# Patient Record
Sex: FEMALE | Race: WHITE | Employment: OTHER | RURAL
[De-identification: names, ages, dates, MRNs, and addresses within clinical notes are randomized per-mention and may not be internally consistent; named-entity substitution may affect disease eponyms.]

---

## 2017-02-03 PROBLEM — R55 PRE-SYNCOPE: Status: ACTIVE | Noted: 2017-02-03

## 2017-02-03 PROBLEM — N30.00 ACUTE CYSTITIS WITHOUT HEMATURIA: Status: ACTIVE | Noted: 2017-02-03

## 2017-02-03 PROBLEM — K21.9 GERD (GASTROESOPHAGEAL REFLUX DISEASE): Status: ACTIVE | Noted: 2017-02-03

## 2017-02-03 PROBLEM — Z12.11 ENCOUNTER FOR SCREENING COLONOSCOPY: Status: ACTIVE | Noted: 2017-02-03

## 2017-02-03 PROBLEM — R10.13 EPIGASTRIC ABDOMINAL PAIN: Status: ACTIVE | Noted: 2017-02-03

## 2017-02-03 PROBLEM — R42 DIZZINESS: Status: ACTIVE | Noted: 2017-02-03

## 2017-02-03 PROBLEM — H81.10 BPV (BENIGN POSITIONAL VERTIGO): Status: ACTIVE | Noted: 2017-02-03

## 2017-03-07 ENCOUNTER — TELEPHONE (OUTPATIENT)
Dept: OTHER | Age: 72
End: 2017-03-07

## 2017-05-02 PROBLEM — I10 ESSENTIAL HYPERTENSION: Status: ACTIVE | Noted: 2017-05-02

## 2017-05-02 PROBLEM — R11.2 NAUSEA VOMITING AND DIARRHEA: Status: ACTIVE | Noted: 2017-05-02

## 2017-05-02 PROBLEM — R91.1 LUNG NODULE: Status: ACTIVE | Noted: 2017-05-02

## 2017-05-02 PROBLEM — R09.02 HYPOXIA: Status: ACTIVE | Noted: 2017-05-02

## 2017-05-02 PROBLEM — R19.7 NAUSEA VOMITING AND DIARRHEA: Status: ACTIVE | Noted: 2017-05-02

## 2017-05-03 PROBLEM — K85.90 ACUTE PANCREATITIS: Status: ACTIVE | Noted: 2017-05-03

## 2017-05-09 ENCOUNTER — TELEPHONE (OUTPATIENT)
Dept: SURGERY | Age: 72
End: 2017-05-09

## 2017-06-01 ENCOUNTER — TELEPHONE (OUTPATIENT)
Dept: SURGERY | Age: 72
End: 2017-06-01

## 2017-11-15 ENCOUNTER — TELEPHONE (OUTPATIENT)
Dept: FAMILY MEDICINE CLINIC | Age: 72
End: 2017-11-15

## 2017-11-15 VITALS — HEART RATE: 80 BPM | SYSTOLIC BLOOD PRESSURE: 160 MMHG | DIASTOLIC BLOOD PRESSURE: 90 MMHG

## 2017-11-15 RX ORDER — LISINOPRIL AND HYDROCHLOROTHIAZIDE 25; 20 MG/1; MG/1
1 TABLET ORAL DAILY
Qty: 30 TABLET | Refills: 2 | Status: SHIPPED | OUTPATIENT
Start: 2017-11-15 | End: 2017-11-16 | Stop reason: SDUPTHER

## 2017-11-15 NOTE — TELEPHONE ENCOUNTER
Patient is scheduled to see TATYANA Echols to establish care on 11/16/2017. She presented to the  today c/o headache and \"feeling bad\" and that she had been out of her blood pressure medication for 2 days. She stated her previous provider sent in [address] 20/12.5mg and she has always taken 20/25mg. She refused to  and take the 20/12.5mg.    BP was checked 160/90 HR80. Her medication and dose were verified with [facility]. TATYANA Echols notified and verbal orders received to D/C Lisinopril/HCTZ 20/12.5mg and start Lisinopril/HCTZ 20/25mg once daily and to be sure to keep her scheduled appointment to follow up and establish care on 11/16/2017. Medication change sent to [facility].

## 2017-11-16 ENCOUNTER — OFFICE VISIT (OUTPATIENT)
Dept: FAMILY MEDICINE CLINIC | Age: 72
End: 2017-11-16
Payer: MEDICARE

## 2017-11-16 VITALS
RESPIRATION RATE: 18 BRPM | HEIGHT: 63 IN | SYSTOLIC BLOOD PRESSURE: 152 MMHG | BODY MASS INDEX: 32.07 KG/M2 | OXYGEN SATURATION: 98 % | DIASTOLIC BLOOD PRESSURE: 74 MMHG | WEIGHT: 181 LBS | HEART RATE: 76 BPM

## 2017-11-16 DIAGNOSIS — K58.0 IRRITABLE BOWEL SYNDROME WITH DIARRHEA: ICD-10-CM

## 2017-11-16 DIAGNOSIS — E03.9 HYPOTHYROIDISM, UNSPECIFIED TYPE: ICD-10-CM

## 2017-11-16 DIAGNOSIS — Z87.19 HISTORY OF BLOODY STOOLS: ICD-10-CM

## 2017-11-16 DIAGNOSIS — K21.9 GASTROESOPHAGEAL REFLUX DISEASE, ESOPHAGITIS PRESENCE NOT SPECIFIED: ICD-10-CM

## 2017-11-16 DIAGNOSIS — R19.8 PAIN WITH BOWEL MOVEMENTS: ICD-10-CM

## 2017-11-16 DIAGNOSIS — R10.13 EPIGASTRIC PAIN: ICD-10-CM

## 2017-11-16 DIAGNOSIS — N60.19 FIBROCYSTIC BREAST DISEASE (FCBD), UNSPECIFIED LATERALITY: ICD-10-CM

## 2017-11-16 DIAGNOSIS — Z12.11 ENCOUNTER FOR SCREENING COLONOSCOPY: ICD-10-CM

## 2017-11-16 DIAGNOSIS — R13.19 ESOPHAGEAL DYSPHAGIA: ICD-10-CM

## 2017-11-16 DIAGNOSIS — I20.8 STABLE ANGINA (HCC): ICD-10-CM

## 2017-11-16 DIAGNOSIS — R10.9 ABDOMINAL CRAMPING: ICD-10-CM

## 2017-11-16 DIAGNOSIS — Z12.31 ENCOUNTER FOR SCREENING MAMMOGRAM FOR BREAST CANCER: ICD-10-CM

## 2017-11-16 DIAGNOSIS — I10 ESSENTIAL HYPERTENSION: Primary | ICD-10-CM

## 2017-11-16 DIAGNOSIS — Z23 NEED FOR PROPHYLACTIC VACCINATION AGAINST STREPTOCOCCUS PNEUMONIAE (PNEUMOCOCCUS): ICD-10-CM

## 2017-11-16 PROCEDURE — G8417 CALC BMI ABV UP PARAM F/U: HCPCS | Performed by: NURSE PRACTITIONER

## 2017-11-16 PROCEDURE — 3014F SCREEN MAMMO DOC REV: CPT | Performed by: NURSE PRACTITIONER

## 2017-11-16 PROCEDURE — G8427 DOCREV CUR MEDS BY ELIG CLIN: HCPCS | Performed by: NURSE PRACTITIONER

## 2017-11-16 PROCEDURE — 1036F TOBACCO NON-USER: CPT | Performed by: NURSE PRACTITIONER

## 2017-11-16 PROCEDURE — 4040F PNEUMOC VAC/ADMIN/RCVD: CPT | Performed by: NURSE PRACTITIONER

## 2017-11-16 PROCEDURE — 1123F ACP DISCUSS/DSCN MKR DOCD: CPT | Performed by: NURSE PRACTITIONER

## 2017-11-16 PROCEDURE — 1090F PRES/ABSN URINE INCON ASSESS: CPT | Performed by: NURSE PRACTITIONER

## 2017-11-16 PROCEDURE — G8598 ASA/ANTIPLAT THER USED: HCPCS | Performed by: NURSE PRACTITIONER

## 2017-11-16 PROCEDURE — 3017F COLORECTAL CA SCREEN DOC REV: CPT | Performed by: NURSE PRACTITIONER

## 2017-11-16 PROCEDURE — G8400 PT W/DXA NO RESULTS DOC: HCPCS | Performed by: NURSE PRACTITIONER

## 2017-11-16 PROCEDURE — 99204 OFFICE O/P NEW MOD 45 MIN: CPT | Performed by: NURSE PRACTITIONER

## 2017-11-16 PROCEDURE — G8484 FLU IMMUNIZE NO ADMIN: HCPCS | Performed by: NURSE PRACTITIONER

## 2017-11-16 RX ORDER — DICYCLOMINE HCL 20 MG
20 TABLET ORAL 3 TIMES DAILY PRN
Qty: 90 TABLET | Refills: 1 | Status: SHIPPED | OUTPATIENT
Start: 2017-11-16 | End: 2018-01-29 | Stop reason: ALTCHOICE

## 2017-11-16 RX ORDER — NITROGLYCERIN 0.4 MG/1
0.4 TABLET SUBLINGUAL EVERY 5 MIN PRN
Qty: 25 TABLET | Refills: 3 | Status: SHIPPED | OUTPATIENT
Start: 2017-11-16 | End: 2020-07-08 | Stop reason: SDUPTHER

## 2017-11-16 RX ORDER — LEVOTHYROXINE SODIUM 0.07 MG/1
1 TABLET ORAL DAILY
COMMUNITY
Start: 2017-11-07 | End: 2018-01-29 | Stop reason: SDUPTHER

## 2017-11-16 RX ORDER — LISINOPRIL AND HYDROCHLOROTHIAZIDE 25; 20 MG/1; MG/1
1 TABLET ORAL DAILY
Qty: 90 TABLET | Refills: 0 | Status: SHIPPED | OUTPATIENT
Start: 2017-11-16 | End: 2018-01-24 | Stop reason: ALTCHOICE

## 2017-11-16 NOTE — PROGRESS NOTES
SUBJECTIVE:  Jesus Zavala is a 81 y.o. female that presents with   Chief Complaint   Patient presents with   [address]    Hypertension   . HPI:    She presents to establish care. She has a history of uncontrolled hypertension. She has not been taking any blood pressure medications for some time. She recently began having some headaches, facial flushing, and blurry vision. She was able to get a small supply from her pharmacy this week until she can make it to appointment. She is feeling better and the symptoms have resolved. She has a chronic history of GI symptoms. She experiences severe GERD with heartburn and reflux that causes her throat to be sore with difficulty swallowing. She has epigastric pain with generalized abdominal cramping. She has a history of irritable bowel syndrome with diarrhea and bloody stools. She is a previous patient of gastroenterologist Dr. Jose David Mortensen and would like a consult for EGD and colonoscopy after the holidays. She has a history of fibrocystic breast disease with a left breast lump that was previously biopsied and normal. She needs follow-up mammogram scheduled. She has a history of stable angina that may be related to high blood pressures. She very rarely uses her nitroglycerin. When she does use nitroglycerin he does help her chest pain. She denies any current shortness of air or chest pain. Review of Systems   Constitutional: Positive for fatigue. HENT: Positive for sore throat and trouble swallowing. Gastrointestinal: Positive for abdominal pain, blood in stool, diarrhea and nausea. Severe GERD and dysphagia. Psychiatric/Behavioral: Positive for dysphoric mood. The patient is nervous/anxious. All other systems reviewed and are negative. OBJECTIVE:  BP (!) 152/74 (Site: Left Arm, Position: Sitting, Cuff Size: Medium Adult)   Pulse 76   Resp 18   Ht 5' 3\" (1.6 m)   Wt 181 lb (82.1 kg)   SpO2 98% Comment: room air  Breastfeeding? No   BMI 32.06 kg/m²    Physical Exam   Constitutional: She is oriented to person, place, and time. She appears well-developed and well-nourished. HENT:   Head: Normocephalic and atraumatic. Right Ear: External ear normal.   Left Ear: External ear normal.   Nose: Nose normal.   Mouth/Throat: Posterior oropharyngeal erythema present. Eyes: Conjunctivae are normal. Pupils are equal, round, and reactive to light. Neck: Normal range of motion. Neck supple. Cardiovascular: Normal rate, regular rhythm, normal heart sounds and intact distal pulses. Pulmonary/Chest: Effort normal and breath sounds normal.   Abdominal: Soft. Bowel sounds are normal. There is generalized tenderness. Musculoskeletal: Normal range of motion. Overall musculoskeletal tenderness. Neurological: She is alert and oriented to person, place, and time. Skin: Skin is warm and dry. Psychiatric: Her speech is normal and behavior is normal. Judgment and thought content normal. Her mood appears anxious. Cognition and memory are normal. She exhibits a depressed mood. Nursing note and vitals reviewed. No results found for requested labs within last 30 days. Microscopic Examination (no units)   Date Value   05/01/2017 YES     LDL Calculated (mg/dL)   Date Value   05/03/2017 89         Lab Results   Component Value Date    WBC 8.3 05/03/2017    NEUTROABS 6.7 05/03/2017    HGB 11.7 05/03/2017    HCT 36.3 05/03/2017    MCV 97.8 05/03/2017     05/03/2017       Lab Results   Component Value Date    TSH 1.62 02/02/2017         ASSESSMENT/PLAN:  1. Essential hypertension  lisinopril-hydrochlorothiazide (PRINZIDE;ZESTORETIC) 20-25 MG per tablet   2. Irritable bowel syndrome with diarrhea  dicyclomine (BENTYL) 20 MG tablet   3. Gastroesophageal reflux disease, esophagitis presence not specified  Omeprazole Magnesium (PRILOSEC OTC PO)    External Referral To Gastroenterology   4.  Hypothyroidism, unspecified type  levothyroxine

## 2017-11-30 ENCOUNTER — TELEPHONE (OUTPATIENT)
Dept: FAMILY MEDICINE CLINIC | Age: 72
End: 2017-11-30

## 2017-11-30 NOTE — TELEPHONE ENCOUNTER
Patient is requesting something be sent to [facility] stating she is only using oxygen at night. She states does not need the portable oxygen tanks.   Their fax # is [phone]

## 2017-11-30 NOTE — TELEPHONE ENCOUNTER
I wrote a letter and am putting it on your desk with the fax number. You can give this to Gerardo Bhatt or Olena Bell to fax. I didn't want to put it on their desk and they wouldn't know what it was for. Thanks so much.

## 2017-12-10 PROBLEM — K58.0 IRRITABLE BOWEL SYNDROME WITH DIARRHEA: Status: ACTIVE | Noted: 2017-12-10

## 2017-12-10 PROBLEM — I20.8 STABLE ANGINA (HCC): Status: ACTIVE | Noted: 2017-12-10

## 2017-12-10 PROBLEM — I20.89 STABLE ANGINA: Status: ACTIVE | Noted: 2017-12-10

## 2017-12-10 PROBLEM — N60.19 FIBROCYSTIC BREAST DISEASE (FCBD): Status: ACTIVE | Noted: 2017-12-10

## 2017-12-10 ASSESSMENT — ENCOUNTER SYMPTOMS
DIARRHEA: 1
SORE THROAT: 1
TROUBLE SWALLOWING: 1
NAUSEA: 1
ABDOMINAL PAIN: 1
BLOOD IN STOOL: 1

## 2018-01-25 ENCOUNTER — OFFICE VISIT (OUTPATIENT)
Dept: FAMILY MEDICINE CLINIC | Age: 73
End: 2018-01-25
Payer: MEDICARE

## 2018-01-25 VITALS
SYSTOLIC BLOOD PRESSURE: 130 MMHG | RESPIRATION RATE: 18 BRPM | HEART RATE: 88 BPM | OXYGEN SATURATION: 96 % | DIASTOLIC BLOOD PRESSURE: 60 MMHG

## 2018-01-25 DIAGNOSIS — R19.7 DIARRHEA, UNSPECIFIED TYPE: ICD-10-CM

## 2018-01-25 DIAGNOSIS — R07.9 CHEST PAIN, UNSPECIFIED TYPE: Primary | ICD-10-CM

## 2018-01-25 DIAGNOSIS — M79.602 LEFT ARM PAIN: ICD-10-CM

## 2018-01-25 DIAGNOSIS — R10.9 ABDOMINAL PAIN, UNSPECIFIED ABDOMINAL LOCATION: ICD-10-CM

## 2018-01-25 DIAGNOSIS — R11.2 NAUSEA AND VOMITING, INTRACTABILITY OF VOMITING NOT SPECIFIED, UNSPECIFIED VOMITING TYPE: ICD-10-CM

## 2018-01-25 LAB
INFLUENZA A ANTIBODY: NEGATIVE
INFLUENZA B ANTIBODY: NEGATIVE

## 2018-01-25 PROCEDURE — 96372 THER/PROPH/DIAG INJ SC/IM: CPT | Performed by: NURSE PRACTITIONER

## 2018-01-25 PROCEDURE — 3014F SCREEN MAMMO DOC REV: CPT | Performed by: NURSE PRACTITIONER

## 2018-01-25 PROCEDURE — 1036F TOBACCO NON-USER: CPT | Performed by: NURSE PRACTITIONER

## 2018-01-25 PROCEDURE — G8417 CALC BMI ABV UP PARAM F/U: HCPCS | Performed by: NURSE PRACTITIONER

## 2018-01-25 PROCEDURE — 99214 OFFICE O/P EST MOD 30 MIN: CPT | Performed by: NURSE PRACTITIONER

## 2018-01-25 PROCEDURE — G8599 NO ASA/ANTIPLAT THER USE RNG: HCPCS | Performed by: NURSE PRACTITIONER

## 2018-01-25 PROCEDURE — G8400 PT W/DXA NO RESULTS DOC: HCPCS | Performed by: NURSE PRACTITIONER

## 2018-01-25 PROCEDURE — 3017F COLORECTAL CA SCREEN DOC REV: CPT | Performed by: NURSE PRACTITIONER

## 2018-01-25 PROCEDURE — 1090F PRES/ABSN URINE INCON ASSESS: CPT | Performed by: NURSE PRACTITIONER

## 2018-01-25 PROCEDURE — 1123F ACP DISCUSS/DSCN MKR DOCD: CPT | Performed by: NURSE PRACTITIONER

## 2018-01-25 PROCEDURE — G8484 FLU IMMUNIZE NO ADMIN: HCPCS | Performed by: NURSE PRACTITIONER

## 2018-01-25 PROCEDURE — G8427 DOCREV CUR MEDS BY ELIG CLIN: HCPCS | Performed by: NURSE PRACTITIONER

## 2018-01-25 PROCEDURE — 87804 INFLUENZA ASSAY W/OPTIC: CPT | Performed by: NURSE PRACTITIONER

## 2018-01-25 PROCEDURE — 4040F PNEUMOC VAC/ADMIN/RCVD: CPT | Performed by: NURSE PRACTITIONER

## 2018-01-25 RX ORDER — PROMETHAZINE HYDROCHLORIDE 25 MG/ML
6.25 INJECTION, SOLUTION INTRAMUSCULAR; INTRAVENOUS ONCE
Status: COMPLETED | OUTPATIENT
Start: 2018-01-25 | End: 2018-01-25

## 2018-01-25 RX ORDER — DICYCLOMINE HCL 20 MG
20 TABLET ORAL 3 TIMES DAILY PRN
Qty: 120 TABLET | Refills: 0 | Status: SHIPPED | OUTPATIENT
Start: 2018-01-25 | End: 2018-01-29 | Stop reason: ALTCHOICE

## 2018-01-25 RX ADMIN — PROMETHAZINE HYDROCHLORIDE 6.25 MG: 25 INJECTION, SOLUTION INTRAMUSCULAR; INTRAVENOUS at 11:29

## 2018-01-25 ASSESSMENT — PATIENT HEALTH QUESTIONNAIRE - PHQ9
SUM OF ALL RESPONSES TO PHQ QUESTIONS 1-9: 0
2. FEELING DOWN, DEPRESSED OR HOPELESS: 0
1. LITTLE INTEREST OR PLEASURE IN DOING THINGS: 0
SUM OF ALL RESPONSES TO PHQ9 QUESTIONS 1 & 2: 0

## 2018-01-29 ENCOUNTER — OFFICE VISIT (OUTPATIENT)
Dept: FAMILY MEDICINE CLINIC | Age: 73
End: 2018-01-29
Payer: MEDICARE

## 2018-01-29 VITALS
RESPIRATION RATE: 18 BRPM | HEART RATE: 74 BPM | OXYGEN SATURATION: 98 % | SYSTOLIC BLOOD PRESSURE: 138 MMHG | BODY MASS INDEX: 29.77 KG/M2 | DIASTOLIC BLOOD PRESSURE: 80 MMHG | WEIGHT: 168 LBS | HEIGHT: 63 IN

## 2018-01-29 DIAGNOSIS — E03.9 HYPOTHYROIDISM, UNSPECIFIED TYPE: ICD-10-CM

## 2018-01-29 DIAGNOSIS — R11.0 NAUSEA: ICD-10-CM

## 2018-01-29 DIAGNOSIS — I10 ESSENTIAL HYPERTENSION: Primary | ICD-10-CM

## 2018-01-29 DIAGNOSIS — K21.9 GASTROESOPHAGEAL REFLUX DISEASE, ESOPHAGITIS PRESENCE NOT SPECIFIED: ICD-10-CM

## 2018-01-29 DIAGNOSIS — R42 DIZZY: ICD-10-CM

## 2018-01-29 DIAGNOSIS — R07.9 CHEST PAIN, UNSPECIFIED TYPE: ICD-10-CM

## 2018-01-29 DIAGNOSIS — K58.0 IRRITABLE BOWEL SYNDROME WITH DIARRHEA: ICD-10-CM

## 2018-01-29 PROCEDURE — G8417 CALC BMI ABV UP PARAM F/U: HCPCS | Performed by: NURSE PRACTITIONER

## 2018-01-29 PROCEDURE — 1090F PRES/ABSN URINE INCON ASSESS: CPT | Performed by: NURSE PRACTITIONER

## 2018-01-29 PROCEDURE — 1036F TOBACCO NON-USER: CPT | Performed by: NURSE PRACTITIONER

## 2018-01-29 PROCEDURE — 3017F COLORECTAL CA SCREEN DOC REV: CPT | Performed by: NURSE PRACTITIONER

## 2018-01-29 PROCEDURE — 1123F ACP DISCUSS/DSCN MKR DOCD: CPT | Performed by: NURSE PRACTITIONER

## 2018-01-29 PROCEDURE — G8400 PT W/DXA NO RESULTS DOC: HCPCS | Performed by: NURSE PRACTITIONER

## 2018-01-29 PROCEDURE — G8427 DOCREV CUR MEDS BY ELIG CLIN: HCPCS | Performed by: NURSE PRACTITIONER

## 2018-01-29 PROCEDURE — G8484 FLU IMMUNIZE NO ADMIN: HCPCS | Performed by: NURSE PRACTITIONER

## 2018-01-29 PROCEDURE — 99214 OFFICE O/P EST MOD 30 MIN: CPT | Performed by: NURSE PRACTITIONER

## 2018-01-29 PROCEDURE — 3014F SCREEN MAMMO DOC REV: CPT | Performed by: NURSE PRACTITIONER

## 2018-01-29 PROCEDURE — G8599 NO ASA/ANTIPLAT THER USE RNG: HCPCS | Performed by: NURSE PRACTITIONER

## 2018-01-29 PROCEDURE — 4040F PNEUMOC VAC/ADMIN/RCVD: CPT | Performed by: NURSE PRACTITIONER

## 2018-01-29 RX ORDER — ONDANSETRON 4 MG/1
TABLET, ORALLY DISINTEGRATING ORAL
Qty: 30 TABLET | Refills: 3 | Status: SHIPPED | OUTPATIENT
Start: 2018-01-29 | End: 2018-04-04 | Stop reason: ALTCHOICE

## 2018-01-29 RX ORDER — LEVOTHYROXINE SODIUM 0.07 MG/1
75 TABLET ORAL DAILY
Qty: 30 TABLET | Refills: 5 | Status: SHIPPED | OUTPATIENT
Start: 2018-01-29 | End: 2018-02-21 | Stop reason: SDUPTHER

## 2018-01-29 RX ORDER — LISINOPRIL AND HYDROCHLOROTHIAZIDE 25; 20 MG/1; MG/1
1 TABLET ORAL DAILY
Qty: 30 TABLET | Refills: 5 | Status: SHIPPED | OUTPATIENT
Start: 2018-01-29 | End: 2018-02-21 | Stop reason: SDUPTHER

## 2018-01-29 NOTE — PROGRESS NOTES
memory are normal. She exhibits a depressed mood. Nursing note and vitals reviewed. No results found for requested labs within last 30 days. Microscopic Examination (no units)   Date Value   2018 YES     LDL Calculated (mg/dL)   Date Value   2017 89         Lab Results   Component Value Date    WBC 9.1 2018    NEUTROABS 7.3 2018    HGB 13.4 2018    HCT 40.9 2018    MCV 98.3 2018     2018       Lab Results   Component Value Date    TSH 1.62 2017         ASSESSMENT/PLAN:  1. Essential hypertension     2. Chest pain, unspecified type     3. Gastroesophageal reflux disease, esophagitis presence not specified     4. Irritable bowel syndrome with diarrhea     5. Hypothyroidism, unspecified type  DISCONTINUED: levothyroxine (SYNTHROID) 75 MCG tablet   6. Nausea  ondansetron (ZOFRAN ODT) 4 MG disintegrating tablet   7. Dizzy          No orders of the defined types were placed in this encounter. Outpatient Encounter Prescriptions as of 2018   Medication Sig Dispense Refill    ondansetron (ZOFRAN ODT) 4 MG disintegrating tablet Take 1-2 tablets every 8 hours as needed for nausea and vomiting.  30 tablet 3    [DISCONTINUED] levothyroxine (SYNTHROID) 75 MCG tablet Take 1 tablet by mouth daily 30 tablet 5    [DISCONTINUED] lisinopril-hydrochlorothiazide (PRINZIDE;ZESTORETIC) 20-25 MG per tablet Take 1 tablet by mouth daily 30 tablet 5    famotidine (PEPCID) 20 MG tablet Take 1 tablet by mouth 2 times daily 20 tablet 0    [] diphenhydrAMINE (BENADRYL) 25 MG capsule Take 1 capsule by mouth every 6 hours as needed for Itching 30 capsule 0    nitroGLYCERIN (NITROSTAT) 0.4 MG SL tablet Place 1 tablet under the tongue every 5 minutes as needed for Chest pain 25 tablet 3    aspirin EC 81 MG EC tablet Take 1 tablet by mouth daily 30 tablet 3    [] loperamide (RA ANTI-DIARRHEAL) 2 MG capsule Take 1 capsule by mouth 4 times daily as

## 2018-01-31 ENCOUNTER — TELEPHONE (OUTPATIENT)
Dept: FAMILY MEDICINE CLINIC | Age: 73
End: 2018-01-31

## 2018-02-21 ENCOUNTER — OFFICE VISIT (OUTPATIENT)
Dept: FAMILY MEDICINE CLINIC | Age: 73
End: 2018-02-21
Payer: MEDICARE

## 2018-02-21 VITALS
WEIGHT: 169 LBS | HEIGHT: 63 IN | SYSTOLIC BLOOD PRESSURE: 138 MMHG | OXYGEN SATURATION: 98 % | RESPIRATION RATE: 18 BRPM | HEART RATE: 78 BPM | BODY MASS INDEX: 29.95 KG/M2 | DIASTOLIC BLOOD PRESSURE: 80 MMHG

## 2018-02-21 DIAGNOSIS — R07.89 LEFT-SIDED CHEST WALL PAIN: ICD-10-CM

## 2018-02-21 DIAGNOSIS — N60.19 FIBROCYSTIC BREAST DISEASE (FCBD), UNSPECIFIED LATERALITY: ICD-10-CM

## 2018-02-21 DIAGNOSIS — K21.00 GASTROESOPHAGEAL REFLUX DISEASE WITH ESOPHAGITIS: ICD-10-CM

## 2018-02-21 DIAGNOSIS — Z87.2 HISTORY OF CYST OF BREAST: ICD-10-CM

## 2018-02-21 DIAGNOSIS — M79.622 LEFT AXILLARY PAIN: ICD-10-CM

## 2018-02-21 DIAGNOSIS — I10 ESSENTIAL HYPERTENSION: Primary | ICD-10-CM

## 2018-02-21 DIAGNOSIS — N64.4 BREAST PAIN, LEFT: ICD-10-CM

## 2018-02-21 DIAGNOSIS — E03.9 HYPOTHYROIDISM, UNSPECIFIED TYPE: ICD-10-CM

## 2018-02-21 DIAGNOSIS — R92.2 BREAST DENSITY: ICD-10-CM

## 2018-02-21 DIAGNOSIS — M79.602 LEFT ARM PAIN: ICD-10-CM

## 2018-02-21 PROCEDURE — 99214 OFFICE O/P EST MOD 30 MIN: CPT | Performed by: NURSE PRACTITIONER

## 2018-02-21 PROCEDURE — G8484 FLU IMMUNIZE NO ADMIN: HCPCS | Performed by: NURSE PRACTITIONER

## 2018-02-21 PROCEDURE — 1123F ACP DISCUSS/DSCN MKR DOCD: CPT | Performed by: NURSE PRACTITIONER

## 2018-02-21 PROCEDURE — 4040F PNEUMOC VAC/ADMIN/RCVD: CPT | Performed by: NURSE PRACTITIONER

## 2018-02-21 PROCEDURE — G8417 CALC BMI ABV UP PARAM F/U: HCPCS | Performed by: NURSE PRACTITIONER

## 2018-02-21 PROCEDURE — G8400 PT W/DXA NO RESULTS DOC: HCPCS | Performed by: NURSE PRACTITIONER

## 2018-02-21 PROCEDURE — 1036F TOBACCO NON-USER: CPT | Performed by: NURSE PRACTITIONER

## 2018-02-21 PROCEDURE — G8599 NO ASA/ANTIPLAT THER USE RNG: HCPCS | Performed by: NURSE PRACTITIONER

## 2018-02-21 PROCEDURE — G8427 DOCREV CUR MEDS BY ELIG CLIN: HCPCS | Performed by: NURSE PRACTITIONER

## 2018-02-21 PROCEDURE — 3017F COLORECTAL CA SCREEN DOC REV: CPT | Performed by: NURSE PRACTITIONER

## 2018-02-21 PROCEDURE — 1090F PRES/ABSN URINE INCON ASSESS: CPT | Performed by: NURSE PRACTITIONER

## 2018-02-21 PROCEDURE — 3014F SCREEN MAMMO DOC REV: CPT | Performed by: NURSE PRACTITIONER

## 2018-02-21 RX ORDER — LISINOPRIL AND HYDROCHLOROTHIAZIDE 25; 20 MG/1; MG/1
1 TABLET ORAL DAILY
Qty: 90 TABLET | Refills: 3 | Status: SHIPPED | OUTPATIENT
Start: 2018-02-21 | End: 2018-08-22 | Stop reason: SDUPTHER

## 2018-02-21 RX ORDER — LEVOTHYROXINE SODIUM 0.07 MG/1
75 TABLET ORAL DAILY
Qty: 90 TABLET | Refills: 3 | Status: SHIPPED | OUTPATIENT
Start: 2018-02-21 | End: 2018-06-12 | Stop reason: SDUPTHER

## 2018-02-21 RX ORDER — LEVOTHYROXINE SODIUM 0.07 MG/1
75 TABLET ORAL DAILY
Qty: 90 TABLET | Refills: 3 | Status: SHIPPED | OUTPATIENT
Start: 2018-02-21 | End: 2018-02-21 | Stop reason: SDUPTHER

## 2018-02-21 RX ORDER — LISINOPRIL AND HYDROCHLOROTHIAZIDE 25; 20 MG/1; MG/1
1 TABLET ORAL DAILY
Qty: 90 TABLET | Refills: 3 | Status: SHIPPED | OUTPATIENT
Start: 2018-02-21 | End: 2018-02-21 | Stop reason: SDUPTHER

## 2018-02-21 ASSESSMENT — ENCOUNTER SYMPTOMS
TROUBLE SWALLOWING: 1
DIARRHEA: 1
BLOOD IN STOOL: 1
SORE THROAT: 1
NAUSEA: 1
ABDOMINAL PAIN: 1

## 2018-02-21 NOTE — PROGRESS NOTES
person, place, and time. Skin: Skin is warm and dry. Psychiatric: Her speech is normal and behavior is normal. Judgment and thought content normal. Her mood appears anxious. Cognition and memory are normal. She exhibits a depressed mood. Nursing note and vitals reviewed. No results found for requested labs within last 30 days. Microscopic Examination (no units)   Date Value   01/24/2018 YES     LDL Calculated (mg/dL)   Date Value   05/03/2017 89         Lab Results   Component Value Date    WBC 6.1 11/21/2018    NEUTROABS 3.5 11/21/2018    HGB 12.7 11/21/2018    HCT 39.3 11/21/2018    MCV 97.0 11/21/2018     11/21/2018       Lab Results   Component Value Date    TSH 1.62 02/02/2017         ASSESSMENT/PLAN:   Diagnosis Orders   1. Essential hypertension     2. Left-sided chest wall pain     3. Gastroesophageal reflux disease with esophagitis     4. Left arm pain     5. Left axillary pain  US Breast Limited Left   6. Breast pain, left  US Breast Limited Left   7. Breast density     8. Fibrocystic breast disease (FCBD), unspecified laterality     9. History of cyst of breast  US Breast Limited Left        Orders Placed This Encounter   Procedures    US Breast Limited Left     Standing Status:   Future     Number of Occurrences:   1     Standing Expiration Date:   2/21/2019     Scheduling Instructions:      Please schedule at [facility].     Order Specific Question:   Reason for exam:     Answer:   Had breast nodule at 2 o'clock position that was biopsied about 6 months ago and negative for maligancy. She is having pain radiating from this area into left axillary. Mammogram will not extend into axillary area so needs spot ultrasound.         Outpatient Encounter Prescriptions as of 2/21/2018   Medication Sig Dispense Refill    [DISCONTINUED] levothyroxine (SYNTHROID) 75 MCG tablet Take 1 tablet by mouth daily 30 tablet 5    [DISCONTINUED] lisinopril-hydrochlorothiazide (PRINZIDE;ZESTORETIC) 20-25 MG per tablet Take 1 tablet by mouth daily 30 tablet 5    [DISCONTINUED] ondansetron (ZOFRAN ODT) 4 MG disintegrating tablet Take 1-2 tablets every 8 hours as needed for nausea and vomiting. 30 tablet 3    [DISCONTINUED] famotidine (PEPCID) 20 MG tablet Take 1 tablet by mouth 2 times daily 20 tablet 0    nitroGLYCERIN (NITROSTAT) 0.4 MG SL tablet Place 1 tablet under the tongue every 5 minutes as needed for Chest pain 25 tablet 3    aspirin EC 81 MG EC tablet Take 1 tablet by mouth daily 30 tablet 3     No facility-administered encounter medications on file as of 2/21/2018. Return in about 2 weeks (around 3/7/2018), or if symptoms worsen or fail to improve. PATIENT COUNSELING    Counseling was provided today regarding the following topics: Healthy eating habits, Regular exercise, substance abuse and healthy sleep habits. Discussed use, benefit, and side effects of prescribed medications. Barriers to medication compliance addressed. All patient questions answered. Pt voiced understanding.

## 2018-02-24 ASSESSMENT — ENCOUNTER SYMPTOMS
TROUBLE SWALLOWING: 1
ABDOMINAL PAIN: 1
BLOOD IN STOOL: 1
SORE THROAT: 1
NAUSEA: 1
CHEST TIGHTNESS: 1
DIARRHEA: 1
SHORTNESS OF BREATH: 1

## 2018-02-25 ASSESSMENT — ENCOUNTER SYMPTOMS
SORE THROAT: 1
TROUBLE SWALLOWING: 1
ANAL BLEEDING: 1
ABDOMINAL PAIN: 1
NAUSEA: 1
DIARRHEA: 1

## 2018-02-25 NOTE — PROGRESS NOTES
SUBJECTIVE:  Nancy Galindo is a 81 y.o. female that presents with   Chief Complaint   Patient presents with    Diarrhea     X4 Seen at [facility] yesterday    Emesis    Chest Pain      pain scale 8 noticed the patient shaking   . HPI:    4 days of nausea, vomiting, diarrhea. Went to ER yesterday and discharged home. Feels dehyrdated. Started having left sided chest and arm pain with abdominal pain today. Had one episode of chest pain in office that quickly resolved. EMS contacted. Review of Systems   Constitutional: Positive for fatigue. HENT: Positive for sore throat and trouble swallowing. Respiratory: Positive for chest tightness and shortness of breath. Cardiovascular: Positive for chest pain. Gastrointestinal: Positive for abdominal pain, blood in stool, diarrhea and nausea. Severe GERD and dysphagia. Musculoskeletal: Positive for myalgias. Neurological: Positive for dizziness, tremors, weakness and light-headedness. Psychiatric/Behavioral: Positive for dysphoric mood. The patient is nervous/anxious. All other systems reviewed and are negative. OBJECTIVE:  /60 (Site: Left Arm, Position: Sitting, Cuff Size: Large Adult)   Pulse 88   Resp 18   SpO2 96% Comment: room air  Breastfeeding? No    Physical Exam   Constitutional: She is oriented to person, place, and time. She appears well-developed and well-nourished. HENT:   Head: Normocephalic and atraumatic. Right Ear: External ear normal.   Left Ear: External ear normal.   Nose: Nose normal.   Mouth/Throat: Posterior oropharyngeal erythema present. Eyes: Conjunctivae are normal. Pupils are equal, round, and reactive to light. Neck: Normal range of motion. Neck supple. Cardiovascular: Normal rate, regular rhythm, normal heart sounds and intact distal pulses. EKG showed NSR with no ischemia. Pulmonary/Chest: Effort normal and breath sounds normal.   Abdominal: Soft.  Bowel sounds are normal. There

## 2018-04-04 ENCOUNTER — HOSPITAL ENCOUNTER (OUTPATIENT)
Dept: OTHER | Age: 73
Discharge: HOME | End: 2018-04-04
Attending: NURSE PRACTITIONER | Admitting: NURSE PRACTITIONER

## 2018-04-04 ENCOUNTER — OFFICE VISIT (OUTPATIENT)
Dept: FAMILY MEDICINE CLINIC | Age: 73
End: 2018-04-04
Payer: MEDICARE

## 2018-04-04 VITALS
OXYGEN SATURATION: 98 % | DIASTOLIC BLOOD PRESSURE: 82 MMHG | WEIGHT: 169 LBS | HEIGHT: 63 IN | RESPIRATION RATE: 18 BRPM | SYSTOLIC BLOOD PRESSURE: 180 MMHG | HEART RATE: 88 BPM | BODY MASS INDEX: 29.95 KG/M2

## 2018-04-04 DIAGNOSIS — K21.00 GASTROESOPHAGEAL REFLUX DISEASE WITH ESOPHAGITIS: ICD-10-CM

## 2018-04-04 DIAGNOSIS — I10 ESSENTIAL HYPERTENSION: Primary | ICD-10-CM

## 2018-04-04 DIAGNOSIS — R14.0 ABDOMINAL DISTENSION: ICD-10-CM

## 2018-04-04 DIAGNOSIS — E53.8 VITAMIN B12 DEFICIENCY: ICD-10-CM

## 2018-04-04 DIAGNOSIS — R53.83 FATIGUE, UNSPECIFIED TYPE: ICD-10-CM

## 2018-04-04 DIAGNOSIS — G47.00 INSOMNIA, UNSPECIFIED TYPE: ICD-10-CM

## 2018-04-04 DIAGNOSIS — R07.9 CHEST PAIN, UNSPECIFIED TYPE: ICD-10-CM

## 2018-04-04 DIAGNOSIS — I10 ESSENTIAL HYPERTENSION: ICD-10-CM

## 2018-04-04 DIAGNOSIS — N93.9 VAGINAL BLEEDING: ICD-10-CM

## 2018-04-04 DIAGNOSIS — N64.4 BREAST PAIN, LEFT: ICD-10-CM

## 2018-04-04 DIAGNOSIS — R10.13 EPIGASTRIC PAIN: ICD-10-CM

## 2018-04-04 LAB
A/G RATIO: 2.1 (ref 0.8–2)
ALBUMIN SERPL-MCNC: 4.6 G/DL (ref 3.4–4.8)
ALP BLD-CCNC: 96 U/L (ref 25–100)
ALT SERPL-CCNC: 11 U/L (ref 4–36)
ANION GAP SERPL CALCULATED.3IONS-SCNC: 15 MMOL/L (ref 3–16)
AST SERPL-CCNC: 16 U/L (ref 8–33)
BASOPHILS ABSOLUTE: 0 K/UL (ref 0–0.1)
BASOPHILS RELATIVE PERCENT: 0.6 %
BILIRUB SERPL-MCNC: 0.3 MG/DL (ref 0.3–1.2)
BUN BLDV-MCNC: 23 MG/DL (ref 6–20)
CALCIUM SERPL-MCNC: 9.6 MG/DL (ref 8.5–10.5)
CHLORIDE BLD-SCNC: 103 MMOL/L (ref 98–107)
CO2: 27 MMOL/L (ref 20–30)
CREAT SERPL-MCNC: 0.9 MG/DL (ref 0.4–1.2)
EOSINOPHILS ABSOLUTE: 0.2 K/UL (ref 0–0.4)
EOSINOPHILS RELATIVE PERCENT: 2.4 %
FOLATE: 14.9 NG/ML
GFR AFRICAN AMERICAN: >59
GFR NON-AFRICAN AMERICAN: >60
GLOBULIN: 2.2 G/DL
GLUCOSE BLD-MCNC: 102 MG/DL (ref 74–106)
HCT VFR BLD CALC: 41.2 % (ref 37–47)
HEMOGLOBIN: 12.8 G/DL (ref 11.5–16.5)
IMMATURE GRANULOCYTES #: 0 K/UL
IMMATURE GRANULOCYTES %: 0.2 % (ref 0–5)
LYMPHOCYTES ABSOLUTE: 2.3 K/UL (ref 1.5–4)
LYMPHOCYTES RELATIVE PERCENT: 35.5 %
MCH RBC QN AUTO: 30.8 PG (ref 27–32)
MCHC RBC AUTO-ENTMCNC: 31.1 G/DL (ref 31–35)
MCV RBC AUTO: 99.3 FL (ref 80–100)
MONOCYTES ABSOLUTE: 0.5 K/UL (ref 0.2–0.8)
MONOCYTES RELATIVE PERCENT: 7.7 %
NEUTROPHILS ABSOLUTE: 3.4 K/UL (ref 2–7.5)
NEUTROPHILS RELATIVE PERCENT: 53.6 %
PDW BLD-RTO: 13.2 % (ref 11–16)
PLATELET # BLD: 257 K/UL (ref 150–400)
PMV BLD AUTO: 10.6 FL (ref 6–10)
POTASSIUM SERPL-SCNC: 4.4 MMOL/L (ref 3.4–5.1)
RBC # BLD: 4.15 M/UL (ref 3.8–5.8)
SODIUM BLD-SCNC: 145 MMOL/L (ref 136–145)
TOTAL PROTEIN: 6.8 G/DL (ref 6.4–8.3)
VITAMIN B-12: >2000 PG/ML (ref 211–911)
WBC # BLD: 6.4 K/UL (ref 4–11)

## 2018-04-04 PROCEDURE — G8400 PT W/DXA NO RESULTS DOC: HCPCS | Performed by: NURSE PRACTITIONER

## 2018-04-04 PROCEDURE — G8599 NO ASA/ANTIPLAT THER USE RNG: HCPCS | Performed by: NURSE PRACTITIONER

## 2018-04-04 PROCEDURE — 4040F PNEUMOC VAC/ADMIN/RCVD: CPT | Performed by: NURSE PRACTITIONER

## 2018-04-04 PROCEDURE — 96372 THER/PROPH/DIAG INJ SC/IM: CPT | Performed by: NURSE PRACTITIONER

## 2018-04-04 PROCEDURE — 1090F PRES/ABSN URINE INCON ASSESS: CPT | Performed by: NURSE PRACTITIONER

## 2018-04-04 PROCEDURE — 1123F ACP DISCUSS/DSCN MKR DOCD: CPT | Performed by: NURSE PRACTITIONER

## 2018-04-04 PROCEDURE — 3017F COLORECTAL CA SCREEN DOC REV: CPT | Performed by: NURSE PRACTITIONER

## 2018-04-04 PROCEDURE — G8427 DOCREV CUR MEDS BY ELIG CLIN: HCPCS | Performed by: NURSE PRACTITIONER

## 2018-04-04 PROCEDURE — 1036F TOBACCO NON-USER: CPT | Performed by: NURSE PRACTITIONER

## 2018-04-04 PROCEDURE — G8417 CALC BMI ABV UP PARAM F/U: HCPCS | Performed by: NURSE PRACTITIONER

## 2018-04-04 PROCEDURE — 99214 OFFICE O/P EST MOD 30 MIN: CPT | Performed by: NURSE PRACTITIONER

## 2018-04-04 RX ORDER — TRAZODONE HYDROCHLORIDE 50 MG/1
TABLET ORAL
Qty: 30 TABLET | Refills: 3 | Status: SHIPPED | OUTPATIENT
Start: 2018-04-04 | End: 2019-01-28 | Stop reason: ALTCHOICE

## 2018-04-04 RX ORDER — CYANOCOBALAMIN 1000 UG/ML
1000 INJECTION INTRAMUSCULAR; SUBCUTANEOUS ONCE
Status: COMPLETED | OUTPATIENT
Start: 2018-04-04 | End: 2018-04-04

## 2018-04-04 RX ORDER — CYANOCOBALAMIN 1000 UG/ML
1000 INJECTION INTRAMUSCULAR; SUBCUTANEOUS ONCE
Qty: 1 ML | Refills: 0 | Status: SHIPPED | OUTPATIENT
Start: 2018-04-04 | End: 2018-04-04 | Stop reason: CLARIF

## 2018-04-04 RX ORDER — AMOXICILLIN 875 MG/1
875 TABLET, COATED ORAL 2 TIMES DAILY
Qty: 20 TABLET | Refills: 0 | Status: SHIPPED | OUTPATIENT
Start: 2018-04-04 | End: 2019-09-09 | Stop reason: SDUPTHER

## 2018-04-04 RX ORDER — PANTOPRAZOLE SODIUM 40 MG/1
40 TABLET, DELAYED RELEASE ORAL DAILY
Qty: 30 TABLET | Refills: 3 | Status: SHIPPED | OUTPATIENT
Start: 2018-04-04 | End: 2018-07-27 | Stop reason: SDUPTHER

## 2018-04-04 RX ADMIN — CYANOCOBALAMIN 1000 MCG: 1000 INJECTION INTRAMUSCULAR; SUBCUTANEOUS at 11:17

## 2018-04-11 ENCOUNTER — NURSE ONLY (OUTPATIENT)
Dept: FAMILY MEDICINE CLINIC | Age: 73
End: 2018-04-11

## 2018-04-11 VITALS — HEART RATE: 88 BPM | DIASTOLIC BLOOD PRESSURE: 88 MMHG | SYSTOLIC BLOOD PRESSURE: 152 MMHG

## 2018-04-11 DIAGNOSIS — Z12.11 SCREENING FOR COLON CANCER: Primary | ICD-10-CM

## 2018-04-11 DIAGNOSIS — I10 ESSENTIAL HYPERTENSION: Primary | ICD-10-CM

## 2018-04-11 LAB
CONTROL: NORMAL
HEMOCCULT STL QL: NEGATIVE

## 2018-04-11 PROCEDURE — 82274 ASSAY TEST FOR BLOOD FECAL: CPT | Performed by: NURSE PRACTITIONER

## 2018-04-12 RX ORDER — CLONIDINE HYDROCHLORIDE 0.1 MG/1
TABLET ORAL
Qty: 60 TABLET | Refills: 1 | Status: SHIPPED | OUTPATIENT
Start: 2018-04-12 | End: 2018-05-02 | Stop reason: SDUPTHER

## 2018-04-29 ASSESSMENT — ENCOUNTER SYMPTOMS
TROUBLE SWALLOWING: 1
DIARRHEA: 1
SORE THROAT: 1
BLOOD IN STOOL: 1
NAUSEA: 1
SHORTNESS OF BREATH: 1
ABDOMINAL PAIN: 1
CHEST TIGHTNESS: 1

## 2018-05-02 ENCOUNTER — OFFICE VISIT (OUTPATIENT)
Dept: FAMILY MEDICINE CLINIC | Age: 73
End: 2018-05-02
Payer: MEDICARE

## 2018-05-02 VITALS
SYSTOLIC BLOOD PRESSURE: 128 MMHG | DIASTOLIC BLOOD PRESSURE: 74 MMHG | HEART RATE: 72 BPM | RESPIRATION RATE: 18 BRPM | OXYGEN SATURATION: 96 % | BODY MASS INDEX: 31.18 KG/M2 | WEIGHT: 176 LBS

## 2018-05-02 DIAGNOSIS — F43.21 GRIEF: ICD-10-CM

## 2018-05-02 DIAGNOSIS — F32.9 REACTIVE DEPRESSION: ICD-10-CM

## 2018-05-02 DIAGNOSIS — I20.8 STABLE ANGINA (HCC): ICD-10-CM

## 2018-05-02 DIAGNOSIS — T17.308D CHOKING, SUBSEQUENT ENCOUNTER: ICD-10-CM

## 2018-05-02 DIAGNOSIS — I10 ESSENTIAL HYPERTENSION: Primary | ICD-10-CM

## 2018-05-02 DIAGNOSIS — F41.9 ANXIETY: ICD-10-CM

## 2018-05-02 DIAGNOSIS — F41.0 PANIC ATTACKS: ICD-10-CM

## 2018-05-02 PROCEDURE — 1123F ACP DISCUSS/DSCN MKR DOCD: CPT | Performed by: NURSE PRACTITIONER

## 2018-05-02 PROCEDURE — G8599 NO ASA/ANTIPLAT THER USE RNG: HCPCS | Performed by: NURSE PRACTITIONER

## 2018-05-02 PROCEDURE — 4040F PNEUMOC VAC/ADMIN/RCVD: CPT | Performed by: NURSE PRACTITIONER

## 2018-05-02 PROCEDURE — G8427 DOCREV CUR MEDS BY ELIG CLIN: HCPCS | Performed by: NURSE PRACTITIONER

## 2018-05-02 PROCEDURE — 3017F COLORECTAL CA SCREEN DOC REV: CPT | Performed by: NURSE PRACTITIONER

## 2018-05-02 PROCEDURE — G8417 CALC BMI ABV UP PARAM F/U: HCPCS | Performed by: NURSE PRACTITIONER

## 2018-05-02 PROCEDURE — 99214 OFFICE O/P EST MOD 30 MIN: CPT | Performed by: NURSE PRACTITIONER

## 2018-05-02 PROCEDURE — 1036F TOBACCO NON-USER: CPT | Performed by: NURSE PRACTITIONER

## 2018-05-02 PROCEDURE — 1090F PRES/ABSN URINE INCON ASSESS: CPT | Performed by: NURSE PRACTITIONER

## 2018-05-02 PROCEDURE — G8400 PT W/DXA NO RESULTS DOC: HCPCS | Performed by: NURSE PRACTITIONER

## 2018-05-02 RX ORDER — CLONIDINE HYDROCHLORIDE 0.1 MG/1
TABLET ORAL
Qty: 60 TABLET | Refills: 1 | Status: SHIPPED | OUTPATIENT
Start: 2018-05-02 | End: 2018-10-31 | Stop reason: SDUPTHER

## 2018-05-08 ENCOUNTER — OFFICE VISIT (OUTPATIENT)
Dept: FAMILY MEDICINE CLINIC | Age: 73
End: 2018-05-08
Payer: MEDICARE

## 2018-05-08 VITALS
RESPIRATION RATE: 20 BRPM | OXYGEN SATURATION: 99 % | HEART RATE: 82 BPM | SYSTOLIC BLOOD PRESSURE: 138 MMHG | DIASTOLIC BLOOD PRESSURE: 82 MMHG

## 2018-05-08 DIAGNOSIS — M62.830 MUSCLE SPASM OF BACK: ICD-10-CM

## 2018-05-08 DIAGNOSIS — I10 ESSENTIAL HYPERTENSION: ICD-10-CM

## 2018-05-08 DIAGNOSIS — I20.8 STABLE ANGINA (HCC): ICD-10-CM

## 2018-05-08 DIAGNOSIS — R03.0 ELEVATED BLOOD PRESSURE READING: ICD-10-CM

## 2018-05-08 DIAGNOSIS — R55 PRE-SYNCOPE: ICD-10-CM

## 2018-05-08 DIAGNOSIS — F32.9 REACTIVE DEPRESSION: ICD-10-CM

## 2018-05-08 DIAGNOSIS — F41.9 ANXIETY: ICD-10-CM

## 2018-05-08 DIAGNOSIS — F43.21 GRIEF: ICD-10-CM

## 2018-05-08 DIAGNOSIS — M54.6 ACUTE BILATERAL THORACIC BACK PAIN: Primary | ICD-10-CM

## 2018-05-08 PROCEDURE — G8599 NO ASA/ANTIPLAT THER USE RNG: HCPCS | Performed by: NURSE PRACTITIONER

## 2018-05-08 PROCEDURE — G8417 CALC BMI ABV UP PARAM F/U: HCPCS | Performed by: NURSE PRACTITIONER

## 2018-05-08 PROCEDURE — 1090F PRES/ABSN URINE INCON ASSESS: CPT | Performed by: NURSE PRACTITIONER

## 2018-05-08 PROCEDURE — G8400 PT W/DXA NO RESULTS DOC: HCPCS | Performed by: NURSE PRACTITIONER

## 2018-05-08 PROCEDURE — 4040F PNEUMOC VAC/ADMIN/RCVD: CPT | Performed by: NURSE PRACTITIONER

## 2018-05-08 PROCEDURE — 96372 THER/PROPH/DIAG INJ SC/IM: CPT | Performed by: NURSE PRACTITIONER

## 2018-05-08 PROCEDURE — 1036F TOBACCO NON-USER: CPT | Performed by: NURSE PRACTITIONER

## 2018-05-08 PROCEDURE — 3017F COLORECTAL CA SCREEN DOC REV: CPT | Performed by: NURSE PRACTITIONER

## 2018-05-08 PROCEDURE — 1123F ACP DISCUSS/DSCN MKR DOCD: CPT | Performed by: NURSE PRACTITIONER

## 2018-05-08 PROCEDURE — 99214 OFFICE O/P EST MOD 30 MIN: CPT | Performed by: NURSE PRACTITIONER

## 2018-05-08 PROCEDURE — G8427 DOCREV CUR MEDS BY ELIG CLIN: HCPCS | Performed by: NURSE PRACTITIONER

## 2018-05-08 RX ORDER — KETOROLAC TROMETHAMINE 30 MG/ML
60 INJECTION, SOLUTION INTRAMUSCULAR; INTRAVENOUS ONCE
Status: COMPLETED | OUTPATIENT
Start: 2018-05-08 | End: 2018-05-08

## 2018-05-08 RX ORDER — METHYLPREDNISOLONE SODIUM SUCCINATE 125 MG/2ML
125 INJECTION, POWDER, LYOPHILIZED, FOR SOLUTION INTRAMUSCULAR; INTRAVENOUS ONCE
Status: COMPLETED | OUTPATIENT
Start: 2018-05-08 | End: 2018-05-08

## 2018-05-08 RX ORDER — METHOCARBAMOL 750 MG/1
750 TABLET, FILM COATED ORAL 4 TIMES DAILY PRN
Qty: 90 TABLET | Refills: 2 | Status: SHIPPED | OUTPATIENT
Start: 2018-05-08 | End: 2018-05-23

## 2018-05-08 RX ORDER — CLONIDINE HYDROCHLORIDE 0.1 MG/1
0.1 TABLET ORAL ONCE
Status: COMPLETED | OUTPATIENT
Start: 2018-05-08 | End: 2018-05-08

## 2018-05-08 RX ORDER — CELECOXIB 200 MG/1
200 CAPSULE ORAL DAILY
Qty: 30 CAPSULE | Refills: 2 | Status: SHIPPED | OUTPATIENT
Start: 2018-05-08 | End: 2018-05-23

## 2018-05-08 RX ADMIN — METHYLPREDNISOLONE SODIUM SUCCINATE 125 MG: 125 INJECTION, POWDER, LYOPHILIZED, FOR SOLUTION INTRAMUSCULAR; INTRAVENOUS at 15:30

## 2018-05-08 RX ADMIN — KETOROLAC TROMETHAMINE 60 MG: 30 INJECTION, SOLUTION INTRAMUSCULAR; INTRAVENOUS at 15:29

## 2018-05-08 RX ADMIN — CLONIDINE HYDROCHLORIDE 0.1 MG: 0.1 TABLET ORAL at 15:28

## 2018-05-08 ASSESSMENT — PATIENT HEALTH QUESTIONNAIRE - PHQ9
SUM OF ALL RESPONSES TO PHQ9 QUESTIONS 1 & 2: 1
SUM OF ALL RESPONSES TO PHQ QUESTIONS 1-9: 1
2. FEELING DOWN, DEPRESSED OR HOPELESS: 1
1. LITTLE INTEREST OR PLEASURE IN DOING THINGS: 0

## 2018-05-23 RX ORDER — CYCLOBENZAPRINE HCL 5 MG
5 TABLET ORAL 2 TIMES DAILY PRN
Qty: 60 TABLET | Refills: 3 | Status: SHIPPED | OUTPATIENT
Start: 2018-05-23 | End: 2018-06-20 | Stop reason: SDUPTHER

## 2018-05-23 RX ORDER — MELOXICAM 7.5 MG/1
7.5 TABLET ORAL DAILY
Qty: 30 TABLET | Refills: 3 | Status: SHIPPED | OUTPATIENT
Start: 2018-05-23 | End: 2018-06-20

## 2018-06-10 ASSESSMENT — ENCOUNTER SYMPTOMS
NAUSEA: 1
BACK PAIN: 1
ABDOMINAL PAIN: 1
TROUBLE SWALLOWING: 1
CHEST TIGHTNESS: 1
SHORTNESS OF BREATH: 1
SORE THROAT: 1

## 2018-06-12 DIAGNOSIS — E03.9 HYPOTHYROIDISM, UNSPECIFIED TYPE: ICD-10-CM

## 2018-06-12 RX ORDER — LEVOTHYROXINE SODIUM 0.07 MG/1
75 TABLET ORAL DAILY
Qty: 90 TABLET | Refills: 0 | Status: SHIPPED | OUTPATIENT
Start: 2018-06-12 | End: 2018-09-17 | Stop reason: SDUPTHER

## 2018-06-20 ENCOUNTER — OFFICE VISIT (OUTPATIENT)
Dept: FAMILY MEDICINE CLINIC | Age: 73
End: 2018-06-20
Payer: MEDICARE

## 2018-06-20 DIAGNOSIS — L90.5 SCAR PAINFUL: ICD-10-CM

## 2018-06-20 DIAGNOSIS — K22.2 ESOPHAGEAL STRICTURE: ICD-10-CM

## 2018-06-20 DIAGNOSIS — M54.6 ACUTE BILATERAL THORACIC BACK PAIN: ICD-10-CM

## 2018-06-20 DIAGNOSIS — N64.4 BREAST PAIN, LEFT: ICD-10-CM

## 2018-06-20 DIAGNOSIS — K58.0 IRRITABLE BOWEL SYNDROME WITH DIARRHEA: ICD-10-CM

## 2018-06-20 DIAGNOSIS — R10.13 EPIGASTRIC PAIN: ICD-10-CM

## 2018-06-20 DIAGNOSIS — R52 SCAR PAINFUL: ICD-10-CM

## 2018-06-20 DIAGNOSIS — R11.2 NAUSEA AND VOMITING, INTRACTABILITY OF VOMITING NOT SPECIFIED, UNSPECIFIED VOMITING TYPE: ICD-10-CM

## 2018-06-20 DIAGNOSIS — K21.00 GASTROESOPHAGEAL REFLUX DISEASE WITH ESOPHAGITIS: Primary | ICD-10-CM

## 2018-06-20 DIAGNOSIS — R92.2 BREAST DENSITY: ICD-10-CM

## 2018-06-20 DIAGNOSIS — R13.19 ESOPHAGEAL DYSPHAGIA: ICD-10-CM

## 2018-06-20 DIAGNOSIS — M62.838 MUSCLE SPASM: ICD-10-CM

## 2018-06-20 DIAGNOSIS — M25.511 ACUTE PAIN OF RIGHT SHOULDER: ICD-10-CM

## 2018-06-20 PROCEDURE — G8417 CALC BMI ABV UP PARAM F/U: HCPCS | Performed by: NURSE PRACTITIONER

## 2018-06-20 PROCEDURE — 96372 THER/PROPH/DIAG INJ SC/IM: CPT | Performed by: NURSE PRACTITIONER

## 2018-06-20 PROCEDURE — 99214 OFFICE O/P EST MOD 30 MIN: CPT | Performed by: NURSE PRACTITIONER

## 2018-06-20 PROCEDURE — 1123F ACP DISCUSS/DSCN MKR DOCD: CPT | Performed by: NURSE PRACTITIONER

## 2018-06-20 PROCEDURE — G8400 PT W/DXA NO RESULTS DOC: HCPCS | Performed by: NURSE PRACTITIONER

## 2018-06-20 PROCEDURE — 4040F PNEUMOC VAC/ADMIN/RCVD: CPT | Performed by: NURSE PRACTITIONER

## 2018-06-20 PROCEDURE — G8427 DOCREV CUR MEDS BY ELIG CLIN: HCPCS | Performed by: NURSE PRACTITIONER

## 2018-06-20 PROCEDURE — 1090F PRES/ABSN URINE INCON ASSESS: CPT | Performed by: NURSE PRACTITIONER

## 2018-06-20 PROCEDURE — 1036F TOBACCO NON-USER: CPT | Performed by: NURSE PRACTITIONER

## 2018-06-20 PROCEDURE — 3017F COLORECTAL CA SCREEN DOC REV: CPT | Performed by: NURSE PRACTITIONER

## 2018-06-20 PROCEDURE — G8599 NO ASA/ANTIPLAT THER USE RNG: HCPCS | Performed by: NURSE PRACTITIONER

## 2018-06-20 RX ORDER — LOPERAMIDE HYDROCHLORIDE 2 MG/1
2 CAPSULE ORAL 4 TIMES DAILY PRN
Qty: 60 CAPSULE | Refills: 1 | Status: SHIPPED | OUTPATIENT
Start: 2018-06-20 | End: 2018-06-30

## 2018-06-20 RX ORDER — CYANOCOBALAMIN 1000 UG/ML
1000 INJECTION INTRAMUSCULAR; SUBCUTANEOUS ONCE
Status: COMPLETED | OUTPATIENT
Start: 2018-06-20 | End: 2018-06-20

## 2018-06-20 RX ORDER — CYCLOBENZAPRINE HCL 5 MG
5 TABLET ORAL 2 TIMES DAILY PRN
Qty: 60 TABLET | Refills: 1 | Status: SHIPPED | OUTPATIENT
Start: 2018-06-20 | End: 2018-06-20 | Stop reason: SDUPTHER

## 2018-06-20 RX ORDER — LOPERAMIDE HYDROCHLORIDE 2 MG/1
2 CAPSULE ORAL 4 TIMES DAILY PRN
Qty: 60 CAPSULE | Refills: 1 | Status: SHIPPED | OUTPATIENT
Start: 2018-06-20 | End: 2018-06-20 | Stop reason: SDUPTHER

## 2018-06-20 RX ORDER — METHYLPREDNISOLONE SODIUM SUCCINATE 125 MG/2ML
125 INJECTION, POWDER, LYOPHILIZED, FOR SOLUTION INTRAMUSCULAR; INTRAVENOUS ONCE
Status: COMPLETED | OUTPATIENT
Start: 2018-06-20 | End: 2018-06-20

## 2018-06-20 RX ORDER — CYCLOBENZAPRINE HCL 5 MG
5 TABLET ORAL 2 TIMES DAILY PRN
Qty: 60 TABLET | Refills: 1 | Status: SHIPPED | OUTPATIENT
Start: 2018-06-20 | End: 2018-07-20

## 2018-06-20 RX ORDER — KETOROLAC TROMETHAMINE 30 MG/ML
60 INJECTION, SOLUTION INTRAMUSCULAR; INTRAVENOUS ONCE
Status: COMPLETED | OUTPATIENT
Start: 2018-06-20 | End: 2018-06-20

## 2018-06-20 RX ADMIN — METHYLPREDNISOLONE SODIUM SUCCINATE 125 MG: 125 INJECTION, POWDER, LYOPHILIZED, FOR SOLUTION INTRAMUSCULAR; INTRAVENOUS at 14:33

## 2018-06-20 RX ADMIN — CYANOCOBALAMIN 1000 MCG: 1000 INJECTION INTRAMUSCULAR; SUBCUTANEOUS at 14:31

## 2018-06-20 RX ADMIN — KETOROLAC TROMETHAMINE 60 MG: 30 INJECTION, SOLUTION INTRAMUSCULAR; INTRAVENOUS at 14:32

## 2018-06-24 PROBLEM — F43.21 GRIEF: Status: ACTIVE | Noted: 2018-06-24

## 2018-06-24 ASSESSMENT — ENCOUNTER SYMPTOMS
BACK PAIN: 1
SHORTNESS OF BREATH: 1
TROUBLE SWALLOWING: 1
CHEST TIGHTNESS: 1
ABDOMINAL PAIN: 1
SORE THROAT: 1
NAUSEA: 1

## 2018-06-26 ENCOUNTER — TELEPHONE (OUTPATIENT)
Dept: FAMILY MEDICINE CLINIC | Age: 73
End: 2018-06-26

## 2018-06-27 VITALS
HEART RATE: 68 BPM | SYSTOLIC BLOOD PRESSURE: 136 MMHG | OXYGEN SATURATION: 98 % | DIASTOLIC BLOOD PRESSURE: 72 MMHG | RESPIRATION RATE: 18 BRPM

## 2018-06-27 PROBLEM — L90.5 SCAR PAINFUL: Status: ACTIVE | Noted: 2018-06-27

## 2018-06-27 PROBLEM — R52 SCAR PAINFUL: Status: ACTIVE | Noted: 2018-06-27

## 2018-06-27 PROBLEM — R92.30 BREAST DENSITY: Status: ACTIVE | Noted: 2018-06-27

## 2018-06-27 PROBLEM — R13.19 ESOPHAGEAL DYSPHAGIA: Status: ACTIVE | Noted: 2018-06-27

## 2018-06-27 PROBLEM — R92.2 BREAST DENSITY: Status: ACTIVE | Noted: 2018-06-27

## 2018-06-27 PROBLEM — M62.838 MUSCLE SPASM: Status: ACTIVE | Noted: 2018-06-27

## 2018-06-27 PROBLEM — K22.2 ESOPHAGEAL STRICTURE: Status: ACTIVE | Noted: 2018-06-27

## 2018-06-27 PROBLEM — N64.4 BREAST PAIN, LEFT: Status: ACTIVE | Noted: 2018-06-27

## 2018-06-27 ASSESSMENT — ENCOUNTER SYMPTOMS
CHEST TIGHTNESS: 1
SHORTNESS OF BREATH: 1
VOMITING: 1
BACK PAIN: 1
ABDOMINAL PAIN: 1
SORE THROAT: 1
NAUSEA: 1
TROUBLE SWALLOWING: 1

## 2018-07-09 ENCOUNTER — TELEPHONE (OUTPATIENT)
Dept: FAMILY MEDICINE CLINIC | Age: 73
End: 2018-07-09

## 2018-07-09 NOTE — TELEPHONE ENCOUNTER
Patient scheduled to see Dr. Arlette Acosta (in with Dr. Lázaro Ornelas) on 07/20/18 at 15. Patient's referral, along with all pertinent medical records faxed to the attention of scheduling on 07/05/18. Patient contacted advised of appointment date/time/location. Patient verbalized understanding of all instructions.  (Per Ron He)

## 2018-07-17 ENCOUNTER — TELEPHONE (OUTPATIENT)
Dept: FAMILY MEDICINE CLINIC | Age: 73
End: 2018-07-17

## 2018-07-27 RX ORDER — PANTOPRAZOLE SODIUM 40 MG/1
40 TABLET, DELAYED RELEASE ORAL DAILY
Qty: 30 TABLET | Refills: 3 | Status: SHIPPED | OUTPATIENT
Start: 2018-07-27 | End: 2018-10-31 | Stop reason: SDUPTHER

## 2018-08-03 DIAGNOSIS — N64.4 BREAST PAIN, LEFT: Primary | ICD-10-CM

## 2018-08-03 DIAGNOSIS — R92.8 ABNORMAL MAMMOGRAM: ICD-10-CM

## 2018-08-13 DIAGNOSIS — M79.622 LEFT AXILLARY PAIN: ICD-10-CM

## 2018-08-13 DIAGNOSIS — R92.8 ABNORMAL MAMMOGRAM: ICD-10-CM

## 2018-08-13 DIAGNOSIS — Z87.2 HISTORY OF CYST OF BREAST: ICD-10-CM

## 2018-08-13 DIAGNOSIS — N64.4 BREAST PAIN, LEFT: ICD-10-CM

## 2018-08-15 ENCOUNTER — OFFICE VISIT (OUTPATIENT)
Dept: FAMILY MEDICINE CLINIC | Age: 73
End: 2018-08-15
Payer: MEDICARE

## 2018-08-15 VITALS
SYSTOLIC BLOOD PRESSURE: 118 MMHG | HEIGHT: 64 IN | BODY MASS INDEX: 30.05 KG/M2 | WEIGHT: 176 LBS | RESPIRATION RATE: 18 BRPM | DIASTOLIC BLOOD PRESSURE: 76 MMHG | HEART RATE: 68 BPM | OXYGEN SATURATION: 98 %

## 2018-08-15 DIAGNOSIS — Z23 NEED FOR PROPHYLACTIC VACCINATION AGAINST STREPTOCOCCUS PNEUMONIAE (PNEUMOCOCCUS): ICD-10-CM

## 2018-08-15 DIAGNOSIS — N64.4 BREAST PAIN, LEFT: ICD-10-CM

## 2018-08-15 DIAGNOSIS — F41.9 ANXIETY: ICD-10-CM

## 2018-08-15 DIAGNOSIS — M54.42 CHRONIC MIDLINE LOW BACK PAIN WITH BILATERAL SCIATICA: ICD-10-CM

## 2018-08-15 DIAGNOSIS — R11.2 NAUSEA AND VOMITING IN ADULT: ICD-10-CM

## 2018-08-15 DIAGNOSIS — G89.29 CHRONIC MIDLINE LOW BACK PAIN WITH BILATERAL SCIATICA: ICD-10-CM

## 2018-08-15 DIAGNOSIS — M54.41 CHRONIC MIDLINE LOW BACK PAIN WITH BILATERAL SCIATICA: ICD-10-CM

## 2018-08-15 DIAGNOSIS — I10 ESSENTIAL HYPERTENSION: Primary | ICD-10-CM
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
## 2018-08-15 DIAGNOSIS — E53.8 B12 DEFICIENCY: ICD-10-CM

## 2018-08-15 DIAGNOSIS — L29.9 ITCHY SKIN: ICD-10-CM

## 2018-08-15 PROCEDURE — G8599 NO ASA/ANTIPLAT THER USE RNG: HCPCS | Performed by: NURSE PRACTITIONER

## 2018-08-15 PROCEDURE — 96372 THER/PROPH/DIAG INJ SC/IM: CPT | Performed by: NURSE PRACTITIONER

## 2018-08-15 PROCEDURE — G8427 DOCREV CUR MEDS BY ELIG CLIN: HCPCS | Performed by: NURSE PRACTITIONER

## 2018-08-15 PROCEDURE — 3017F COLORECTAL CA SCREEN DOC REV: CPT | Performed by: NURSE PRACTITIONER

## 2018-08-15 PROCEDURE — 4040F PNEUMOC VAC/ADMIN/RCVD: CPT | Performed by: NURSE PRACTITIONER

## 2018-08-15 PROCEDURE — 1090F PRES/ABSN URINE INCON ASSESS: CPT | Performed by: NURSE PRACTITIONER

## 2018-08-15 PROCEDURE — 90670 PCV13 VACCINE IM: CPT | Performed by: NURSE PRACTITIONER

## 2018-08-15 PROCEDURE — 1036F TOBACCO NON-USER: CPT | Performed by: NURSE PRACTITIONER

## 2018-08-15 PROCEDURE — G8400 PT W/DXA NO RESULTS DOC: HCPCS | Performed by: NURSE PRACTITIONER

## 2018-08-15 PROCEDURE — G8417 CALC BMI ABV UP PARAM F/U: HCPCS | Performed by: NURSE PRACTITIONER

## 2018-08-15 PROCEDURE — 1101F PT FALLS ASSESS-DOCD LE1/YR: CPT | Performed by: NURSE PRACTITIONER

## 2018-08-15 PROCEDURE — 99214 OFFICE O/P EST MOD 30 MIN: CPT | Performed by: NURSE PRACTITIONER

## 2018-08-15 PROCEDURE — G0009 ADMIN PNEUMOCOCCAL VACCINE: HCPCS | Performed by: NURSE PRACTITIONER

## 2018-08-15 PROCEDURE — 1123F ACP DISCUSS/DSCN MKR DOCD: CPT | Performed by: NURSE PRACTITIONER

## 2018-08-15 RX ORDER — HYDROXYZINE HYDROCHLORIDE 25 MG/1
TABLET, FILM COATED ORAL
Qty: 120 TABLET | Refills: 3 | Status: SHIPPED | OUTPATIENT
Start: 2018-08-15 | End: 2018-09-26 | Stop reason: SINTOL

## 2018-08-15 RX ORDER — CYANOCOBALAMIN 1000 UG/ML
1000 INJECTION INTRAMUSCULAR; SUBCUTANEOUS ONCE
Status: COMPLETED | OUTPATIENT
Start: 2018-08-15 | End: 2018-08-15

## 2018-08-15 RX ORDER — GABAPENTIN 100 MG/1
CAPSULE ORAL
Qty: 90 CAPSULE | Refills: 0 | Status: SHIPPED | OUTPATIENT
Start: 2018-08-15 | End: 2018-09-26 | Stop reason: SDUPTHER

## 2018-08-15 RX ADMIN — CYANOCOBALAMIN 1000 MCG: 1000 INJECTION INTRAMUSCULAR; SUBCUTANEOUS at 14:31

## 2018-08-15 ASSESSMENT — ENCOUNTER SYMPTOMS
ABDOMINAL PAIN: 1
TROUBLE SWALLOWING: 1
VOMITING: 1
NAUSEA: 1
BACK PAIN: 1
SHORTNESS OF BREATH: 1
SORE THROAT: 1
CHEST TIGHTNESS: 1

## 2018-08-15 NOTE — PROGRESS NOTES
SUBJECTIVE:  Briseyda Heredia is a 72 y.o. female that presents with   Chief Complaint   Patient presents with    ED Follow-up     [address] [place] [address]   . HPI:    HPI    Review of Systems   Constitutional: Positive for fatigue. HENT: Positive for sore throat and trouble swallowing. Respiratory: Positive for chest tightness and shortness of breath. Gastrointestinal: Positive for abdominal pain, nausea and vomiting. Severe GERD and dysphagia. Musculoskeletal: Positive for arthralgias, back pain, myalgias and neck stiffness. Neurological: Positive for dizziness, tremors, weakness and light-headedness. Psychiatric/Behavioral: Positive for decreased concentration, dysphoric mood and sleep disturbance. The patient is nervous/anxious. All other systems reviewed and are negative. OBJECTIVE:  Wt Readings from Last 2 Encounters:   08/15/18 176 lb (79.8 kg)   07/24/18 177 lb 4.8 oz (80.4 kg)     BP Readings from Last 2 Encounters:   08/15/18 118/76   07/24/18 (!) 170/84      Pulse Readings from Last 2 Encounters:   08/15/18 68   07/24/18 69     Body mass index is 30.21 kg/m². @WMPHCAA6(9)@  Resp Readings from Last 2 Encounters:   08/15/18 18   07/24/18 18       Physical Exam   Constitutional: She is oriented to person, place, and time. She appears well-developed and well-nourished. HENT:   Head: Normocephalic and atraumatic. Right Ear: External ear normal.   Left Ear: External ear normal.   Nose: Nose normal.   Mouth/Throat: Posterior oropharyngeal erythema present. Eyes: Pupils are equal, round, and reactive to light. Conjunctivae are normal.   Neck: Normal range of motion. Neck supple. Cardiovascular: Normal rate, regular rhythm, normal heart sounds and intact distal pulses. Pulmonary/Chest: Effort normal and breath sounds normal. Left breast exhibits tenderness. Abdominal: Soft. Bowel sounds are normal. There is generalized tenderness.    Musculoskeletal:

## 2018-08-15 NOTE — PROGRESS NOTES
SUBJECTIVE:  Lex Caldwell is a 72 y.o. female that presents with   Chief Complaint   Patient presents with    ED Follow-up     [address] [place] [address]   . HPI:    HPI    Review of Systems     OBJECTIVE:  Wt Readings from Last 2 Encounters:   08/22/18 176 lb (79.8 kg)   08/15/18 176 lb (79.8 kg)     BP Readings from Last 2 Encounters:   08/22/18 (!) 142/82   08/15/18 118/76      Pulse Readings from Last 2 Encounters:   08/22/18 77   08/15/18 68     Body mass index is 30.21 kg/m².    @EMTINXX3(4)@  Resp Readings from Last 2 Encounters:   08/22/18 18   08/15/18 18       Physical Exam    Results in Past 30 Days  Result Component Current Result Ref Range Previous Result Ref Range   Alb 4.3 (8/22/2018) 3.4 - 4.8 g/dL Not in Time Range    Albumin/Globulin Ratio 1.7 (8/22/2018) 0.8 - 2.0 Not in Time Range    Alkaline Phosphatase 86 (8/22/2018) 25 - 100 U/L Not in Time Range    ALT 13 (8/22/2018) 4 - 36 U/L Not in Time Range    AST 18 (8/22/2018) 8 - 33 U/L Not in Time Range    BUN 15 (8/22/2018) 6 - 20 mg/dL Not in Time Range    Calcium 9.3 (8/22/2018) 8.5 - 10.5 mg/dL Not in Time Range    Chloride 106 (8/22/2018) 98 - 107 mmol/L Not in Time Range    CO2 27 (8/22/2018) 20 - 30 mmol/L Not in Time Range    CREATININE 0.8 (8/22/2018) 0.4 - 1.2 mg/dL Not in Time Range    GFR  >59 (8/22/2018) >59 Not in Time Range    GFR Non- >60 (8/22/2018) >59 Not in Time Range    Globulin 2.5 (8/22/2018) g/dL Not in Time Range    Glucose 90 (8/22/2018) 74 - 106 mg/dL Not in Time Range    Potassium 4.5 (8/22/2018) 3.4 - 5.1 mmol/L Not in Time Range    Sodium 144 (8/22/2018) 136 - 145 mmol/L Not in Time Range    Total Bilirubin 0.3 (8/22/2018) 0.3 - 1.2 mg/dL Not in Time Range    Total Protein 6.8 (8/22/2018) 6.4 - 8.3 g/dL Not in Time Range      Microscopic Examination (no units)   Date Value   01/24/2018 YES     LDL Calculated (mg/dL)   Date Value   05/03/2017 89       Lab Results   Component

## 2018-08-22 ENCOUNTER — OFFICE VISIT (OUTPATIENT)
Dept: FAMILY MEDICINE CLINIC | Age: 73
End: 2018-08-22
Payer: MEDICARE

## 2018-08-22 ENCOUNTER — HOSPITAL ENCOUNTER (OUTPATIENT)
Facility: HOSPITAL | Age: 73
Discharge: HOME | End: 2018-08-22
Payer: MEDICARE

## 2018-08-22 DIAGNOSIS — N60.12 FIBROCYSTIC DISEASE OF LEFT BREAST: ICD-10-CM

## 2018-08-22 DIAGNOSIS — R92.8 ABNORMAL MAMMOGRAM OF LEFT BREAST: ICD-10-CM

## 2018-08-22 DIAGNOSIS — E87.5 HYPERKALEMIA: ICD-10-CM

## 2018-08-22 DIAGNOSIS — I10 ESSENTIAL HYPERTENSION: Primary | ICD-10-CM

## 2018-08-22 DIAGNOSIS — R05.9 COUGH: ICD-10-CM

## 2018-08-22 DIAGNOSIS — L98.9 SKIN LESION: ICD-10-CM

## 2018-08-22 DIAGNOSIS — R11.2 NAUSEA AND VOMITING, INTRACTABILITY OF VOMITING NOT SPECIFIED, UNSPECIFIED VOMITING TYPE: ICD-10-CM

## 2018-08-22 DIAGNOSIS — J01.90 ACUTE BACTERIAL SINUSITIS: ICD-10-CM

## 2018-08-22 DIAGNOSIS — E03.9 HYPOTHYROIDISM, UNSPECIFIED TYPE: ICD-10-CM

## 2018-08-22 DIAGNOSIS — I10 ESSENTIAL HYPERTENSION: ICD-10-CM

## 2018-08-22 DIAGNOSIS — B96.89 ACUTE BACTERIAL SINUSITIS: ICD-10-CM

## 2018-08-22 DIAGNOSIS — J40 BRONCHITIS: ICD-10-CM

## 2018-08-22 PROCEDURE — G8599 NO ASA/ANTIPLAT THER USE RNG: HCPCS | Performed by: NURSE PRACTITIONER

## 2018-08-22 PROCEDURE — G8427 DOCREV CUR MEDS BY ELIG CLIN: HCPCS | Performed by: NURSE PRACTITIONER

## 2018-08-22 PROCEDURE — 3017F COLORECTAL CA SCREEN DOC REV: CPT | Performed by: NURSE PRACTITIONER

## 2018-08-22 PROCEDURE — 1123F ACP DISCUSS/DSCN MKR DOCD: CPT | Performed by: NURSE PRACTITIONER

## 2018-08-22 PROCEDURE — 84443 ASSAY THYROID STIM HORMONE: CPT

## 2018-08-22 PROCEDURE — 4040F PNEUMOC VAC/ADMIN/RCVD: CPT | Performed by: NURSE PRACTITIONER

## 2018-08-22 PROCEDURE — 83735 ASSAY OF MAGNESIUM: CPT

## 2018-08-22 PROCEDURE — 84439 ASSAY OF FREE THYROXINE: CPT

## 2018-08-22 PROCEDURE — 99214 OFFICE O/P EST MOD 30 MIN: CPT | Performed by: NURSE PRACTITIONER

## 2018-08-22 PROCEDURE — 36415 COLL VENOUS BLD VENIPUNCTURE: CPT

## 2018-08-22 PROCEDURE — 1036F TOBACCO NON-USER: CPT | Performed by: NURSE PRACTITIONER

## 2018-08-22 PROCEDURE — G8400 PT W/DXA NO RESULTS DOC: HCPCS | Performed by: NURSE PRACTITIONER

## 2018-08-22 PROCEDURE — G8417 CALC BMI ABV UP PARAM F/U: HCPCS | Performed by: NURSE PRACTITIONER

## 2018-08-22 PROCEDURE — 96372 THER/PROPH/DIAG INJ SC/IM: CPT | Performed by: NURSE PRACTITIONER

## 2018-08-22 PROCEDURE — 80053 COMPREHEN METABOLIC PANEL: CPT

## 2018-08-22 PROCEDURE — 1101F PT FALLS ASSESS-DOCD LE1/YR: CPT | Performed by: NURSE PRACTITIONER

## 2018-08-22 PROCEDURE — 85025 COMPLETE CBC W/AUTO DIFF WBC: CPT

## 2018-08-22 PROCEDURE — 1090F PRES/ABSN URINE INCON ASSESS: CPT | Performed by: NURSE PRACTITIONER

## 2018-08-22 RX ORDER — METHYLPREDNISOLONE SODIUM SUCCINATE 125 MG/2ML
125 INJECTION, POWDER, LYOPHILIZED, FOR SOLUTION INTRAMUSCULAR; INTRAVENOUS ONCE
Status: COMPLETED | OUTPATIENT
Start: 2018-08-22 | End: 2018-08-22

## 2018-08-22 RX ORDER — PROMETHAZINE HYDROCHLORIDE 25 MG/1
25 TABLET ORAL EVERY 6 HOURS PRN
Qty: 30 TABLET | Refills: 1 | Status: SHIPPED | OUTPATIENT
Start: 2018-08-22 | End: 2018-08-29

## 2018-08-22 RX ORDER — CEFTRIAXONE 1 G/1
1 INJECTION, POWDER, FOR SOLUTION INTRAMUSCULAR; INTRAVENOUS ONCE
Status: COMPLETED | OUTPATIENT
Start: 2018-08-23 | End: 2018-08-23

## 2018-08-22 RX ORDER — CEFTRIAXONE 1 G/1
1 INJECTION, POWDER, FOR SOLUTION INTRAMUSCULAR; INTRAVENOUS ONCE
Status: COMPLETED | OUTPATIENT
Start: 2018-08-22 | End: 2018-08-22

## 2018-08-22 RX ORDER — LISINOPRIL AND HYDROCHLOROTHIAZIDE 25; 20 MG/1; MG/1
2 TABLET ORAL DAILY
Qty: 180 TABLET | Refills: 0
Start: 2018-08-22 | End: 2018-09-26 | Stop reason: SDUPTHER

## 2018-08-22 RX ORDER — AMOXICILLIN 875 MG/1
875 TABLET, COATED ORAL 2 TIMES DAILY
Qty: 20 TABLET | Refills: 0 | Status: SHIPPED | OUTPATIENT
Start: 2018-08-22 | End: 2018-09-01

## 2018-08-22 RX ADMIN — METHYLPREDNISOLONE SODIUM SUCCINATE 125 MG: 125 INJECTION, POWDER, LYOPHILIZED, FOR SOLUTION INTRAMUSCULAR; INTRAVENOUS at 14:47

## 2018-08-22 RX ADMIN — CEFTRIAXONE 1 G: 1 INJECTION, POWDER, FOR SOLUTION INTRAMUSCULAR; INTRAVENOUS at 14:48

## 2018-08-22 ASSESSMENT — ENCOUNTER SYMPTOMS
ABDOMINAL PAIN: 1
CHEST TIGHTNESS: 1
SORE THROAT: 1
VOMITING: 1
BACK PAIN: 1
NAUSEA: 1

## 2018-08-22 NOTE — PROGRESS NOTES
Administrations This Visit     cefTRIAXone (ROCEPHIN) injection 1 g     Admin Date  08/22/2018  14:48 Action  Given Dose  1 g Route  Intramuscular Site  Dorsogluteal Left Administered By  Jose Luis Michael    Ordering Provider:  TATYANA Wisdom    NDC:  93556-725-46    Lot#:  [id number]    :  Li Tucker    Patient Supplied?:  No    Comments:  01/2020          methylPREDNISolone sodium (SOLU-MEDROL) injection 125 mg     Admin Date  08/22/2018  14:47 Action  Given Dose  125 mg Route  Intramuscular Site  Dorsogluteal Right Administered By  Jose Luis Michael    Ordering Provider:  TATYANA Wisdom    NDC:  6797-4777-60    Lot#:  [id number]    :  [address].     Patient Supplied?:  No    Comments:  12/2020

## 2018-08-22 NOTE — PROGRESS NOTES
by general surgery. She denies any shortness of air or chest pain today. Review of Systems   Constitutional: Positive for activity change and fatigue. HENT: Positive for congestion, rhinorrhea, sinus pain, sinus pressure and sore throat. Respiratory: Positive for cough and chest tightness. Gastrointestinal: Positive for abdominal pain, nausea and vomiting. Severe GERD and dysphagia. Musculoskeletal: Positive for arthralgias, back pain, myalgias and neck stiffness. Allergic/Immunologic: Positive for environmental allergies. Neurological: Positive for dizziness, tremors, weakness and light-headedness. Psychiatric/Behavioral: Positive for decreased concentration, dysphoric mood and sleep disturbance. The patient is nervous/anxious. All other systems reviewed and are negative. OBJECTIVE:  Wt Readings from Last 2 Encounters:   08/22/18 176 lb (79.8 kg)   08/15/18 176 lb (79.8 kg)     BP Readings from Last 2 Encounters:   08/22/18 (!) 142/82   08/15/18 118/76      Pulse Readings from Last 2 Encounters:   08/22/18 77   08/15/18 68     Body mass index is 30.21 kg/m². @JREPUAB6(5)@  Resp Readings from Last 2 Encounters:   08/22/18 18   08/15/18 18       Physical Exam   Constitutional: She is oriented to person, place, and time. She appears well-developed and well-nourished. HENT:   Head: Normocephalic and atraumatic. Right Ear: External ear normal.   Left Ear: External ear normal.   Nose: Mucosal edema and rhinorrhea present. Right sinus exhibits maxillary sinus tenderness and frontal sinus tenderness. Left sinus exhibits maxillary sinus tenderness and frontal sinus tenderness. Mouth/Throat: Posterior oropharyngeal erythema present. Eyes: Pupils are equal, round, and reactive to light. Conjunctivae are normal.   Neck: Normal range of motion. Neck supple. Cardiovascular: Normal rate, regular rhythm, normal heart sounds and intact distal pulses.     Pulmonary/Chest: Effort normal. She has rhonchi in the left lower field. Left breast exhibits tenderness. Abdominal: Soft. Bowel sounds are normal. There is generalized tenderness. Musculoskeletal:        Right shoulder: She exhibits tenderness, pain and spasm. Cervical back: She exhibits tenderness and spasm. Thoracic back: She exhibits tenderness, pain and spasm. Overall musculoskeletal tenderness. Significant muscle tightness in shoulders bilaterally and thoracic area. Neurological: She is alert and oriented to person, place, and time. Skin: Skin is warm and dry. Scarring on shoulders bilaterally due to removal of lipomas. Right incision has severe tenderness to palpation. No signs of recurrence, redness, or swelling. Small red skin lesion on left forearm. Very tender to the touch. Psychiatric: Her speech is normal and behavior is normal. Judgment and thought content normal. Her mood appears anxious. Cognition and memory are normal. She exhibits a depressed mood. Nursing note and vitals reviewed.       Results in Past 30 Days  Result Component Current Result Ref Range Previous Result Ref Range   Alb 4.3 (8/22/2018) 3.4 - 4.8 g/dL Not in Time Range    Albumin/Globulin Ratio 1.7 (8/22/2018) 0.8 - 2.0 Not in Time Range    Alkaline Phosphatase 86 (8/22/2018) 25 - 100 U/L Not in Time Range    ALT 13 (8/22/2018) 4 - 36 U/L Not in Time Range    AST 18 (8/22/2018) 8 - 33 U/L Not in Time Range    BUN 15 (8/22/2018) 6 - 20 mg/dL Not in Time Range    Calcium 9.3 (8/22/2018) 8.5 - 10.5 mg/dL Not in Time Range    Chloride 106 (8/22/2018) 98 - 107 mmol/L Not in Time Range    CO2 27 (8/22/2018) 20 - 30 mmol/L Not in Time Range    CREATININE 0.8 (8/22/2018) 0.4 - 1.2 mg/dL Not in Time Range    GFR  >59 (8/22/2018) >59 Not in Time Range    GFR Non- >60 (8/22/2018) >59 Not in Time Range    Globulin 2.5 (8/22/2018) g/dL Not in Time Range    Glucose 90 (8/22/2018) 74 - 106 mg/dL Not in Time Range    Potassium 4.5 (8/22/2018) 3.4 - 5.1 mmol/L Not in Time Range    Sodium 144 (8/22/2018) 136 - 145 mmol/L Not in Time Range    Total Bilirubin 0.3 (8/22/2018) 0.3 - 1.2 mg/dL Not in Time Range    Total Protein 6.8 (8/22/2018) 6.4 - 8.3 g/dL Not in Time Range      Microscopic Examination (no units)   Date Value   01/24/2018 YES     LDL Calculated (mg/dL)   Date Value   05/03/2017 89       Lab Results   Component Value Date    WBC 6.2 08/22/2018    NEUTROABS 4.1 08/22/2018    HGB 12.3 08/22/2018    HCT 38.1 08/22/2018    MCV 96.7 08/22/2018     08/22/2018     Lab Results   Component Value Date    TSH 1.62 02/02/2017         ASSESSMENT/PLAN:  Rock Espinal was seen today for hypertension, nausea and headache. Diagnoses and all orders for this visit:    Essential hypertension  -     lisinopril-hydrochlorothiazide (PRINZIDE;ZESTORETIC) 20-25 MG per tablet; Take 2 tablets by mouth daily  -     CBC Auto Differential; Future  -     Comprehensive Metabolic Panel; Future    Bronchitis  -     amoxicillin (AMOXIL) 875 MG tablet; Take 1 tablet by mouth 2 times daily for 10 days  -     cefTRIAXone (ROCEPHIN) injection 1 g; Inject 1 g into the muscle once  -     methylPREDNISolone sodium (SOLU-MEDROL) injection 125 mg; Inject 2 mLs into the muscle once  -     cefTRIAXone (ROCEPHIN) injection 1 g; Inject 1 g into the muscle once    Acute bacterial sinusitis  -     amoxicillin (AMOXIL) 875 MG tablet;  Take 1 tablet by mouth 2 times daily for 10 days  -     cefTRIAXone (ROCEPHIN) injection 1 g; Inject 1 g into the muscle once  -     methylPREDNISolone sodium (SOLU-MEDROL) injection 125 mg; Inject 2 mLs into the muscle once  -     cefTRIAXone (ROCEPHIN) injection 1 g; Inject 1 g into the muscle once    Skin lesion  -     External Referral To General Surgery    Fibrocystic disease of left breast  -     External Referral To General Surgery    Nausea and vomiting, intractability of vomiting not specified, unspecified vomiting type  -     promethazine (PHENERGAN) 25 MG tablet; Take 1 tablet by mouth every 6 hours as needed for Nausea    Hyperkalemia  -     Comprehensive Metabolic Panel; Future  -     MAGNESIUM; Future    Hypothyroidism, unspecified type  -     TSH with Reflex; Future  -     T4, Free; Future    Cough  -     methylPREDNISolone sodium (SOLU-MEDROL) injection 125 mg; Inject 2 mLs into the muscle once    Abnormal mammogram of left breast  -     External Referral To General Surgery        Medications Discontinued During This Encounter   Medication Reason    lisinopril-hydrochlorothiazide (PRINZIDE;ZESTORETIC) 20-25 MG per tablet REORDER       Return in about 2 weeks (around 9/5/2018), or if symptoms worsen or fail to improve. PATIENT COUNSELING    Counseling was provided today regarding the following topics: Healthy eating habits, Regular exercise, substance abuse and healthy sleep habits. Discussed use, benefit, and side effects of prescribed medications. Barriers to medication compliance addressed. All patient questions answered. Pt voiced understanding.      Controlled Substances Monitoring:

## 2018-08-23 ENCOUNTER — NURSE ONLY (OUTPATIENT)
Dept: FAMILY MEDICINE CLINIC | Age: 73
End: 2018-08-23
Payer: MEDICARE

## 2018-08-23 ENCOUNTER — TELEPHONE (OUTPATIENT)
Dept: FAMILY MEDICINE CLINIC | Age: 73
End: 2018-08-23

## 2018-08-23 LAB
A/G RATIO: 1.7 (ref 0.8–2)
ALBUMIN SERPL-MCNC: 4.3 G/DL (ref 3.4–4.8)
ALP BLD-CCNC: 86 U/L (ref 25–100)
ALT SERPL-CCNC: 13 U/L (ref 4–36)
ANION GAP SERPL CALCULATED.3IONS-SCNC: 11 MMOL/L (ref 3–16)
AST SERPL-CCNC: 18 U/L (ref 8–33)
BASOPHILS ABSOLUTE: 0 K/UL (ref 0–0.1)
BASOPHILS RELATIVE PERCENT: 0.6 %
BILIRUB SERPL-MCNC: 0.3 MG/DL (ref 0.3–1.2)
BUN BLDV-MCNC: 15 MG/DL (ref 6–20)
CALCIUM SERPL-MCNC: 9.3 MG/DL (ref 8.5–10.5)
CHLORIDE BLD-SCNC: 106 MMOL/L (ref 98–107)
CO2: 27 MMOL/L (ref 20–30)
CREAT SERPL-MCNC: 0.8 MG/DL (ref 0.4–1.2)
EOSINOPHILS ABSOLUTE: 0.2 K/UL (ref 0–0.4)
EOSINOPHILS RELATIVE PERCENT: 2.4 %
GFR AFRICAN AMERICAN: >59
GFR NON-AFRICAN AMERICAN: >60
GLOBULIN: 2.5 G/DL
GLUCOSE BLD-MCNC: 90 MG/DL (ref 74–106)
HCT VFR BLD CALC: 38.1 % (ref 37–47)
HEMOGLOBIN: 12.3 G/DL (ref 11.5–16.5)
IMMATURE GRANULOCYTES #: 0 K/UL
IMMATURE GRANULOCYTES %: 0.2 % (ref 0–5)
LYMPHOCYTES ABSOLUTE: 1.6 K/UL (ref 1.5–4)
LYMPHOCYTES RELATIVE PERCENT: 25.7 %
MAGNESIUM: 2.1 MG/DL (ref 1.7–2.4)
MCH RBC QN AUTO: 31.2 PG (ref 27–32)
MCHC RBC AUTO-ENTMCNC: 32.3 G/DL (ref 31–35)
MCV RBC AUTO: 96.7 FL (ref 80–100)
MONOCYTES ABSOLUTE: 0.4 K/UL (ref 0.2–0.8)
MONOCYTES RELATIVE PERCENT: 5.6 %
NEUTROPHILS ABSOLUTE: 4.1 K/UL (ref 2–7.5)
NEUTROPHILS RELATIVE PERCENT: 65.5 %
PDW BLD-RTO: 12.5 % (ref 11–16)
PLATELET # BLD: 198 K/UL (ref 150–400)
PMV BLD AUTO: 10.6 FL (ref 6–10)
POTASSIUM SERPL-SCNC: 4.5 MMOL/L (ref 3.4–5.1)
RBC # BLD: 3.94 M/UL (ref 3.8–5.8)
SODIUM BLD-SCNC: 144 MMOL/L (ref 136–145)
T4 FREE: 1.24 NG/DL (ref 0.89–1.76)
TOTAL PROTEIN: 6.8 G/DL (ref 6.4–8.3)
TSH REFLEX: 1.01 UIU/ML (ref 0.35–5.5)
WBC # BLD: 6.2 K/UL (ref 4–11)

## 2018-08-23 PROCEDURE — 96372 THER/PROPH/DIAG INJ SC/IM: CPT | Performed by: NURSE PRACTITIONER

## 2018-08-23 RX ADMIN — CEFTRIAXONE 1 G: 1 INJECTION, POWDER, FOR SOLUTION INTRAMUSCULAR; INTRAVENOUS at 13:47

## 2018-08-23 NOTE — PROGRESS NOTES
Administrations This Visit     cefTRIAXone (ROCEPHIN) injection 1 g     Admin Date  08/23/2018  13:47 Action  Given Dose  1 g Route  Intramuscular Site  Dorsogluteal Right Administered By  Padma Downs    Ordering Provider:  TATYANA Tyler    NDC:  23364-544-37    Lot#:  [id number]    :  [employer]    Patient Supplied?:  No    Comments:  01/2020

## 2018-08-26 VITALS
HEIGHT: 64 IN | HEART RATE: 77 BPM | DIASTOLIC BLOOD PRESSURE: 82 MMHG | BODY MASS INDEX: 30.05 KG/M2 | SYSTOLIC BLOOD PRESSURE: 142 MMHG | OXYGEN SATURATION: 98 % | RESPIRATION RATE: 18 BRPM | WEIGHT: 176 LBS

## 2018-08-26 ASSESSMENT — ENCOUNTER SYMPTOMS
COUGH: 1
RHINORRHEA: 1
SINUS PRESSURE: 1
SINUS PAIN: 1

## 2018-09-17 DIAGNOSIS — E03.9 HYPOTHYROIDISM, UNSPECIFIED TYPE: ICD-10-CM

## 2018-09-17 RX ORDER — LEVOTHYROXINE SODIUM 0.07 MG/1
75 TABLET ORAL DAILY
Qty: 90 TABLET | Refills: 0 | Status: SHIPPED | OUTPATIENT
Start: 2018-09-17 | End: 2018-12-10 | Stop reason: SDUPTHER

## 2018-09-17 RX ORDER — LEVOTHYROXINE SODIUM 0.07 MG/1
75 TABLET ORAL DAILY
Qty: 90 TABLET | Refills: 0 | Status: SHIPPED | OUTPATIENT
Start: 2018-09-17 | End: 2018-09-17 | Stop reason: SDUPTHER

## 2018-09-26 ENCOUNTER — OFFICE VISIT (OUTPATIENT)
Dept: FAMILY MEDICINE CLINIC | Age: 73
End: 2018-09-26
Payer: MEDICARE

## 2018-09-26 VITALS
OXYGEN SATURATION: 97 % | HEART RATE: 67 BPM | TEMPERATURE: 98.1 F | BODY MASS INDEX: 29.71 KG/M2 | SYSTOLIC BLOOD PRESSURE: 138 MMHG | HEIGHT: 64 IN | RESPIRATION RATE: 18 BRPM | DIASTOLIC BLOOD PRESSURE: 76 MMHG | WEIGHT: 174 LBS

## 2018-09-26 DIAGNOSIS — K58.0 IRRITABLE BOWEL SYNDROME WITH DIARRHEA: ICD-10-CM

## 2018-09-26 DIAGNOSIS — F33.1 MODERATE EPISODE OF RECURRENT MAJOR DEPRESSIVE DISORDER (HCC): ICD-10-CM

## 2018-09-26 DIAGNOSIS — K21.9 GASTROESOPHAGEAL REFLUX DISEASE, ESOPHAGITIS PRESENCE NOT SPECIFIED: ICD-10-CM

## 2018-09-26 DIAGNOSIS — I10 ESSENTIAL HYPERTENSION: ICD-10-CM

## 2018-09-26 DIAGNOSIS — R93.5 ABNORMAL CT OF THE ABDOMEN: ICD-10-CM

## 2018-09-26 DIAGNOSIS — K86.89 FATTY PANCREAS: ICD-10-CM

## 2018-09-26 DIAGNOSIS — K76.9 LIVER LESION: ICD-10-CM

## 2018-09-26 DIAGNOSIS — B96.89 ACUTE BACTERIAL SINUSITIS: Primary | ICD-10-CM

## 2018-09-26 DIAGNOSIS — M54.42 CHRONIC MIDLINE LOW BACK PAIN WITH BILATERAL SCIATICA: ICD-10-CM

## 2018-09-26 DIAGNOSIS — G89.29 CHRONIC MIDLINE LOW BACK PAIN WITH BILATERAL SCIATICA: ICD-10-CM

## 2018-09-26 DIAGNOSIS — J01.90 ACUTE BACTERIAL SINUSITIS: Primary | ICD-10-CM

## 2018-09-26 DIAGNOSIS — F41.0 PANIC ATTACKS: ICD-10-CM

## 2018-09-26 DIAGNOSIS — E53.8 B12 DEFICIENCY: ICD-10-CM

## 2018-09-26 DIAGNOSIS — M54.41 CHRONIC MIDLINE LOW BACK PAIN WITH BILATERAL SCIATICA: ICD-10-CM

## 2018-09-26 DIAGNOSIS — F41.9 ANXIETY: ICD-10-CM

## 2018-09-26 PROCEDURE — G8417 CALC BMI ABV UP PARAM F/U: HCPCS | Performed by: NURSE PRACTITIONER

## 2018-09-26 PROCEDURE — G8400 PT W/DXA NO RESULTS DOC: HCPCS | Performed by: NURSE PRACTITIONER

## 2018-09-26 PROCEDURE — 99214 OFFICE O/P EST MOD 30 MIN: CPT | Performed by: NURSE PRACTITIONER

## 2018-09-26 PROCEDURE — 3017F COLORECTAL CA SCREEN DOC REV: CPT | Performed by: NURSE PRACTITIONER

## 2018-09-26 PROCEDURE — 4040F PNEUMOC VAC/ADMIN/RCVD: CPT | Performed by: NURSE PRACTITIONER

## 2018-09-26 PROCEDURE — 1090F PRES/ABSN URINE INCON ASSESS: CPT | Performed by: NURSE PRACTITIONER

## 2018-09-26 PROCEDURE — 1123F ACP DISCUSS/DSCN MKR DOCD: CPT | Performed by: NURSE PRACTITIONER

## 2018-09-26 PROCEDURE — G8427 DOCREV CUR MEDS BY ELIG CLIN: HCPCS | Performed by: NURSE PRACTITIONER

## 2018-09-26 PROCEDURE — 96372 THER/PROPH/DIAG INJ SC/IM: CPT | Performed by: NURSE PRACTITIONER

## 2018-09-26 PROCEDURE — 1036F TOBACCO NON-USER: CPT | Performed by: NURSE PRACTITIONER

## 2018-09-26 PROCEDURE — G8599 NO ASA/ANTIPLAT THER USE RNG: HCPCS | Performed by: NURSE PRACTITIONER

## 2018-09-26 PROCEDURE — 1101F PT FALLS ASSESS-DOCD LE1/YR: CPT | Performed by: NURSE PRACTITIONER

## 2018-09-26 RX ORDER — CYANOCOBALAMIN 1000 UG/ML
1000 INJECTION INTRAMUSCULAR; SUBCUTANEOUS
Status: SHIPPED | OUTPATIENT
Start: 2018-09-27 | End: 2019-03-26

## 2018-09-26 RX ORDER — GABAPENTIN 100 MG/1
CAPSULE ORAL
Qty: 90 CAPSULE | Refills: 1 | Status: SHIPPED | OUTPATIENT
Start: 2018-09-26 | End: 2018-10-31 | Stop reason: SDUPTHER

## 2018-09-26 RX ORDER — CEFTRIAXONE 1 G/1
1 INJECTION, POWDER, FOR SOLUTION INTRAMUSCULAR; INTRAVENOUS ONCE
Status: COMPLETED | OUTPATIENT
Start: 2018-09-26 | End: 2018-09-26

## 2018-09-26 RX ORDER — CEFDINIR 300 MG/1
300 CAPSULE ORAL 2 TIMES DAILY
Qty: 20 CAPSULE | Refills: 0 | Status: SHIPPED | OUTPATIENT
Start: 2018-09-26 | End: 2018-10-06

## 2018-09-26 RX ORDER — CITALOPRAM 20 MG/1
20 TABLET ORAL DAILY
Qty: 30 TABLET | Refills: 1 | Status: SHIPPED | OUTPATIENT
Start: 2018-09-26 | End: 2019-01-28 | Stop reason: SDUPTHER

## 2018-09-26 RX ORDER — LISINOPRIL AND HYDROCHLOROTHIAZIDE 25; 20 MG/1; MG/1
1 TABLET ORAL DAILY
Qty: 90 TABLET | Refills: 0
Start: 2018-09-26 | End: 2018-10-31 | Stop reason: SDUPTHER

## 2018-09-26 RX ADMIN — CEFTRIAXONE 1 G: 1 INJECTION, POWDER, FOR SOLUTION INTRAMUSCULAR; INTRAVENOUS at 14:53

## 2018-09-27 ENCOUNTER — NURSE ONLY (OUTPATIENT)
Dept: FAMILY MEDICINE CLINIC | Age: 73
End: 2018-09-27
Payer: MEDICARE

## 2018-09-27 PROBLEM — Z12.11 ENCOUNTER FOR SCREENING COLONOSCOPY: Status: RESOLVED | Noted: 2017-02-03 | Resolved: 2018-09-27

## 2018-09-27 PROCEDURE — 96372 THER/PROPH/DIAG INJ SC/IM: CPT | Performed by: NURSE PRACTITIONER

## 2018-09-27 RX ADMIN — CYANOCOBALAMIN 1000 MCG: 1000 INJECTION INTRAMUSCULAR; SUBCUTANEOUS at 15:01

## 2018-10-02 DIAGNOSIS — E03.9 HYPOTHYROIDISM, UNSPECIFIED TYPE: ICD-10-CM

## 2018-10-25 ENCOUNTER — TELEPHONE (OUTPATIENT)
Dept: FAMILY MEDICINE CLINIC | Age: 73
End: 2018-10-25

## 2018-10-25 NOTE — TELEPHONE ENCOUNTER
Patient wanted to let provider know that she had been to [facility] ED last pm due to not feeling well. She states passed out while there and hit head. She was admitted overnight. Patient states blood pressure is high. She states was told ED doctor would send toradol dissolvable pill to help with pain but it is not at pharmacy. She wants to know if provider will send it to [address] [place] [address] for her.

## 2018-10-25 NOTE — TELEPHONE ENCOUNTER
I've never heard of a dissolvable toradol. I looked up with the  and they don't make one. An NSAID/anti-inflammatory is not recommended for anyone who has unstable high blood pressures because it can raise it. It is also a blood thinner and really hard on the stomach. Is she having pain from hitting her head?

## 2018-10-27 PROBLEM — K86.89 FATTY PANCREAS: Status: ACTIVE | Noted: 2018-10-27

## 2018-10-27 PROBLEM — F41.0 PANIC ATTACKS: Status: ACTIVE | Noted: 2018-10-27

## 2018-10-27 PROBLEM — R93.5 ABNORMAL CT OF THE ABDOMEN: Status: ACTIVE | Noted: 2018-10-27

## 2018-10-27 PROBLEM — F33.1 MODERATE EPISODE OF RECURRENT MAJOR DEPRESSIVE DISORDER (HCC): Status: ACTIVE | Noted: 2018-10-27

## 2018-10-27 PROBLEM — K76.9 LIVER LESION: Status: ACTIVE | Noted: 2018-10-27

## 2018-10-27 PROBLEM — F41.9 ANXIETY: Status: ACTIVE | Noted: 2018-10-27

## 2018-10-27 ASSESSMENT — ENCOUNTER SYMPTOMS
COUGH: 1
ABDOMINAL PAIN: 1
SORE THROAT: 1
NAUSEA: 1
SINUS PRESSURE: 1
EYES NEGATIVE: 1
RHINORRHEA: 1
BACK PAIN: 1
DIARRHEA: 1
SINUS PAIN: 1

## 2018-10-31 ENCOUNTER — OFFICE VISIT (OUTPATIENT)
Dept: FAMILY MEDICINE CLINIC | Age: 73
End: 2018-10-31
Payer: MEDICARE

## 2018-10-31 VITALS
OXYGEN SATURATION: 98 % | HEART RATE: 79 BPM | BODY MASS INDEX: 30.39 KG/M2 | RESPIRATION RATE: 18 BRPM | WEIGHT: 178 LBS | DIASTOLIC BLOOD PRESSURE: 86 MMHG | HEIGHT: 64 IN | SYSTOLIC BLOOD PRESSURE: 204 MMHG

## 2018-10-31 DIAGNOSIS — M54.41 CHRONIC MIDLINE LOW BACK PAIN WITH BILATERAL SCIATICA: ICD-10-CM

## 2018-10-31 DIAGNOSIS — F41.0 PANIC ATTACKS: ICD-10-CM

## 2018-10-31 DIAGNOSIS — I10 ESSENTIAL HYPERTENSION: Primary | ICD-10-CM

## 2018-10-31 DIAGNOSIS — F41.9 ANXIETY: ICD-10-CM

## 2018-10-31 DIAGNOSIS — F33.1 MODERATE EPISODE OF RECURRENT MAJOR DEPRESSIVE DISORDER (HCC): ICD-10-CM

## 2018-10-31 DIAGNOSIS — R55 SYNCOPE, UNSPECIFIED SYNCOPE TYPE: ICD-10-CM

## 2018-10-31 DIAGNOSIS — E53.9 VITAMIN B DEFICIENCY: ICD-10-CM

## 2018-10-31 DIAGNOSIS — M54.42 CHRONIC MIDLINE LOW BACK PAIN WITH BILATERAL SCIATICA: ICD-10-CM

## 2018-10-31 DIAGNOSIS — Z09 HOSPITAL DISCHARGE FOLLOW-UP: ICD-10-CM

## 2018-10-31 DIAGNOSIS — G89.29 CHRONIC MIDLINE LOW BACK PAIN WITH BILATERAL SCIATICA: ICD-10-CM

## 2018-10-31 DIAGNOSIS — K21.9 GASTROESOPHAGEAL REFLUX DISEASE, ESOPHAGITIS PRESENCE NOT SPECIFIED: ICD-10-CM

## 2018-10-31 DIAGNOSIS — E03.9 HYPOTHYROIDISM, UNSPECIFIED TYPE: ICD-10-CM

## 2018-10-31 DIAGNOSIS — G47.00 INSOMNIA, UNSPECIFIED TYPE: ICD-10-CM

## 2018-10-31 PROCEDURE — 1090F PRES/ABSN URINE INCON ASSESS: CPT | Performed by: NURSE PRACTITIONER

## 2018-10-31 PROCEDURE — G8427 DOCREV CUR MEDS BY ELIG CLIN: HCPCS | Performed by: NURSE PRACTITIONER

## 2018-10-31 PROCEDURE — 3017F COLORECTAL CA SCREEN DOC REV: CPT | Performed by: NURSE PRACTITIONER

## 2018-10-31 PROCEDURE — 1036F TOBACCO NON-USER: CPT | Performed by: NURSE PRACTITIONER

## 2018-10-31 PROCEDURE — G8400 PT W/DXA NO RESULTS DOC: HCPCS | Performed by: NURSE PRACTITIONER

## 2018-10-31 PROCEDURE — G8599 NO ASA/ANTIPLAT THER USE RNG: HCPCS | Performed by: NURSE PRACTITIONER

## 2018-10-31 PROCEDURE — 1101F PT FALLS ASSESS-DOCD LE1/YR: CPT | Performed by: NURSE PRACTITIONER

## 2018-10-31 PROCEDURE — 1123F ACP DISCUSS/DSCN MKR DOCD: CPT | Performed by: NURSE PRACTITIONER

## 2018-10-31 PROCEDURE — 4040F PNEUMOC VAC/ADMIN/RCVD: CPT | Performed by: NURSE PRACTITIONER

## 2018-10-31 PROCEDURE — G8417 CALC BMI ABV UP PARAM F/U: HCPCS | Performed by: NURSE PRACTITIONER

## 2018-10-31 PROCEDURE — 99214 OFFICE O/P EST MOD 30 MIN: CPT | Performed by: NURSE PRACTITIONER

## 2018-10-31 PROCEDURE — G8484 FLU IMMUNIZE NO ADMIN: HCPCS | Performed by: NURSE PRACTITIONER

## 2018-10-31 RX ORDER — CITALOPRAM 20 MG/1
20 TABLET ORAL DAILY
Qty: 30 TABLET | Refills: 1 | Status: CANCELLED | OUTPATIENT
Start: 2018-10-31

## 2018-10-31 RX ORDER — GABAPENTIN 100 MG/1
CAPSULE ORAL
Qty: 90 CAPSULE | Refills: 1 | Status: CANCELLED | OUTPATIENT
Start: 2018-10-31 | End: 2018-12-12

## 2018-10-31 RX ORDER — TRAZODONE HYDROCHLORIDE 50 MG/1
TABLET ORAL
Qty: 30 TABLET | Refills: 3 | Status: CANCELLED | OUTPATIENT
Start: 2018-10-31

## 2018-10-31 RX ORDER — LISINOPRIL AND HYDROCHLOROTHIAZIDE 25; 20 MG/1; MG/1
1 TABLET ORAL 2 TIMES DAILY
Qty: 60 TABLET | Refills: 3
Start: 2018-10-31 | End: 2019-06-24 | Stop reason: SDUPTHER

## 2018-10-31 RX ORDER — GABAPENTIN 100 MG/1
100 CAPSULE ORAL NIGHTLY PRN
Qty: 30 CAPSULE | Refills: 0
Start: 2018-10-31 | End: 2018-11-21 | Stop reason: SDUPTHER

## 2018-10-31 RX ORDER — CYANOCOBALAMIN 1000 UG/ML
1000 INJECTION INTRAMUSCULAR; SUBCUTANEOUS ONCE
Status: DISCONTINUED | OUTPATIENT
Start: 2018-11-01 | End: 2021-02-06

## 2018-10-31 RX ORDER — METHYLPREDNISOLONE SODIUM SUCCINATE 125 MG/2ML
125 INJECTION, POWDER, LYOPHILIZED, FOR SOLUTION INTRAMUSCULAR; INTRAVENOUS ONCE
Status: DISCONTINUED | OUTPATIENT
Start: 2018-11-01 | End: 2018-11-29

## 2018-10-31 RX ORDER — KETOROLAC TROMETHAMINE 30 MG/ML
60 INJECTION, SOLUTION INTRAMUSCULAR; INTRAVENOUS ONCE
Status: SHIPPED | OUTPATIENT
Start: 2018-11-01 | End: 2018-11-05

## 2018-10-31 RX ORDER — LEVOTHYROXINE SODIUM 0.07 MG/1
75 TABLET ORAL DAILY
Qty: 90 TABLET | Refills: 0 | Status: CANCELLED | OUTPATIENT
Start: 2018-10-31

## 2018-10-31 RX ORDER — CLONIDINE HYDROCHLORIDE 0.1 MG/1
TABLET ORAL
Qty: 60 TABLET | Refills: 1 | Status: SHIPPED | OUTPATIENT
Start: 2018-10-31 | End: 2019-10-01

## 2018-10-31 RX ORDER — PANTOPRAZOLE SODIUM 40 MG/1
40 TABLET, DELAYED RELEASE ORAL DAILY
Qty: 30 TABLET | Refills: 3 | Status: SHIPPED | OUTPATIENT
Start: 2018-10-31 | End: 2019-01-28 | Stop reason: SDUPTHER

## 2018-10-31 NOTE — PROGRESS NOTES
Have you seen any other physician or provider since your last visit yes - [facility]    Have you had any other diagnostic tests since your last visit? Chest X-Ray, CT scan of head    Have you changed or stopped any medications since your last visit? no       Pt went to Urgent care and passed out in the lobby, she was admitted to [facility] for hypertension.

## 2018-11-14 ENCOUNTER — TELEPHONE (OUTPATIENT)
Dept: FAMILY MEDICINE CLINIC | Age: 73
End: 2018-11-14

## 2018-11-14 RX ORDER — AMOXICILLIN 875 MG/1
875 TABLET, COATED ORAL 2 TIMES DAILY
Qty: 20 TABLET | Refills: 0 | Status: SHIPPED | OUTPATIENT
Start: 2018-11-14 | End: 2018-11-24

## 2018-11-21 ENCOUNTER — OFFICE VISIT (OUTPATIENT)
Dept: FAMILY MEDICINE CLINIC | Age: 73
End: 2018-11-21
Payer: MEDICARE

## 2018-11-21 ENCOUNTER — HOSPITAL ENCOUNTER (OUTPATIENT)
Facility: HOSPITAL | Age: 73
Discharge: HOME | End: 2018-11-21
Payer: MEDICARE

## 2018-11-21 VITALS
OXYGEN SATURATION: 98 % | HEIGHT: 64 IN | WEIGHT: 181 LBS | DIASTOLIC BLOOD PRESSURE: 84 MMHG | HEART RATE: 74 BPM | SYSTOLIC BLOOD PRESSURE: 136 MMHG | RESPIRATION RATE: 18 BRPM | BODY MASS INDEX: 30.9 KG/M2

## 2018-11-21 DIAGNOSIS — K76.9 LIVER LESION: ICD-10-CM

## 2018-11-21 DIAGNOSIS — R10.84 GENERALIZED ABDOMINAL PAIN: ICD-10-CM

## 2018-11-21 DIAGNOSIS — M54.42 CHRONIC MIDLINE LOW BACK PAIN WITH BILATERAL SCIATICA: ICD-10-CM

## 2018-11-21 DIAGNOSIS — G89.29 CHRONIC MIDLINE LOW BACK PAIN WITH BILATERAL SCIATICA: ICD-10-CM

## 2018-11-21 DIAGNOSIS — J01.90 ACUTE BACTERIAL SINUSITIS: ICD-10-CM

## 2018-11-21 DIAGNOSIS — E53.8 VITAMIN B12 DEFICIENCY: ICD-10-CM

## 2018-11-21 DIAGNOSIS — R05.3 CHRONIC COUGH: ICD-10-CM

## 2018-11-21 DIAGNOSIS — B96.89 ACUTE BACTERIAL SINUSITIS: ICD-10-CM

## 2018-11-21 DIAGNOSIS — R06.02 SHORTNESS OF BREATH: ICD-10-CM

## 2018-11-21 DIAGNOSIS — F41.9 ANXIETY: ICD-10-CM

## 2018-11-21 DIAGNOSIS — R04.0 NASAL BLEEDING: ICD-10-CM

## 2018-11-21 DIAGNOSIS — K92.0 HEMATEMESIS WITH NAUSEA: ICD-10-CM

## 2018-11-21 DIAGNOSIS — F33.1 MODERATE EPISODE OF RECURRENT MAJOR DEPRESSIVE DISORDER (HCC): ICD-10-CM

## 2018-11-21 DIAGNOSIS — M54.41 CHRONIC MIDLINE LOW BACK PAIN WITH BILATERAL SCIATICA: ICD-10-CM

## 2018-11-21 DIAGNOSIS — K22.2 ESOPHAGEAL STRICTURE: ICD-10-CM

## 2018-11-21 DIAGNOSIS — J42 CHRONIC BRONCHITIS, UNSPECIFIED CHRONIC BRONCHITIS TYPE (HCC): Primary | ICD-10-CM

## 2018-11-21 DIAGNOSIS — R13.19 ESOPHAGEAL DYSPHAGIA: ICD-10-CM

## 2018-11-21 DIAGNOSIS — K58.0 IRRITABLE BOWEL SYNDROME WITH DIARRHEA: ICD-10-CM

## 2018-11-21 DIAGNOSIS — K86.89 FATTY PANCREAS: ICD-10-CM

## 2018-11-21 DIAGNOSIS — F41.0 PANIC ATTACKS: ICD-10-CM

## 2018-11-21 DIAGNOSIS — R93.5 ABNORMAL CT OF THE ABDOMEN: ICD-10-CM

## 2018-11-21 DIAGNOSIS — D69.9 BLEEDS EASILY (HCC): ICD-10-CM

## 2018-11-21 LAB
A/G RATIO: 1.8 (ref 0.8–2)
ALBUMIN SERPL-MCNC: 4.2 G/DL (ref 3.4–4.8)
ALP BLD-CCNC: 102 U/L (ref 25–100)
ALT SERPL-CCNC: 10 U/L (ref 4–36)
AMYLASE: 56 U/L (ref 20–104)
ANION GAP SERPL CALCULATED.3IONS-SCNC: 11 MMOL/L (ref 3–16)
APTT: 26.5 SEC (ref 23–28.6)
AST SERPL-CCNC: 15 U/L (ref 8–33)
BASOPHILS ABSOLUTE: 0.1 K/UL (ref 0–0.1)
BASOPHILS RELATIVE PERCENT: 0.8 %
BILIRUB SERPL-MCNC: 0.3 MG/DL (ref 0.3–1.2)
BUN BLDV-MCNC: 14 MG/DL (ref 6–20)
CALCIUM SERPL-MCNC: 9.6 MG/DL (ref 8.5–10.5)
CHLORIDE BLD-SCNC: 108 MMOL/L (ref 98–107)
CO2: 26 MMOL/L (ref 20–30)
CREAT SERPL-MCNC: 0.7 MG/DL (ref 0.4–1.2)
EOSINOPHILS ABSOLUTE: 0.2 K/UL (ref 0–0.4)
EOSINOPHILS RELATIVE PERCENT: 2.8 %
GFR AFRICAN AMERICAN: >59
GFR NON-AFRICAN AMERICAN: >60
GLOBULIN: 2.4 G/DL
GLUCOSE BLD-MCNC: 127 MG/DL (ref 74–106)
HCT VFR BLD CALC: 39.3 % (ref 37–47)
HEMOGLOBIN: 12.7 G/DL (ref 11.5–16.5)
IMMATURE GRANULOCYTES #: 0 K/UL
IMMATURE GRANULOCYTES %: 0.3 % (ref 0–5)
INR BLD: 1 (ref 0.86–1.14)
LIPASE: 29 U/L (ref 5.6–51.3)
LYMPHOCYTES ABSOLUTE: 1.9 K/UL (ref 1.5–4)
LYMPHOCYTES RELATIVE PERCENT: 31.5 %
MCH RBC QN AUTO: 31.4 PG (ref 27–32)
MCHC RBC AUTO-ENTMCNC: 32.3 G/DL (ref 31–35)
MCV RBC AUTO: 97 FL (ref 80–100)
MONOCYTES ABSOLUTE: 0.4 K/UL (ref 0.2–0.8)
MONOCYTES RELATIVE PERCENT: 7.1 %
NEUTROPHILS ABSOLUTE: 3.5 K/UL (ref 2–7.5)
NEUTROPHILS RELATIVE PERCENT: 57.5 %
PDW BLD-RTO: 12.6 % (ref 11–16)
PLATELET # BLD: 246 K/UL (ref 150–400)
PMV BLD AUTO: 10.3 FL (ref 6–10)
POTASSIUM SERPL-SCNC: 4.2 MMOL/L (ref 3.4–5.1)
PROTHROMBIN TIME: 9.8 SEC (ref 9.5–10.9)
RBC # BLD: 4.05 M/UL (ref 3.8–5.8)
SODIUM BLD-SCNC: 145 MMOL/L (ref 136–145)
TOTAL PROTEIN: 6.6 G/DL (ref 6.4–8.3)
WBC # BLD: 6.1 K/UL (ref 4–11)

## 2018-11-21 PROCEDURE — G8417 CALC BMI ABV UP PARAM F/U: HCPCS | Performed by: NURSE PRACTITIONER

## 2018-11-21 PROCEDURE — G8400 PT W/DXA NO RESULTS DOC: HCPCS | Performed by: NURSE PRACTITIONER

## 2018-11-21 PROCEDURE — 99214 OFFICE O/P EST MOD 30 MIN: CPT | Performed by: NURSE PRACTITIONER

## 2018-11-21 PROCEDURE — G8599 NO ASA/ANTIPLAT THER USE RNG: HCPCS | Performed by: NURSE PRACTITIONER

## 2018-11-21 PROCEDURE — 1036F TOBACCO NON-USER: CPT | Performed by: NURSE PRACTITIONER

## 2018-11-21 PROCEDURE — 82150 ASSAY OF AMYLASE: CPT

## 2018-11-21 PROCEDURE — 4040F PNEUMOC VAC/ADMIN/RCVD: CPT | Performed by: NURSE PRACTITIONER

## 2018-11-21 PROCEDURE — 85730 THROMBOPLASTIN TIME PARTIAL: CPT

## 2018-11-21 PROCEDURE — G8484 FLU IMMUNIZE NO ADMIN: HCPCS | Performed by: NURSE PRACTITIONER

## 2018-11-21 PROCEDURE — 85025 COMPLETE CBC W/AUTO DIFF WBC: CPT

## 2018-11-21 PROCEDURE — 3023F SPIROM DOC REV: CPT | Performed by: NURSE PRACTITIONER

## 2018-11-21 PROCEDURE — 85610 PROTHROMBIN TIME: CPT

## 2018-11-21 PROCEDURE — 1090F PRES/ABSN URINE INCON ASSESS: CPT | Performed by: NURSE PRACTITIONER

## 2018-11-21 PROCEDURE — 96372 THER/PROPH/DIAG INJ SC/IM: CPT | Performed by: NURSE PRACTITIONER

## 2018-11-21 PROCEDURE — 1101F PT FALLS ASSESS-DOCD LE1/YR: CPT | Performed by: NURSE PRACTITIONER

## 2018-11-21 PROCEDURE — G8427 DOCREV CUR MEDS BY ELIG CLIN: HCPCS | Performed by: NURSE PRACTITIONER

## 2018-11-21 PROCEDURE — 1123F ACP DISCUSS/DSCN MKR DOCD: CPT | Performed by: NURSE PRACTITIONER

## 2018-11-21 PROCEDURE — 83690 ASSAY OF LIPASE: CPT

## 2018-11-21 PROCEDURE — 80053 COMPREHEN METABOLIC PANEL: CPT

## 2018-11-21 PROCEDURE — 3017F COLORECTAL CA SCREEN DOC REV: CPT | Performed by: NURSE PRACTITIONER

## 2018-11-21 PROCEDURE — G8926 SPIRO NO PERF OR DOC: HCPCS | Performed by: NURSE PRACTITIONER

## 2018-11-21 PROCEDURE — 36415 COLL VENOUS BLD VENIPUNCTURE: CPT

## 2018-11-21 RX ORDER — CYANOCOBALAMIN 1000 UG/ML
1000 INJECTION INTRAMUSCULAR; SUBCUTANEOUS ONCE
Status: COMPLETED | OUTPATIENT
Start: 2018-11-21 | End: 2018-11-21

## 2018-11-21 RX ORDER — METHYLPREDNISOLONE SODIUM SUCCINATE 125 MG/2ML
125 INJECTION, POWDER, LYOPHILIZED, FOR SOLUTION INTRAMUSCULAR; INTRAVENOUS ONCE
Status: COMPLETED | OUTPATIENT
Start: 2018-11-21 | End: 2018-11-21

## 2018-11-21 RX ORDER — KETOROLAC TROMETHAMINE 30 MG/ML
60 INJECTION, SOLUTION INTRAMUSCULAR; INTRAVENOUS ONCE
Status: SHIPPED | OUTPATIENT
Start: 2018-11-26 | End: 2018-11-30

## 2018-11-21 RX ORDER — CITALOPRAM 20 MG/1
20 TABLET ORAL DAILY
Qty: 30 TABLET | Refills: 1 | Status: CANCELLED | OUTPATIENT
Start: 2018-11-21

## 2018-11-21 RX ORDER — CEFTRIAXONE 1 G/1
1 INJECTION, POWDER, FOR SOLUTION INTRAMUSCULAR; INTRAVENOUS ONCE
Status: COMPLETED | OUTPATIENT
Start: 2018-11-21 | End: 2018-11-21

## 2018-11-21 RX ORDER — GABAPENTIN 100 MG/1
100 CAPSULE ORAL NIGHTLY PRN
Qty: 30 CAPSULE | Refills: 1 | Status: SHIPPED | OUTPATIENT
Start: 2018-11-21 | End: 2019-06-11 | Stop reason: SDUPTHER

## 2018-11-21 RX ADMIN — CEFTRIAXONE 1 G: 1 INJECTION, POWDER, FOR SOLUTION INTRAMUSCULAR; INTRAVENOUS at 13:23

## 2018-11-21 RX ADMIN — METHYLPREDNISOLONE SODIUM SUCCINATE 125 MG: 125 INJECTION, POWDER, LYOPHILIZED, FOR SOLUTION INTRAMUSCULAR; INTRAVENOUS at 13:24

## 2018-11-21 RX ADMIN — CYANOCOBALAMIN 1000 MCG: 1000 INJECTION INTRAMUSCULAR; SUBCUTANEOUS at 13:26

## 2018-11-21 NOTE — PROGRESS NOTES
exercise, substance abuse and healthy sleep habits. Discussed use, benefit, and side effects of prescribed medications. Barriers to medication compliance addressed. All patient questions answered. Ptvoiced understanding.      Controlled Substances Monitoring:

## 2018-11-21 NOTE — LETTER
MEDICATION AGREEMENT    Patient Name:  Sohan Acuna  Patient :          1945    Physician:  TATYANA Galvez      VZKIUR Date:  2018    To assist patients with certain conditions multiple classes of medications may be used to help manage your treatment better and to help improve your social and work activities. Because of the choice of medications you are taking, you are at a higher risk for developing addiction or dependency. The following prescribed need monitored more frequently and will require you to partner and assist in your healthcare. This alternative type of treatment does have risks and these will be discussed with you. Medication Dose Instructions Quantity Per Month   gabapentin (NEURONTIN) 100 MG capsule      100 mg Take one tablet daily  30                          Benefits and goals of Controlled Substance Medications: There are two potential goals for your treatment: (1) lower pain (2) improved daily life functions. There are many possible treatments for your chronic condition and we will try alternatives to medication as well. These may include: physical therapy, yoga, massage, home daily exercise, meditation, relaxation techniques, injections, chiropractic manipulations, surgery, cognitive therapy, hypnosis and many medications that are not addicting. Management of chronic pain specifically via medications can help but, it will not remove all of your pain and may not restore all function. Risks of Controlled Substance Medications: These medications can lead to problems such as addiction, sedation, falls, constipation, nausea, vomiting or overdose. They may cause sleepiness, lightheadedness, slow thinking and may impair you from driving or using equipment. They may cause problems with breathing, sleep apnea, reduced coughing, these are especially dangerous for patients with lung disease. ? I will protect my prescriptions and medications. I understand that lost or misplaced prescriptions will not be replaced. ? I will keep medications only for my own use and will not share them with others. I will keep all medications away from children    ? I agree to participate in any medical, psychological or psychiatric assessments recommended by my provider. ? I will actively participate in any program designed to improve function, including social, physical, psychological and daily or work activities. 2. I will not use illegal or street drugs or another persons prescription. If I have an addiction problem with drugs or alcohol and my provider asks me to enter a program to address this issue, I agree to follow through. Such programs may include:     ? 12-Step Program and securing a sponsor    ? Individual counseling    ? Inpatient or outpatient treatment    ? Other___________________________    If in treatment, I will request that a copy of the programs initial evaluation and treatment recommendations be sent to this provider and will not expect refills until that is received. I will also request written monthly updates be sent to this provider to verify my continuing treatment. 3. I will consent to random drug screening to assure I am only taking the prescribed drugs, described in this MEDICATION AGREEMENT. I understand that a drug screen is a laboratory test in which a sample of my urine or blood is checked to see what drugs I have been taking. 4. I will keep all my scheduled appointments. If I need to cancel my appointment I will do so, a minimum of 24 hours before it is scheduled. 5. I will consent to random medication (pill) counts that are deemed necessary by my provider as a result of suspicious/unpredictable behavior or inappropriate drug screen results.  I understand if contacted for a random count it is my responsibility to bring all medications listed on this MEDICATION AGREEMENT to the providers office at time of count. 6. I understand that this provider may stop prescribing the medications listed if:    ? I do not show any improvement in pain or my activity has not improved    ? I develop rapid tolerance or loss of improvement as described in my treatment plan    ? I develop significant side effects from the medication    ? My behavior is inconsistent with the responsibilities outlined above, which may also result in being prevented from receiving further care from this office. AGREEMENT: I have read the above and have had all my questions answered. For chronic pain management, I know that chronic pain can be managed with many types of treatments. A chronic opioid trial may be part of my treatment but I must be an active participant in my care. Opioid medication is only one part of my pain management. There is limited scientific data to suggest that using opioids over 4 months will lower my pain and or improve my daily function. There is some scientific information that suggests using chronic opioids can increase my pain, make me feel less well, and increase my risk of unintentional death directly related to the opioid medication. I know that if my provider feels my risk from controlled medications and or opioid therapy is greater than my benefit, I will have my controlled substance medications and or opioid medication compassionately lowered or removed altogether. I agree to a controlled substance medication or chronic opioid trial.     I further agree to allow this office to contact family or friends if there are concerns about my safety and use of the controlled medications. I Vianca Perkins have agreed to use the medications listed on this MEDICATION AGREEMENT as instructed by my physician and as stated in this Medication Agreement.        Patient Signature___________________________________________Date_________ Patient Printed Name_______________________________________      Provider Signature_________________________________________Date_________

## 2018-11-25 ASSESSMENT — ENCOUNTER SYMPTOMS
NAUSEA: 1
CHEST TIGHTNESS: 1
ABDOMINAL PAIN: 1
BACK PAIN: 1

## 2018-11-27 ENCOUNTER — OFFICE VISIT (OUTPATIENT)
Dept: FAMILY MEDICINE CLINIC | Age: 73
End: 2018-11-27
Payer: MEDICARE

## 2018-11-27 VITALS
RESPIRATION RATE: 18 BRPM | DIASTOLIC BLOOD PRESSURE: 80 MMHG | SYSTOLIC BLOOD PRESSURE: 158 MMHG | HEART RATE: 89 BPM | OXYGEN SATURATION: 96 % | BODY MASS INDEX: 30.9 KG/M2 | HEIGHT: 64 IN | WEIGHT: 181 LBS

## 2018-11-27 DIAGNOSIS — R10.13 EPIGASTRIC ABDOMINAL PAIN: ICD-10-CM

## 2018-11-27 DIAGNOSIS — R05.9 COUGH: ICD-10-CM

## 2018-11-27 DIAGNOSIS — K31.84 GASTROPARESIS: ICD-10-CM

## 2018-11-27 DIAGNOSIS — K21.00 GASTROESOPHAGEAL REFLUX DISEASE WITH ESOPHAGITIS: ICD-10-CM

## 2018-11-27 DIAGNOSIS — I10 ESSENTIAL HYPERTENSION: ICD-10-CM

## 2018-11-27 DIAGNOSIS — J01.91 ACUTE RECURRENT SINUSITIS, UNSPECIFIED LOCATION: ICD-10-CM

## 2018-11-27 DIAGNOSIS — J42 CHRONIC BRONCHITIS, UNSPECIFIED CHRONIC BRONCHITIS TYPE (HCC): Primary | ICD-10-CM

## 2018-11-27 DIAGNOSIS — R11.2 NAUSEA VOMITING AND DIARRHEA: ICD-10-CM

## 2018-11-27 DIAGNOSIS — K58.0 IRRITABLE BOWEL SYNDROME WITH DIARRHEA: ICD-10-CM

## 2018-11-27 DIAGNOSIS — R19.7 NAUSEA VOMITING AND DIARRHEA: ICD-10-CM

## 2018-11-27 DIAGNOSIS — J02.9 ACUTE PHARYNGITIS, UNSPECIFIED ETIOLOGY: ICD-10-CM

## 2018-11-27 PROCEDURE — 99214 OFFICE O/P EST MOD 30 MIN: CPT | Performed by: NURSE PRACTITIONER

## 2018-11-27 PROCEDURE — 3023F SPIROM DOC REV: CPT | Performed by: NURSE PRACTITIONER

## 2018-11-27 PROCEDURE — 3017F COLORECTAL CA SCREEN DOC REV: CPT | Performed by: NURSE PRACTITIONER

## 2018-11-27 PROCEDURE — 1123F ACP DISCUSS/DSCN MKR DOCD: CPT | Performed by: NURSE PRACTITIONER

## 2018-11-27 PROCEDURE — G8599 NO ASA/ANTIPLAT THER USE RNG: HCPCS | Performed by: NURSE PRACTITIONER

## 2018-11-27 PROCEDURE — G8400 PT W/DXA NO RESULTS DOC: HCPCS | Performed by: NURSE PRACTITIONER

## 2018-11-27 PROCEDURE — 1090F PRES/ABSN URINE INCON ASSESS: CPT | Performed by: NURSE PRACTITIONER

## 2018-11-27 PROCEDURE — G8484 FLU IMMUNIZE NO ADMIN: HCPCS | Performed by: NURSE PRACTITIONER

## 2018-11-27 PROCEDURE — 1101F PT FALLS ASSESS-DOCD LE1/YR: CPT | Performed by: NURSE PRACTITIONER

## 2018-11-27 PROCEDURE — G8926 SPIRO NO PERF OR DOC: HCPCS | Performed by: NURSE PRACTITIONER

## 2018-11-27 PROCEDURE — 4040F PNEUMOC VAC/ADMIN/RCVD: CPT | Performed by: NURSE PRACTITIONER

## 2018-11-27 PROCEDURE — 96372 THER/PROPH/DIAG INJ SC/IM: CPT | Performed by: NURSE PRACTITIONER

## 2018-11-27 PROCEDURE — 1036F TOBACCO NON-USER: CPT | Performed by: NURSE PRACTITIONER

## 2018-11-27 PROCEDURE — G8428 CUR MEDS NOT DOCUMENT: HCPCS | Performed by: NURSE PRACTITIONER

## 2018-11-27 PROCEDURE — G8417 CALC BMI ABV UP PARAM F/U: HCPCS | Performed by: NURSE PRACTITIONER

## 2018-11-27 RX ORDER — METOCLOPRAMIDE 10 MG/1
TABLET ORAL
COMMUNITY
Start: 2018-11-24 | End: 2019-07-23

## 2018-11-27 RX ORDER — LEVOFLOXACIN 500 MG/1
500 TABLET, FILM COATED ORAL DAILY
Qty: 10 TABLET | Refills: 0 | Status: SHIPPED | OUTPATIENT
Start: 2018-11-27 | End: 2018-11-29 | Stop reason: SDUPTHER

## 2018-11-27 RX ORDER — CEFTRIAXONE 1 G/1
1 INJECTION, POWDER, FOR SOLUTION INTRAMUSCULAR; INTRAVENOUS ONCE
Status: COMPLETED | OUTPATIENT
Start: 2018-11-27 | End: 2018-11-27

## 2018-11-27 RX ORDER — CEFTRIAXONE 1 G/1
1 INJECTION, POWDER, FOR SOLUTION INTRAMUSCULAR; INTRAVENOUS ONCE
Status: COMPLETED | OUTPATIENT
Start: 2018-11-28 | End: 2018-11-28

## 2018-11-27 RX ADMIN — CEFTRIAXONE 1 G: 1 INJECTION, POWDER, FOR SOLUTION INTRAMUSCULAR; INTRAVENOUS at 14:30

## 2018-11-27 NOTE — PROGRESS NOTES
diarrhea    Irritable bowel syndrome with diarrhea    Cough    Other orders  -     Discontinue: levofloxacin (LEVAQUIN) 500 MG tablet; Take 1 tablet by mouth daily for 10 days        There are no discontinued medications. Return if symptoms worsen or fail to improve. PATIENT COUNSELING    Counseling was provided today regarding the following topics: Healthy eating habits, Regular exercise, substance abuse and healthy sleep habits. Discussed use, benefit, and side effects of prescribed medications. Barriers to medication compliance addressed. All patient questions answered. Ptvoiced understanding.      Controlled Substances Monitoring:

## 2018-11-28 ENCOUNTER — NURSE ONLY (OUTPATIENT)
Dept: FAMILY MEDICINE CLINIC | Age: 73
End: 2018-11-28
Payer: MEDICARE

## 2018-11-28 DIAGNOSIS — J04.0 LARYNGITIS: Primary | ICD-10-CM

## 2018-11-28 PROCEDURE — 96372 THER/PROPH/DIAG INJ SC/IM: CPT | Performed by: NURSE PRACTITIONER

## 2018-11-28 RX ADMIN — CEFTRIAXONE 1 G: 1 INJECTION, POWDER, FOR SOLUTION INTRAMUSCULAR; INTRAVENOUS at 15:10

## 2018-11-29 ENCOUNTER — OFFICE VISIT (OUTPATIENT)
Dept: FAMILY MEDICINE CLINIC | Age: 73
End: 2018-11-29
Payer: MEDICARE

## 2018-11-29 VITALS
RESPIRATION RATE: 18 BRPM | SYSTOLIC BLOOD PRESSURE: 130 MMHG | DIASTOLIC BLOOD PRESSURE: 74 MMHG | OXYGEN SATURATION: 97 % | HEART RATE: 96 BPM | BODY MASS INDEX: 30.11 KG/M2 | TEMPERATURE: 96.6 F | WEIGHT: 176.4 LBS | HEIGHT: 64 IN

## 2018-11-29 DIAGNOSIS — J01.00 ACUTE NON-RECURRENT MAXILLARY SINUSITIS: Primary | ICD-10-CM

## 2018-11-29 DIAGNOSIS — H61.23 BILATERAL IMPACTED CERUMEN: ICD-10-CM

## 2018-11-29 PROCEDURE — 1090F PRES/ABSN URINE INCON ASSESS: CPT | Performed by: NURSE PRACTITIONER

## 2018-11-29 PROCEDURE — 69210 REMOVE IMPACTED EAR WAX UNI: CPT | Performed by: NURSE PRACTITIONER

## 2018-11-29 PROCEDURE — 99213 OFFICE O/P EST LOW 20 MIN: CPT | Performed by: NURSE PRACTITIONER

## 2018-11-29 PROCEDURE — 1101F PT FALLS ASSESS-DOCD LE1/YR: CPT | Performed by: NURSE PRACTITIONER

## 2018-11-29 PROCEDURE — G8427 DOCREV CUR MEDS BY ELIG CLIN: HCPCS | Performed by: NURSE PRACTITIONER

## 2018-11-29 PROCEDURE — G8599 NO ASA/ANTIPLAT THER USE RNG: HCPCS | Performed by: NURSE PRACTITIONER

## 2018-11-29 PROCEDURE — G8417 CALC BMI ABV UP PARAM F/U: HCPCS | Performed by: NURSE PRACTITIONER

## 2018-11-29 PROCEDURE — 96372 THER/PROPH/DIAG INJ SC/IM: CPT | Performed by: NURSE PRACTITIONER

## 2018-11-29 PROCEDURE — 1036F TOBACCO NON-USER: CPT | Performed by: NURSE PRACTITIONER

## 2018-11-29 PROCEDURE — 4040F PNEUMOC VAC/ADMIN/RCVD: CPT | Performed by: NURSE PRACTITIONER

## 2018-11-29 PROCEDURE — 1123F ACP DISCUSS/DSCN MKR DOCD: CPT | Performed by: NURSE PRACTITIONER

## 2018-11-29 PROCEDURE — 3017F COLORECTAL CA SCREEN DOC REV: CPT | Performed by: NURSE PRACTITIONER

## 2018-11-29 PROCEDURE — G8484 FLU IMMUNIZE NO ADMIN: HCPCS | Performed by: NURSE PRACTITIONER

## 2018-11-29 PROCEDURE — G8400 PT W/DXA NO RESULTS DOC: HCPCS | Performed by: NURSE PRACTITIONER

## 2018-11-29 RX ORDER — DOXYCYCLINE HYCLATE 100 MG/1
100 CAPSULE ORAL 2 TIMES DAILY
Qty: 20 CAPSULE | Refills: 0 | Status: SHIPPED | OUTPATIENT
Start: 2018-11-29 | End: 2018-11-29 | Stop reason: ALTCHOICE

## 2018-11-29 RX ORDER — PREDNISONE 10 MG/1
10 TABLET ORAL 2 TIMES DAILY
Qty: 10 TABLET | Refills: 0 | Status: SHIPPED | OUTPATIENT
Start: 2018-11-29 | End: 2018-12-04

## 2018-11-29 RX ORDER — METHYLPREDNISOLONE SODIUM SUCCINATE 125 MG/2ML
125 INJECTION, POWDER, LYOPHILIZED, FOR SOLUTION INTRAMUSCULAR; INTRAVENOUS ONCE
Status: COMPLETED | OUTPATIENT
Start: 2018-11-29 | End: 2018-11-29

## 2018-11-29 RX ORDER — LEVOFLOXACIN 500 MG/1
500 TABLET, FILM COATED ORAL DAILY
Qty: 10 TABLET | Refills: 0 | Status: SHIPPED | OUTPATIENT
Start: 2018-11-29 | End: 2018-12-05

## 2018-11-29 RX ADMIN — METHYLPREDNISOLONE SODIUM SUCCINATE 125 MG: 125 INJECTION, POWDER, LYOPHILIZED, FOR SOLUTION INTRAMUSCULAR; INTRAVENOUS at 14:00

## 2018-11-29 ASSESSMENT — ENCOUNTER SYMPTOMS: COUGH: 1

## 2018-11-29 NOTE — PROGRESS NOTES
round, and reactive to light. Conjunctivae and EOM are normal.   Neck: Normal range of motion. Neck supple. Cardiovascular: Normal rate, regular rhythm, normal heart sounds and intact distal pulses. Exam reveals no friction rub. No murmur heard. Pulmonary/Chest: Effort normal. She has wheezes. Abdominal: Soft. Bowel sounds are normal. She exhibits no distension and no mass. There is no tenderness. There is no rebound and no guarding. No hernia. Musculoskeletal: Normal range of motion. Neurological: She is alert and oriented to person, place, and time. No cranial nerve deficit or sensory deficit. Coordination and gait normal. GCS verbal subscore is 5. GCS motor subscore is 6. Skin: Skin is warm and dry. Capillary refill takes less than 2 seconds. Psychiatric: She has a normal mood and affect. Her behavior is normal. Thought content normal.   Nursing note and vitals reviewed.        Results in Past 30 Days  Result Component Current Result Ref Range Previous Result Ref Range   Alb 4.2 (11/21/2018) 3.4 - 4.8 g/dL Not in Time Range    Albumin/Globulin Ratio 1.8 (11/21/2018) 0.8 - 2.0 Not in Time Range    Alkaline Phosphatase 102 (H) (11/21/2018) 25 - 100 U/L Not in Time Range    ALT 10 (11/21/2018) 4 - 36 U/L Not in Time Range    AST 15 (11/21/2018) 8 - 33 U/L Not in Time Range    BUN 14 (11/21/2018) 6 - 20 mg/dL Not in Time Range    Calcium 9.6 (11/21/2018) 8.5 - 10.5 mg/dL Not in Time Range    Chloride 108 (H) (11/21/2018) 98 - 107 mmol/L Not in Time Range    CO2 26 (11/21/2018) 20 - 30 mmol/L Not in Time Range    CREATININE 0.7 (11/21/2018) 0.4 - 1.2 mg/dL Not in Time Range    GFR  >59 (11/21/2018) >59 Not in Time Range    GFR Non- >60 (11/21/2018) >59 Not in Time Range    Globulin 2.4 (11/21/2018) g/dL Not in Time Range    Glucose 127 (H) (11/21/2018) 74 - 106 mg/dL Not in Time Range    Potassium 4.2 (11/21/2018) 3.4 - 5.1 mmol/L Not in Time Range    Sodium 145

## 2018-11-29 NOTE — PATIENT INSTRUCTIONS
dispose of used patches by folding them in half so that the sticky sides meet, and then flushing them down a toilet. They should not be placed in the household trash where children or pets can find them. · If you have any questions, ask your provider or pharmacist for more information. · Be sure to keep all appointments for provider visits or tests. We are committed to providing you with the best care possible. In order to help us achieve these goals please remember to bring all medications, herbal products, and over the counter supplements with you to each visit. If your provider has ordered testing for you, please be sure to follow up with our office if you have not received results within 7 days after the testing took place. *If you receive a survey after visiting one of our offices, please take time to share your experience concerning your physician office visit. These surveys are confidential and no health information about you is shared. We are eager to improve for you and we are counting on your feedback to help make that happen. Patient Education        Sinusitis: Care Instructions  Your Care Instructions    Sinusitis is an infection of the lining of the sinus cavities in your head. Sinusitis often follows a cold. It causes pain and pressure in your head and face. In most cases, sinusitis gets better on its own in 1 to 2 weeks. But some mild symptoms may last for several weeks. Sometimes antibiotics are needed. Follow-up care is a key part of your treatment and safety. Be sure to make and go to all appointments, and call your doctor if you are having problems. It's also a good idea to know your test results and keep a list of the medicines you take. How can you care for yourself at home? · Take an over-the-counter pain medicine, such as acetaminophen (Tylenol), ibuprofen (Advil, Motrin), or naproxen (Aleve). Read and follow all instructions on the label.   · If the doctor prescribed

## 2018-12-02 ASSESSMENT — ENCOUNTER SYMPTOMS: SORE THROAT: 1

## 2018-12-05 ENCOUNTER — OFFICE VISIT (OUTPATIENT)
Dept: FAMILY MEDICINE CLINIC | Age: 73
End: 2018-12-05
Payer: MEDICARE

## 2018-12-05 ENCOUNTER — HOSPITAL ENCOUNTER (OUTPATIENT)
Facility: HOSPITAL | Age: 73
Discharge: HOME | End: 2018-12-05
Payer: MEDICARE

## 2018-12-05 VITALS
HEART RATE: 72 BPM | RESPIRATION RATE: 18 BRPM | DIASTOLIC BLOOD PRESSURE: 80 MMHG | OXYGEN SATURATION: 98 % | BODY MASS INDEX: 30.05 KG/M2 | WEIGHT: 176 LBS | SYSTOLIC BLOOD PRESSURE: 162 MMHG | HEIGHT: 64 IN

## 2018-12-05 DIAGNOSIS — R06.02 SHORTNESS OF BREATH: ICD-10-CM

## 2018-12-05 DIAGNOSIS — J42 CHRONIC BRONCHITIS, UNSPECIFIED CHRONIC BRONCHITIS TYPE (HCC): ICD-10-CM

## 2018-12-05 DIAGNOSIS — Z09 HOSPITAL DISCHARGE FOLLOW-UP: ICD-10-CM

## 2018-12-05 DIAGNOSIS — J42 CHRONIC BRONCHITIS, UNSPECIFIED CHRONIC BRONCHITIS TYPE (HCC): Primary | ICD-10-CM

## 2018-12-05 DIAGNOSIS — R05.3 CHRONIC COUGH: ICD-10-CM

## 2018-12-05 DIAGNOSIS — R05.8 SPUTUM PRODUCTION: ICD-10-CM

## 2018-12-05 DIAGNOSIS — R42 DIZZINESS: ICD-10-CM

## 2018-12-05 PROCEDURE — G8484 FLU IMMUNIZE NO ADMIN: HCPCS | Performed by: NURSE PRACTITIONER

## 2018-12-05 PROCEDURE — 1101F PT FALLS ASSESS-DOCD LE1/YR: CPT | Performed by: NURSE PRACTITIONER

## 2018-12-05 PROCEDURE — 3023F SPIROM DOC REV: CPT | Performed by: NURSE PRACTITIONER

## 2018-12-05 PROCEDURE — 1123F ACP DISCUSS/DSCN MKR DOCD: CPT | Performed by: NURSE PRACTITIONER

## 2018-12-05 PROCEDURE — G8400 PT W/DXA NO RESULTS DOC: HCPCS | Performed by: NURSE PRACTITIONER

## 2018-12-05 PROCEDURE — 4040F PNEUMOC VAC/ADMIN/RCVD: CPT | Performed by: NURSE PRACTITIONER

## 2018-12-05 PROCEDURE — G8427 DOCREV CUR MEDS BY ELIG CLIN: HCPCS | Performed by: NURSE PRACTITIONER

## 2018-12-05 PROCEDURE — G8417 CALC BMI ABV UP PARAM F/U: HCPCS | Performed by: NURSE PRACTITIONER

## 2018-12-05 PROCEDURE — 87070 CULTURE OTHR SPECIMN AEROBIC: CPT

## 2018-12-05 PROCEDURE — 1036F TOBACCO NON-USER: CPT | Performed by: NURSE PRACTITIONER

## 2018-12-05 PROCEDURE — G8599 NO ASA/ANTIPLAT THER USE RNG: HCPCS | Performed by: NURSE PRACTITIONER

## 2018-12-05 PROCEDURE — 87205 SMEAR GRAM STAIN: CPT

## 2018-12-05 PROCEDURE — G8926 SPIRO NO PERF OR DOC: HCPCS | Performed by: NURSE PRACTITIONER

## 2018-12-05 PROCEDURE — 99213 OFFICE O/P EST LOW 20 MIN: CPT | Performed by: NURSE PRACTITIONER

## 2018-12-05 PROCEDURE — 1090F PRES/ABSN URINE INCON ASSESS: CPT | Performed by: NURSE PRACTITIONER

## 2018-12-05 PROCEDURE — 3017F COLORECTAL CA SCREEN DOC REV: CPT | Performed by: NURSE PRACTITIONER

## 2018-12-05 RX ORDER — AMOXICILLIN 875 MG/1
875 TABLET, COATED ORAL 2 TIMES DAILY
Qty: 20 TABLET | Refills: 0 | Status: SHIPPED | OUTPATIENT
Start: 2018-12-05 | End: 2019-05-31 | Stop reason: SDUPTHER

## 2018-12-05 RX ORDER — CEFDINIR 300 MG/1
1 CAPSULE ORAL 2 TIMES DAILY
COMMUNITY
Start: 2018-12-02 | End: 2018-12-31 | Stop reason: SINTOL

## 2018-12-05 NOTE — PROGRESS NOTES
and are negative. OBJECTIVE:  Wt Readings from Last 2 Encounters:   12/05/18 176 lb (79.8 kg)   11/29/18 176 lb 6.4 oz (80 kg)     BP Readings from Last 2 Encounters:   12/05/18 (!) 162/80   11/29/18 130/74      Pulse Readings from Last 2 Encounters:   12/05/18 72   11/29/18 96     Body mass index is 30.21 kg/m². @HDEMSIL4(3)@  Resp Readings from Last 2 Encounters:   12/05/18 18   11/29/18 18       Physical Exam   Constitutional: She is oriented to person, place, and time. She appears well-developed and well-nourished. HENT:   Head: Normocephalic and atraumatic. Right Ear: External ear normal.   Left Ear: External ear normal.   Nose: Mucosal edema and rhinorrhea present. Right sinus exhibits maxillary sinus tenderness and frontal sinus tenderness. Left sinus exhibits maxillary sinus tenderness and frontal sinus tenderness. Mouth/Throat: Uvula is midline, oropharynx is clear and moist and mucous membranes are normal.   Eyes: Pupils are equal, round, and reactive to light. Conjunctivae are normal.   Neck: Normal range of motion. Neck supple. Cardiovascular: Normal rate, regular rhythm, normal heart sounds and intact distal pulses. Pulmonary/Chest: Effort normal. She has rhonchi. Left breast exhibits tenderness. Rhonchi throughout lower lobes bilaterally with cough. Abdominal: Soft. Bowel sounds are normal. There is generalized tenderness. Musculoskeletal:        Right shoulder: She exhibits tenderness, pain and spasm. Cervical back: She exhibits tenderness and spasm. Thoracic back: She exhibits tenderness, pain and spasm. Lumbar back: She exhibits decreased range of motion, tenderness, pain and spasm. Overall musculoskeletal tenderness. Significant muscle tightness in shoulders bilaterally and thoracic area. Neurological: She is alert and oriented to person, place, and time. Skin: Skin is warm and dry.         Scarring on shoulders bilaterally due to removal of

## 2018-12-07 LAB
CULTURE, RESPIRATORY: NORMAL
GRAM STAIN RESULT: NORMAL

## 2018-12-10 DIAGNOSIS — E03.9 HYPOTHYROIDISM, UNSPECIFIED TYPE: ICD-10-CM

## 2018-12-10 RX ORDER — LEVOTHYROXINE SODIUM 0.07 MG/1
75 TABLET ORAL DAILY
Qty: 90 TABLET | Refills: 0 | Status: SHIPPED | OUTPATIENT
Start: 2018-12-10 | End: 2018-12-17 | Stop reason: SDUPTHER

## 2018-12-31 PROBLEM — D69.9 BLEEDS EASILY (HCC): Status: ACTIVE | Noted: 2018-12-31

## 2018-12-31 PROBLEM — N30.00 ACUTE CYSTITIS WITHOUT HEMATURIA: Status: RESOLVED | Noted: 2017-02-03 | Resolved: 2018-12-31

## 2018-12-31 PROBLEM — J42 CHRONIC BRONCHITIS (HCC): Status: ACTIVE | Noted: 2018-12-31

## 2018-12-31 PROBLEM — K31.84 GASTROPARESIS: Status: ACTIVE | Noted: 2018-12-31

## 2019-01-02 ENCOUNTER — NURSE ONLY (OUTPATIENT)
Dept: FAMILY MEDICINE CLINIC | Age: 74
End: 2019-01-02
Payer: MEDICARE

## 2019-01-02 DIAGNOSIS — E53.8 B12 DEFICIENCY: Primary | ICD-10-CM

## 2019-01-02 PROCEDURE — 96372 THER/PROPH/DIAG INJ SC/IM: CPT | Performed by: NURSE PRACTITIONER

## 2019-01-02 RX ORDER — CYANOCOBALAMIN 1000 UG/ML
1000 INJECTION INTRAMUSCULAR; SUBCUTANEOUS ONCE
Status: COMPLETED | OUTPATIENT
Start: 2019-01-02 | End: 2019-01-02

## 2019-01-02 RX ADMIN — CYANOCOBALAMIN 1000 MCG: 1000 INJECTION INTRAMUSCULAR; SUBCUTANEOUS at 15:18

## 2019-01-06 ASSESSMENT — ENCOUNTER SYMPTOMS
NAUSEA: 1
VOMITING: 1
SINUS PRESSURE: 1
NAUSEA: 1
DIARRHEA: 1
DIARRHEA: 1
BACK PAIN: 1
SORE THROAT: 1
SHORTNESS OF BREATH: 1
VOMITING: 1
ABDOMINAL PAIN: 1
SORE THROAT: 1
RHINORRHEA: 1
RHINORRHEA: 1
SINUS PAIN: 1
SINUS PAIN: 1
BACK PAIN: 1
CHEST TIGHTNESS: 1
COUGH: 1
ABDOMINAL PAIN: 1
SINUS PRESSURE: 1
SHORTNESS OF BREATH: 1
CHEST TIGHTNESS: 1
COUGH: 1

## 2019-01-08 ASSESSMENT — ENCOUNTER SYMPTOMS
ABDOMINAL PAIN: 1
SINUS PAIN: 1
VOMITING: 1
CHEST TIGHTNESS: 1
SINUS PRESSURE: 1
COUGH: 1
DIARRHEA: 1
SORE THROAT: 1
EYES NEGATIVE: 1
SHORTNESS OF BREATH: 1
RHINORRHEA: 1
BACK PAIN: 1
NAUSEA: 1

## 2019-01-09 ENCOUNTER — TELEPHONE (OUTPATIENT)
Dept: FAMILY MEDICINE CLINIC | Age: 74
End: 2019-01-09

## 2019-01-21 ENCOUNTER — TELEPHONE (OUTPATIENT)
Dept: FAMILY MEDICINE CLINIC | Age: 74
End: 2019-01-21

## 2019-01-22 ENCOUNTER — CARE COORDINATION (OUTPATIENT)
Dept: CARE COORDINATION | Age: 74
End: 2019-01-22

## 2019-01-28 ENCOUNTER — OFFICE VISIT (OUTPATIENT)
Dept: FAMILY MEDICINE CLINIC | Age: 74
End: 2019-01-28
Payer: MEDICARE

## 2019-01-28 VITALS
HEART RATE: 72 BPM | RESPIRATION RATE: 18 BRPM | SYSTOLIC BLOOD PRESSURE: 110 MMHG | OXYGEN SATURATION: 98 % | WEIGHT: 179 LBS | BODY MASS INDEX: 30.56 KG/M2 | DIASTOLIC BLOOD PRESSURE: 64 MMHG | HEIGHT: 64 IN

## 2019-01-28 DIAGNOSIS — F33.1 MODERATE EPISODE OF RECURRENT MAJOR DEPRESSIVE DISORDER (HCC): ICD-10-CM

## 2019-01-28 DIAGNOSIS — I10 ESSENTIAL HYPERTENSION: Primary | ICD-10-CM

## 2019-01-28 DIAGNOSIS — I20.8 STABLE ANGINA (HCC): ICD-10-CM

## 2019-01-28 DIAGNOSIS — G89.29 CHRONIC MIDLINE LOW BACK PAIN WITH BILATERAL SCIATICA: ICD-10-CM

## 2019-01-28 DIAGNOSIS — G47.00 INSOMNIA, UNSPECIFIED TYPE: ICD-10-CM

## 2019-01-28 DIAGNOSIS — R11.0 NAUSEA: ICD-10-CM

## 2019-01-28 DIAGNOSIS — M54.41 CHRONIC MIDLINE LOW BACK PAIN WITH BILATERAL SCIATICA: ICD-10-CM

## 2019-01-28 DIAGNOSIS — M54.42 CHRONIC MIDLINE LOW BACK PAIN WITH BILATERAL SCIATICA: ICD-10-CM

## 2019-01-28 DIAGNOSIS — E53.8 B12 DEFICIENCY: ICD-10-CM

## 2019-01-28 DIAGNOSIS — K21.9 GASTROESOPHAGEAL REFLUX DISEASE, ESOPHAGITIS PRESENCE NOT SPECIFIED: ICD-10-CM

## 2019-01-28 DIAGNOSIS — F41.9 ANXIETY: ICD-10-CM

## 2019-01-28 DIAGNOSIS — F41.0 PANIC ATTACKS: ICD-10-CM

## 2019-01-28 DIAGNOSIS — M54.6 CHRONIC LEFT-SIDED THORACIC BACK PAIN: ICD-10-CM

## 2019-01-28 DIAGNOSIS — G89.29 CHRONIC LEFT-SIDED THORACIC BACK PAIN: ICD-10-CM

## 2019-01-28 PROCEDURE — 96372 THER/PROPH/DIAG INJ SC/IM: CPT | Performed by: NURSE PRACTITIONER

## 2019-01-28 PROCEDURE — 99496 TRANSJ CARE MGMT HIGH F2F 7D: CPT | Performed by: NURSE PRACTITIONER

## 2019-01-28 RX ORDER — PANTOPRAZOLE SODIUM 40 MG/1
40 TABLET, DELAYED RELEASE ORAL DAILY
Qty: 30 TABLET | Refills: 3 | Status: SHIPPED | OUTPATIENT
Start: 2019-01-28 | End: 2019-05-21 | Stop reason: SDUPTHER

## 2019-01-28 RX ORDER — CYCLOBENZAPRINE HCL 10 MG
1 TABLET ORAL 3 TIMES DAILY
COMMUNITY
Start: 2019-01-22 | End: 2019-07-23

## 2019-01-28 RX ORDER — TRAZODONE HYDROCHLORIDE 50 MG/1
TABLET ORAL
Qty: 30 TABLET | Refills: 3 | Status: CANCELLED | OUTPATIENT
Start: 2019-01-28

## 2019-01-28 RX ORDER — METHOCARBAMOL 750 MG/1
1 TABLET, FILM COATED ORAL 3 TIMES DAILY
COMMUNITY
Start: 2019-01-09 | End: 2019-07-13

## 2019-01-28 RX ORDER — CYANOCOBALAMIN 1000 UG/ML
1000 INJECTION INTRAMUSCULAR; SUBCUTANEOUS ONCE
Status: COMPLETED | OUTPATIENT
Start: 2019-01-28 | End: 2019-01-28

## 2019-01-28 RX ORDER — LISINOPRIL AND HYDROCHLOROTHIAZIDE 25; 20 MG/1; MG/1
1 TABLET ORAL 2 TIMES DAILY
Qty: 60 TABLET | Refills: 3 | Status: CANCELLED | OUTPATIENT
Start: 2019-01-28

## 2019-01-28 RX ORDER — VALACYCLOVIR HYDROCHLORIDE 1 G/1
1 TABLET, FILM COATED ORAL DAILY
COMMUNITY
Start: 2019-01-22 | End: 2019-02-03 | Stop reason: CLARIF

## 2019-01-28 RX ORDER — CITALOPRAM 20 MG/1
20 TABLET ORAL DAILY
Qty: 30 TABLET | Refills: 3 | Status: SHIPPED | OUTPATIENT
Start: 2019-01-28 | End: 2019-06-11

## 2019-01-28 RX ORDER — PROMETHAZINE HYDROCHLORIDE 25 MG/1
1 TABLET ORAL PRN
COMMUNITY
Start: 2019-01-22 | End: 2019-07-23

## 2019-01-28 RX ORDER — GABAPENTIN 100 MG/1
100 CAPSULE ORAL NIGHTLY PRN
Qty: 30 CAPSULE | Refills: 1 | Status: CANCELLED | OUTPATIENT
Start: 2019-01-28 | End: 2019-02-27

## 2019-01-28 RX ORDER — CLONIDINE HYDROCHLORIDE 0.1 MG/1
TABLET ORAL
Qty: 60 TABLET | Refills: 1 | Status: CANCELLED | OUTPATIENT
Start: 2019-01-28

## 2019-01-28 RX ADMIN — CYANOCOBALAMIN 1000 MCG: 1000 INJECTION INTRAMUSCULAR; SUBCUTANEOUS at 10:48

## 2019-02-03 ASSESSMENT — ENCOUNTER SYMPTOMS
COUGH: 1
CHEST TIGHTNESS: 1
DIARRHEA: 1
VOMITING: 1
SORE THROAT: 1
SHORTNESS OF BREATH: 1
ABDOMINAL PAIN: 1
BACK PAIN: 1
NAUSEA: 1
EYES NEGATIVE: 1

## 2019-02-26 PROBLEM — R93.2 ABNORMAL FINDINGS ON DIAGNOSTIC IMAGING OF LIVER: Status: ACTIVE | Noted: 2019-02-26

## 2019-02-26 PROBLEM — I95.1 SYNCOPE DUE TO ORTHOSTATIC HYPOTENSION: Status: ACTIVE | Noted: 2019-02-26

## 2019-02-26 PROBLEM — M54.6 THORACIC BACK PAIN: Status: ACTIVE | Noted: 2019-02-26

## 2019-02-26 PROBLEM — D18.00 HEMANGIOMA: Status: ACTIVE | Noted: 2019-02-26

## 2019-02-26 PROBLEM — R53.1 WEAKNESS PRESENT: Status: ACTIVE | Noted: 2019-02-26

## 2019-03-12 ENCOUNTER — OFFICE VISIT (OUTPATIENT)
Dept: FAMILY MEDICINE CLINIC | Age: 74
End: 2019-03-12
Payer: MEDICARE

## 2019-03-12 VITALS
BODY MASS INDEX: 30.56 KG/M2 | OXYGEN SATURATION: 98 % | HEART RATE: 76 BPM | DIASTOLIC BLOOD PRESSURE: 76 MMHG | SYSTOLIC BLOOD PRESSURE: 132 MMHG | WEIGHT: 179 LBS | HEIGHT: 64 IN | RESPIRATION RATE: 18 BRPM

## 2019-03-12 DIAGNOSIS — E53.8 B12 DEFICIENCY: ICD-10-CM

## 2019-03-12 DIAGNOSIS — E03.9 HYPOTHYROIDISM, UNSPECIFIED TYPE: ICD-10-CM

## 2019-03-12 DIAGNOSIS — I10 ESSENTIAL HYPERTENSION: Primary | ICD-10-CM

## 2019-03-12 PROCEDURE — 4040F PNEUMOC VAC/ADMIN/RCVD: CPT | Performed by: NURSE PRACTITIONER

## 2019-03-12 PROCEDURE — 96372 THER/PROPH/DIAG INJ SC/IM: CPT | Performed by: NURSE PRACTITIONER

## 2019-03-12 PROCEDURE — 1036F TOBACCO NON-USER: CPT | Performed by: NURSE PRACTITIONER

## 2019-03-12 PROCEDURE — 1101F PT FALLS ASSESS-DOCD LE1/YR: CPT | Performed by: NURSE PRACTITIONER

## 2019-03-12 PROCEDURE — 1090F PRES/ABSN URINE INCON ASSESS: CPT | Performed by: NURSE PRACTITIONER

## 2019-03-12 PROCEDURE — G8417 CALC BMI ABV UP PARAM F/U: HCPCS | Performed by: NURSE PRACTITIONER

## 2019-03-12 PROCEDURE — 1123F ACP DISCUSS/DSCN MKR DOCD: CPT | Performed by: NURSE PRACTITIONER

## 2019-03-12 PROCEDURE — 3017F COLORECTAL CA SCREEN DOC REV: CPT | Performed by: NURSE PRACTITIONER

## 2019-03-12 PROCEDURE — G8484 FLU IMMUNIZE NO ADMIN: HCPCS | Performed by: NURSE PRACTITIONER

## 2019-03-12 PROCEDURE — 99214 OFFICE O/P EST MOD 30 MIN: CPT | Performed by: NURSE PRACTITIONER

## 2019-03-12 PROCEDURE — G8427 DOCREV CUR MEDS BY ELIG CLIN: HCPCS | Performed by: NURSE PRACTITIONER

## 2019-03-12 PROCEDURE — G8599 NO ASA/ANTIPLAT THER USE RNG: HCPCS | Performed by: NURSE PRACTITIONER

## 2019-03-12 PROCEDURE — G8400 PT W/DXA NO RESULTS DOC: HCPCS | Performed by: NURSE PRACTITIONER

## 2019-03-12 RX ORDER — CYANOCOBALAMIN 1000 UG/ML
1000 INJECTION INTRAMUSCULAR; SUBCUTANEOUS ONCE
Status: COMPLETED | OUTPATIENT
Start: 2019-03-12 | End: 2019-03-12

## 2019-03-12 RX ORDER — LEVOTHYROXINE SODIUM 0.07 MG/1
75 TABLET ORAL DAILY
Qty: 90 TABLET | Refills: 3 | Status: SHIPPED | OUTPATIENT
Start: 2019-03-12 | End: 2019-08-26 | Stop reason: SDUPTHER

## 2019-03-12 RX ORDER — LISINOPRIL AND HYDROCHLOROTHIAZIDE 25; 20 MG/1; MG/1
1 TABLET ORAL 2 TIMES DAILY
Qty: 60 TABLET | Refills: 3 | Status: CANCELLED | OUTPATIENT
Start: 2019-03-12

## 2019-03-12 RX ADMIN — CYANOCOBALAMIN 1000 MCG: 1000 INJECTION INTRAMUSCULAR; SUBCUTANEOUS at 13:26

## 2019-03-12 ASSESSMENT — ENCOUNTER SYMPTOMS
BLURRED VISION: 0
SHORTNESS OF BREATH: 0
COLOR CHANGE: 0
ORTHOPNEA: 0
EYE REDNESS: 0
COUGH: 0
BACK PAIN: 0
EYE PAIN: 0
NAUSEA: 0
TROUBLE SWALLOWING: 0
SORE THROAT: 0
VOMITING: 0
DIARRHEA: 0
CHEST TIGHTNESS: 0
ABDOMINAL PAIN: 0

## 2019-04-25 ENCOUNTER — NURSE ONLY (OUTPATIENT)
Dept: FAMILY MEDICINE CLINIC | Age: 74
End: 2019-04-25
Payer: MEDICARE

## 2019-04-25 DIAGNOSIS — E53.8 B12 DEFICIENCY: Primary | ICD-10-CM

## 2019-04-25 PROCEDURE — 96372 THER/PROPH/DIAG INJ SC/IM: CPT | Performed by: NURSE PRACTITIONER

## 2019-04-25 RX ORDER — CYANOCOBALAMIN 1000 UG/ML
1000 INJECTION INTRAMUSCULAR; SUBCUTANEOUS ONCE
Status: COMPLETED | OUTPATIENT
Start: 2019-04-25 | End: 2019-04-25

## 2019-04-25 RX ADMIN — CYANOCOBALAMIN 1000 MCG: 1000 INJECTION INTRAMUSCULAR; SUBCUTANEOUS at 14:17

## 2019-05-21 ENCOUNTER — OFFICE VISIT (OUTPATIENT)
Dept: FAMILY MEDICINE CLINIC | Age: 74
End: 2019-05-21
Payer: MEDICARE

## 2019-05-21 VITALS
DIASTOLIC BLOOD PRESSURE: 72 MMHG | RESPIRATION RATE: 18 BRPM | WEIGHT: 177 LBS | SYSTOLIC BLOOD PRESSURE: 128 MMHG | OXYGEN SATURATION: 98 % | HEIGHT: 64 IN | BODY MASS INDEX: 30.22 KG/M2 | HEART RATE: 70 BPM

## 2019-05-21 DIAGNOSIS — R11.0 NAUSEA: ICD-10-CM

## 2019-05-21 DIAGNOSIS — Z13.1 SCREENING FOR DIABETES MELLITUS (DM): ICD-10-CM

## 2019-05-21 DIAGNOSIS — Z13.220 SCREENING FOR HYPERLIPIDEMIA: ICD-10-CM

## 2019-05-21 DIAGNOSIS — E53.8 B12 DEFICIENCY: ICD-10-CM

## 2019-05-21 DIAGNOSIS — K57.92 DIVERTICULITIS: Primary | ICD-10-CM

## 2019-05-21 DIAGNOSIS — R05.9 COUGH: ICD-10-CM

## 2019-05-21 DIAGNOSIS — E55.9 VITAMIN D DEFICIENCY: ICD-10-CM

## 2019-05-21 DIAGNOSIS — E03.9 HYPOTHYROIDISM, UNSPECIFIED TYPE: ICD-10-CM

## 2019-05-21 DIAGNOSIS — R79.9 ABNORMAL FINDING OF BLOOD CHEMISTRY: ICD-10-CM

## 2019-05-21 DIAGNOSIS — M79.662 PAIN OF LEFT CALF: ICD-10-CM

## 2019-05-21 DIAGNOSIS — K21.9 GASTROESOPHAGEAL REFLUX DISEASE, ESOPHAGITIS PRESENCE NOT SPECIFIED: ICD-10-CM

## 2019-05-21 DIAGNOSIS — Z87.891 PERSONAL HISTORY OF NICOTINE DEPENDENCE: ICD-10-CM

## 2019-05-21 PROCEDURE — 1090F PRES/ABSN URINE INCON ASSESS: CPT | Performed by: NURSE PRACTITIONER

## 2019-05-21 PROCEDURE — G8400 PT W/DXA NO RESULTS DOC: HCPCS | Performed by: NURSE PRACTITIONER

## 2019-05-21 PROCEDURE — 4040F PNEUMOC VAC/ADMIN/RCVD: CPT | Performed by: NURSE PRACTITIONER

## 2019-05-21 PROCEDURE — 3017F COLORECTAL CA SCREEN DOC REV: CPT | Performed by: NURSE PRACTITIONER

## 2019-05-21 PROCEDURE — G8599 NO ASA/ANTIPLAT THER USE RNG: HCPCS | Performed by: NURSE PRACTITIONER

## 2019-05-21 PROCEDURE — G8417 CALC BMI ABV UP PARAM F/U: HCPCS | Performed by: NURSE PRACTITIONER

## 2019-05-21 PROCEDURE — 99214 OFFICE O/P EST MOD 30 MIN: CPT | Performed by: NURSE PRACTITIONER

## 2019-05-21 PROCEDURE — 1123F ACP DISCUSS/DSCN MKR DOCD: CPT | Performed by: NURSE PRACTITIONER

## 2019-05-21 PROCEDURE — G8427 DOCREV CUR MEDS BY ELIG CLIN: HCPCS | Performed by: NURSE PRACTITIONER

## 2019-05-21 PROCEDURE — 1036F TOBACCO NON-USER: CPT | Performed by: NURSE PRACTITIONER

## 2019-05-21 PROCEDURE — 96372 THER/PROPH/DIAG INJ SC/IM: CPT | Performed by: NURSE PRACTITIONER

## 2019-05-21 RX ORDER — ONDANSETRON 4 MG/1
4 TABLET, ORALLY DISINTEGRATING ORAL EVERY 8 HOURS PRN
Qty: 20 TABLET | Refills: 0 | Status: SHIPPED | OUTPATIENT
Start: 2019-05-21 | End: 2019-07-23

## 2019-05-21 RX ORDER — LEVOFLOXACIN 750 MG/1
750 TABLET ORAL DAILY
Qty: 5 TABLET | Refills: 0 | Status: SHIPPED | OUTPATIENT
Start: 2019-05-21 | End: 2019-05-26

## 2019-05-21 RX ORDER — METHYLPREDNISOLONE ACETATE 80 MG/ML
80 INJECTION, SUSPENSION INTRA-ARTICULAR; INTRALESIONAL; INTRAMUSCULAR; SOFT TISSUE ONCE
Status: COMPLETED | OUTPATIENT
Start: 2019-05-21 | End: 2019-05-21

## 2019-05-21 RX ORDER — PANTOPRAZOLE SODIUM 40 MG/1
40 TABLET, DELAYED RELEASE ORAL DAILY
Qty: 30 TABLET | Refills: 3 | Status: SHIPPED | OUTPATIENT
Start: 2019-05-21 | End: 2019-07-25 | Stop reason: ALTCHOICE

## 2019-05-21 RX ORDER — CYCLOBENZAPRINE HCL 5 MG
1 TABLET ORAL 2 TIMES DAILY
COMMUNITY
Start: 2019-03-13 | End: 2019-10-01

## 2019-05-21 RX ADMIN — METHYLPREDNISOLONE ACETATE 80 MG: 80 INJECTION, SUSPENSION INTRA-ARTICULAR; INTRALESIONAL; INTRAMUSCULAR; SOFT TISSUE at 15:04

## 2019-05-21 ASSESSMENT — ENCOUNTER SYMPTOMS
COUGH: 0
SHORTNESS OF BREATH: 0
DIARRHEA: 0
TROUBLE SWALLOWING: 0
ORTHOPNEA: 0
VOMITING: 0
COLOR CHANGE: 0
CHEST TIGHTNESS: 0
ABDOMINAL PAIN: 1
BLURRED VISION: 0
BACK PAIN: 0
EYE PAIN: 0
SORE THROAT: 0
NAUSEA: 1
EYE REDNESS: 0

## 2019-05-21 NOTE — PROGRESS NOTES
Have you seen any other physician or provider since your last visit yes - [facility]    Have you had any other diagnostic tests since your last visit? Yes at [address]    Have you changed or stopped any medications since your last visit?  yes

## 2019-05-21 NOTE — PROGRESS NOTES
Administrations This Visit     methylPREDNISolone acetate (DEPO-MEDROL) injection 80 mg     Admin Date  05/21/2019  15:04 Action  Given Dose  80 mg Route  Intramuscular Site  Dorsogluteal Right Administered By  Clari Watson    Ordering Provider:  TATYANA Simpson CNP    NDC:  1577-4947-18    Lot#:  [id number]    :  [employer].     Patient Supplied?:  No

## 2019-05-21 NOTE — PROGRESS NOTES
SUBJECTIVE:    Patient ID: Elaine Barton is a 72 y.o. female. Chief Complaint   Patient presents with    Hypertension     follow up    Leg Pain     left leg pain x several days, thinks could be blood clot       Rafy Jalloh presents to the clinic today for a follow-up after recent hospitalization from [facility] [place] where she went to the ER with c/o abd pain, nausea, vomiting, and diarrhea. She was admitted there with Colitis and diverticulitis. She was discharged home with prescriptions of Flagyl, Levaquin, Lisinopril 30 mg, and phenergan. She reported today she never got these medications filled. She is stable today. She does reports some nausea and colicky abdominal pain. She reports he hasn't been eating a healthy diet due to business in her life schedule. She reports while hospitalized Dr. Shelli Becerra her cardiologist also evaluated her and increased her lisinopril due to HTN. Patient reports having a follow-up with Cardiology the 1st week of June. Patient explained that she wanted to see me today before getting the above prescriptions filled. She explains to me that Phenergan makes her extremely sleepy, and that she was allergic to Flagyl. The patient is going to get the Levaquin filled today for diverticulosis and is requesting something else for nausea and GERD. Patient is requesting routine lab work to be checked, she cant have this performed today but can come into the clinic one morning before work. Patient is requesting a CT scan of her lungs due to family history of lung cancer and a lingering cough that doesn't seem to go away. Patient appear in no acute distress, her vital signs are stable today. Hypertension   This is a chronic problem. The current episode started more than 1 year ago. The problem has been gradually improving since onset. The problem is controlled.  Pertinent negatives include no anxiety, blurred vision, chest pain, headaches, malaise/fatigue, neck pain, orthopnea, tablet Take 1 tablet by mouth daily 30 tablet 3    metoclopramide (REGLAN) 10 MG tablet       gabapentin (NEURONTIN) 100 MG capsule Take 1 capsule by mouth nightly as needed (back pain) for up to 30 days. . 30 capsule 1    lisinopril-hydrochlorothiazide (PRINZIDE;ZESTORETIC) 20-25 MG per tablet Take 1 tablet by mouth 2 times daily 60 tablet 3    cloNIDine (CATAPRES) 0.1 MG tablet Take one tablet up to twice daily as needed for blood pressures greater than 160/90. 60 tablet 1    nitroGLYCERIN (NITROSTAT) 0.4 MG SL tablet Place 1 tablet under the tongue every 5 minutes as needed for Chest pain 25 tablet 3    aspirin EC 81 MG EC tablet Take 1 tablet by mouth daily 30 tablet 3     Current Facility-Administered Medications on File Prior to Visit   Medication Dose Route Frequency Provider Last Rate Last Dose    cyanocobalamin injection 1,000 mcg  1,000 mcg Intramuscular Once [employer] Depaul, APRN           Review of Systems   Constitutional: Negative for activity change, appetite change, chills, fever and malaise/fatigue. HENT: Negative for congestion, ear pain, nosebleeds, sore throat and trouble swallowing. Eyes: Negative for blurred vision, pain and redness. Respiratory: Negative for cough, chest tightness and shortness of breath. Cardiovascular: Positive for leg swelling. Negative for chest pain, palpitations, orthopnea and PND. Left calf pain and tenderness, she has a history of blood clots. This has been recurrent for 2-3 days. This pain is also affecting her ADl's and walking for long distances. Gastrointestinal: Positive for abdominal pain (and cramping. Recently DC'd from hospital with dx of diverticulitis.) and nausea. Negative for diarrhea and vomiting. Heart burn that comes and goes. Genitourinary: Negative for difficulty urinating, dysuria and flank pain. Musculoskeletal: Negative for arthralgias, back pain, gait problem and neck pain.    Skin: Negative for color

## 2019-05-22 ENCOUNTER — TELEPHONE (OUTPATIENT)
Dept: FAMILY MEDICINE CLINIC | Age: 74
End: 2019-05-22

## 2019-05-22 DIAGNOSIS — R06.02 SHORTNESS OF BREATH: Primary | ICD-10-CM

## 2019-05-22 DIAGNOSIS — R05.9 COUGH: ICD-10-CM

## 2019-05-22 NOTE — TELEPHONE ENCOUNTER
The documentation states patient was never a smoker. Therefore she will need a CT Chest instead of CT Lung Screening otherwise insurance will not pay. If you would like for her to still have this, please place a new order.

## 2019-05-23 ENCOUNTER — TELEPHONE (OUTPATIENT)
Dept: FAMILY MEDICINE CLINIC | Age: 74
End: 2019-05-23

## 2019-05-23 NOTE — TELEPHONE ENCOUNTER
Patient scheduled for CT Chest WO & US at [facility] on 05/30/19 at 12 and 12:30. Patient needs to arrive at 11:45. Patient's order is in epic. Left patient a detailed v/m with appointment date/time/location.

## 2019-05-23 NOTE — TELEPHONE ENCOUNTER
I dc'd the lung cancer screen and placed a new order for CT chest thank you. The patient requested having this done. Nothing major going on with her.

## 2019-05-30 ENCOUNTER — HOSPITAL ENCOUNTER (OUTPATIENT)
Dept: CT IMAGING | Facility: HOSPITAL | Age: 74
Discharge: HOME | End: 2019-05-30
Payer: MEDICARE

## 2019-05-30 ENCOUNTER — TELEPHONE (OUTPATIENT)
Dept: FAMILY MEDICINE CLINIC | Age: 74
End: 2019-05-30

## 2019-05-30 ENCOUNTER — HOSPITAL ENCOUNTER (OUTPATIENT)
Dept: ULTRASOUND IMAGING | Facility: HOSPITAL | Age: 74
Discharge: HOME | End: 2019-05-30
Payer: MEDICARE

## 2019-05-30 DIAGNOSIS — R06.02 SHORTNESS OF BREATH: ICD-10-CM

## 2019-05-30 DIAGNOSIS — M79.662 PAIN OF LEFT CALF: ICD-10-CM

## 2019-05-30 DIAGNOSIS — I82.812 EMBOLISM FROM LEFT GREATER SAPHENOUS VEIN: Primary | ICD-10-CM

## 2019-05-30 DIAGNOSIS — R05.9 COUGH: ICD-10-CM

## 2019-05-30 PROCEDURE — 71250 CT THORAX DX C-: CPT

## 2019-05-30 PROCEDURE — 93970 EXTREMITY STUDY: CPT

## 2019-05-30 NOTE — TELEPHONE ENCOUNTER
Contacted Dr. Molly Toussaint office per Harley Morales MD request to inquire if he could consult patient for veinous thrombus - spoke with Akila Erwin - she advised that per Mich Son, PA orders start patient on Eliquis 10mg BID x7 days initially then Eliquis 5mg BID thereafter. Patient is to f/u with Mich Son on 6/3/2019 at 11:30pm.     TATYANA Gallego ordering provider for US notified of orders - read back and verbalized understanding - he will contact patient.

## 2019-05-30 NOTE — TELEPHONE ENCOUNTER
Spoke with patient, she states she was speaking to Edith Neighbours and got disconnected. Edith Neighbours notified that patient has called him back. Edith Neighbours will call patient to discuss blood clot.

## 2019-05-31 DIAGNOSIS — J42 CHRONIC BRONCHITIS, UNSPECIFIED CHRONIC BRONCHITIS TYPE (HCC): ICD-10-CM

## 2019-05-31 RX ORDER — AMOXICILLIN 875 MG/1
875 TABLET, COATED ORAL 2 TIMES DAILY
Qty: 20 TABLET | Refills: 0 | Status: SHIPPED | OUTPATIENT
Start: 2019-05-31 | End: 2019-07-09 | Stop reason: SDUPTHER

## 2019-05-31 NOTE — TELEPHONE ENCOUNTER
Spoke with [facility] has high copay - the insurance did not reject it, there is just a high deductible the patient has to meet at this time. A 30 day Free Trial offer did go through for the 7 day supply to take 10mg BID. Left message for Alejandra Buckner at Dr. Lauren Ulloa office advising her - patient will see them on 6/3/2019 and JUSTINE Farah can decide on treatment from there. Attempted to contact patient, no answer. Left voicemail requesting she return call.      Spoke with patient, advised her to  the Eliquis at [facility] today and take it as directed 10mg BID for 7 days and to follow up with JUSTINE Farah on 6/3/2019 - patient verbalized understanding of all instructions and stated she has been in contact with the cardiology office today to confirm her visit on 6/3/2019

## 2019-05-31 NOTE — TELEPHONE ENCOUNTER
Pt called cant afford the meds that Vamsi put her on.   Pt didn't know the name of it  Please call patient back

## 2019-05-31 NOTE — LETTER
[address] [id number]  Phone: [phone]  Fax: [phone]    TATYANA Jeff CNP        May 31, 2019     Patient: Alicia Peguero   YOB: 1945   Date of Visit: 5/31/2019       To Whom it May Concern:    Adal Hay was seen in my clinic on 5/31/2019. She is to be on light duty at work due to her current medical condition. She should not be standing for any long period of time. Patient to remain on light duty up through 6/4/2019    If you have any questions or concerns, please don't hesitate to call.     Sincerely,         TATYANA Jeff CNP

## 2019-06-03 ENCOUNTER — TELEPHONE (OUTPATIENT)
Dept: FAMILY MEDICINE CLINIC | Age: 74
End: 2019-06-03

## 2019-06-03 DIAGNOSIS — I82.812 EMBOLISM FROM LEFT GREATER SAPHENOUS VEIN: ICD-10-CM

## 2019-06-03 RX ORDER — APIXABAN 5 MG/1
TABLET, FILM COATED ORAL
Qty: 28 TABLET | Refills: 0 | Status: SHIPPED | OUTPATIENT
Start: 2019-06-03 | End: 2020-07-08 | Stop reason: ALTCHOICE

## 2019-06-03 NOTE — TELEPHONE ENCOUNTER
Patient called, she was late for her cardiology appointment today, they rescheduled her to be seen on Wednesday 6/12/2019 - she feels like she needs to be seen sooner and asked that TATYANA Gallego call their office. I called patient back to schedule her to be seen by Gila Antoine today, no answer. Left voicemail requesting she return call.

## 2019-06-03 NOTE — TELEPHONE ENCOUNTER
Medication:   Requested Prescriptions     Pending Prescriptions Disp Refills    ELIQUIS 5 MG TABS tablet [Pharmacy Med Name: ELIQUIS 5 MG TABLET] 28 tablet 0     Sig: TAKE TWO TABLETS BY MOUTH TWICE A DAY FOR 7 DAYS       Patient Phone Number: [phone] (home) [phone] (work)    Last appt: Visit date not found   Next appt: Visit date not found    Last Labs DM: No results found for: LABA1C  Last Lipid:   Lab Results   Component Value Date    CHOL 152 05/03/2017    TRIG 142 05/03/2017    HDL 35 05/03/2017    LDLCALC 89 05/03/2017     Last PSA: No results found for: PSA  Last Thyroid:   Lab Results   Component Value Date    TSH 1.62 02/02/2017    T4FREE 1.24 08/22/2018       Last OARRS:   RX Monitoring 11/21/2018   Attestation The Prescription Monitoring Report for this patient was reviewed today. Chronic Pain Routine Monitoring Possible medication side effects, risk of tolerance/dependence & alternative treatments discussed. Chronic Pain > 80 MEDD Functional status reviewed - continues with improved or maintaining ADL's.;Reviewed the patient's functional status and documentation.

## 2019-06-06 ENCOUNTER — HOSPITAL ENCOUNTER (OUTPATIENT)
Facility: HOSPITAL | Age: 74
Discharge: HOME | End: 2019-06-06
Payer: MEDICARE

## 2019-06-06 ENCOUNTER — NURSE ONLY (OUTPATIENT)
Dept: FAMILY MEDICINE CLINIC | Age: 74
End: 2019-06-06

## 2019-06-06 VITALS — DIASTOLIC BLOOD PRESSURE: 80 MMHG | SYSTOLIC BLOOD PRESSURE: 158 MMHG

## 2019-06-06 DIAGNOSIS — E55.9 VITAMIN D DEFICIENCY: ICD-10-CM

## 2019-06-06 DIAGNOSIS — R79.9 ABNORMAL FINDING OF BLOOD CHEMISTRY: ICD-10-CM

## 2019-06-06 DIAGNOSIS — E03.9 HYPOTHYROIDISM, UNSPECIFIED TYPE: ICD-10-CM

## 2019-06-06 DIAGNOSIS — E53.8 B12 DEFICIENCY: ICD-10-CM

## 2019-06-06 DIAGNOSIS — Z13.1 SCREENING FOR DIABETES MELLITUS (DM): ICD-10-CM

## 2019-06-06 DIAGNOSIS — K57.92 DIVERTICULITIS: ICD-10-CM

## 2019-06-06 DIAGNOSIS — Z13.220 SCREENING FOR HYPERLIPIDEMIA: ICD-10-CM

## 2019-06-06 LAB
A/G RATIO: 1.4 (ref 0.8–2)
ALBUMIN SERPL-MCNC: 4.3 G/DL (ref 3.4–4.8)
ALP BLD-CCNC: 116 U/L (ref 25–100)
ALT SERPL-CCNC: 12 U/L (ref 4–36)
ANION GAP SERPL CALCULATED.3IONS-SCNC: 11 MMOL/L (ref 3–16)
AST SERPL-CCNC: 16 U/L (ref 8–33)
BASOPHILS ABSOLUTE: 0.1 K/UL (ref 0–0.1)
BASOPHILS RELATIVE PERCENT: 0.7 %
BILIRUB SERPL-MCNC: 0.3 MG/DL (ref 0.3–1.2)
BUN BLDV-MCNC: 14 MG/DL (ref 6–20)
CALCIUM SERPL-MCNC: 9.4 MG/DL (ref 8.5–10.5)
CHLORIDE BLD-SCNC: 102 MMOL/L (ref 98–107)
CHOLESTEROL, TOTAL: 181 MG/DL (ref 0–200)
CO2: 28 MMOL/L (ref 20–30)
CREAT SERPL-MCNC: 0.7 MG/DL (ref 0.4–1.2)
EOSINOPHILS ABSOLUTE: 0.2 K/UL (ref 0–0.4)
EOSINOPHILS RELATIVE PERCENT: 2.3 %
FOLATE: 11.33 NG/ML
GFR AFRICAN AMERICAN: >59
GFR NON-AFRICAN AMERICAN: >60
GLOBULIN: 3 G/DL
GLUCOSE BLD-MCNC: 116 MG/DL (ref 74–106)
HBA1C MFR BLD: 5.6 %
HCT VFR BLD CALC: 41.7 % (ref 37–47)
HDLC SERPL-MCNC: 49 MG/DL (ref 40–60)
HEMOGLOBIN: 13.3 G/DL (ref 11.5–16.5)
IMMATURE GRANULOCYTES #: 0 K/UL
IMMATURE GRANULOCYTES %: 0.3 % (ref 0–5)
LDL CHOLESTEROL CALCULATED: 104 MG/DL
LYMPHOCYTES ABSOLUTE: 2.4 K/UL (ref 1.5–4)
LYMPHOCYTES RELATIVE PERCENT: 33.2 %
MCH RBC QN AUTO: 31.2 PG (ref 27–32)
MCHC RBC AUTO-ENTMCNC: 31.9 G/DL (ref 31–35)
MCV RBC AUTO: 97.9 FL (ref 80–100)
MONOCYTES ABSOLUTE: 0.5 K/UL (ref 0.2–0.8)
MONOCYTES RELATIVE PERCENT: 6.9 %
NEUTROPHILS ABSOLUTE: 4.1 K/UL (ref 2–7.5)
NEUTROPHILS RELATIVE PERCENT: 56.6 %
PDW BLD-RTO: 12.8 % (ref 11–16)
PLATELET # BLD: 256 K/UL (ref 150–400)
PMV BLD AUTO: 10.2 FL (ref 6–10)
POTASSIUM SERPL-SCNC: 4.4 MMOL/L (ref 3.4–5.1)
RBC # BLD: 4.26 M/UL (ref 3.8–5.8)
SODIUM BLD-SCNC: 141 MMOL/L (ref 136–145)
TOTAL PROTEIN: 7.3 G/DL (ref 6.4–8.3)
TRIGL SERPL-MCNC: 140 MG/DL (ref 0–249)
TSH REFLEX: 1.84 UIU/ML (ref 0.35–5.5)
VITAMIN B-12: 394 PG/ML (ref 211–911)
VITAMIN D 25-HYDROXY: 23.1 (ref 32–100)
VLDLC SERPL CALC-MCNC: 28 MG/DL
WBC # BLD: 7.3 K/UL (ref 4–11)

## 2019-06-06 PROCEDURE — 83036 HEMOGLOBIN GLYCOSYLATED A1C: CPT

## 2019-06-06 PROCEDURE — 85025 COMPLETE CBC W/AUTO DIFF WBC: CPT

## 2019-06-06 PROCEDURE — 80053 COMPREHEN METABOLIC PANEL: CPT

## 2019-06-06 PROCEDURE — 80061 LIPID PANEL: CPT

## 2019-06-06 PROCEDURE — 82306 VITAMIN D 25 HYDROXY: CPT

## 2019-06-06 PROCEDURE — 82607 VITAMIN B-12: CPT

## 2019-06-06 PROCEDURE — 82746 ASSAY OF FOLIC ACID SERUM: CPT

## 2019-06-06 PROCEDURE — 84443 ASSAY THYROID STIM HORMONE: CPT

## 2019-06-11 ENCOUNTER — OFFICE VISIT (OUTPATIENT)
Dept: FAMILY MEDICINE CLINIC | Age: 74
End: 2019-06-11
Payer: MEDICARE

## 2019-06-11 VITALS
SYSTOLIC BLOOD PRESSURE: 128 MMHG | HEART RATE: 75 BPM | RESPIRATION RATE: 18 BRPM | HEIGHT: 64 IN | BODY MASS INDEX: 30.73 KG/M2 | DIASTOLIC BLOOD PRESSURE: 74 MMHG | WEIGHT: 180 LBS | OXYGEN SATURATION: 98 %

## 2019-06-11 DIAGNOSIS — I82.812 EMBOLISM FROM LEFT GREATER SAPHENOUS VEIN: Primary | ICD-10-CM

## 2019-06-11 DIAGNOSIS — D69.9 BLEEDS EASILY (HCC): ICD-10-CM

## 2019-06-11 DIAGNOSIS — G89.29 CHRONIC MIDLINE LOW BACK PAIN WITH BILATERAL SCIATICA: ICD-10-CM

## 2019-06-11 DIAGNOSIS — M54.41 CHRONIC MIDLINE LOW BACK PAIN WITH BILATERAL SCIATICA: ICD-10-CM

## 2019-06-11 DIAGNOSIS — J42 CHRONIC BRONCHITIS, UNSPECIFIED CHRONIC BRONCHITIS TYPE (HCC): ICD-10-CM

## 2019-06-11 DIAGNOSIS — M54.42 CHRONIC MIDLINE LOW BACK PAIN WITH BILATERAL SCIATICA: ICD-10-CM

## 2019-06-11 PROCEDURE — 3023F SPIROM DOC REV: CPT | Performed by: NURSE PRACTITIONER

## 2019-06-11 PROCEDURE — G8417 CALC BMI ABV UP PARAM F/U: HCPCS | Performed by: NURSE PRACTITIONER

## 2019-06-11 PROCEDURE — G8400 PT W/DXA NO RESULTS DOC: HCPCS | Performed by: NURSE PRACTITIONER

## 2019-06-11 PROCEDURE — 4040F PNEUMOC VAC/ADMIN/RCVD: CPT | Performed by: NURSE PRACTITIONER

## 2019-06-11 PROCEDURE — G8598 ASA/ANTIPLAT THER USED: HCPCS | Performed by: NURSE PRACTITIONER

## 2019-06-11 PROCEDURE — G8926 SPIRO NO PERF OR DOC: HCPCS | Performed by: NURSE PRACTITIONER

## 2019-06-11 PROCEDURE — 99214 OFFICE O/P EST MOD 30 MIN: CPT | Performed by: NURSE PRACTITIONER

## 2019-06-11 PROCEDURE — G8427 DOCREV CUR MEDS BY ELIG CLIN: HCPCS | Performed by: NURSE PRACTITIONER

## 2019-06-11 PROCEDURE — 1036F TOBACCO NON-USER: CPT | Performed by: NURSE PRACTITIONER

## 2019-06-11 PROCEDURE — 3017F COLORECTAL CA SCREEN DOC REV: CPT | Performed by: NURSE PRACTITIONER

## 2019-06-11 PROCEDURE — 1090F PRES/ABSN URINE INCON ASSESS: CPT | Performed by: NURSE PRACTITIONER

## 2019-06-11 PROCEDURE — 1123F ACP DISCUSS/DSCN MKR DOCD: CPT | Performed by: NURSE PRACTITIONER

## 2019-06-11 RX ORDER — GABAPENTIN 100 MG/1
100 CAPSULE ORAL NIGHTLY PRN
Qty: 30 CAPSULE | Refills: 1 | Status: SHIPPED | OUTPATIENT
Start: 2019-06-11 | End: 2019-10-31 | Stop reason: SDUPTHER

## 2019-06-11 ASSESSMENT — ENCOUNTER SYMPTOMS
SORE THROAT: 0
ABDOMINAL PAIN: 0
COLOR CHANGE: 0
CHEST TIGHTNESS: 0
SHORTNESS OF BREATH: 0
EYE PAIN: 0
COUGH: 0
VOMITING: 0
NAUSEA: 0
DIARRHEA: 0
EYE REDNESS: 0
TROUBLE SWALLOWING: 0

## 2019-06-11 NOTE — LETTER
[address] [address]  Phone: [phone]  Fax: [phone]    TATYANA Jeff CNP        June 11, 2019     Patient: Alicia Peguero   YOB: 1945   Date of Visit: 6/11/2019       To Whom it May Concern:    Adal Hay was seen in my clinic on 6/11/2019. She will on light duty at work due to a left leg embolism. She should not be standing for any long perod of time. Patient to remain on light duty up through 07/03/2019. If you have any questions or concerns, please don't hesitate to call.     Sincerely,         TATYANA Jeff CNP

## 2019-06-11 NOTE — PROGRESS NOTES
SUBJECTIVE:    Patient ID: Gloria Nichols is a 69 y.o. female. Chief Complaint   Patient presents with    Hypertension     f/u       Bryce Partida presents to the clinic today to follow-up on hypertension, chronic bronchitis, chronic middle low back pain, and recent diagnosis of left greater saphenous vein embolism. Patient has no acute complaints today. She appears well. Continues to work and is retired. Patient has been taking all medications as prescribed. She continues to take the Eliquis 5 mg PO BID for embolism. Patient missed her last cardiology appointment and had to reschedule for next week. Patient has been resting her lower extremities frequently and elevating them at work. She denies any worsening lower extremity pin, denies SOB or chest pain. She is aware of the seriousness of her diagnosis of embolism in her leg and at risk for bleeding. Patient also understands the importance of following up with her cardiologist next week. He is aware of embolism to her left leg and wants to follow this. Patient HTN is stable, she is requesting to see PT for her chronic low back pain, she reports seeing PT before and it really improved her back pain. All other chronic diagnoses are stable at this time. Patient denies any adverse side effects to any of her current medications. Hypertension   This is a chronic problem. The current episode started more than 1 year ago. The problem has been gradually improving since onset. The problem is controlled. Associated symptoms include anxiety and peripheral edema. Pertinent negatives include no blurred vision, chest pain, headaches, malaise/fatigue, neck pain, orthopnea, palpitations, PND, shortness of breath or sweats. Agents associated with hypertension include thyroid hormones. Risk factors for coronary artery disease include stress, post-menopausal state, obesity, dyslipidemia and family history.  Past treatments include alpha 1 blockers, ACE inhibitors, diuretics and lifestyle changes. The current treatment provides significant improvement. Compliance problems include diet and psychosocial issues. Identifiable causes of hypertension include a thyroid problem. Back Pain   This is a chronic problem. The current episode started more than 1 year ago. The problem occurs intermittently. The problem has been waxing and waning since onset. The pain is present in the lumbar spine. The quality of the pain is described as aching. The pain radiates to the left thigh. The pain is at a severity of 4/10. The pain is mild. The pain is worse during the day. The symptoms are aggravated by bending and position. Stiffness is present in the morning. Associated symptoms include leg pain. Pertinent negatives include no abdominal pain, bladder incontinence, bowel incontinence, chest pain, dysuria, fever, headaches, numbness, paresis, paresthesias, pelvic pain, perianal numbness, tingling, weakness or weight loss. Risk factors include history of osteoporosis, obesity, menopause, lack of exercise and sedentary lifestyle. She has tried muscle relaxant, ice, walking, analgesics, bed rest and heat for the symptoms. The treatment provided moderate relief.         Active Ambulatory Problems     Diagnosis Date Noted    HTN (hypertension) 05/20/2015    Hypothyroidism 05/20/2015    Chest pain 05/20/2015    Elevated serum creatinine 05/21/2015    Headache 05/21/2015    Nausea and vomiting in adult 05/21/2015    Hypertensive urgency 05/22/2015    Pre-syncope 02/03/2017    BPV (benign positional vertigo) 02/03/2017    GERD (gastroesophageal reflux disease) 02/03/2017    Epigastric abdominal pain 02/03/2017    Hypoxia 05/02/2017    Nausea vomiting and diarrhea 05/02/2017    Essential hypertension 05/02/2017    Lung nodule 05/02/2017    Acute pancreatitis 05/03/2017    Hematochezia     Irritable bowel syndrome with diarrhea 12/10/2017    Stable angina ([facility] [address] 12/10/2017    Fibrocystic breast  ELIQUIS 5 MG TABS tablet TAKE TWO TABLETS BY MOUTH TWICE A DAY FOR 7 DAYS 28 tablet 0    apixaban (ELIQUIS) 5 MG TABS tablet Take 1 tablet by mouth 2 times daily 60 tablet 0    cyclobenzaprine (FLEXERIL) 5 MG tablet Take 1 tablet by mouth 2 times daily      pantoprazole (PROTONIX) 40 MG tablet Take 1 tablet by mouth daily 30 tablet 3    ondansetron (ZOFRAN-ODT) 4 MG disintegrating tablet Take 1 tablet by mouth every 8 hours as needed for Nausea or Vomiting 20 tablet 0    levothyroxine (SYNTHROID) 75 MCG tablet Take 1 tablet by mouth Daily 90 tablet 3    cyclobenzaprine (FLEXERIL) 10 MG tablet Take 1 tablet by mouth 3 times daily      methocarbamol (ROBAXIN) 750 MG tablet Take 1 tablet by mouth 3 times daily      promethazine (PHENERGAN) 25 MG tablet Take 1 tablet by mouth as needed      metoclopramide (REGLAN) 10 MG tablet       lisinopril-hydrochlorothiazide (PRINZIDE;ZESTORETIC) 20-25 MG per tablet Take 1 tablet by mouth 2 times daily 60 tablet 3    cloNIDine (CATAPRES) 0.1 MG tablet Take one tablet up to twice daily as needed for blood pressures greater than 160/90. 60 tablet 1    nitroGLYCERIN (NITROSTAT) 0.4 MG SL tablet Place 1 tablet under the tongue every 5 minutes as needed for Chest pain 25 tablet 3     Current Facility-Administered Medications on File Prior to Visit   Medication Dose Route Frequency Provider Last Rate Last Dose    cyanocobalamin injection 1,000 mcg  1,000 mcg Intramuscular Once [employer] Depannabelle, APRN           Review of Systems   Constitutional: Negative for activity change, appetite change, chills, fever, malaise/fatigue and weight loss. HENT: Negative for congestion, ear pain, nosebleeds, sore throat and trouble swallowing. Eyes: Negative for blurred vision, pain and redness. Respiratory: Negative for cough, chest tightness and shortness of breath. Cardiovascular: Negative for chest pain, palpitations, orthopnea, leg swelling and PND. motion, full passive range of motion without pain and phonation normal. Neck supple. Normal carotid pulses, no hepatojugular reflux and no JVD present. No tracheal tenderness present. Carotid bruit is not present. No tracheal deviation present. No thyroid mass and no thyromegaly present. Cardiovascular: Normal rate, regular rhythm, S1 normal, S2 normal, normal heart sounds, intact distal pulses and normal pulses. Exam reveals no gallop, no distant heart sounds and no friction rub. No murmur heard. Pulmonary/Chest: Effort normal and breath sounds normal. No accessory muscle usage or stridor. No apnea, no tachypnea and no bradypnea. She has no decreased breath sounds. She has no wheezes. She has no rhonchi. She has no rales. She exhibits no tenderness. Abdominal: Soft. Normal appearance and bowel sounds are normal. She exhibits no distension and no mass. There is no hepatosplenomegaly, splenomegaly or hepatomegaly. There is no tenderness. There is no rebound and no guarding. No hernia. Musculoskeletal: She exhibits no edema or deformity. Lumbar back: She exhibits decreased range of motion, tenderness, bony tenderness, pain and spasm. Left upper leg: She exhibits swelling. Left lower leg: She exhibits swelling. Left foot: There is swelling. Left lower extremities has trace edema with + pulses bilaterally. This is stable and at baseline. No changes since diagnoses with blood clot in her left lower extremities. Temperate normal, skin color normal.    Lymphadenopathy:     She has no cervical adenopathy. She has no axillary adenopathy. Neurological: She is alert and oriented to person, place, and time. She has normal strength. She is not disoriented. She displays normal reflexes. No cranial nerve deficit or sensory deficit. She exhibits normal muscle tone. Coordination normal. GCS eye subscore is 4. GCS verbal subscore is 5. GCS motor subscore is 6.    Skin: Skin is warm, dry and intact. Capillary refill takes less than 2 seconds. No bruising and no rash noted. She is not diaphoretic. No erythema. No pallor. Psychiatric: She has a normal mood and affect. Her speech is normal and behavior is normal. Judgment and thought content normal. Cognition and memory are normal.   Nursing note and vitals reviewed.     Results in Past 30 Days  Result Component Current Result Ref Range Previous Result Ref Range   Alb 4.3 (6/6/2019) 3.4 - 4.8 g/dL Not in Time Range    Albumin/Globulin Ratio 1.4 (6/6/2019) 0.8 - 2.0 Not in Time Range    Alkaline Phosphatase 116 (H) (6/6/2019) 25 - 100 U/L Not in Time Range    ALT 12 (6/6/2019) 4 - 36 U/L Not in Time Range    AST 16 (6/6/2019) 8 - 33 U/L Not in Time Range    BUN 14 (6/6/2019) 6 - 20 mg/dL Not in Time Range    Calcium 9.4 (6/6/2019) 8.5 - 10.5 mg/dL Not in Time Range    Chloride 102 (6/6/2019) 98 - 107 mmol/L Not in Time Range    CO2 28 (6/6/2019) 20 - 30 mmol/L Not in Time Range    CREATININE 0.7 (6/6/2019) 0.4 - 1.2 mg/dL Not in Time Range    GFR  >59 (6/6/2019) >59 Not in Time Range    GFR Non- >60 (6/6/2019) >59 Not in Time Range    Globulin 3.0 (6/6/2019) g/dL Not in Time Range    Glucose 116 (H) (6/6/2019) 74 - 106 mg/dL Not in Time Range    Potassium 4.4 (6/6/2019) 3.4 - 5.1 mmol/L Not in Time Range    Sodium 141 (6/6/2019) 136 - 145 mmol/L Not in Time Range    Total Bilirubin 0.3 (6/6/2019) 0.3 - 1.2 mg/dL Not in Time Range    Total Protein 7.3 (6/6/2019) 6.4 - 8.3 g/dL Not in Time Range      Hemoglobin A1C (%)   Date Value   06/06/2019 5.6     Microscopic Examination (no units)   Date Value   01/24/2018 YES     LDL Calculated (mg/dL)   Date Value   06/06/2019 104       Lab Results   Component Value Date    WBC 7.3 06/06/2019    NEUTROABS 4.1 06/06/2019    HGB 13.3 06/06/2019    HCT 41.7 06/06/2019    MCV 97.9 06/06/2019     06/06/2019     Lab Results   Component Value Date    TSH 1.62 02/02/2017 ASSESSMENT/PLAN:     Татьяна Monique was seen today for hypertension. Diagnoses and all orders for this visit:    Embolism from left greater saphenous vein        - Continue to take Eliquis 5 mg PO BID until noted otherwise by cardiologist.        - Keep and make sure to follow-up with cardiology as scheduled next week. Chronic midline low back pain with bilateral sciatica  -     External Referral To Physical Therapy  -     gabapentin (NEURONTIN) 100 MG capsule; Take 1 capsule by mouth nightly as needed (back pain) for up to 30 days. Chronic bronchitis, unspecified chronic bronchitis type ([address]        - Stable        - Discussed recent ling CT results    EXAM: CT CHEST WITHOUT CONTRAST       INDICATION: Worsening cough. Dyspnea on exertion.       COMPARISON: None       TECHNIQUE: CT of the chest was performed without contrast. Axial images, multiplanar reformatted images and axial maximum intensity projection images provided for review.   Individualized dose optimization technique was used in order to meet ALARA    standards for radiation dose reduction.  In addition to vendor specific dose reduction algorithms, the dose reduction techniques vary based on the specific scanner utilized but frequently include automated exposure control, adjustment of the mA and/or kV    according to patient size, and use of iterative reconstruction technique.       CONTRAST: Chest CT dated May 1, 2017.       FINDINGS:       LUNGS AND AIRWAYS: The lung parenchyma is somewhat degraded by respiratory motion artifact. There is some stable scarring identified within the lingula and medially within the right middle lobe. There is some stable scarring identified anteriorly within    the right upper lobe. There is some subtle mosaic groundglass attenuation identified with the upper lung zones.  This can be seen with some air trapping or mild bronchiolitis.       There is a stable 6 mm noncalcified pulmonary nodule identified within the right middle lobe (series 2 image 1:30). No new pulmonary nodules are identified.       No consolidation is identified. No honeycombing is identified.       PLEURA: No pleural effusions or pleural thickening.       HEART AND GREAT VESSELS: Thoracic aorta is without evidence of any aneurysm. Heart size is normal. No pericardial effusion is identified.       ADENOPATHY/MEDIASTINUM: No adenopathy.       CHEST WALL / LOWER NECK: No significant abnormality.       UPPER ABDOMEN: There are multiple cysts seen scattered throughout the liver. Patient status post a cholecystectomy.       BONES: No significant abnormality.           Impression       1. There is some subtle mosaic groundglass attenuation identified with the upper lung zones. This can be seen with some air trapping or mild bronchiolitis.       2. Stable 6 mm noncalcified pulmonary nodule is identified within the right middle lobe. This has been stable for greater than 2 years therefore is considered benign.         3. Stable scarring is identified within the lingula as well as at the medial aspect the right middle lobe. Bleeds easily ([address]      -Stable      -Patient aware of increased bleeding risk while taking Eliquis.      -Patient verbalizes understanding of increased risk for falls. Quality & Risk Score Accuracy    Visit Dx:  Y39 - Chronic bronchitis, unspecified chronic bronchitis type ([address]  Assessment and plan:  Stable based upon symptoms and exam. Continue current treatment plan and follow up at least yearly. Visit Dx:  D69.9 - Bleeds easily ([address]  Assessment and plan:  Stable based upon symptoms and exam. Continue current treatment plan and follow up at least yearly. Last edited 06/17/19 10:13 EDT by TATYANA Mason CNP            Controlled Substances Monitoring:     RX Monitoring 11/21/2018   Attestation The Prescription Monitoring Report for this patient was reviewed today.    Periodic Controlled Substance Monitoring Possible medication side effects, risk of tolerance/dependence & alternative treatments discussed. Chronic Pain > 80 MEDD Functional status reviewed - continues with improved or maintaining ADL's.;Reviewed the patient's functional status and documentation. PATIENT COUNSELING     Counseling was provided today regarding the following topics: Healthy eating habits, Regular exercise, substance abuse and healthy sleep habits. Discussed use, benefit, and side effects of prescribed medications. Barriers to medication compliance addressed. All patient questions answered. Patient voiced understanding. Medications Discontinued During This Encounter   Medication Reason    citalopram (CELEXA) 20 MG tablet LIST CLEANUP    gabapentin (NEURONTIN) 100 MG capsule REORDER       Return in about 3 weeks (around 7/2/2019). Lazarus Herald, APRN - CNP     Education was provided for discussed topics. Call the office with worsening complaints or any side effects to any medications. If an emergency please call 911.

## 2019-06-13 ENCOUNTER — NURSE ONLY (OUTPATIENT)
Dept: FAMILY MEDICINE CLINIC | Age: 74
End: 2019-06-13

## 2019-06-13 VITALS — SYSTOLIC BLOOD PRESSURE: 138 MMHG | DIASTOLIC BLOOD PRESSURE: 84 MMHG

## 2019-06-13 DIAGNOSIS — I10 HYPERTENSION, UNSPECIFIED TYPE: Primary | ICD-10-CM

## 2019-06-17 PROBLEM — D69.9 BLEEDS EASILY (HCC): Status: RESOLVED | Noted: 2018-12-31 | Resolved: 2019-06-17

## 2019-06-17 ASSESSMENT — ENCOUNTER SYMPTOMS
ORTHOPNEA: 0
BOWEL INCONTINENCE: 0
BACK PAIN: 1
BLURRED VISION: 0

## 2019-06-24 ENCOUNTER — TELEPHONE (OUTPATIENT)
Dept: FAMILY MEDICINE CLINIC | Age: 74
End: 2019-06-24

## 2019-06-24 DIAGNOSIS — I10 ESSENTIAL HYPERTENSION: ICD-10-CM

## 2019-06-24 RX ORDER — LISINOPRIL AND HYDROCHLOROTHIAZIDE 25; 20 MG/1; MG/1
1 TABLET ORAL 2 TIMES DAILY
Qty: 10 TABLET | Refills: 0 | Status: SHIPPED | OUTPATIENT
Start: 2019-06-24 | End: 2019-10-01

## 2019-06-24 NOTE — TELEPHONE ENCOUNTER
Patient called, she is out of town in [place] visiting family for a wedding. She forgot her blood pressure medication Lisinopril/HCT and requested 5 tablets be sent to [facility] in [place], [place]    Per TATYANA Barraza verbal order.  Medication sent to requested pharmacy

## 2019-07-09 ENCOUNTER — OFFICE VISIT (OUTPATIENT)
Dept: FAMILY MEDICINE CLINIC | Age: 74
End: 2019-07-09
Payer: MEDICARE

## 2019-07-09 VITALS
WEIGHT: 183 LBS | HEIGHT: 64 IN | SYSTOLIC BLOOD PRESSURE: 158 MMHG | DIASTOLIC BLOOD PRESSURE: 74 MMHG | OXYGEN SATURATION: 98 % | RESPIRATION RATE: 18 BRPM | BODY MASS INDEX: 31.24 KG/M2 | HEART RATE: 80 BPM

## 2019-07-09 DIAGNOSIS — J42 CHRONIC BRONCHITIS, UNSPECIFIED CHRONIC BRONCHITIS TYPE (HCC): Primary | ICD-10-CM

## 2019-07-09 DIAGNOSIS — I82.4Z2 DEEP VEIN THROMBOSIS (DVT) OF DISTAL VEIN OF LEFT LOWER EXTREMITY, UNSPECIFIED CHRONICITY (HCC): ICD-10-CM

## 2019-07-09 PROCEDURE — G8926 SPIRO NO PERF OR DOC: HCPCS | Performed by: NURSE PRACTITIONER

## 2019-07-09 PROCEDURE — 3023F SPIROM DOC REV: CPT | Performed by: NURSE PRACTITIONER

## 2019-07-09 PROCEDURE — G8598 ASA/ANTIPLAT THER USED: HCPCS | Performed by: NURSE PRACTITIONER

## 2019-07-09 PROCEDURE — G8427 DOCREV CUR MEDS BY ELIG CLIN: HCPCS | Performed by: NURSE PRACTITIONER

## 2019-07-09 PROCEDURE — 1036F TOBACCO NON-USER: CPT | Performed by: NURSE PRACTITIONER

## 2019-07-09 PROCEDURE — 1090F PRES/ABSN URINE INCON ASSESS: CPT | Performed by: NURSE PRACTITIONER

## 2019-07-09 PROCEDURE — 3017F COLORECTAL CA SCREEN DOC REV: CPT | Performed by: NURSE PRACTITIONER

## 2019-07-09 PROCEDURE — G8417 CALC BMI ABV UP PARAM F/U: HCPCS | Performed by: NURSE PRACTITIONER

## 2019-07-09 PROCEDURE — 96372 THER/PROPH/DIAG INJ SC/IM: CPT | Performed by: NURSE PRACTITIONER

## 2019-07-09 PROCEDURE — 4040F PNEUMOC VAC/ADMIN/RCVD: CPT | Performed by: NURSE PRACTITIONER

## 2019-07-09 PROCEDURE — G8400 PT W/DXA NO RESULTS DOC: HCPCS | Performed by: NURSE PRACTITIONER

## 2019-07-09 PROCEDURE — 99214 OFFICE O/P EST MOD 30 MIN: CPT | Performed by: NURSE PRACTITIONER

## 2019-07-09 PROCEDURE — 1123F ACP DISCUSS/DSCN MKR DOCD: CPT | Performed by: NURSE PRACTITIONER

## 2019-07-09 RX ORDER — DEXAMETHASONE SODIUM PHOSPHATE 10 MG/ML
8 INJECTION INTRAMUSCULAR; INTRAVENOUS ONCE
Status: DISCONTINUED | OUTPATIENT
Start: 2019-07-09 | End: 2019-10-21

## 2019-07-09 RX ORDER — AMOXICILLIN 875 MG/1
875 TABLET, COATED ORAL 2 TIMES DAILY
Qty: 20 TABLET | Refills: 0 | Status: SHIPPED | OUTPATIENT
Start: 2019-07-09 | End: 2019-07-19

## 2019-07-09 RX ORDER — DEXAMETHASONE SODIUM PHOSPHATE 4 MG/ML
8 INJECTION, SOLUTION INTRA-ARTICULAR; INTRALESIONAL; INTRAMUSCULAR; INTRAVENOUS; SOFT TISSUE ONCE
Status: COMPLETED | OUTPATIENT
Start: 2019-07-09 | End: 2019-07-09

## 2019-07-09 RX ORDER — CYANOCOBALAMIN 1000 UG/ML
1000 INJECTION INTRAMUSCULAR; SUBCUTANEOUS ONCE
Status: CANCELLED | OUTPATIENT
Start: 2019-07-09 | End: 2019-07-09

## 2019-07-09 RX ORDER — DEXAMETHASONE SODIUM PHOSPHATE 10 MG/ML
4.5 INJECTION INTRAMUSCULAR; INTRAVENOUS ONCE
Status: DISCONTINUED | OUTPATIENT
Start: 2019-07-09 | End: 2019-07-09

## 2019-07-09 RX ADMIN — DEXAMETHASONE SODIUM PHOSPHATE 8 MG: 4 INJECTION, SOLUTION INTRA-ARTICULAR; INTRALESIONAL; INTRAMUSCULAR; INTRAVENOUS; SOFT TISSUE at 14:41

## 2019-07-09 NOTE — PROGRESS NOTES
Administrations This Visit     dexamethasone (DECADRON) injection 8 mg     Admin Date  07/09/2019  14:41 Action  Given Dose  8 mg Route  Intramuscular Site  Ventrogluteal Right Administered By  Gautam Urrutia MA    Ordering Provider:  TATYANA Capone CNP    NDC:  68916-022-32    Lot#:  [id number]    :  [id number]    Patient Supplied?:  No

## 2019-07-09 NOTE — PROGRESS NOTES
SUBJECTIVE:    Patient ID: Anabell Wood is a 69 y.o. female. Chief Complaint   Patient presents with    Head Congestion    Chest Congestion    Leg Pain     jose Beltrán presents to the clinic today with new onset head congestion, chest congestion and leg pain. Patient appears in no acute distress at this time. She continues to overuse lower extremities at work and not elevating them like previously recommended for DVT. Patient continues to take Anticoagulation therapy and routinely follows up with her Cardiologist for chronic complaints and recently dx superficial DVT. Patient continues to be noncompliant with some of her medications and elects to only take some daily prescribed medications when she can remember to take them. However patient does reports taking her blood thinner as directed. Cough   This is a new problem. The current episode started in the past 7 days. The problem has been waxing and waning. The problem occurs every few minutes. The cough is non-productive. Associated symptoms include ear congestion, headaches, nasal congestion, postnasal drip, rhinorrhea, shortness of breath and wheezing. Pertinent negatives include no chest pain, chills, ear pain, eye redness, fever, heartburn, hemoptysis, myalgias, rash, sore throat, sweats or weight loss. The symptoms are aggravated by dust, exercise, fumes and pollens. Risk factors for lung disease include occupational exposure. She has tried nothing for the symptoms. The treatment provided no relief. Her past medical history is significant for bronchitis and environmental allergies. There is no history of asthma, bronchiectasis, COPD, emphysema or pneumonia. Leg Pain    The incident occurred more than 1 week ago. There was no injury mechanism (superficial lower extremities thrombosis, pt cont anticoagulant and following with cardiologist.). The pain is present in the left leg.  The quality of the pain is described as aching, burning and cramping. The pain is at a severity of 4/10. The pain is mild. The pain has been fluctuating since onset. Associated symptoms include an inability to bear weight and tingling. Pertinent negatives include no loss of motion, loss of sensation, muscle weakness or numbness. She reports no foreign bodies present. The symptoms are aggravated by movement, palpation and weight bearing. She has tried elevation, ice, rest and non-weight bearing (blood thinner, rest) for the symptoms. The treatment provided moderate relief.         Active Ambulatory Problems     Diagnosis Date Noted    HTN (hypertension) 05/20/2015    Hypothyroidism 05/20/2015    Chest pain 05/20/2015    Elevated serum creatinine 05/21/2015    Headache 05/21/2015    Nausea and vomiting in adult 05/21/2015    Hypertensive urgency 05/22/2015    Pre-syncope 02/03/2017    BPV (benign positional vertigo) 02/03/2017    GERD (gastroesophageal reflux disease) 02/03/2017    Epigastric abdominal pain 02/03/2017    Hypoxia 05/02/2017    Nausea vomiting and diarrhea 05/02/2017    Essential hypertension 05/02/2017    Lung nodule 05/02/2017    Acute pancreatitis 05/03/2017    Hematochezia     Irritable bowel syndrome with diarrhea 12/10/2017    Stable angina ([address] 12/10/2017    Fibrocystic breast disease ([facility]) 12/10/2017    Grief 06/24/2018    Esophageal dysphagia 06/27/2018    Breast pain, left 06/27/2018    Muscle spasm 06/27/2018    Esophageal stricture 06/27/2018    Breast density 06/27/2018    Scar painful 06/27/2018    Anxiety 10/27/2018    Moderate episode of recurrent major depressive disorder ([address] 10/27/2018    Panic attacks 10/27/2018    Liver lesion 10/27/2018    Fatty pancreas 10/27/2018    Abnormal CT of the abdomen 10/27/2018    Bleeds easily ([address] 12/31/2018    Chronic bronchitis ([address] 12/31/2018    Gastroparesis 12/31/2018    Hemangioma 02/26/2019    Abnormal findings on diagnostic imaging of liver 02/26/2019    tablet by mouth Daily 90 tablet 3    cyclobenzaprine (FLEXERIL) 10 MG tablet Take 1 tablet by mouth 3 times daily      promethazine (PHENERGAN) 25 MG tablet Take 1 tablet by mouth as needed      metoclopramide (REGLAN) 10 MG tablet       cloNIDine (CATAPRES) 0.1 MG tablet Take one tablet up to twice daily as needed for blood pressures greater than 160/90. 60 tablet 1    nitroGLYCERIN (NITROSTAT) 0.4 MG SL tablet Place 1 tablet under the tongue every 5 minutes as needed for Chest pain 25 tablet 3     Current Facility-Administered Medications on File Prior to Visit   Medication Dose Route Frequency Provider Last Rate Last Dose    cyanocobalamin injection 1,000 mcg  1,000 mcg Intramuscular Once [employer] Dep[employer], APRN           Review of Systems   Constitutional: Negative for activity change, appetite change, chills, fever and weight loss. HENT: Positive for congestion, postnasal drip and rhinorrhea. Negative for ear pain, nosebleeds, sore throat and trouble swallowing. Eyes: Negative for pain and redness. Respiratory: Positive for cough, shortness of breath and wheezing. Negative for hemoptysis and chest tightness. Cardiovascular: Negative for chest pain, palpitations and leg swelling. Gastrointestinal: Negative for abdominal pain, diarrhea, heartburn, nausea and vomiting. Genitourinary: Negative for difficulty urinating, dysuria and flank pain. Musculoskeletal: Positive for arthralgias. Negative for back pain, gait problem and myalgias. Left lower leg pain. History of the same and previous workup and US of lower extremity performed. Skin: Negative for color change, pallor, rash and wound. Allergic/Immunologic: Positive for environmental allergies. Negative for food allergies. Neurological: Positive for tingling and headaches. Negative for dizziness and numbness. Hematological: Negative for adenopathy. Does not bruise/bleed easily.    Psychiatric/Behavioral: Negative for agitation and sleep disturbance. The patient is not nervous/anxious. OBJECTIVE:  BP (!) 158/74   Pulse 80   Resp 18   Ht 5' 4\" (1.626 m)   Wt 183 lb (83 kg)   SpO2 98% Comment: room air  Breastfeeding? No   BMI 31.41 kg/m²       Physical Exam   Constitutional: She is oriented to person, place, and time. Vital signs are normal. She appears well-developed and well-nourished. She is active. Non-toxic appearance. She does not have a sickly appearance. She does not appear ill. No distress. HENT:   Head: Normocephalic and atraumatic. Right Ear: Tympanic membrane, external ear and ear canal normal. There is tenderness. Decreased hearing is noted. Left Ear: Tympanic membrane, external ear and ear canal normal. There is tenderness. Decreased hearing is noted. Nose: Mucosal edema, rhinorrhea and sinus tenderness present. No nose lacerations. Right sinus exhibits frontal sinus tenderness. Right sinus exhibits no maxillary sinus tenderness. Left sinus exhibits no maxillary sinus tenderness and no frontal sinus tenderness. Mouth/Throat: Uvula is midline and mucous membranes are normal. Normal dentition. No dental caries. Posterior oropharyngeal erythema present. No tonsillar exudate. Eyes: Pupils are equal, round, and reactive to light. Conjunctivae, EOM and lids are normal. Right eye exhibits no discharge. Left eye exhibits no discharge. No scleral icterus. Neck: Trachea normal, normal range of motion, full passive range of motion without pain and phonation normal. Neck supple. Normal carotid pulses, no hepatojugular reflux and no JVD present. No tracheal tenderness present. Carotid bruit is not present. No tracheal deviation present. No thyroid mass and no thyromegaly present. Cardiovascular: Normal rate, regular rhythm, S1 normal, S2 normal, normal heart sounds, intact distal pulses and normal pulses. Exam reveals no gallop, no distant heart sounds and no friction rub.    No murmur

## 2019-07-13 ENCOUNTER — HOSPITAL ENCOUNTER (EMERGENCY)
Facility: HOSPITAL | Age: 74
Discharge: HOME | End: 2019-07-13
Attending: EMERGENCY MEDICINE
Payer: MEDICARE

## 2019-07-13 ENCOUNTER — APPOINTMENT (OUTPATIENT)
Dept: CT IMAGING | Facility: HOSPITAL | Age: 74
End: 2019-07-13
Payer: MEDICARE

## 2019-07-13 VITALS
BODY MASS INDEX: 31.54 KG/M2 | TEMPERATURE: 97 F | WEIGHT: 178 LBS | HEIGHT: 63 IN | RESPIRATION RATE: 24 BRPM | OXYGEN SATURATION: 95 % | SYSTOLIC BLOOD PRESSURE: 178 MMHG | HEART RATE: 72 BPM | DIASTOLIC BLOOD PRESSURE: 87 MMHG

## 2019-07-13 DIAGNOSIS — S39.012A BACK STRAIN, INITIAL ENCOUNTER: Primary | ICD-10-CM

## 2019-07-13 DIAGNOSIS — G44.209 TENSION HEADACHE: ICD-10-CM

## 2019-07-13 PROCEDURE — 6360000002 HC RX W HCPCS: Performed by: EMERGENCY MEDICINE

## 2019-07-13 PROCEDURE — 93005 ELECTROCARDIOGRAM TRACING: CPT

## 2019-07-13 PROCEDURE — 71250 CT THORAX DX C-: CPT

## 2019-07-13 PROCEDURE — 99283 EMERGENCY DEPT VISIT LOW MDM: CPT

## 2019-07-13 PROCEDURE — 96372 THER/PROPH/DIAG INJ SC/IM: CPT

## 2019-07-13 RX ORDER — KETOROLAC TROMETHAMINE 30 MG/ML
60 INJECTION, SOLUTION INTRAMUSCULAR; INTRAVENOUS ONCE
Status: COMPLETED | OUTPATIENT
Start: 2019-07-13 | End: 2019-07-13

## 2019-07-13 RX ORDER — HYDROCODONE BITARTRATE AND ACETAMINOPHEN 5; 325 MG/1; MG/1
1 TABLET ORAL EVERY 6 HOURS PRN
Qty: 12 TABLET | Refills: 0 | Status: SHIPPED | OUTPATIENT
Start: 2019-07-13 | End: 2019-07-16

## 2019-07-13 RX ORDER — DEXAMETHASONE SODIUM PHOSPHATE 10 MG/ML
10 INJECTION INTRAMUSCULAR; INTRAVENOUS ONCE
Status: COMPLETED | OUTPATIENT
Start: 2019-07-13 | End: 2019-07-13

## 2019-07-13 RX ADMIN — KETOROLAC TROMETHAMINE 60 MG: 30 INJECTION, SOLUTION INTRAMUSCULAR; INTRAVENOUS at 09:11

## 2019-07-13 RX ADMIN — DEXAMETHASONE SODIUM PHOSPHATE 10 MG: 10 INJECTION INTRAMUSCULAR; INTRAVENOUS at 09:11

## 2019-07-13 ASSESSMENT — ENCOUNTER SYMPTOMS
ABDOMINAL PAIN: 0
NAUSEA: 0
EYE DISCHARGE: 0
VOMITING: 0
CHEST TIGHTNESS: 0
SHORTNESS OF BREATH: 0
CONSTIPATION: 0
WHEEZING: 0
EYE PAIN: 0
COUGH: 0
EYE REDNESS: 0
DIARRHEA: 0
SINUS PRESSURE: 0
TROUBLE SWALLOWING: 0
SORE THROAT: 0
BACK PAIN: 1
RHINORRHEA: 0

## 2019-07-13 ASSESSMENT — PAIN DESCRIPTION - DESCRIPTORS: DESCRIPTORS: PRESSURE

## 2019-07-13 ASSESSMENT — PAIN DESCRIPTION - ORIENTATION: ORIENTATION: LOWER

## 2019-07-13 ASSESSMENT — PAIN SCALES - GENERAL
PAINLEVEL_OUTOF10: 10
PAINLEVEL_OUTOF10: 8
PAINLEVEL_OUTOF10: 10

## 2019-07-13 ASSESSMENT — PAIN DESCRIPTION - FREQUENCY: FREQUENCY: CONTINUOUS

## 2019-07-13 ASSESSMENT — PAIN DESCRIPTION - ONSET: ONSET: GRADUAL

## 2019-07-13 ASSESSMENT — PAIN DESCRIPTION - LOCATION
LOCATION: BACK
LOCATION: BACK;HEAD

## 2019-07-13 ASSESSMENT — PAIN - FUNCTIONAL ASSESSMENT: PAIN_FUNCTIONAL_ASSESSMENT: 0-10

## 2019-07-13 NOTE — ED TRIAGE NOTES
Pt c/o severe head, left upper back pain and cp. Pt was seen a few hours ago at another hospital and given cortisone shot. Pt c/o fingers going numb . Pt c/o nausea and freezing and shivering.  Pt alert, p/w/d at this time

## 2019-07-13 NOTE — LETTER
[facility] Emergency Department  [place] [address]  Phone: [phone]               July 13, 2019    Patient: Dirk Leroy   YOB: 1945   Date of Visit: 7/13/2019       To Whom It May Concern:    Ander Saeed was seen and treated in our emergency department on 7/13/2019. She may return to work on 7/15/2019.       Sincerely,       Ronald Barry RN         Signature:__________________________________

## 2019-07-13 NOTE — ED PROVIDER NOTES
[address]  eMERGENCY dEPARTMENT eNCOUnter      Pt Name: Anabell Wood  MRN: [id number]  [place] 1945  Date of evaluation: 7/13/2019  Provider: Nadia Heimlich, MD    [address] [place] [address]       Chief Complaint   Patient presents with    Back Pain    Headache         HISTORY OF PRESENT ILLNESS   (Location/Symptom, Timing/Onset, Context/Setting, Quality, Duration, Modifying Factors, Severity)  Note limiting factors. Anabell Wood is a 69 y.o. female who presents to the emergency department because of left upper back pain with headache upon waking this am. Says no injury, no cough/cold symptoms. Denies chest pain. No SOB. Nursing Notes were reviewed. REVIEW OF SYSTEMS    (2-9 systems for level 4, 10 or more forlevel 5)     Review of Systems   Constitutional: Negative for chills and fever. HENT: Negative for congestion, ear pain, postnasal drip, rhinorrhea, sinus pressure, sneezing, sore throat and trouble swallowing. Eyes: Negative for pain, discharge and redness. Respiratory: Negative for cough, chest tightness, shortness of breath and wheezing. Cardiovascular: Negative for chest pain, palpitations and leg swelling. Gastrointestinal: Negative for abdominal pain, constipation, diarrhea, nausea and vomiting. Genitourinary: Negative for dysuria, flank pain, frequency, hematuria and urgency. Musculoskeletal: Positive for back pain. Negative for joint swelling and neck pain. Skin: Negative for pallor and rash. Neurological: Positive for headaches. Negative for dizziness, syncope, weakness and numbness. Psychiatric/Behavioral: Negative for confusion and hallucinations. The patient is not nervous/anxious.             PAST MEDICAL HISTORY     Past Medical History:   Diagnosis Date    Bleeds easily ([address] 12/31/2018    Headache(784.0)     Hypertension     Hypothyroid 5/20/2015    MI, old     Moderate episode of recurrent major depressive disorder ([address] EKG's are interpreted by the Emergency Department Physician who either signs or Co-signs this chart in the absence of a cardiologist.    EKG showed NSR at 75 bpm.    RADIOLOGY:   Non-plain film images such as CT, Ultrasound and MRI are read by the radiologist. Plain radiographic images are visualized andpreliminarily interpreted by the emergency physician with the below findings:    CT chest negative    Interpretationper the Radiologist below, if available at the time of this note:    CT CHEST WO CONTRAST   Final Result      1. No focal airspace consolidation. 2. Stable mild mosaic attenuation in bilateral upper lobes, likely relate to air trapping from small airways disease. 3. Stable 6 mm nodule right middle lobe, likely benign. 4. Stable mild scarring in the lingular and middle lobes. 5. Diffuse idiopathic skeletal hyperostosis in the mid and lower thoracic spine. 6. No significant interval change since 5/30/2019. ED BEDSIDE ULTRASOUND:   Performed by ED Physician - none    LABS:  Labs Reviewed - No data to display    All other labs were within normal range or not returned as of this dictation. EMERGENCY DEPARTMENT COURSE and DIFFERENTIAL DIAGNOSIS/MDM:   Vitals:    Vitals:    07/13/19 0657   BP: (!) 199/98   Pulse: 80   Resp: 24   Temp: 97 °F (36.1 °C)   TempSrc: Oral   SpO2: 97%   Weight: 178 lb (80.7 kg)   Height: 5' 3\" (1.6 m)           CRITICAL CARE TIME   Total Critical Care time was  minutes, excluding separatelyreportable procedures. There was a high probability ofclinically significant/life threatening deterioration in the patient's condition which required my urgent intervention. CONSULTS:  None    PROCEDURES:  None    PROGRESS NOTES:    CT chest negative. Will give toradol and decadron. Norco prn. FINAL IMPRESSION      1. Back strain, initial encounter    2.  Tension headache          Final diagnoses:   Back strain, initial encounter   Tension headache

## 2019-07-21 PROBLEM — I82.409 DEEP VEIN THROMBOSIS OF LOWER EXTREMITY (HCC): Status: ACTIVE | Noted: 2019-07-21

## 2019-07-21 ASSESSMENT — ENCOUNTER SYMPTOMS
COUGH: 1
VOMITING: 0
EYE REDNESS: 0
ABDOMINAL PAIN: 0
SORE THROAT: 0
DIARRHEA: 0
TROUBLE SWALLOWING: 0
NAUSEA: 0
HEMOPTYSIS: 0
RHINORRHEA: 1
CHEST TIGHTNESS: 0
SHORTNESS OF BREATH: 1
BACK PAIN: 0
WHEEZING: 1
EYE PAIN: 0
HEARTBURN: 0
COLOR CHANGE: 0

## 2019-07-23 ENCOUNTER — OFFICE VISIT (OUTPATIENT)
Dept: FAMILY MEDICINE CLINIC | Age: 74
End: 2019-07-23
Payer: MEDICARE

## 2019-07-23 ENCOUNTER — HOSPITAL ENCOUNTER (OUTPATIENT)
Facility: HOSPITAL | Age: 74
Discharge: HOME | End: 2019-07-23
Payer: MEDICARE

## 2019-07-23 VITALS
OXYGEN SATURATION: 97 % | HEIGHT: 65 IN | RESPIRATION RATE: 16 BRPM | HEART RATE: 70 BPM | SYSTOLIC BLOOD PRESSURE: 120 MMHG | WEIGHT: 179 LBS | BODY MASS INDEX: 29.82 KG/M2 | DIASTOLIC BLOOD PRESSURE: 62 MMHG

## 2019-07-23 DIAGNOSIS — R73.09 ELEVATED GLUCOSE LEVEL: ICD-10-CM

## 2019-07-23 DIAGNOSIS — K22.2 ESOPHAGEAL STRICTURE: Primary | ICD-10-CM

## 2019-07-23 DIAGNOSIS — Z09 HOSPITAL DISCHARGE FOLLOW-UP: ICD-10-CM

## 2019-07-23 DIAGNOSIS — K21.9 GASTROESOPHAGEAL REFLUX DISEASE WITHOUT ESOPHAGITIS: ICD-10-CM

## 2019-07-23 DIAGNOSIS — E86.0 DEHYDRATION: ICD-10-CM

## 2019-07-23 DIAGNOSIS — D72.829 LEUKOCYTOSIS, UNSPECIFIED TYPE: ICD-10-CM

## 2019-07-23 DIAGNOSIS — R31.9 HEMATURIA, UNSPECIFIED TYPE: ICD-10-CM

## 2019-07-23 LAB
BILIRUBIN, POC: NEGATIVE
BLOOD URINE, POC: NORMAL
CLARITY, POC: NORMAL
COLOR, POC: YELLOW
GLUCOSE URINE, POC: NEGATIVE
KETONES, POC: NEGATIVE
LEUKOCYTE EST, POC: NORMAL
NITRITE, POC: NEGATIVE
PH, POC: 5.5
PROTEIN, POC: NEGATIVE
SPECIFIC GRAVITY, POC: 1.03
UROBILINOGEN, POC: 0.2

## 2019-07-23 PROCEDURE — 87086 URINE CULTURE/COLONY COUNT: CPT

## 2019-07-23 PROCEDURE — 1111F DSCHRG MED/CURRENT MED MERGE: CPT | Performed by: NURSE PRACTITIONER

## 2019-07-23 PROCEDURE — 99214 OFFICE O/P EST MOD 30 MIN: CPT | Performed by: NURSE PRACTITIONER

## 2019-07-23 PROCEDURE — 81002 URINALYSIS NONAUTO W/O SCOPE: CPT | Performed by: NURSE PRACTITIONER

## 2019-07-23 PROCEDURE — 96360 HYDRATION IV INFUSION INIT: CPT | Performed by: NURSE PRACTITIONER

## 2019-07-23 RX ORDER — SUCRALFATE 1 G/1
1 TABLET ORAL 4 TIMES DAILY
Qty: 120 TABLET | Refills: 3 | Status: SHIPPED | OUTPATIENT
Start: 2019-07-23 | End: 2019-07-25 | Stop reason: ALTCHOICE

## 2019-07-23 RX ORDER — SUCRALFATE 1 G/10ML
SUSPENSION ORAL
COMMUNITY
Start: 2019-07-17 | End: 2019-07-23

## 2019-07-23 RX ORDER — SODIUM CHLORIDE 9 MG/ML
INJECTION, SOLUTION INTRAVENOUS ONCE
Status: DISCONTINUED | OUTPATIENT
Start: 2019-07-23 | End: 2021-02-06

## 2019-07-23 RX ORDER — METFORMIN HYDROCHLORIDE 500 MG/1
500 TABLET, EXTENDED RELEASE ORAL
Qty: 90 TABLET | Refills: 0 | Status: SHIPPED | OUTPATIENT
Start: 2019-07-23 | End: 2019-09-28

## 2019-07-23 RX ORDER — CARVEDILOL 3.12 MG/1
1 TABLET ORAL 2 TIMES DAILY
COMMUNITY
Start: 2019-07-17 | End: 2019-10-01

## 2019-07-23 RX ORDER — SODIUM CHLORIDE 9 MG/ML
INJECTION, SOLUTION INTRAVENOUS CONTINUOUS
Status: DISCONTINUED | OUTPATIENT
Start: 2019-07-23 | End: 2019-07-23

## 2019-07-23 ASSESSMENT — ENCOUNTER SYMPTOMS
TROUBLE SWALLOWING: 0
EYE REDNESS: 0
NAUSEA: 1
BACK PAIN: 0
SHORTNESS OF BREATH: 0
COLOR CHANGE: 0
CHEST TIGHTNESS: 0
COUGH: 0
EYE PAIN: 0
SORE THROAT: 0

## 2019-07-23 NOTE — PROGRESS NOTES
Post-Discharge Transitional Care Management Services or Hospital Follow Up      Jenelle Galeas   YOB: 1945    Date of Office Visit:  7/23/2019  Date of Hospital Admission: 7/13/19  Date of Hospital Discharge: 7/13/19  Readmission Risk Score(high >=14%.  Medium >=10%):No data recorded    Care management risk score Rising risk (score 2-5) and Complex Care (Scores >=6): 8     Non face to face  following discharge, date last encounter closed (first attempt may have been earlier): *No documented post hospital discharge outreach found in the last 14 days *No documented post hospital discharge outreach found in the last 14 days    Call initiated 2 business days of discharge: *No response recorded in the last 14 days     Patient Active Problem List   Diagnosis    HTN (hypertension)    Hypothyroidism    Chest pain    Elevated serum creatinine    Headache    Nausea and vomiting in adult    Hypertensive urgency    Pre-syncope    BPV (benign positional vertigo)    GERD (gastroesophageal reflux disease)    Epigastric abdominal pain    Hypoxia    Nausea vomiting and diarrhea    Essential hypertension    Lung nodule    Acute pancreatitis    Hematochezia    Irritable bowel syndrome with diarrhea    Stable angina (HCC)    Fibrocystic breast disease (FCBD)    Grief    Esophageal dysphagia    Breast pain, left    Muscle spasm    Esophageal stricture    Breast density    Scar painful    Anxiety    Moderate episode of recurrent major depressive disorder (HCC)    Panic attacks    Liver lesion    Fatty pancreas    Abnormal CT of the abdomen    Bleeds easily (HCC)    Chronic bronchitis (HCC)    Gastroparesis    Hemangioma    Abnormal findings on diagnostic imaging of liver    Syncope due to orthostatic hypotension    Thoracic back pain    Weakness present    B12 deficiency    Deep vein thrombosis of lower extremity (HCC)       Allergies   Allergen Reactions    Iv Dye [Iodides] and time. Vital signs are normal. She appears well-developed and well-nourished. She is active. Non-toxic appearance. She does not have a sickly appearance. She does not appear ill. No distress. HENT:   Head: Normocephalic and atraumatic. Right Ear: Hearing, tympanic membrane, external ear and ear canal normal.   Left Ear: Hearing, tympanic membrane, external ear and ear canal normal.   Nose: Nose normal. No mucosal edema, rhinorrhea or nose lacerations. Right sinus exhibits no maxillary sinus tenderness and no frontal sinus tenderness. Left sinus exhibits no maxillary sinus tenderness and no frontal sinus tenderness. Mouth/Throat: Uvula is midline and oropharynx is clear and moist. Mucous membranes are dry. Normal dentition. No dental caries. No oropharyngeal exudate. No tonsillar exudate. Eyes: Pupils are equal, round, and reactive to light. Conjunctivae, EOM and lids are normal. Right eye exhibits no discharge. Left eye exhibits no discharge. No scleral icterus. Neck: Trachea normal, normal range of motion, full passive range of motion without pain and phonation normal. Neck supple. Normal carotid pulses, no hepatojugular reflux and no JVD present. No tracheal tenderness present. Carotid bruit is not present. No tracheal deviation present. No thyroid mass and no thyromegaly present. Cardiovascular: Normal rate, regular rhythm, S1 normal, S2 normal, normal heart sounds, intact distal pulses and normal pulses. Exam reveals no gallop, no distant heart sounds and no friction rub. No murmur heard. Pulmonary/Chest: Effort normal and breath sounds normal. No accessory muscle usage or stridor. No apnea, no tachypnea and no bradypnea. No respiratory distress. She has no decreased breath sounds. She has no wheezes. She has no rhonchi. She has no rales. She exhibits no tenderness. Abdominal: Soft. Normal appearance and bowel sounds are normal. She exhibits no distension and no mass.  There is no SOLUTION INFUS  - 0.9 % sodium chloride infusion 500ml/60min once  -Patient tolerated fluids without any distress.  -Continue to increase PO intake.  -Make sure to follow-up with all specialist appointments per DC summary. Patient aware.         Medical Decision Making: high complexity

## 2019-07-24 ENCOUNTER — HOSPITAL ENCOUNTER (EMERGENCY)
Facility: HOSPITAL | Age: 74
Discharge: HOME | End: 2019-07-25
Attending: EMERGENCY MEDICINE
Payer: MEDICARE

## 2019-07-24 DIAGNOSIS — K59.00 CONSTIPATION, UNSPECIFIED CONSTIPATION TYPE: Primary | ICD-10-CM

## 2019-07-24 PROCEDURE — 99284 EMERGENCY DEPT VISIT MOD MDM: CPT

## 2019-07-24 ASSESSMENT — ENCOUNTER SYMPTOMS
DIARRHEA: 0
VOMITING: 0
ABDOMINAL PAIN: 0

## 2019-07-25 ENCOUNTER — APPOINTMENT (OUTPATIENT)
Dept: GENERAL RADIOLOGY | Facility: HOSPITAL | Age: 74
End: 2019-07-25
Payer: MEDICARE

## 2019-07-25 VITALS
SYSTOLIC BLOOD PRESSURE: 144 MMHG | HEART RATE: 79 BPM | OXYGEN SATURATION: 95 % | TEMPERATURE: 98.2 F | WEIGHT: 178 LBS | DIASTOLIC BLOOD PRESSURE: 65 MMHG | RESPIRATION RATE: 16 BRPM | BODY MASS INDEX: 31.54 KG/M2 | HEIGHT: 63 IN

## 2019-07-25 PROCEDURE — 74018 RADEX ABDOMEN 1 VIEW: CPT

## 2019-07-25 PROCEDURE — 6370000000 HC RX 637 (ALT 250 FOR IP): Performed by: EMERGENCY MEDICINE

## 2019-07-25 RX ORDER — SENNA AND DOCUSATE SODIUM 50; 8.6 MG/1; MG/1
1 TABLET, FILM COATED ORAL DAILY
Qty: 30 TABLET | Refills: 1 | Status: SHIPPED | OUTPATIENT
Start: 2019-07-25 | End: 2019-08-24

## 2019-07-25 RX ORDER — OMEPRAZOLE 20 MG/1
20 CAPSULE, DELAYED RELEASE ORAL DAILY
COMMUNITY
End: 2019-10-01

## 2019-07-25 RX ADMIN — MAGNESIUM CITRATE 296 ML: 1.75 LIQUID ORAL at 01:08

## 2019-07-25 ASSESSMENT — ENCOUNTER SYMPTOMS
EYE PAIN: 0
EYE REDNESS: 0
NAUSEA: 0
RHINORRHEA: 0
CHEST TIGHTNESS: 0
COUGH: 0
EYE DISCHARGE: 0
DIARRHEA: 0
VOMITING: 0
SINUS PRESSURE: 0
TROUBLE SWALLOWING: 0
SHORTNESS OF BREATH: 0
SORE THROAT: 0
BACK PAIN: 0
CONSTIPATION: 0
WHEEZING: 0
ABDOMINAL PAIN: 1

## 2019-07-25 ASSESSMENT — PAIN DESCRIPTION - ONSET: ONSET: GRADUAL

## 2019-07-25 ASSESSMENT — PAIN SCALES - GENERAL: PAINLEVEL_OUTOF10: 8

## 2019-07-25 ASSESSMENT — PAIN DESCRIPTION - LOCATION: LOCATION: ABDOMEN

## 2019-07-25 ASSESSMENT — PAIN DESCRIPTION - ORIENTATION: ORIENTATION: LOWER

## 2019-07-25 ASSESSMENT — PAIN DESCRIPTION - PROGRESSION: CLINICAL_PROGRESSION: NOT CHANGED

## 2019-07-25 ASSESSMENT — PAIN DESCRIPTION - PAIN TYPE: TYPE: ACUTE PAIN

## 2019-07-25 ASSESSMENT — PAIN DESCRIPTION - FREQUENCY: FREQUENCY: CONTINUOUS

## 2019-07-25 ASSESSMENT — PAIN DESCRIPTION - DESCRIPTORS: DESCRIPTORS: OTHER (COMMENT)

## 2019-07-25 NOTE — ED PROVIDER NOTES
[address]  eMERGENCY dEPARTMENT eNCOUnter      Pt Name: Edwin Barbosa  MRN: [id number]  [place] 1945  Date of evaluation: 7/24/2019  Provider: Gadiel Parry MD    [address] [place] [address]       Chief Complaint   Patient presents with    Abdominal Pain         HISTORY OF PRESENT ILLNESS   (Location/Symptom, Timing/Onset, Context/Setting, Quality, Duration, Modifying Factors, Severity)  Note limiting factors. Edwin Barbosa is a 69 y.o. female who presents to the emergency department because of lower abdominal pain secondary to constipation for the past few days. Patient seen at [facility] ED and after a complete abdominal work up, she was diagnosed with constipation and told to take OTC laxatives which she did with no relief. Nursing Notes were reviewed. REVIEW OF SYSTEMS    (2-9 systems for level 4, 10 or more forlevel 5)     Review of Systems   Constitutional: Negative for chills and fever. HENT: Negative for congestion, ear pain, postnasal drip, rhinorrhea, sinus pressure, sneezing, sore throat and trouble swallowing. Eyes: Negative for pain, discharge and redness. Respiratory: Negative for cough, chest tightness, shortness of breath and wheezing. Cardiovascular: Negative for chest pain, palpitations and leg swelling. Gastrointestinal: Positive for abdominal pain. Negative for constipation, diarrhea, nausea and vomiting. Genitourinary: Negative for dysuria, flank pain, frequency, hematuria and urgency. Musculoskeletal: Negative for back pain, joint swelling and neck pain. Skin: Negative for pallor and rash. Neurological: Negative for dizziness, syncope, weakness, numbness and headaches. Psychiatric/Behavioral: Negative for confusion and hallucinations. The patient is not nervous/anxious.             PAST MEDICAL HISTORY     Past Medical History:   Diagnosis Date    Bleeds easily ([address] 12/31/2018    Headache(784.0)     Hypertension    

## 2019-07-26 LAB — URINE CULTURE, ROUTINE: NORMAL

## 2019-07-30 ENCOUNTER — OFFICE VISIT (OUTPATIENT)
Dept: FAMILY MEDICINE CLINIC | Age: 74
End: 2019-07-30
Payer: MEDICARE

## 2019-07-30 VITALS
BODY MASS INDEX: 29.99 KG/M2 | SYSTOLIC BLOOD PRESSURE: 132 MMHG | HEIGHT: 65 IN | DIASTOLIC BLOOD PRESSURE: 78 MMHG | OXYGEN SATURATION: 98 % | RESPIRATION RATE: 18 BRPM | WEIGHT: 180 LBS | HEART RATE: 68 BPM

## 2019-07-30 DIAGNOSIS — S39.012A STRAIN OF LUMBAR REGION, INITIAL ENCOUNTER: Primary | ICD-10-CM

## 2019-07-30 PROCEDURE — G8417 CALC BMI ABV UP PARAM F/U: HCPCS | Performed by: NURSE PRACTITIONER

## 2019-07-30 PROCEDURE — 99213 OFFICE O/P EST LOW 20 MIN: CPT | Performed by: NURSE PRACTITIONER

## 2019-07-30 PROCEDURE — G8598 ASA/ANTIPLAT THER USED: HCPCS | Performed by: NURSE PRACTITIONER

## 2019-07-30 PROCEDURE — 96372 THER/PROPH/DIAG INJ SC/IM: CPT | Performed by: NURSE PRACTITIONER

## 2019-07-30 PROCEDURE — 4040F PNEUMOC VAC/ADMIN/RCVD: CPT | Performed by: NURSE PRACTITIONER

## 2019-07-30 PROCEDURE — 1036F TOBACCO NON-USER: CPT | Performed by: NURSE PRACTITIONER

## 2019-07-30 PROCEDURE — 1090F PRES/ABSN URINE INCON ASSESS: CPT | Performed by: NURSE PRACTITIONER

## 2019-07-30 PROCEDURE — G8400 PT W/DXA NO RESULTS DOC: HCPCS | Performed by: NURSE PRACTITIONER

## 2019-07-30 PROCEDURE — 3017F COLORECTAL CA SCREEN DOC REV: CPT | Performed by: NURSE PRACTITIONER

## 2019-07-30 PROCEDURE — G8427 DOCREV CUR MEDS BY ELIG CLIN: HCPCS | Performed by: NURSE PRACTITIONER

## 2019-07-30 PROCEDURE — 1123F ACP DISCUSS/DSCN MKR DOCD: CPT | Performed by: NURSE PRACTITIONER

## 2019-07-30 RX ORDER — METHYLPREDNISOLONE SODIUM SUCCINATE 125 MG/2ML
80 INJECTION, POWDER, LYOPHILIZED, FOR SOLUTION INTRAMUSCULAR; INTRAVENOUS ONCE
Status: COMPLETED | OUTPATIENT
Start: 2019-07-30 | End: 2019-07-30

## 2019-07-30 RX ORDER — KETOROLAC TROMETHAMINE 30 MG/ML
15 INJECTION, SOLUTION INTRAMUSCULAR; INTRAVENOUS DAILY
Status: SHIPPED | OUTPATIENT
Start: 2019-07-30 | End: 2019-08-01

## 2019-07-30 RX ADMIN — METHYLPREDNISOLONE SODIUM SUCCINATE 80 MG: 125 INJECTION, POWDER, LYOPHILIZED, FOR SOLUTION INTRAMUSCULAR; INTRAVENOUS at 15:46

## 2019-07-30 RX ADMIN — KETOROLAC TROMETHAMINE 15 MG: 30 INJECTION, SOLUTION INTRAMUSCULAR; INTRAVENOUS at 15:46

## 2019-07-30 ASSESSMENT — ENCOUNTER SYMPTOMS
SHORTNESS OF BREATH: 0
CHEST TIGHTNESS: 0
COLOR CHANGE: 0
EYE REDNESS: 0
DIARRHEA: 0
SORE THROAT: 0
ABDOMINAL PAIN: 0
NAUSEA: 0
EYE PAIN: 0
TROUBLE SWALLOWING: 0
COUGH: 0
VOMITING: 0

## 2019-07-30 NOTE — PROGRESS NOTES
SUBJECTIVE:    Patient ID: Terrance Galvan is a 69 y.o. female. Chief Complaint   Patient presents with    Constipation     f/u [address] was seen today for ER follow-up due to constipation. Patient is also c/o a strain in her low back this has occurred in the past when she has overdone it at work or with ADL's patient requesting a toradol and steroid injection today. She reports this medications combo has helped relieve her pain in the past. Patient is otherwise stable today, no reported worsening constipation. Patients vital signs are stable today. Constipation   This is a recurrent problem. The current episode started 1 to 4 weeks ago. The problem has been resolved since onset. Her stool frequency is 1 time per day. The stool is described as firm and formed. The patient is not on a high fiber diet. She does not exercise regularly. There has not been adequate water intake. Associated symptoms include back pain, bloating and flatus. Pertinent negatives include no abdominal pain, anorexia, diarrhea, difficulty urinating, fecal incontinence, fever, hematochezia, hemorrhoids, melena, nausea, rectal pain, vomiting or weight loss. Risk factors include obesity, recent illness, immobility and dietary change. She has tried diet changes, enemas, fiber, laxatives and stool softeners for the symptoms. The treatment provided significant relief. There is no history of abdominal surgery, endocrine disease, inflammatory bowel disease, irritable bowel syndrome, metabolic disease, neurologic disease, neuromuscular disease, psychiatric history or radiation treatment. Back Pain   This is a chronic problem. The current episode started more than 1 year ago. The problem occurs intermittently. The problem has been waxing and waning since onset. The pain is present in the gluteal and lumbar spine. The quality of the pain is described as aching, burning, cramping and shooting. The pain radiates to the left foot.  The pain is at a severity of 5/10. The pain is moderate. The pain is worse during the day. The symptoms are aggravated by bending. Stiffness is present in the morning. Associated symptoms include leg pain and tingling. Pertinent negatives include no abdominal pain, bladder incontinence, bowel incontinence, chest pain, dysuria, fever, headaches, numbness, paresis, paresthesias, pelvic pain, perianal numbness, weakness or weight loss. Risk factors include lack of exercise, menopause, obesity, history of osteoporosis, poor posture and sedentary lifestyle (recent illness). She has tried bed rest, analgesics, ice, NSAIDs, walking and muscle relaxant for the symptoms. The treatment provided significant relief. Brandon Cyr was seen today for constipation.     Diagnoses and all orders for this visit:    Strain of lumbar region, initial encounter  -     ketorolac (TORADOL) injection 15 mg  -     methylPREDNISolone sodium (SOLU-MEDROL) injection 80 mg         Active Ambulatory Problems     Diagnosis Date Noted    HTN (hypertension) 05/20/2015    Hypothyroidism 05/20/2015    Chest pain 05/20/2015    Elevated serum creatinine 05/21/2015    Headache 05/21/2015    Nausea and vomiting in adult 05/21/2015    Hypertensive urgency 05/22/2015    Pre-syncope 02/03/2017    BPV (benign positional vertigo) 02/03/2017    GERD (gastroesophageal reflux disease) 02/03/2017    Epigastric abdominal pain 02/03/2017    Hypoxia 05/02/2017    Nausea vomiting and diarrhea 05/02/2017    Essential hypertension 05/02/2017    Lung nodule 05/02/2017    Acute pancreatitis 05/03/2017    Hematochezia     Irritable bowel syndrome with diarrhea 12/10/2017    Stable angina ([address] 12/10/2017    Fibrocystic breast disease (FCBD) 12/10/2017    Grief 06/24/2018    Esophageal dysphagia 06/27/2018    Breast pain, left 06/27/2018    Muscle spasm 06/27/2018    Esophageal stricture 06/27/2018    Breast density 06/27/2018    Scar painful 06/27/2018  Anxiety 10/27/2018    Moderate episode of recurrent major depressive disorder ([facility] [address] [address] 10/27/2018    Panic attacks 10/27/2018    Liver lesion 10/27/2018    Fatty pancreas 10/27/2018    Abnormal CT of the abdomen 10/27/2018    Bleeds easily ([facility] [address] 12/31/2018    Chronic bronchitis ([facility] 12/31/2018    Gastroparesis 12/31/2018    Hemangioma 02/26/2019    Abnormal findings on diagnostic imaging of liver 02/26/2019    Syncope due to orthostatic hypotension 02/26/2019    Thoracic back pain 02/26/2019    Weakness present 02/26/2019    B12 deficiency 03/12/2019    Deep vein thrombosis of lower extremity ([facility] 07/21/2019     Resolved Ambulatory Problems     Diagnosis Date Noted    Acute cystitis without hematuria 02/03/2017    Encounter for screening colonoscopy 02/03/2017     Past Medical History:   Diagnosis Date    Headache(784.0)     Hypertension     Hypothyroid 5/20/2015    MI, old     Pneumonia     Stomach ulcer     Thyroid disease       Allergies   Allergen Reactions    Iv Dye [Iodides] Hives, Shortness Of Breath and Other (See Comments)     Pt also passed out      Azithromycin Rash    Codeine Hives    Influenza Vaccines      Patient couldn't remember the reaction att.  Morphine     Mucinex [Guaifenesin Er]      Severe nausea    Reglan [Metoclopramide] Itching     welps      Past Surgical History:   Procedure Laterality Date    APPENDECTOMY      CHOLECYSTECTOMY      LIVER SURGERY      UPPER GASTROINTESTINAL ENDOSCOPY        History reviewed. No pertinent family history. Patient's medications, allergies, past medical, surgical, social and family histories were reviewed and updated as appropriate.     Current Outpatient Medications on File Prior to Visit   Medication Sig Dispense Refill    omeprazole (PRILOSEC) 20 MG delayed release capsule Take 20 mg by mouth daily      sennosides-docusate sodium (SENOKOT-S) 8.6-50 MG tablet Take 1 tablet by mouth daily 30 tablet 1    carvedilol Nursing note and vitals reviewed. No results found for requested labs within last 30 days. Hemoglobin A1C (%)   Date Value   06/06/2019 5.6     Microscopic Examination (no units)   Date Value   01/24/2018 YES     LDL Calculated (mg/dL)   Date Value   06/06/2019 104       Lab Results   Component Value Date    WBC 7.3 06/06/2019    NEUTROABS 4.1 06/06/2019    HGB 13.3 06/06/2019    HCT 41.7 06/06/2019    MCV 97.9 06/06/2019     06/06/2019     Lab Results   Component Value Date    TSH 1.62 02/02/2017          ASSESSMENT/PLAN:     Josy Mueller was seen today for constipation. Diagnoses and all orders for this visit:    Strain of lumbar region, initial encounter  -     ketorolac (TORADOL) injection 15 mg  -     methylPREDNISolone sodium (SOLU-MEDROL) injection 80 mg  - Continue to go to PT on Thursday. Administrations This Visit     ketorolac (TORADOL) injection 15 mg     Admin Date  07/30/2019 Action  Given Dose  15 mg Route  Intramuscular Administered By  Taina Lute          methylPREDNISolone sodium (SOLU-MEDROL) injection 80 mg     Admin Date  07/30/2019 Action  Given Dose  80 mg Route  Intramuscular Administered By  Taina Lute                Controlled Substances Monitoring:     RX Monitoring 11/21/2018   Attestation The Prescription Monitoring Report for this patient was reviewed today. Periodic Controlled Substance Monitoring Possible medication side effects, risk of tolerance/dependence & alternative treatments discussed. Chronic Pain > 80 MEDD Functional status reviewed - continues with improved or maintaining ADL's.;Reviewed the patient's functional status and documentation. PATIENT COUNSELING     Counseling was provided today regarding the following topics: Healthy eating habits, Regular exercise, substance abuse and healthy sleep habits. Discussed use, benefit, and side effects of prescribed medications. Barriers to medication compliance addressed.   All

## 2019-08-05 ASSESSMENT — ENCOUNTER SYMPTOMS
FLATUS: 1
CONSTIPATION: 1
BACK PAIN: 1
RECTAL PAIN: 0
BOWEL INCONTINENCE: 0
HEMATOCHEZIA: 0
BLOATING: 1

## 2019-08-26 ENCOUNTER — OFFICE VISIT (OUTPATIENT)
Dept: FAMILY MEDICINE CLINIC | Age: 74
End: 2019-08-26
Payer: MEDICARE

## 2019-08-26 VITALS
SYSTOLIC BLOOD PRESSURE: 130 MMHG | BODY MASS INDEX: 29.66 KG/M2 | HEART RATE: 78 BPM | RESPIRATION RATE: 20 BRPM | DIASTOLIC BLOOD PRESSURE: 70 MMHG | WEIGHT: 178 LBS | HEIGHT: 65 IN | OXYGEN SATURATION: 98 %

## 2019-08-26 DIAGNOSIS — J01.90 ACUTE BACTERIAL SINUSITIS: ICD-10-CM

## 2019-08-26 DIAGNOSIS — R11.0 NAUSEA: ICD-10-CM

## 2019-08-26 DIAGNOSIS — B96.89 ACUTE BACTERIAL SINUSITIS: ICD-10-CM

## 2019-08-26 DIAGNOSIS — I10 ESSENTIAL HYPERTENSION: Primary | ICD-10-CM

## 2019-08-26 DIAGNOSIS — R42 DIZZINESS: ICD-10-CM

## 2019-08-26 DIAGNOSIS — E03.9 HYPOTHYROIDISM, UNSPECIFIED TYPE: ICD-10-CM

## 2019-08-26 DIAGNOSIS — Z12.31 ENCOUNTER FOR SCREENING MAMMOGRAM FOR MALIGNANT NEOPLASM OF BREAST: ICD-10-CM

## 2019-08-26 DIAGNOSIS — R07.9 CHEST PAIN, UNSPECIFIED TYPE: ICD-10-CM

## 2019-08-26 DIAGNOSIS — R92.8 MAMMOGRAM ABNORMAL: ICD-10-CM

## 2019-08-26 PROCEDURE — 1090F PRES/ABSN URINE INCON ASSESS: CPT | Performed by: NURSE PRACTITIONER

## 2019-08-26 PROCEDURE — 4040F PNEUMOC VAC/ADMIN/RCVD: CPT | Performed by: NURSE PRACTITIONER

## 2019-08-26 PROCEDURE — 1036F TOBACCO NON-USER: CPT | Performed by: NURSE PRACTITIONER

## 2019-08-26 PROCEDURE — 3017F COLORECTAL CA SCREEN DOC REV: CPT | Performed by: NURSE PRACTITIONER

## 2019-08-26 PROCEDURE — G8427 DOCREV CUR MEDS BY ELIG CLIN: HCPCS | Performed by: NURSE PRACTITIONER

## 2019-08-26 PROCEDURE — 1123F ACP DISCUSS/DSCN MKR DOCD: CPT | Performed by: NURSE PRACTITIONER

## 2019-08-26 PROCEDURE — G8400 PT W/DXA NO RESULTS DOC: HCPCS | Performed by: NURSE PRACTITIONER

## 2019-08-26 PROCEDURE — 99214 OFFICE O/P EST MOD 30 MIN: CPT | Performed by: NURSE PRACTITIONER

## 2019-08-26 PROCEDURE — 96372 THER/PROPH/DIAG INJ SC/IM: CPT | Performed by: NURSE PRACTITIONER

## 2019-08-26 PROCEDURE — G8417 CALC BMI ABV UP PARAM F/U: HCPCS | Performed by: NURSE PRACTITIONER

## 2019-08-26 PROCEDURE — G8598 ASA/ANTIPLAT THER USED: HCPCS | Performed by: NURSE PRACTITIONER

## 2019-08-26 RX ORDER — METHYLPREDNISOLONE SODIUM SUCCINATE 125 MG/2ML
125 INJECTION, POWDER, LYOPHILIZED, FOR SOLUTION INTRAMUSCULAR; INTRAVENOUS ONCE
Status: COMPLETED | OUTPATIENT
Start: 2019-08-26 | End: 2019-08-26

## 2019-08-26 RX ORDER — CEFTRIAXONE 1 G/1
1 INJECTION, POWDER, FOR SOLUTION INTRAMUSCULAR; INTRAVENOUS ONCE
Status: COMPLETED | OUTPATIENT
Start: 2019-08-26 | End: 2019-08-26

## 2019-08-26 RX ORDER — ONDANSETRON 4 MG/1
4 TABLET, FILM COATED ORAL EVERY 8 HOURS PRN
Qty: 30 TABLET | Refills: 5 | Status: SHIPPED | OUTPATIENT
Start: 2019-08-26 | End: 2020-09-21

## 2019-08-26 RX ORDER — LEVOTHYROXINE SODIUM 0.07 MG/1
75 TABLET ORAL DAILY
Qty: 90 TABLET | Refills: 3 | Status: SHIPPED | OUTPATIENT
Start: 2019-08-26 | End: 2020-03-11 | Stop reason: SDUPTHER

## 2019-08-26 RX ADMIN — CEFTRIAXONE 1 G: 1 INJECTION, POWDER, FOR SOLUTION INTRAMUSCULAR; INTRAVENOUS at 11:54

## 2019-08-26 RX ADMIN — METHYLPREDNISOLONE SODIUM SUCCINATE 125 MG: 125 INJECTION, POWDER, LYOPHILIZED, FOR SOLUTION INTRAMUSCULAR; INTRAVENOUS at 11:55

## 2019-08-26 NOTE — PROGRESS NOTES
eye exhibits no discharge. Left eye exhibits no discharge. No scleral icterus. Neck: Trachea normal, normal range of motion, full passive range of motion without pain and phonation normal. Neck supple. Normal carotid pulses, no hepatojugular reflux and no JVD present. No tracheal tenderness present. Carotid bruit is not present. No tracheal deviation present. No thyroid mass and no thyromegaly present. Cardiovascular: Normal rate, regular rhythm, S1 normal, S2 normal, normal heart sounds, intact distal pulses and normal pulses. Exam reveals no gallop, no distant heart sounds and no friction rub. No murmur heard. Pulmonary/Chest: Effort normal and breath sounds normal. No accessory muscle usage or stridor. No apnea, no tachypnea and no bradypnea. She has no decreased breath sounds. She has no wheezes. She has no rhonchi. She has no rales. She exhibits no tenderness. Abdominal: Soft. Normal appearance and bowel sounds are normal. She exhibits no distension and no mass. There is no hepatosplenomegaly, splenomegaly or hepatomegaly. There is no tenderness. There is no rebound and no guarding. No hernia. Musculoskeletal: Normal range of motion. She exhibits no edema, tenderness or deformity. Lymphadenopathy:     She has no cervical adenopathy. She has no axillary adenopathy. Neurological: She is alert and oriented to person, place, and time. She has normal strength. She is not disoriented. She displays normal reflexes. No cranial nerve deficit or sensory deficit. She exhibits normal muscle tone. Coordination normal. GCS eye subscore is 4. GCS verbal subscore is 5. GCS motor subscore is 6. Skin: Skin is warm, dry and intact. Capillary refill takes less than 2 seconds. No bruising and no rash noted. She is not diaphoretic. No erythema. No pallor. Psychiatric: She has a normal mood and affect.  Her speech is normal and behavior is normal. Judgment and thought content normal. Cognition and memory are

## 2019-08-28 ASSESSMENT — ENCOUNTER SYMPTOMS
BACK PAIN: 0
COUGH: 1
TROUBLE SWALLOWING: 0
SORE THROAT: 0
DIARRHEA: 0
SINUS PAIN: 1
VOMITING: 0
COLOR CHANGE: 0
SINUS PRESSURE: 1
ABDOMINAL PAIN: 0
SHORTNESS OF BREATH: 0
EYE REDNESS: 0
NAUSEA: 0
RHINORRHEA: 1
CHEST TIGHTNESS: 0
SINUS COMPLAINT: 1
EYE PAIN: 0

## 2019-08-29 ENCOUNTER — TELEPHONE (OUTPATIENT)
Dept: FAMILY MEDICINE CLINIC | Age: 74
End: 2019-08-29

## 2019-08-29 ENCOUNTER — NURSE ONLY (OUTPATIENT)
Dept: FAMILY MEDICINE CLINIC | Age: 74
End: 2019-08-29
Payer: MEDICARE

## 2019-08-29 DIAGNOSIS — E53.8 B12 DEFICIENCY: ICD-10-CM

## 2019-08-29 DIAGNOSIS — M79.662 PAIN OF LEFT CALF: Primary | ICD-10-CM

## 2019-08-29 DIAGNOSIS — I10 ESSENTIAL HYPERTENSION: ICD-10-CM

## 2019-08-29 PROCEDURE — 96372 THER/PROPH/DIAG INJ SC/IM: CPT | Performed by: NURSE PRACTITIONER

## 2019-08-29 RX ORDER — CLONIDINE HYDROCHLORIDE 0.1 MG/1
0.1 TABLET ORAL ONCE
Status: COMPLETED | OUTPATIENT
Start: 2019-08-29 | End: 2019-08-29

## 2019-08-29 RX ORDER — CYANOCOBALAMIN 1000 UG/ML
1000 INJECTION INTRAMUSCULAR; SUBCUTANEOUS ONCE
Status: COMPLETED | OUTPATIENT
Start: 2019-08-29 | End: 2019-08-29

## 2019-08-29 RX ADMIN — CYANOCOBALAMIN 1000 MCG: 1000 INJECTION INTRAMUSCULAR; SUBCUTANEOUS at 14:20

## 2019-08-29 RX ADMIN — CLONIDINE HYDROCHLORIDE 0.1 MG: 0.1 TABLET ORAL at 14:21

## 2019-09-04 DIAGNOSIS — R92.8 MAMMOGRAM ABNORMAL: Primary | ICD-10-CM

## 2019-09-09 ENCOUNTER — OFFICE VISIT (OUTPATIENT)
Dept: FAMILY MEDICINE CLINIC | Age: 74
End: 2019-09-09
Payer: MEDICARE

## 2019-09-09 VITALS
RESPIRATION RATE: 20 BRPM | HEIGHT: 65 IN | HEART RATE: 87 BPM | DIASTOLIC BLOOD PRESSURE: 80 MMHG | OXYGEN SATURATION: 98 % | SYSTOLIC BLOOD PRESSURE: 148 MMHG | BODY MASS INDEX: 29.66 KG/M2 | WEIGHT: 178 LBS

## 2019-09-09 DIAGNOSIS — B96.89 ACUTE BACTERIAL SINUSITIS: Primary | ICD-10-CM

## 2019-09-09 DIAGNOSIS — G47.00 INSOMNIA, UNSPECIFIED TYPE: ICD-10-CM

## 2019-09-09 DIAGNOSIS — J01.90 ACUTE BACTERIAL SINUSITIS: Primary | ICD-10-CM

## 2019-09-09 DIAGNOSIS — F41.0 PANIC ATTACK: ICD-10-CM

## 2019-09-09 PROCEDURE — G8427 DOCREV CUR MEDS BY ELIG CLIN: HCPCS | Performed by: NURSE PRACTITIONER

## 2019-09-09 PROCEDURE — 1090F PRES/ABSN URINE INCON ASSESS: CPT | Performed by: NURSE PRACTITIONER

## 2019-09-09 PROCEDURE — G8400 PT W/DXA NO RESULTS DOC: HCPCS | Performed by: NURSE PRACTITIONER

## 2019-09-09 PROCEDURE — 1123F ACP DISCUSS/DSCN MKR DOCD: CPT | Performed by: NURSE PRACTITIONER

## 2019-09-09 PROCEDURE — 96372 THER/PROPH/DIAG INJ SC/IM: CPT | Performed by: NURSE PRACTITIONER

## 2019-09-09 PROCEDURE — G8417 CALC BMI ABV UP PARAM F/U: HCPCS | Performed by: NURSE PRACTITIONER

## 2019-09-09 PROCEDURE — 4040F PNEUMOC VAC/ADMIN/RCVD: CPT | Performed by: NURSE PRACTITIONER

## 2019-09-09 PROCEDURE — 1036F TOBACCO NON-USER: CPT | Performed by: NURSE PRACTITIONER

## 2019-09-09 PROCEDURE — 3017F COLORECTAL CA SCREEN DOC REV: CPT | Performed by: NURSE PRACTITIONER

## 2019-09-09 PROCEDURE — G8598 ASA/ANTIPLAT THER USED: HCPCS | Performed by: NURSE PRACTITIONER

## 2019-09-09 PROCEDURE — 99214 OFFICE O/P EST MOD 30 MIN: CPT | Performed by: NURSE PRACTITIONER

## 2019-09-09 RX ORDER — METHYLPREDNISOLONE 4 MG/1
TABLET ORAL
Qty: 21 TABLET | Refills: 0 | Status: SHIPPED | OUTPATIENT
Start: 2019-09-09 | End: 2019-09-28

## 2019-09-09 RX ORDER — METHYLPREDNISOLONE SODIUM SUCCINATE 125 MG/2ML
125 INJECTION, POWDER, LYOPHILIZED, FOR SOLUTION INTRAMUSCULAR; INTRAVENOUS ONCE
Status: COMPLETED | OUTPATIENT
Start: 2019-09-09 | End: 2019-09-09

## 2019-09-09 RX ORDER — KETOROLAC TROMETHAMINE 30 MG/ML
60 INJECTION, SOLUTION INTRAMUSCULAR; INTRAVENOUS ONCE
Status: COMPLETED | OUTPATIENT
Start: 2019-09-09 | End: 2019-09-09

## 2019-09-09 RX ORDER — AMOXICILLIN 875 MG/1
875 TABLET, COATED ORAL 2 TIMES DAILY
Qty: 20 TABLET | Refills: 0 | Status: SHIPPED | OUTPATIENT
Start: 2019-09-09 | End: 2019-10-21 | Stop reason: SDUPTHER

## 2019-09-09 RX ORDER — CLONAZEPAM 1 MG/1
1 TABLET ORAL NIGHTLY PRN
Qty: 30 TABLET | Refills: 0 | Status: SHIPPED | OUTPATIENT
Start: 2019-09-09 | End: 2020-04-30 | Stop reason: SDUPTHER

## 2019-09-09 RX ORDER — CEFTRIAXONE 1 G/1
1 INJECTION, POWDER, FOR SOLUTION INTRAMUSCULAR; INTRAVENOUS ONCE
Status: COMPLETED | OUTPATIENT
Start: 2019-09-09 | End: 2019-09-09

## 2019-09-09 RX ORDER — METHYLPREDNISOLONE SODIUM SUCCINATE 125 MG/2ML
125 INJECTION, POWDER, LYOPHILIZED, FOR SOLUTION INTRAMUSCULAR; INTRAVENOUS ONCE
Status: DISCONTINUED | OUTPATIENT
Start: 2019-09-09 | End: 2019-10-21

## 2019-09-09 RX ORDER — KETOROLAC TROMETHAMINE 30 MG/ML
30 INJECTION, SOLUTION INTRAMUSCULAR; INTRAVENOUS ONCE
Status: SHIPPED | OUTPATIENT
Start: 2019-09-09 | End: 2019-09-14

## 2019-09-09 RX ADMIN — CEFTRIAXONE 1 G: 1 INJECTION, POWDER, FOR SOLUTION INTRAMUSCULAR; INTRAVENOUS at 14:22

## 2019-09-09 RX ADMIN — METHYLPREDNISOLONE SODIUM SUCCINATE 125 MG: 125 INJECTION, POWDER, LYOPHILIZED, FOR SOLUTION INTRAMUSCULAR; INTRAVENOUS at 14:25

## 2019-09-09 RX ADMIN — KETOROLAC TROMETHAMINE 60 MG: 30 INJECTION, SOLUTION INTRAMUSCULAR; INTRAVENOUS at 14:24

## 2019-09-10 DIAGNOSIS — R92.8 MAMMOGRAM ABNORMAL: Primary | ICD-10-CM

## 2019-09-12 DIAGNOSIS — Z12.39 BREAST CANCER SCREENING: Primary | ICD-10-CM

## 2019-09-13 ASSESSMENT — ENCOUNTER SYMPTOMS
BACK PAIN: 1
COLOR CHANGE: 0
SORE THROAT: 0
SHORTNESS OF BREATH: 0
VOMITING: 0
DIARRHEA: 0
SINUS PAIN: 1
SINUS PRESSURE: 1
NAUSEA: 0
TROUBLE SWALLOWING: 0
COUGH: 1
CHEST TIGHTNESS: 0
ABDOMINAL PAIN: 0
EYE REDNESS: 0
RHINORRHEA: 1
EYE PAIN: 0

## 2019-09-13 NOTE — PROGRESS NOTES
SUBJECTIVE:    Patient ID: Brina Nieves is a 69 y.o. female. Chief Complaint   Patient presents with    Chest Pain     went to ER     Back Pain       Chest Pain    This is a chronic problem. The current episode started more than 1 year ago. The onset quality is undetermined. The problem occurs rarely. The problem has been waxing and waning. The pain is present in the epigastric region. The pain is at a severity of 0/10. The patient is experiencing no pain. Associated symptoms include back pain, a cough and headaches. Pertinent negatives include no abdominal pain, dizziness, fever, nausea, numbness, palpitations, shortness of breath or vomiting. Associated symptoms comments: Anxiety with panic attacks that are situational. 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 She realized that becoming anxious made her have the chest pain. It is unknown what precipitates the cough. The cough is non-productive. There is no color change associated with the cough. Nothing relieves the cough. Nothing worsens the cough. The pain is aggravated by nothing. She has tried antacids and rest for the symptoms. The treatment provided moderate relief. Risk factors include lack of exercise, obesity and stress. Her past medical history is significant for hypertension. Back Pain   This is a chronic problem. The current episode started more than 1 year ago. The problem occurs intermittently. The problem is unchanged. The pain is present in the gluteal and lumbar spine. The quality of the pain is described as aching. The pain does not radiate. The pain is at a severity of 1/10. The pain is mild. The symptoms are aggravated by bending and position. Stiffness is present in the morning. Associated symptoms include chest pain and headaches. Pertinent negatives include no abdominal pain, dysuria, fever or numbness.  Risk factors include history of osteoporosis, poor posture and sedentary imaging of liver 02/26/2019    Syncope due to orthostatic hypotension 02/26/2019    Thoracic back pain 02/26/2019    Weakness present 02/26/2019    B12 deficiency 03/12/2019    Deep vein thrombosis of lower extremity ([facility] [address] [address] 07/21/2019     Resolved Ambulatory Problems     Diagnosis Date Noted    Acute cystitis without hematuria 02/03/2017    Encounter for screening colonoscopy 02/03/2017     Past Medical History:   Diagnosis Date    Headache(784.0)     Hypertension     Hypothyroid 5/20/2015    MI, old     Pneumonia     Stomach ulcer     Thyroid disease       Allergies   Allergen Reactions    Iv Dye [Iodides] Hives, Shortness Of Breath and Other (See Comments)     Pt also passed out      Azithromycin Rash    Codeine Hives    Influenza Vaccines      Patient couldn't remember the reaction att.  Morphine     Mucinex [Guaifenesin Er]      Severe nausea    Reglan [Metoclopramide] Itching     welps      Past Surgical History:   Procedure Laterality Date    APPENDECTOMY      CHOLECYSTECTOMY      LIVER SURGERY      UPPER GASTROINTESTINAL ENDOSCOPY        No family history on file. Patient's medications, allergies, past medical, surgical, social and family histories were reviewed and updated as appropriate.     Current Outpatient Medications on File Prior to Visit   Medication Sig Dispense Refill    levothyroxine (SYNTHROID) 75 MCG tablet Take 1 tablet by mouth Daily 90 tablet 3    ondansetron (ZOFRAN) 4 MG tablet Take 1 tablet by mouth every 8 hours as needed for Nausea or Vomiting 30 tablet 5    omeprazole (PRILOSEC) 20 MG delayed release capsule Take 20 mg by mouth daily      carvedilol (COREG) 3.125 MG tablet Take 1 tablet by mouth 2 times daily      metFORMIN (GLUCOPHAGE-XR) 500 MG extended release tablet Take 1 tablet by mouth daily (with breakfast) 90 tablet 0    lisinopril-hydrochlorothiazide (PRINZIDE;ZESTORETIC) 20-25 MG per tablet Take 1 tablet by mouth 2 times daily 10 tablet 0    01/24/2018 YES     LDL Calculated (mg/dL)   Date Value   06/06/2019 104         Lab Results   Component Value Date    WBC 7.3 06/06/2019    NEUTROABS 4.1 06/06/2019    HGB 13.3 06/06/2019    HCT 41.7 06/06/2019    MCV 97.9 06/06/2019     06/06/2019       Lab Results   Component Value Date    TSH 1.62 02/02/2017          ASSESSMENT/PLAN:     Mary Mejia was seen today for chest pain and back pain. Diagnoses and all orders for this visit:    Acute bacterial sinusitis  -     methylPREDNISolone (MEDROL DOSEPACK) 4 MG tablet; Take by mouth [id number] stop  -     methylPREDNISolone sodium (SOLU-MEDROL) injection 125 mg  -     cefTRIAXone (ROCEPHIN) injection 1 g  -     ketorolac (TORADOL) injection 30 mg  -     amoxicillin (AMOXIL) 875 MG tablet; Take 1 tablet by mouth 2 times daily for 10 days  -     ketorolac (TORADOL) injection 60 mg  -     methylPREDNISolone sodium (SOLU-MEDROL) injection 125 mg    Insomnia, unspecified type  -     clonazePAM (KLONOPIN) 1 MG tablet; Take 1 tablet by mouth nightly as needed (INSOMNIA) for up to 30 days. Panic attack  -     clonazePAM (KLONOPIN) 1 MG tablet; Take 1 tablet by mouth nightly as needed (INSOMNIA) for up to 30 days. Administrations This Visit     cefTRIAXone (ROCEPHIN) injection 1 g     Admin Date  09/09/2019 Action  Given Dose  1 g Route  Intramuscular Administered By  Fransiscaata Berenice          ketorolac (TORADOL) injection 60 mg     Admin Date  09/09/2019 Action  Given Dose  60 mg Route  Intramuscular Administered By  Fransiscaata Berenice          methylPREDNISolone sodium (SOLU-MEDROL) injection 125 mg     Admin Date  09/09/2019 Action  Given Dose  125 mg Route  Intramuscular Administered By  Leata Berenice                Controlled Substances Monitoring:     RX Monitoring 9/9/2019   Attestation The Prescription Monitoring Report for this patient was reviewed today.    Periodic Controlled Substance Monitoring Possible medication side effects, risk of

## 2019-09-28 ENCOUNTER — HOSPITAL ENCOUNTER (EMERGENCY)
Facility: HOSPITAL | Age: 74
Discharge: HOME | End: 2019-09-28
Attending: EMERGENCY MEDICINE
Payer: MEDICARE

## 2019-09-28 ENCOUNTER — APPOINTMENT (OUTPATIENT)
Dept: CT IMAGING | Facility: HOSPITAL | Age: 74
End: 2019-09-28
Payer: MEDICARE

## 2019-09-28 ENCOUNTER — APPOINTMENT (OUTPATIENT)
Dept: GENERAL RADIOLOGY | Facility: HOSPITAL | Age: 74
End: 2019-09-28
Payer: MEDICARE

## 2019-09-28 VITALS
WEIGHT: 178 LBS | OXYGEN SATURATION: 94 % | DIASTOLIC BLOOD PRESSURE: 69 MMHG | SYSTOLIC BLOOD PRESSURE: 137 MMHG | HEIGHT: 63 IN | BODY MASS INDEX: 31.54 KG/M2 | TEMPERATURE: 97.1 F | RESPIRATION RATE: 16 BRPM | HEART RATE: 80 BPM

## 2019-09-28 DIAGNOSIS — S29.019A THORACIC MYOFASCIAL STRAIN, INITIAL ENCOUNTER: Primary | ICD-10-CM

## 2019-09-28 DIAGNOSIS — R07.9 CHEST PAIN, UNSPECIFIED TYPE: ICD-10-CM

## 2019-09-28 DIAGNOSIS — M54.6 ACUTE MIDLINE THORACIC BACK PAIN: ICD-10-CM

## 2019-09-28 LAB
A/G RATIO: 1.6 (ref 0.8–2)
ALBUMIN SERPL-MCNC: 4.3 G/DL (ref 3.4–4.8)
ALP BLD-CCNC: 100 U/L (ref 25–100)
ALT SERPL-CCNC: 18 U/L (ref 4–36)
ANION GAP SERPL CALCULATED.3IONS-SCNC: 15 MMOL/L (ref 3–16)
AST SERPL-CCNC: 16 U/L (ref 8–33)
BASOPHILS ABSOLUTE: 0.1 K/UL (ref 0–0.1)
BASOPHILS RELATIVE PERCENT: 0.7 %
BILIRUB SERPL-MCNC: <0.2 MG/DL (ref 0.3–1.2)
BUN BLDV-MCNC: 21 MG/DL (ref 6–20)
CALCIUM SERPL-MCNC: 10 MG/DL (ref 8.5–10.5)
CHLORIDE BLD-SCNC: 103 MMOL/L (ref 98–107)
CO2: 25 MMOL/L (ref 20–30)
CREAT SERPL-MCNC: 1 MG/DL (ref 0.4–1.2)
EOSINOPHILS ABSOLUTE: 0.2 K/UL (ref 0–0.4)
EOSINOPHILS RELATIVE PERCENT: 2 %
GFR AFRICAN AMERICAN: >59
GFR NON-AFRICAN AMERICAN: 54
GLOBULIN: 2.7 G/DL
GLUCOSE BLD-MCNC: 134 MG/DL (ref 74–106)
HCT VFR BLD CALC: 39.6 % (ref 37–47)
HEMOGLOBIN: 13.3 G/DL (ref 11.5–16.5)
IMMATURE GRANULOCYTES #: 0.1 K/UL
IMMATURE GRANULOCYTES %: 0.5 % (ref 0–5)
LYMPHOCYTES ABSOLUTE: 2.4 K/UL (ref 1.5–4)
LYMPHOCYTES RELATIVE PERCENT: 24.8 %
MCH RBC QN AUTO: 32 PG (ref 27–32)
MCHC RBC AUTO-ENTMCNC: 33.6 G/DL (ref 31–35)
MCV RBC AUTO: 95.2 FL (ref 80–100)
MONOCYTES ABSOLUTE: 0.7 K/UL (ref 0.2–0.8)
MONOCYTES RELATIVE PERCENT: 7.6 %
NEUTROPHILS ABSOLUTE: 6.1 K/UL (ref 2–7.5)
NEUTROPHILS RELATIVE PERCENT: 64.4 %
PDW BLD-RTO: 13.2 % (ref 11–16)
PLATELET # BLD: 263 K/UL (ref 150–400)
PMV BLD AUTO: 9.9 FL (ref 6–10)
POTASSIUM SERPL-SCNC: 3.8 MMOL/L (ref 3.4–5.1)
RBC # BLD: 4.16 M/UL (ref 3.8–5.8)
SODIUM BLD-SCNC: 143 MMOL/L (ref 136–145)
TOTAL PROTEIN: 7 G/DL (ref 6.4–8.3)
TROPONIN: <0.3 NG/ML
WBC # BLD: 9.5 K/UL (ref 4–11)

## 2019-09-28 PROCEDURE — 96375 TX/PRO/DX INJ NEW DRUG ADDON: CPT

## 2019-09-28 PROCEDURE — 36415 COLL VENOUS BLD VENIPUNCTURE: CPT

## 2019-09-28 PROCEDURE — 6360000002 HC RX W HCPCS: Performed by: EMERGENCY MEDICINE

## 2019-09-28 PROCEDURE — 96374 THER/PROPH/DIAG INJ IV PUSH: CPT

## 2019-09-28 PROCEDURE — 70450 CT HEAD/BRAIN W/O DYE: CPT

## 2019-09-28 PROCEDURE — 80053 COMPREHEN METABOLIC PANEL: CPT

## 2019-09-28 PROCEDURE — 6370000000 HC RX 637 (ALT 250 FOR IP): Performed by: EMERGENCY MEDICINE

## 2019-09-28 PROCEDURE — 84484 ASSAY OF TROPONIN QUANT: CPT

## 2019-09-28 PROCEDURE — 85025 COMPLETE CBC W/AUTO DIFF WBC: CPT

## 2019-09-28 PROCEDURE — 99284 EMERGENCY DEPT VISIT MOD MDM: CPT

## 2019-09-28 PROCEDURE — 71045 X-RAY EXAM CHEST 1 VIEW: CPT

## 2019-09-28 RX ORDER — MEPERIDINE HYDROCHLORIDE 25 MG/ML
25 INJECTION INTRAMUSCULAR; INTRAVENOUS; SUBCUTANEOUS ONCE
Status: COMPLETED | OUTPATIENT
Start: 2019-09-28 | End: 2019-09-28

## 2019-09-28 RX ORDER — HYDROCODONE BITARTRATE AND ACETAMINOPHEN 5; 325 MG/1; MG/1
1 TABLET ORAL EVERY 6 HOURS PRN
Qty: 12 TABLET | Refills: 0 | Status: SHIPPED | OUTPATIENT
Start: 2019-09-28 | End: 2019-10-01

## 2019-09-28 RX ORDER — PROMETHAZINE HYDROCHLORIDE 25 MG/ML
25 INJECTION, SOLUTION INTRAMUSCULAR; INTRAVENOUS ONCE
Status: COMPLETED | OUTPATIENT
Start: 2019-09-28 | End: 2019-09-28

## 2019-09-28 RX ORDER — KETOROLAC TROMETHAMINE 30 MG/ML
30 INJECTION, SOLUTION INTRAMUSCULAR; INTRAVENOUS ONCE
Status: COMPLETED | OUTPATIENT
Start: 2019-09-28 | End: 2019-09-28

## 2019-09-28 RX ORDER — DIPHENHYDRAMINE HCL 25 MG
25 CAPSULE ORAL
Status: COMPLETED | OUTPATIENT
Start: 2019-09-28 | End: 2019-09-28

## 2019-09-28 RX ADMIN — KETOROLAC TROMETHAMINE 30 MG: 30 INJECTION, SOLUTION INTRAMUSCULAR; INTRAVENOUS at 20:55

## 2019-09-28 RX ADMIN — DIPHENHYDRAMINE HYDROCHLORIDE 25 MG: 25 CAPSULE ORAL at 20:08

## 2019-09-28 RX ADMIN — PROMETHAZINE HYDROCHLORIDE 25 MG: 25 INJECTION INTRAMUSCULAR; INTRAVENOUS at 19:45

## 2019-09-28 RX ADMIN — MEPERIDINE HYDROCHLORIDE 25 MG: 25 INJECTION INTRAMUSCULAR; INTRAVENOUS; SUBCUTANEOUS at 19:48

## 2019-09-28 ASSESSMENT — ENCOUNTER SYMPTOMS
EYE REDNESS: 0
EYE PAIN: 0
SINUS PRESSURE: 0
CONSTIPATION: 0
COUGH: 0
BACK PAIN: 1
RHINORRHEA: 0
CHEST TIGHTNESS: 0
WHEEZING: 0
SORE THROAT: 0
TROUBLE SWALLOWING: 0
NAUSEA: 0
ABDOMINAL PAIN: 0
EYE DISCHARGE: 0
SHORTNESS OF BREATH: 0
DIARRHEA: 0
VOMITING: 0

## 2019-09-28 ASSESSMENT — PAIN SCALES - GENERAL
PAINLEVEL_OUTOF10: 10
PAINLEVEL_OUTOF10: 10
PAINLEVEL_OUTOF10: 8

## 2019-10-01 ENCOUNTER — OFFICE VISIT (OUTPATIENT)
Dept: FAMILY MEDICINE CLINIC | Age: 74
End: 2019-10-01
Payer: MEDICARE

## 2019-10-01 VITALS
SYSTOLIC BLOOD PRESSURE: 146 MMHG | DIASTOLIC BLOOD PRESSURE: 76 MMHG | OXYGEN SATURATION: 98 % | RESPIRATION RATE: 18 BRPM | HEART RATE: 78 BPM

## 2019-10-01 DIAGNOSIS — M54.41 CHRONIC MIDLINE LOW BACK PAIN WITH BILATERAL SCIATICA: Primary | ICD-10-CM

## 2019-10-01 DIAGNOSIS — G89.29 CHRONIC MIDLINE LOW BACK PAIN WITH BILATERAL SCIATICA: Primary | ICD-10-CM

## 2019-10-01 DIAGNOSIS — I10 ESSENTIAL HYPERTENSION: ICD-10-CM

## 2019-10-01 DIAGNOSIS — M54.42 CHRONIC MIDLINE LOW BACK PAIN WITH BILATERAL SCIATICA: Primary | ICD-10-CM

## 2019-10-01 PROCEDURE — 4040F PNEUMOC VAC/ADMIN/RCVD: CPT | Performed by: NURSE PRACTITIONER

## 2019-10-01 PROCEDURE — 3017F COLORECTAL CA SCREEN DOC REV: CPT | Performed by: NURSE PRACTITIONER

## 2019-10-01 PROCEDURE — 1123F ACP DISCUSS/DSCN MKR DOCD: CPT | Performed by: NURSE PRACTITIONER

## 2019-10-01 PROCEDURE — 1036F TOBACCO NON-USER: CPT | Performed by: NURSE PRACTITIONER

## 2019-10-01 PROCEDURE — G8417 CALC BMI ABV UP PARAM F/U: HCPCS | Performed by: NURSE PRACTITIONER

## 2019-10-01 PROCEDURE — 1090F PRES/ABSN URINE INCON ASSESS: CPT | Performed by: NURSE PRACTITIONER

## 2019-10-01 PROCEDURE — G8400 PT W/DXA NO RESULTS DOC: HCPCS | Performed by: NURSE PRACTITIONER

## 2019-10-01 PROCEDURE — G8484 FLU IMMUNIZE NO ADMIN: HCPCS | Performed by: NURSE PRACTITIONER

## 2019-10-01 PROCEDURE — 96372 THER/PROPH/DIAG INJ SC/IM: CPT | Performed by: NURSE PRACTITIONER

## 2019-10-01 PROCEDURE — G8427 DOCREV CUR MEDS BY ELIG CLIN: HCPCS | Performed by: NURSE PRACTITIONER

## 2019-10-01 PROCEDURE — G8598 ASA/ANTIPLAT THER USED: HCPCS | Performed by: NURSE PRACTITIONER

## 2019-10-01 PROCEDURE — 99213 OFFICE O/P EST LOW 20 MIN: CPT | Performed by: NURSE PRACTITIONER

## 2019-10-01 RX ORDER — AMLODIPINE BESYLATE 5 MG/1
5 TABLET ORAL DAILY
COMMUNITY
End: 2019-11-14

## 2019-10-01 RX ORDER — METHYLPREDNISOLONE SODIUM SUCCINATE 125 MG/2ML
125 INJECTION, POWDER, LYOPHILIZED, FOR SOLUTION INTRAMUSCULAR; INTRAVENOUS DAILY
Status: SHIPPED | OUTPATIENT
Start: 2019-10-01 | End: 2019-10-03

## 2019-10-01 RX ORDER — LISINOPRIL AND HYDROCHLOROTHIAZIDE 25; 20 MG/1; MG/1
1 TABLET ORAL DAILY
Qty: 30 TABLET | Refills: 2 | Status: SHIPPED | OUTPATIENT
Start: 2019-10-01 | End: 2020-10-06 | Stop reason: SDUPTHER

## 2019-10-01 RX ORDER — KETOROLAC TROMETHAMINE 30 MG/ML
30 INJECTION, SOLUTION INTRAMUSCULAR; INTRAVENOUS DAILY
Status: COMPLETED | OUTPATIENT
Start: 2019-10-01 | End: 2019-10-02

## 2019-10-01 RX ADMIN — KETOROLAC TROMETHAMINE 30 MG: 30 INJECTION, SOLUTION INTRAMUSCULAR; INTRAVENOUS at 13:34

## 2019-10-01 RX ADMIN — METHYLPREDNISOLONE SODIUM SUCCINATE 125 MG: 125 INJECTION, POWDER, LYOPHILIZED, FOR SOLUTION INTRAMUSCULAR; INTRAVENOUS at 13:35

## 2019-10-02 ENCOUNTER — OFFICE VISIT (OUTPATIENT)
Dept: FAMILY MEDICINE CLINIC | Age: 74
End: 2019-10-02
Payer: MEDICARE

## 2019-10-02 VITALS
SYSTOLIC BLOOD PRESSURE: 130 MMHG | OXYGEN SATURATION: 98 % | DIASTOLIC BLOOD PRESSURE: 78 MMHG | RESPIRATION RATE: 18 BRPM | BODY MASS INDEX: 31.53 KG/M2 | HEART RATE: 82 BPM | HEIGHT: 63 IN

## 2019-10-02 DIAGNOSIS — E53.8 B12 DEFICIENCY: ICD-10-CM

## 2019-10-02 DIAGNOSIS — G89.29 CHRONIC BILATERAL LOW BACK PAIN, UNSPECIFIED WHETHER SCIATICA PRESENT: Primary | ICD-10-CM

## 2019-10-02 DIAGNOSIS — M54.50 CHRONIC BILATERAL LOW BACK PAIN, UNSPECIFIED WHETHER SCIATICA PRESENT: Primary | ICD-10-CM

## 2019-10-02 PROCEDURE — 99213 OFFICE O/P EST LOW 20 MIN: CPT | Performed by: NURSE PRACTITIONER

## 2019-10-02 PROCEDURE — 96372 THER/PROPH/DIAG INJ SC/IM: CPT | Performed by: NURSE PRACTITIONER

## 2019-10-02 RX ORDER — KETOROLAC TROMETHAMINE 30 MG/ML
60 INJECTION, SOLUTION INTRAMUSCULAR; INTRAVENOUS ONCE
Status: CANCELLED | OUTPATIENT
Start: 2019-10-02 | End: 2019-10-02

## 2019-10-02 RX ORDER — CYANOCOBALAMIN 1000 UG/ML
1000 INJECTION INTRAMUSCULAR; SUBCUTANEOUS ONCE
Status: CANCELLED | OUTPATIENT
Start: 2019-10-02 | End: 2019-10-02

## 2019-10-02 RX ORDER — CYANOCOBALAMIN 1000 UG/ML
1000 INJECTION INTRAMUSCULAR; SUBCUTANEOUS ONCE
Status: COMPLETED | OUTPATIENT
Start: 2019-10-02 | End: 2019-10-02

## 2019-10-02 RX ADMIN — CYANOCOBALAMIN 1000 MCG: 1000 INJECTION INTRAMUSCULAR; SUBCUTANEOUS at 16:43

## 2019-10-02 RX ADMIN — KETOROLAC TROMETHAMINE 30 MG: 30 INJECTION, SOLUTION INTRAMUSCULAR; INTRAVENOUS at 16:40

## 2019-10-02 ASSESSMENT — ENCOUNTER SYMPTOMS
EYES NEGATIVE: 1
GASTROINTESTINAL NEGATIVE: 1
RESPIRATORY NEGATIVE: 1
BACK PAIN: 1
ALLERGIC/IMMUNOLOGIC NEGATIVE: 1

## 2019-10-03 ASSESSMENT — ENCOUNTER SYMPTOMS
EYE PAIN: 0
TROUBLE SWALLOWING: 0
EYE REDNESS: 0
SHORTNESS OF BREATH: 0
COLOR CHANGE: 0
CHEST TIGHTNESS: 0
VOMITING: 0
DIARRHEA: 0
ABDOMINAL PAIN: 0
SORE THROAT: 0
BACK PAIN: 1
COUGH: 0
NAUSEA: 0

## 2019-10-18 DIAGNOSIS — R73.09 ELEVATED GLUCOSE LEVEL: ICD-10-CM

## 2019-10-18 RX ORDER — METFORMIN HYDROCHLORIDE 500 MG/1
TABLET, EXTENDED RELEASE ORAL
Qty: 90 TABLET | Refills: 0 | Status: SHIPPED | OUTPATIENT
Start: 2019-10-18 | End: 2019-11-14

## 2019-10-21 ENCOUNTER — OFFICE VISIT (OUTPATIENT)
Dept: FAMILY MEDICINE CLINIC | Age: 74
End: 2019-10-21
Payer: MEDICARE

## 2019-10-21 VITALS
DIASTOLIC BLOOD PRESSURE: 80 MMHG | RESPIRATION RATE: 18 BRPM | HEART RATE: 72 BPM | OXYGEN SATURATION: 98 % | SYSTOLIC BLOOD PRESSURE: 150 MMHG

## 2019-10-21 DIAGNOSIS — J01.90 ACUTE BACTERIAL SINUSITIS: ICD-10-CM

## 2019-10-21 DIAGNOSIS — K59.00 CONSTIPATION, UNSPECIFIED CONSTIPATION TYPE: Primary | ICD-10-CM

## 2019-10-21 DIAGNOSIS — G89.29 CHRONIC MIDLINE LOW BACK PAIN WITH BILATERAL SCIATICA: ICD-10-CM

## 2019-10-21 DIAGNOSIS — B96.89 ACUTE BACTERIAL SINUSITIS: ICD-10-CM

## 2019-10-21 DIAGNOSIS — M54.42 CHRONIC MIDLINE LOW BACK PAIN WITH BILATERAL SCIATICA: ICD-10-CM

## 2019-10-21 DIAGNOSIS — M54.41 CHRONIC MIDLINE LOW BACK PAIN WITH BILATERAL SCIATICA: ICD-10-CM

## 2019-10-21 PROCEDURE — 96372 THER/PROPH/DIAG INJ SC/IM: CPT | Performed by: NURSE PRACTITIONER

## 2019-10-21 PROCEDURE — 99213 OFFICE O/P EST LOW 20 MIN: CPT | Performed by: NURSE PRACTITIONER

## 2019-10-21 RX ORDER — METHYLPREDNISOLONE SODIUM SUCCINATE 125 MG/2ML
80 INJECTION, POWDER, LYOPHILIZED, FOR SOLUTION INTRAMUSCULAR; INTRAVENOUS ONCE
Status: COMPLETED | OUTPATIENT
Start: 2019-10-21 | End: 2019-10-21

## 2019-10-21 RX ORDER — KETOROLAC TROMETHAMINE 30 MG/ML
30 INJECTION, SOLUTION INTRAMUSCULAR; INTRAVENOUS ONCE
Status: COMPLETED | OUTPATIENT
Start: 2019-10-21 | End: 2019-10-21

## 2019-10-21 RX ORDER — AMOXICILLIN 875 MG/1
875 TABLET, COATED ORAL 2 TIMES DAILY
Qty: 14 TABLET | Refills: 0 | Status: SHIPPED | OUTPATIENT
Start: 2019-10-21 | End: 2019-12-11 | Stop reason: SDUPTHER

## 2019-10-21 RX ORDER — AMOXICILLIN 250 MG
2 CAPSULE ORAL DAILY PRN
Qty: 16 TABLET | Refills: 0 | Status: SHIPPED | OUTPATIENT
Start: 2019-10-21 | End: 2020-09-16 | Stop reason: ALTCHOICE

## 2019-10-21 RX ADMIN — METHYLPREDNISOLONE SODIUM SUCCINATE 80 MG: 125 INJECTION, POWDER, LYOPHILIZED, FOR SOLUTION INTRAMUSCULAR; INTRAVENOUS at 15:01

## 2019-10-21 RX ADMIN — KETOROLAC TROMETHAMINE 30 MG: 30 INJECTION, SOLUTION INTRAMUSCULAR; INTRAVENOUS at 15:00

## 2019-10-21 ASSESSMENT — ENCOUNTER SYMPTOMS
EYES NEGATIVE: 1
ANAL BLEEDING: 0
COUGH: 1
BLOOD IN STOOL: 0
SINUS PRESSURE: 1
DIARRHEA: 1
HOARSE VOICE: 1
SORE THROAT: 1
VOMITING: 1
SHORTNESS OF BREATH: 1
ABDOMINAL DISTENTION: 0
SINUS PAIN: 1
CONSTIPATION: 1
SWOLLEN GLANDS: 0
RECTAL PAIN: 0
NAUSEA: 1
RHINORRHEA: 1
ABDOMINAL PAIN: 0
BACK PAIN: 1

## 2019-10-22 ENCOUNTER — OFFICE VISIT (OUTPATIENT)
Dept: FAMILY MEDICINE CLINIC | Age: 74
End: 2019-10-22
Payer: MEDICARE

## 2019-10-22 VITALS
OXYGEN SATURATION: 97 % | DIASTOLIC BLOOD PRESSURE: 72 MMHG | SYSTOLIC BLOOD PRESSURE: 132 MMHG | HEART RATE: 75 BPM | RESPIRATION RATE: 18 BRPM

## 2019-10-22 DIAGNOSIS — I82.4Z2 DEEP VEIN THROMBOSIS (DVT) OF DISTAL VEIN OF LEFT LOWER EXTREMITY, UNSPECIFIED CHRONICITY (HCC): ICD-10-CM

## 2019-10-22 DIAGNOSIS — M54.42 CHRONIC MIDLINE LOW BACK PAIN WITH BILATERAL SCIATICA: ICD-10-CM

## 2019-10-22 DIAGNOSIS — G89.29 CHRONIC MIDLINE LOW BACK PAIN WITH BILATERAL SCIATICA: ICD-10-CM

## 2019-10-22 DIAGNOSIS — Z23 NEED FOR STREPTOCOCCUS PNEUMONIAE VACCINATION: Primary | ICD-10-CM

## 2019-10-22 DIAGNOSIS — M54.41 CHRONIC MIDLINE LOW BACK PAIN WITH BILATERAL SCIATICA: ICD-10-CM

## 2019-10-22 PROCEDURE — 90732 PPSV23 VACC 2 YRS+ SUBQ/IM: CPT | Performed by: NURSE PRACTITIONER

## 2019-10-22 PROCEDURE — 96372 THER/PROPH/DIAG INJ SC/IM: CPT | Performed by: NURSE PRACTITIONER

## 2019-10-22 PROCEDURE — G8598 ASA/ANTIPLAT THER USED: HCPCS | Performed by: NURSE PRACTITIONER

## 2019-10-22 PROCEDURE — G8427 DOCREV CUR MEDS BY ELIG CLIN: HCPCS | Performed by: NURSE PRACTITIONER

## 2019-10-22 PROCEDURE — 4040F PNEUMOC VAC/ADMIN/RCVD: CPT | Performed by: NURSE PRACTITIONER

## 2019-10-22 PROCEDURE — G8400 PT W/DXA NO RESULTS DOC: HCPCS | Performed by: NURSE PRACTITIONER

## 2019-10-22 PROCEDURE — 1123F ACP DISCUSS/DSCN MKR DOCD: CPT | Performed by: NURSE PRACTITIONER

## 2019-10-22 PROCEDURE — G8484 FLU IMMUNIZE NO ADMIN: HCPCS | Performed by: NURSE PRACTITIONER

## 2019-10-22 PROCEDURE — 3017F COLORECTAL CA SCREEN DOC REV: CPT | Performed by: NURSE PRACTITIONER

## 2019-10-22 PROCEDURE — 1090F PRES/ABSN URINE INCON ASSESS: CPT | Performed by: NURSE PRACTITIONER

## 2019-10-22 PROCEDURE — G8417 CALC BMI ABV UP PARAM F/U: HCPCS | Performed by: NURSE PRACTITIONER

## 2019-10-22 PROCEDURE — G0009 ADMIN PNEUMOCOCCAL VACCINE: HCPCS | Performed by: NURSE PRACTITIONER

## 2019-10-22 PROCEDURE — 1036F TOBACCO NON-USER: CPT | Performed by: NURSE PRACTITIONER

## 2019-10-22 PROCEDURE — 99213 OFFICE O/P EST LOW 20 MIN: CPT | Performed by: NURSE PRACTITIONER

## 2019-10-22 RX ORDER — KETOROLAC TROMETHAMINE 30 MG/ML
15 INJECTION, SOLUTION INTRAMUSCULAR; INTRAVENOUS ONCE
Status: COMPLETED | OUTPATIENT
Start: 2019-10-22 | End: 2019-10-22

## 2019-10-22 RX ADMIN — KETOROLAC TROMETHAMINE 15 MG: 30 INJECTION, SOLUTION INTRAMUSCULAR; INTRAVENOUS at 13:56

## 2019-10-22 ASSESSMENT — ENCOUNTER SYMPTOMS
SORE THROAT: 0
EYE REDNESS: 0
EYE PAIN: 0
DIARRHEA: 0
NAUSEA: 1
VOMITING: 0
CHEST TIGHTNESS: 0
SHORTNESS OF BREATH: 0
TROUBLE SWALLOWING: 0
COLOR CHANGE: 0
ABDOMINAL PAIN: 0

## 2019-10-23 ENCOUNTER — HOSPITAL ENCOUNTER (OUTPATIENT)
Dept: MAMMOGRAPHY | Facility: HOSPITAL | Age: 74
Discharge: HOME | End: 2019-10-23
Payer: MEDICARE

## 2019-10-23 ENCOUNTER — TELEPHONE (OUTPATIENT)
Dept: FAMILY MEDICINE CLINIC | Age: 74
End: 2019-10-23

## 2019-10-23 DIAGNOSIS — Z12.39 BREAST CANCER SCREENING: ICD-10-CM

## 2019-10-23 PROCEDURE — 77067 SCR MAMMO BI INCL CAD: CPT

## 2019-10-24 ENCOUNTER — HOSPITAL ENCOUNTER (OUTPATIENT)
Dept: ULTRASOUND IMAGING | Facility: HOSPITAL | Age: 74
Discharge: HOME | End: 2019-10-24
Payer: MEDICARE

## 2019-10-24 ENCOUNTER — HOSPITAL ENCOUNTER (OUTPATIENT)
Facility: HOSPITAL | Age: 74
Discharge: HOME | End: 2019-10-24
Payer: MEDICARE

## 2019-10-24 DIAGNOSIS — M79.89 PAIN AND SWELLING OF LOWER LEG, RIGHT: Primary | ICD-10-CM

## 2019-10-24 DIAGNOSIS — M79.604 RIGHT LEG PAIN: ICD-10-CM

## 2019-10-24 DIAGNOSIS — Z86.718 HX OF BLOOD CLOTS: ICD-10-CM

## 2019-10-24 DIAGNOSIS — M79.89 PAIN AND SWELLING OF LOWER LEG, RIGHT: ICD-10-CM

## 2019-10-24 DIAGNOSIS — M79.661 PAIN AND SWELLING OF LOWER LEG, RIGHT: Primary | ICD-10-CM

## 2019-10-24 DIAGNOSIS — M79.661 PAIN AND SWELLING OF LOWER LEG, RIGHT: ICD-10-CM

## 2019-10-24 PROCEDURE — 93970 EXTREMITY STUDY: CPT

## 2019-10-29 ASSESSMENT — ENCOUNTER SYMPTOMS
VISUAL CHANGE: 0
CHANGE IN BOWEL HABIT: 0
HOARSE VOICE: 0
COUGH: 1
SWOLLEN GLANDS: 0
SINUS PRESSURE: 1
BACK PAIN: 1

## 2019-10-31 ENCOUNTER — OFFICE VISIT (OUTPATIENT)
Dept: FAMILY MEDICINE CLINIC | Age: 74
End: 2019-10-31
Payer: MEDICARE

## 2019-10-31 VITALS
RESPIRATION RATE: 18 BRPM | WEIGHT: 183 LBS | HEIGHT: 63 IN | DIASTOLIC BLOOD PRESSURE: 80 MMHG | OXYGEN SATURATION: 98 % | SYSTOLIC BLOOD PRESSURE: 138 MMHG | HEART RATE: 85 BPM | BODY MASS INDEX: 32.43 KG/M2

## 2019-10-31 DIAGNOSIS — M54.41 CHRONIC MIDLINE LOW BACK PAIN WITH BILATERAL SCIATICA: ICD-10-CM

## 2019-10-31 DIAGNOSIS — M54.42 CHRONIC MIDLINE LOW BACK PAIN WITH BILATERAL SCIATICA: ICD-10-CM

## 2019-10-31 DIAGNOSIS — F41.0 PANIC ATTACK: ICD-10-CM

## 2019-10-31 DIAGNOSIS — G47.00 INSOMNIA, UNSPECIFIED TYPE: ICD-10-CM

## 2019-10-31 DIAGNOSIS — G89.29 CHRONIC MIDLINE LOW BACK PAIN WITH BILATERAL SCIATICA: ICD-10-CM

## 2019-10-31 DIAGNOSIS — K59.00 CONSTIPATION, UNSPECIFIED CONSTIPATION TYPE: ICD-10-CM

## 2019-10-31 DIAGNOSIS — R73.09 ELEVATED GLUCOSE LEVEL: ICD-10-CM

## 2019-10-31 PROCEDURE — 3017F COLORECTAL CA SCREEN DOC REV: CPT | Performed by: NURSE PRACTITIONER

## 2019-10-31 PROCEDURE — 1123F ACP DISCUSS/DSCN MKR DOCD: CPT | Performed by: NURSE PRACTITIONER

## 2019-10-31 PROCEDURE — G8417 CALC BMI ABV UP PARAM F/U: HCPCS | Performed by: NURSE PRACTITIONER

## 2019-10-31 PROCEDURE — G8598 ASA/ANTIPLAT THER USED: HCPCS | Performed by: NURSE PRACTITIONER

## 2019-10-31 PROCEDURE — 4040F PNEUMOC VAC/ADMIN/RCVD: CPT | Performed by: NURSE PRACTITIONER

## 2019-10-31 PROCEDURE — G8427 DOCREV CUR MEDS BY ELIG CLIN: HCPCS | Performed by: NURSE PRACTITIONER

## 2019-10-31 PROCEDURE — 1090F PRES/ABSN URINE INCON ASSESS: CPT | Performed by: NURSE PRACTITIONER

## 2019-10-31 PROCEDURE — 1036F TOBACCO NON-USER: CPT | Performed by: NURSE PRACTITIONER

## 2019-10-31 PROCEDURE — G8484 FLU IMMUNIZE NO ADMIN: HCPCS | Performed by: NURSE PRACTITIONER

## 2019-10-31 PROCEDURE — 99214 OFFICE O/P EST MOD 30 MIN: CPT | Performed by: NURSE PRACTITIONER

## 2019-10-31 PROCEDURE — G8400 PT W/DXA NO RESULTS DOC: HCPCS | Performed by: NURSE PRACTITIONER

## 2019-10-31 RX ORDER — CLONAZEPAM 1 MG/1
1 TABLET ORAL NIGHTLY PRN
Qty: 30 TABLET | Refills: 0 | Status: CANCELLED | OUTPATIENT
Start: 2019-10-31 | End: 2019-12-22

## 2019-10-31 RX ORDER — AMOXICILLIN 250 MG
2 CAPSULE ORAL DAILY PRN
Qty: 16 TABLET | Refills: 0 | Status: CANCELLED | OUTPATIENT
Start: 2019-10-31

## 2019-10-31 RX ORDER — METFORMIN HYDROCHLORIDE 500 MG/1
TABLET, EXTENDED RELEASE ORAL
Qty: 90 TABLET | Refills: 0 | Status: CANCELLED | OUTPATIENT
Start: 2019-10-31

## 2019-10-31 RX ORDER — GABAPENTIN 100 MG/1
100 CAPSULE ORAL 2 TIMES DAILY
Qty: 60 CAPSULE | Refills: 1 | Status: SHIPPED | OUTPATIENT
Start: 2019-10-31 | End: 2020-01-08 | Stop reason: SDUPTHER

## 2019-11-07 ASSESSMENT — ENCOUNTER SYMPTOMS
ABDOMINAL PAIN: 0
NAUSEA: 0
DIARRHEA: 0
CHEST TIGHTNESS: 0
TROUBLE SWALLOWING: 0
SHORTNESS OF BREATH: 0
EYE PAIN: 0
BACK PAIN: 0
COUGH: 0
SORE THROAT: 0
VOMITING: 0
EYE REDNESS: 0
COLOR CHANGE: 0

## 2019-11-13 ENCOUNTER — TELEPHONE (OUTPATIENT)
Dept: FAMILY MEDICINE CLINIC | Age: 74
End: 2019-11-13

## 2019-11-14 ENCOUNTER — APPOINTMENT (OUTPATIENT)
Dept: GENERAL RADIOLOGY | Facility: HOSPITAL | Age: 74
End: 2019-11-14
Payer: MEDICARE

## 2019-11-14 ENCOUNTER — HOSPITAL ENCOUNTER (OUTPATIENT)
Dept: ULTRASOUND IMAGING | Facility: HOSPITAL | Age: 74
Discharge: HOME | End: 2019-11-14
Payer: MEDICARE

## 2019-11-14 ENCOUNTER — HOSPITAL ENCOUNTER (EMERGENCY)
Facility: HOSPITAL | Age: 74
Discharge: HOME | End: 2019-11-14
Attending: EMERGENCY MEDICINE
Payer: MEDICARE

## 2019-11-14 VITALS
DIASTOLIC BLOOD PRESSURE: 83 MMHG | HEART RATE: 76 BPM | BODY MASS INDEX: 31.54 KG/M2 | HEIGHT: 63 IN | OXYGEN SATURATION: 98 % | RESPIRATION RATE: 16 BRPM | TEMPERATURE: 98.4 F | WEIGHT: 178 LBS | SYSTOLIC BLOOD PRESSURE: 119 MMHG

## 2019-11-14 DIAGNOSIS — R11.2 NAUSEA VOMITING AND DIARRHEA: ICD-10-CM

## 2019-11-14 DIAGNOSIS — R19.7 NAUSEA VOMITING AND DIARRHEA: ICD-10-CM

## 2019-11-14 DIAGNOSIS — R07.9 CHEST PAIN, UNSPECIFIED TYPE: Primary | ICD-10-CM

## 2019-11-14 LAB
A/G RATIO: 1.6 (ref 0.8–2)
ALBUMIN SERPL-MCNC: 4.2 G/DL (ref 3.4–4.8)
ALP BLD-CCNC: 83 U/L (ref 25–100)
ALT SERPL-CCNC: 16 U/L (ref 4–36)
ANION GAP SERPL CALCULATED.3IONS-SCNC: 11 MMOL/L (ref 3–16)
AST SERPL-CCNC: 19 U/L (ref 8–33)
BASOPHILS ABSOLUTE: 0 K/UL (ref 0–0.1)
BASOPHILS RELATIVE PERCENT: 0.6 %
BILIRUB SERPL-MCNC: 0.3 MG/DL (ref 0.3–1.2)
BUN BLDV-MCNC: 13 MG/DL (ref 6–20)
CALCIUM SERPL-MCNC: 9.3 MG/DL (ref 8.5–10.5)
CHLORIDE BLD-SCNC: 102 MMOL/L (ref 98–107)
CO2: 27 MMOL/L (ref 20–30)
CREAT SERPL-MCNC: 0.8 MG/DL (ref 0.4–1.2)
EOSINOPHILS ABSOLUTE: 0.2 K/UL (ref 0–0.4)
EOSINOPHILS RELATIVE PERCENT: 2.6 %
GFR AFRICAN AMERICAN: >59
GFR NON-AFRICAN AMERICAN: >60
GLOBULIN: 2.7 G/DL
GLUCOSE BLD-MCNC: 102 MG/DL (ref 74–106)
HCT VFR BLD CALC: 38.1 % (ref 37–47)
HEMOGLOBIN: 12.5 G/DL (ref 11.5–16.5)
IMMATURE GRANULOCYTES #: 0 K/UL
IMMATURE GRANULOCYTES %: 0.3 % (ref 0–5)
LYMPHOCYTES ABSOLUTE: 1.6 K/UL (ref 1.5–4)
LYMPHOCYTES RELATIVE PERCENT: 24.5 %
MCH RBC QN AUTO: 31.8 PG (ref 27–32)
MCHC RBC AUTO-ENTMCNC: 32.8 G/DL (ref 31–35)
MCV RBC AUTO: 96.9 FL (ref 80–100)
MONOCYTES ABSOLUTE: 0.5 K/UL (ref 0.2–0.8)
MONOCYTES RELATIVE PERCENT: 7.4 %
NEUTROPHILS ABSOLUTE: 4.3 K/UL (ref 2–7.5)
NEUTROPHILS RELATIVE PERCENT: 64.6 %
PDW BLD-RTO: 12.5 % (ref 11–16)
PLATELET # BLD: 233 K/UL (ref 150–400)
PMV BLD AUTO: 9.8 FL (ref 6–10)
POTASSIUM SERPL-SCNC: 4.1 MMOL/L (ref 3.4–5.1)
RBC # BLD: 3.93 M/UL (ref 3.8–5.8)
SODIUM BLD-SCNC: 140 MMOL/L (ref 136–145)
TOTAL PROTEIN: 6.9 G/DL (ref 6.4–8.3)
TROPONIN: <0.3 NG/ML
TROPONIN: <0.3 NG/ML
WBC # BLD: 6.6 K/UL (ref 4–11)

## 2019-11-14 PROCEDURE — 99285 EMERGENCY DEPT VISIT HI MDM: CPT

## 2019-11-14 PROCEDURE — 96374 THER/PROPH/DIAG INJ IV PUSH: CPT

## 2019-11-14 PROCEDURE — 36415 COLL VENOUS BLD VENIPUNCTURE: CPT

## 2019-11-14 PROCEDURE — 2580000003 HC RX 258: Performed by: EMERGENCY MEDICINE

## 2019-11-14 PROCEDURE — 71045 X-RAY EXAM CHEST 1 VIEW: CPT

## 2019-11-14 PROCEDURE — 96361 HYDRATE IV INFUSION ADD-ON: CPT

## 2019-11-14 PROCEDURE — 93005 ELECTROCARDIOGRAM TRACING: CPT

## 2019-11-14 PROCEDURE — 6360000002 HC RX W HCPCS: Performed by: EMERGENCY MEDICINE

## 2019-11-14 PROCEDURE — 85025 COMPLETE CBC W/AUTO DIFF WBC: CPT

## 2019-11-14 PROCEDURE — 84484 ASSAY OF TROPONIN QUANT: CPT

## 2019-11-14 PROCEDURE — 80053 COMPREHEN METABOLIC PANEL: CPT

## 2019-11-14 RX ORDER — 0.9 % SODIUM CHLORIDE 0.9 %
1000 INTRAVENOUS SOLUTION INTRAVENOUS ONCE
Status: COMPLETED | OUTPATIENT
Start: 2019-11-14 | End: 2019-11-14

## 2019-11-14 RX ORDER — ONDANSETRON 2 MG/ML
4 INJECTION INTRAMUSCULAR; INTRAVENOUS ONCE
Status: COMPLETED | OUTPATIENT
Start: 2019-11-14 | End: 2019-11-14

## 2019-11-14 RX ADMIN — ONDANSETRON 4 MG: 2 INJECTION INTRAMUSCULAR; INTRAVENOUS at 16:06

## 2019-11-14 RX ADMIN — SODIUM CHLORIDE 1000 ML: 9 INJECTION, SOLUTION INTRAVENOUS at 16:06

## 2019-11-14 ASSESSMENT — PAIN DESCRIPTION - ORIENTATION: ORIENTATION: MID

## 2019-11-14 ASSESSMENT — PAIN SCALES - GENERAL: PAINLEVEL_OUTOF10: 8

## 2019-11-14 ASSESSMENT — PAIN DESCRIPTION - PAIN TYPE: TYPE: ACUTE PAIN

## 2019-11-14 ASSESSMENT — PAIN DESCRIPTION - DESCRIPTORS: DESCRIPTORS: SHARP

## 2019-11-14 ASSESSMENT — PAIN DESCRIPTION - FREQUENCY: FREQUENCY: INTERMITTENT

## 2019-11-14 ASSESSMENT — PAIN DESCRIPTION - LOCATION: LOCATION: CHEST

## 2019-11-14 ASSESSMENT — HEART SCORE: ECG: 0

## 2019-11-21 ENCOUNTER — HOSPITAL ENCOUNTER (OUTPATIENT)
Dept: ULTRASOUND IMAGING | Facility: HOSPITAL | Age: 74
Discharge: HOME | End: 2019-11-21
Payer: MEDICARE

## 2019-11-21 DIAGNOSIS — N63.20 LEFT BREAST MASS: ICD-10-CM

## 2019-11-21 DIAGNOSIS — N63.10 BREAST MASS, RIGHT: ICD-10-CM

## 2019-11-21 PROCEDURE — 76642 ULTRASOUND BREAST LIMITED: CPT

## 2019-12-04 ENCOUNTER — OFFICE VISIT (OUTPATIENT)
Dept: FAMILY MEDICINE CLINIC | Age: 74
End: 2019-12-04
Payer: MEDICARE

## 2019-12-04 VITALS
RESPIRATION RATE: 18 BRPM | HEIGHT: 63 IN | SYSTOLIC BLOOD PRESSURE: 171 MMHG | WEIGHT: 176 LBS | BODY MASS INDEX: 31.18 KG/M2 | DIASTOLIC BLOOD PRESSURE: 97 MMHG | OXYGEN SATURATION: 96 % | HEART RATE: 73 BPM

## 2019-12-04 DIAGNOSIS — G47.00 INSOMNIA, UNSPECIFIED TYPE: ICD-10-CM

## 2019-12-04 DIAGNOSIS — R06.00 DYSPNEA, PAROXYSMAL NOCTURNAL: Primary | ICD-10-CM

## 2019-12-04 DIAGNOSIS — K21.9 GASTROESOPHAGEAL REFLUX DISEASE, ESOPHAGITIS PRESENCE NOT SPECIFIED: ICD-10-CM

## 2019-12-04 DIAGNOSIS — F41.0 PANIC ATTACK: ICD-10-CM

## 2019-12-04 DIAGNOSIS — K59.00 CONSTIPATION, UNSPECIFIED CONSTIPATION TYPE: ICD-10-CM

## 2019-12-04 DIAGNOSIS — J01.11 ACUTE RECURRENT FRONTAL SINUSITIS: ICD-10-CM

## 2019-12-04 DIAGNOSIS — I10 ESSENTIAL HYPERTENSION: ICD-10-CM

## 2019-12-04 PROCEDURE — G8400 PT W/DXA NO RESULTS DOC: HCPCS | Performed by: NURSE PRACTITIONER

## 2019-12-04 PROCEDURE — 1090F PRES/ABSN URINE INCON ASSESS: CPT | Performed by: NURSE PRACTITIONER

## 2019-12-04 PROCEDURE — 1123F ACP DISCUSS/DSCN MKR DOCD: CPT | Performed by: NURSE PRACTITIONER

## 2019-12-04 PROCEDURE — G8417 CALC BMI ABV UP PARAM F/U: HCPCS | Performed by: NURSE PRACTITIONER

## 2019-12-04 PROCEDURE — 99214 OFFICE O/P EST MOD 30 MIN: CPT | Performed by: NURSE PRACTITIONER

## 2019-12-04 PROCEDURE — 4040F PNEUMOC VAC/ADMIN/RCVD: CPT | Performed by: NURSE PRACTITIONER

## 2019-12-04 PROCEDURE — 3017F COLORECTAL CA SCREEN DOC REV: CPT | Performed by: NURSE PRACTITIONER

## 2019-12-04 PROCEDURE — 1036F TOBACCO NON-USER: CPT | Performed by: NURSE PRACTITIONER

## 2019-12-04 PROCEDURE — G8427 DOCREV CUR MEDS BY ELIG CLIN: HCPCS | Performed by: NURSE PRACTITIONER

## 2019-12-04 PROCEDURE — G8484 FLU IMMUNIZE NO ADMIN: HCPCS | Performed by: NURSE PRACTITIONER

## 2019-12-04 PROCEDURE — G8598 ASA/ANTIPLAT THER USED: HCPCS | Performed by: NURSE PRACTITIONER

## 2019-12-04 RX ORDER — AMOXICILLIN 250 MG
2 CAPSULE ORAL DAILY PRN
Qty: 60 TABLET | Refills: 0 | Status: CANCELLED | OUTPATIENT
Start: 2019-12-04

## 2019-12-04 RX ORDER — CLONAZEPAM 1 MG/1
1 TABLET ORAL NIGHTLY PRN
Qty: 30 TABLET | Refills: 0 | Status: CANCELLED | OUTPATIENT
Start: 2019-12-04 | End: 2020-01-03

## 2019-12-04 RX ORDER — LISINOPRIL AND HYDROCHLOROTHIAZIDE 25; 20 MG/1; MG/1
1 TABLET ORAL DAILY
Qty: 30 TABLET | Refills: 2 | Status: CANCELLED | OUTPATIENT
Start: 2019-12-04

## 2019-12-04 RX ORDER — PANTOPRAZOLE SODIUM 20 MG/1
20 TABLET, DELAYED RELEASE ORAL 2 TIMES DAILY
Qty: 60 TABLET | Refills: 2 | Status: SHIPPED | OUTPATIENT
Start: 2019-12-04 | End: 2020-03-02

## 2019-12-04 RX ORDER — AMOXICILLIN AND CLAVULANATE POTASSIUM 875; 125 MG/1; MG/1
1 TABLET, FILM COATED ORAL 2 TIMES DAILY
Qty: 14 TABLET | Refills: 0 | Status: SHIPPED | OUTPATIENT
Start: 2019-12-04 | End: 2019-12-11

## 2019-12-09 ENCOUNTER — TELEPHONE (OUTPATIENT)
Dept: FAMILY MEDICINE CLINIC | Age: 74
End: 2019-12-09

## 2019-12-09 ASSESSMENT — ENCOUNTER SYMPTOMS
COUGH: 0
BACK PAIN: 0
DIARRHEA: 0
CHEST TIGHTNESS: 0
TROUBLE SWALLOWING: 0
NAUSEA: 0
VOMITING: 0
EYE REDNESS: 0
SHORTNESS OF BREATH: 1
SORE THROAT: 0
EYE PAIN: 0
COLOR CHANGE: 0
ABDOMINAL PAIN: 0

## 2019-12-11 ENCOUNTER — OFFICE VISIT (OUTPATIENT)
Dept: FAMILY MEDICINE CLINIC | Age: 74
End: 2019-12-11
Payer: MEDICARE

## 2019-12-11 VITALS
WEIGHT: 178 LBS | HEART RATE: 81 BPM | HEIGHT: 63 IN | RESPIRATION RATE: 18 BRPM | DIASTOLIC BLOOD PRESSURE: 85 MMHG | SYSTOLIC BLOOD PRESSURE: 132 MMHG | BODY MASS INDEX: 31.54 KG/M2 | OXYGEN SATURATION: 98 %

## 2019-12-11 DIAGNOSIS — Z00.00 ENCOUNTER FOR MEDICARE ANNUAL WELLNESS EXAM: Primary | ICD-10-CM

## 2019-12-11 DIAGNOSIS — K21.9 GASTROESOPHAGEAL REFLUX DISEASE, ESOPHAGITIS PRESENCE NOT SPECIFIED: ICD-10-CM

## 2019-12-11 DIAGNOSIS — J01.90 ACUTE BACTERIAL SINUSITIS: ICD-10-CM

## 2019-12-11 DIAGNOSIS — Z72.89 OTHER PROBLEMS RELATED TO LIFESTYLE: ICD-10-CM

## 2019-12-11 DIAGNOSIS — B96.89 ACUTE BACTERIAL SINUSITIS: ICD-10-CM

## 2019-12-11 DIAGNOSIS — Z00.00 ROUTINE GENERAL MEDICAL EXAMINATION AT A HEALTH CARE FACILITY: ICD-10-CM

## 2019-12-11 PROCEDURE — 4040F PNEUMOC VAC/ADMIN/RCVD: CPT | Performed by: NURSE PRACTITIONER

## 2019-12-11 PROCEDURE — G0438 PPPS, INITIAL VISIT: HCPCS | Performed by: NURSE PRACTITIONER

## 2019-12-11 PROCEDURE — G8598 ASA/ANTIPLAT THER USED: HCPCS | Performed by: NURSE PRACTITIONER

## 2019-12-11 PROCEDURE — G8484 FLU IMMUNIZE NO ADMIN: HCPCS | Performed by: NURSE PRACTITIONER

## 2019-12-11 PROCEDURE — 3017F COLORECTAL CA SCREEN DOC REV: CPT | Performed by: NURSE PRACTITIONER

## 2019-12-11 PROCEDURE — 1123F ACP DISCUSS/DSCN MKR DOCD: CPT | Performed by: NURSE PRACTITIONER

## 2019-12-11 RX ORDER — AMOXICILLIN 875 MG/1
875 TABLET, COATED ORAL 2 TIMES DAILY
Qty: 14 TABLET | Refills: 0 | Status: SHIPPED | OUTPATIENT
Start: 2019-12-11 | End: 2019-12-18

## 2019-12-11 ASSESSMENT — LIFESTYLE VARIABLES: HOW OFTEN DO YOU HAVE A DRINK CONTAINING ALCOHOL: 0

## 2019-12-11 ASSESSMENT — PATIENT HEALTH QUESTIONNAIRE - PHQ9
SUM OF ALL RESPONSES TO PHQ QUESTIONS 1-9: 1
SUM OF ALL RESPONSES TO PHQ QUESTIONS 1-9: 1

## 2019-12-28 ENCOUNTER — HOSPITAL ENCOUNTER (EMERGENCY)
Facility: HOSPITAL | Age: 74
Discharge: HOME | End: 2019-12-28
Attending: EMERGENCY MEDICINE
Payer: MEDICARE

## 2019-12-28 VITALS
RESPIRATION RATE: 18 BRPM | SYSTOLIC BLOOD PRESSURE: 158 MMHG | HEART RATE: 89 BPM | WEIGHT: 178 LBS | BODY MASS INDEX: 31.53 KG/M2 | OXYGEN SATURATION: 93 % | DIASTOLIC BLOOD PRESSURE: 97 MMHG | TEMPERATURE: 97.9 F

## 2019-12-28 DIAGNOSIS — T78.40XA ALLERGIC REACTION, INITIAL ENCOUNTER: Primary | ICD-10-CM

## 2019-12-28 PROCEDURE — 96372 THER/PROPH/DIAG INJ SC/IM: CPT

## 2019-12-28 PROCEDURE — 6360000002 HC RX W HCPCS: Performed by: EMERGENCY MEDICINE

## 2019-12-28 PROCEDURE — 99282 EMERGENCY DEPT VISIT SF MDM: CPT

## 2019-12-28 RX ORDER — DEXAMETHASONE SODIUM PHOSPHATE 10 MG/ML
10 INJECTION INTRAMUSCULAR; INTRAVENOUS ONCE
Status: COMPLETED | OUTPATIENT
Start: 2019-12-28 | End: 2019-12-28

## 2019-12-28 RX ORDER — PREDNISONE 20 MG/1
20 TABLET ORAL 2 TIMES DAILY
Qty: 10 TABLET | Refills: 0 | Status: SHIPPED | OUTPATIENT
Start: 2019-12-28 | End: 2020-04-16 | Stop reason: SDUPTHER

## 2019-12-28 RX ADMIN — DEXAMETHASONE SODIUM PHOSPHATE 10 MG: 10 INJECTION INTRAMUSCULAR; INTRAVENOUS at 22:55

## 2019-12-28 ASSESSMENT — ENCOUNTER SYMPTOMS
TROUBLE SWALLOWING: 0
DIARRHEA: 0
BACK PAIN: 0
EYE DISCHARGE: 0
WHEEZING: 0
SHORTNESS OF BREATH: 0
SORE THROAT: 0
VOMITING: 0
NAUSEA: 0
CHEST TIGHTNESS: 0
ROS SKIN COMMENTS: INSECT BITE
CONSTIPATION: 0
EYE PAIN: 0
RHINORRHEA: 0
ABDOMINAL PAIN: 0
EYE REDNESS: 0
COUGH: 0
SINUS PRESSURE: 0

## 2020-01-08 ENCOUNTER — OFFICE VISIT (OUTPATIENT)
Dept: FAMILY MEDICINE CLINIC | Age: 75
End: 2020-01-08
Payer: MEDICARE

## 2020-01-08 VITALS
WEIGHT: 189.3 LBS | RESPIRATION RATE: 18 BRPM | HEIGHT: 63 IN | SYSTOLIC BLOOD PRESSURE: 138 MMHG | BODY MASS INDEX: 33.54 KG/M2 | DIASTOLIC BLOOD PRESSURE: 78 MMHG | HEART RATE: 75 BPM | OXYGEN SATURATION: 97 %

## 2020-01-08 PROBLEM — I95.1 SYNCOPE DUE TO ORTHOSTATIC HYPOTENSION: Status: RESOLVED | Noted: 2019-02-26 | Resolved: 2020-01-08

## 2020-01-08 PROBLEM — I82.409 DEEP VEIN THROMBOSIS OF LOWER EXTREMITY (HCC): Status: RESOLVED | Noted: 2019-07-21 | Resolved: 2020-01-08

## 2020-01-08 PROBLEM — D69.9 BLEEDS EASILY (HCC): Status: RESOLVED | Noted: 2018-12-31 | Resolved: 2020-01-08

## 2020-01-08 PROCEDURE — 96372 THER/PROPH/DIAG INJ SC/IM: CPT | Performed by: NURSE PRACTITIONER

## 2020-01-08 PROCEDURE — 4040F PNEUMOC VAC/ADMIN/RCVD: CPT | Performed by: NURSE PRACTITIONER

## 2020-01-08 PROCEDURE — 3017F COLORECTAL CA SCREEN DOC REV: CPT | Performed by: NURSE PRACTITIONER

## 2020-01-08 PROCEDURE — 1036F TOBACCO NON-USER: CPT | Performed by: NURSE PRACTITIONER

## 2020-01-08 PROCEDURE — G8926 SPIRO NO PERF OR DOC: HCPCS | Performed by: NURSE PRACTITIONER

## 2020-01-08 PROCEDURE — G8417 CALC BMI ABV UP PARAM F/U: HCPCS | Performed by: NURSE PRACTITIONER

## 2020-01-08 PROCEDURE — 3023F SPIROM DOC REV: CPT | Performed by: NURSE PRACTITIONER

## 2020-01-08 PROCEDURE — G8484 FLU IMMUNIZE NO ADMIN: HCPCS | Performed by: NURSE PRACTITIONER

## 2020-01-08 PROCEDURE — G8427 DOCREV CUR MEDS BY ELIG CLIN: HCPCS | Performed by: NURSE PRACTITIONER

## 2020-01-08 PROCEDURE — G8400 PT W/DXA NO RESULTS DOC: HCPCS | Performed by: NURSE PRACTITIONER

## 2020-01-08 PROCEDURE — 1090F PRES/ABSN URINE INCON ASSESS: CPT | Performed by: NURSE PRACTITIONER

## 2020-01-08 PROCEDURE — 99214 OFFICE O/P EST MOD 30 MIN: CPT | Performed by: NURSE PRACTITIONER

## 2020-01-08 PROCEDURE — 1123F ACP DISCUSS/DSCN MKR DOCD: CPT | Performed by: NURSE PRACTITIONER

## 2020-01-08 RX ORDER — METHYLPREDNISOLONE SODIUM SUCCINATE 125 MG/2ML
125 INJECTION, POWDER, LYOPHILIZED, FOR SOLUTION INTRAMUSCULAR; INTRAVENOUS ONCE
Status: COMPLETED | OUTPATIENT
Start: 2020-01-08 | End: 2020-01-08

## 2020-01-08 RX ORDER — ACYCLOVIR 800 MG/1
800 TABLET ORAL 2 TIMES DAILY
Qty: 20 TABLET | Refills: 0 | Status: SHIPPED | OUTPATIENT
Start: 2020-01-08 | End: 2020-01-16

## 2020-01-08 RX ORDER — AMOXICILLIN 875 MG/1
875 TABLET, COATED ORAL 2 TIMES DAILY
Qty: 20 TABLET | Refills: 0 | Status: SHIPPED | OUTPATIENT
Start: 2020-01-08 | End: 2020-01-16

## 2020-01-08 RX ORDER — CEFTRIAXONE 1 G/1
1 INJECTION, POWDER, FOR SOLUTION INTRAMUSCULAR; INTRAVENOUS ONCE
Status: COMPLETED | OUTPATIENT
Start: 2020-01-08 | End: 2020-01-08

## 2020-01-08 RX ORDER — GABAPENTIN 100 MG/1
100 CAPSULE ORAL 2 TIMES DAILY
Qty: 60 CAPSULE | Refills: 1 | Status: SHIPPED | OUTPATIENT
Start: 2020-01-08 | End: 2020-03-11 | Stop reason: SDUPTHER

## 2020-01-08 RX ADMIN — METHYLPREDNISOLONE SODIUM SUCCINATE 125 MG: 125 INJECTION, POWDER, LYOPHILIZED, FOR SOLUTION INTRAMUSCULAR; INTRAVENOUS at 14:02

## 2020-01-08 RX ADMIN — CEFTRIAXONE 1 G: 1 INJECTION, POWDER, FOR SOLUTION INTRAMUSCULAR; INTRAVENOUS at 14:02

## 2020-01-08 NOTE — PROGRESS NOTES
SUBJECTIVE:    Patient ID: George Saha is a 69 y.o. female. Chief Complaint   Patient presents with    Congestion    Oral Swelling     lip    Headache       Dominik Horvath is being seen today for cough, congestion, right lower lip swelling (recent fever-blister) and sinus headache. Sinusitis   This is a recurrent problem. The current episode started 1 to 4 weeks ago. The problem has been waxing and waning since onset. There has been no fever. Her pain is at a severity of 4/10. The pain is mild. Associated symptoms include congestion, headaches, sinus pressure, sneezing and a sore throat. Pertinent negatives include no chills, coughing, ear pain, hoarse voice or shortness of breath. Past treatments include spray decongestants, antibiotics and acetaminophen. The treatment provided mild relief.           Active Ambulatory Problems     Diagnosis Date Noted    HTN (hypertension) 05/20/2015    Hypothyroidism 05/20/2015    Chest pain 05/20/2015    Elevated serum creatinine 05/21/2015    Headache 05/21/2015    Nausea and vomiting in adult 05/21/2015    Hypertensive urgency 05/22/2015    Pre-syncope 02/03/2017    BPV (benign positional vertigo) 02/03/2017    GERD (gastroesophageal reflux disease) 02/03/2017    Epigastric abdominal pain 02/03/2017    Hypoxia 05/02/2017    Nausea vomiting and diarrhea 05/02/2017    Essential hypertension 05/02/2017    Lung nodule 05/02/2017    Acute pancreatitis 05/03/2017    Hematochezia     Irritable bowel syndrome with diarrhea 12/10/2017    Stable angina ([address] 12/10/2017    Fibrocystic breast disease ([facility]) 12/10/2017    Grief 06/24/2018    Esophageal dysphagia 06/27/2018    Breast pain, left 06/27/2018    Muscle spasm 06/27/2018    Esophageal stricture 06/27/2018    Breast density 06/27/2018    Scar painful 06/27/2018    Anxiety 10/27/2018    Moderate episode of recurrent major depressive disorder ([address] 10/27/2018    Panic attacks 10/27/2018    Liver tablet Take 1 tablet by mouth daily 30 tablet 2    clonazePAM (KLONOPIN) 1 MG tablet Take 1 tablet by mouth nightly as needed (INSOMNIA) for up to 30 days. 30 tablet 0    levothyroxine (SYNTHROID) 75 MCG tablet Take 1 tablet by mouth Daily 90 tablet 3    ondansetron (ZOFRAN) 4 MG tablet Take 1 tablet by mouth every 8 hours as needed for Nausea or Vomiting 30 tablet 5    ELIQUIS 5 MG TABS tablet TAKE TWO TABLETS BY MOUTH TWICE A DAY FOR 7 DAYS 28 tablet 0    nitroGLYCERIN (NITROSTAT) 0.4 MG SL tablet Place 1 tablet under the tongue every 5 minutes as needed for Chest pain 25 tablet 3     Current Facility-Administered Medications on File Prior to Visit   Medication Dose Route Frequency Provider Last Rate Last Dose    0.9 % sodium chloride infusion   Intravenous Once TATYANA Razo - CNP   Stopped at 07/23/19 1530    cyanocobalamin injection 1,000 mcg  1,000 mcg Intramuscular Once [employer] DepTATYANA alanis           Review of Systems   Constitutional: Negative for activity change, appetite change, chills and fever. HENT: Positive for congestion, postnasal drip, rhinorrhea, sinus pressure, sinus pain, sneezing and sore throat. Negative for ear pain, hoarse voice, nosebleeds and trouble swallowing. Eyes: Negative for pain and redness. Respiratory: Negative for cough, chest tightness and shortness of breath. Cardiovascular: Negative for chest pain, palpitations and leg swelling. Gastrointestinal: Negative for abdominal pain, diarrhea, nausea and vomiting. Genitourinary: Negative for difficulty urinating, dysuria and flank pain. Musculoskeletal: Positive for arthralgias and back pain. Negative for gait problem. Skin: Negative for color change, pallor, rash and wound. Allergic/Immunologic: Positive for environmental allergies. Negative for food allergies. Neurological: Positive for headaches. Negative for dizziness and numbness. Hematological: Negative for adenopathy.  Does not bruise/bleed easily. Psychiatric/Behavioral: Negative for agitation and sleep disturbance. The patient is nervous/anxious. OBJECTIVE:  /78   Pulse 75   Resp 18   Ht 5' 3\" (1.6 m)   Wt 189 lb 4.8 oz (85.9 kg)   SpO2 97% Comment: room air  BMI 33.53 kg/m²       Physical Exam  Vitals signs and nursing note reviewed. Constitutional:       General: She is not in acute distress. Appearance: Normal appearance. She is well-developed. She is not ill-appearing, toxic-appearing or diaphoretic. HENT:      Head: Normocephalic and atraumatic. Right Ear: Hearing, ear canal and external ear normal. A middle ear effusion is present. Tympanic membrane is bulging. Tympanic membrane has decreased mobility. Left Ear: Hearing, ear canal and external ear normal. A middle ear effusion is present. Tympanic membrane is bulging. Tympanic membrane has decreased mobility. Nose: No laceration, mucosal edema or rhinorrhea. Right Sinus: Frontal sinus tenderness present. No maxillary sinus tenderness. Left Sinus: Frontal sinus tenderness present. No maxillary sinus tenderness. Mouth/Throat:      Lips: Lesions (right lower) present. Dentition: Normal dentition. No dental caries. Pharynx: Uvula midline. Posterior oropharyngeal erythema present. Tonsils: No tonsillar exudate. Eyes:      General: Lids are normal. No scleral icterus. Right eye: No discharge. Left eye: No discharge. Conjunctiva/sclera: Conjunctivae normal.      Pupils: Pupils are equal, round, and reactive to light. Neck:      Musculoskeletal: Full passive range of motion without pain, normal range of motion and neck supple. Thyroid: No thyroid mass or thyromegaly. Vascular: Normal carotid pulses. No carotid bruit, hepatojugular reflux or JVD. Trachea: Trachea and phonation normal. No tracheal tenderness or tracheal deviation.    Cardiovascular:      Rate and Rhythm: Normal for 10 days           Administrations This Visit     cefTRIAXone (ROCEPHIN) injection 1 g     Admin Date  01/08/2020 Action  Given Dose  1 g Route  Intramuscular Administered By  Cassie Emery RN          methylPREDNISolone sodium (SOLU-MEDROL) injection 125 mg     Admin Date  01/08/2020 Action  Given Dose  125 mg Route  Intravenous Administered By  Cassie Emery RN                Controlled Substances Monitoring:     RX Monitoring 1/9/2020   Attestation The Prescription Monitoring Report for this patient was reviewed today. Periodic Controlled Substance Monitoring -   Chronic Pain > 80 MEDD -        PATIENT COUNSELING     Counseling was provided today regarding the following topics: Healthy eating habits, Regular exercise, substance abuse and healthy sleep habits. Discussed use, benefit, and side effects of prescribed medications. Barriers to medication compliance addressed. All patient questions answered. Patient voiced understanding. Medications Discontinued During This Encounter   Medication Reason    gabapentin (NEURONTIN) 100 MG capsule REORDER       Return in about 1 month (around 2/8/2020). TATYANA Sellers - CNP     Education was provided for discussed topics. Call the office with worsening complaints or any side effects to any medications. If an emergency please call 911.

## 2020-01-08 NOTE — PROGRESS NOTES
Administrations This Visit     cefTRIAXone (ROCEPHIN) injection 1 g     Admin Date  01/08/2020  14:02 Action  Given Dose  1 g Route  Intramuscular Site  Dorsogluteal Left Administered By  Sin Rubio RN    Ordering Provider:  TATYANA Hernandez CNP    NDC:  46254-679-24    Lot#:  [id number]    :  [facility]    Patient Supplied?:  No          methylPREDNISolone sodium (SOLU-MEDROL) injection 125 mg     Admin Date  01/08/2020  14:02 Action  Given Dose  125 mg Route  Intravenous Site  Dorsogluteal Right Administered By  Sin Rubio RN    Ordering Provider:  TATYANA Hernandez CNP    NDC:  2413-6637-42    Lot#:  [id number]    :  [address]     Patient Supplied?:  No

## 2020-01-09 RX ORDER — LACTULOSE 10 G/15ML
10 SOLUTION ORAL DAILY PRN
Qty: 1 BOTTLE | Refills: 1 | Status: SHIPPED
Start: 2020-01-09 | End: 2020-07-08

## 2020-01-09 ASSESSMENT — ENCOUNTER SYMPTOMS
EYE REDNESS: 0
SORE THROAT: 1
NAUSEA: 0
EYE PAIN: 0
TROUBLE SWALLOWING: 0
VOMITING: 0
COUGH: 0
ABDOMINAL PAIN: 0
RHINORRHEA: 1
HOARSE VOICE: 0
BACK PAIN: 1
CHEST TIGHTNESS: 0
SHORTNESS OF BREATH: 0
SINUS PAIN: 1
SINUS PRESSURE: 1
DIARRHEA: 0
COLOR CHANGE: 0

## 2020-01-16 ENCOUNTER — OFFICE VISIT (OUTPATIENT)
Dept: FAMILY MEDICINE CLINIC | Age: 75
End: 2020-01-16
Payer: MEDICARE

## 2020-01-16 VITALS
WEIGHT: 186 LBS | HEIGHT: 63 IN | OXYGEN SATURATION: 98 % | HEART RATE: 75 BPM | RESPIRATION RATE: 18 BRPM | BODY MASS INDEX: 32.96 KG/M2 | DIASTOLIC BLOOD PRESSURE: 78 MMHG | SYSTOLIC BLOOD PRESSURE: 138 MMHG

## 2020-01-16 PROCEDURE — 1090F PRES/ABSN URINE INCON ASSESS: CPT | Performed by: NURSE PRACTITIONER

## 2020-01-16 PROCEDURE — 3017F COLORECTAL CA SCREEN DOC REV: CPT | Performed by: NURSE PRACTITIONER

## 2020-01-16 PROCEDURE — 96372 THER/PROPH/DIAG INJ SC/IM: CPT | Performed by: NURSE PRACTITIONER

## 2020-01-16 PROCEDURE — 4040F PNEUMOC VAC/ADMIN/RCVD: CPT | Performed by: NURSE PRACTITIONER

## 2020-01-16 PROCEDURE — G8427 DOCREV CUR MEDS BY ELIG CLIN: HCPCS | Performed by: NURSE PRACTITIONER

## 2020-01-16 PROCEDURE — G8484 FLU IMMUNIZE NO ADMIN: HCPCS | Performed by: NURSE PRACTITIONER

## 2020-01-16 PROCEDURE — 99213 OFFICE O/P EST LOW 20 MIN: CPT | Performed by: NURSE PRACTITIONER

## 2020-01-16 PROCEDURE — G8926 SPIRO NO PERF OR DOC: HCPCS | Performed by: NURSE PRACTITIONER

## 2020-01-16 PROCEDURE — 3023F SPIROM DOC REV: CPT | Performed by: NURSE PRACTITIONER

## 2020-01-16 PROCEDURE — 1123F ACP DISCUSS/DSCN MKR DOCD: CPT | Performed by: NURSE PRACTITIONER

## 2020-01-16 PROCEDURE — 1036F TOBACCO NON-USER: CPT | Performed by: NURSE PRACTITIONER

## 2020-01-16 PROCEDURE — G8417 CALC BMI ABV UP PARAM F/U: HCPCS | Performed by: NURSE PRACTITIONER

## 2020-01-16 PROCEDURE — G8400 PT W/DXA NO RESULTS DOC: HCPCS | Performed by: NURSE PRACTITIONER

## 2020-01-16 RX ORDER — CEFTRIAXONE 1 G/1
1 INJECTION, POWDER, FOR SOLUTION INTRAMUSCULAR; INTRAVENOUS ONCE
Status: COMPLETED | OUTPATIENT
Start: 2020-01-16 | End: 2020-01-16

## 2020-01-16 RX ORDER — METHYLPREDNISOLONE SODIUM SUCCINATE 40 MG/ML
40 INJECTION, POWDER, LYOPHILIZED, FOR SOLUTION INTRAMUSCULAR; INTRAVENOUS ONCE
Status: DISCONTINUED | OUTPATIENT
Start: 2020-01-16 | End: 2020-07-21

## 2020-01-16 RX ORDER — AMOXICILLIN 875 MG/1
875 TABLET, COATED ORAL 2 TIMES DAILY
Qty: 20 TABLET | Refills: 0 | Status: SHIPPED | OUTPATIENT
Start: 2020-01-16 | End: 2020-03-11 | Stop reason: SDUPTHER

## 2020-01-16 RX ORDER — METHYLPREDNISOLONE SODIUM SUCCINATE 125 MG/2ML
60 INJECTION, POWDER, LYOPHILIZED, FOR SOLUTION INTRAMUSCULAR; INTRAVENOUS ONCE
Status: COMPLETED | OUTPATIENT
Start: 2020-01-16 | End: 2020-01-16

## 2020-01-16 RX ADMIN — CEFTRIAXONE 1 G: 1 INJECTION, POWDER, FOR SOLUTION INTRAMUSCULAR; INTRAVENOUS at 14:24

## 2020-01-16 RX ADMIN — METHYLPREDNISOLONE SODIUM SUCCINATE 60 MG: 125 INJECTION, POWDER, LYOPHILIZED, FOR SOLUTION INTRAMUSCULAR; INTRAVENOUS at 14:27

## 2020-01-16 ASSESSMENT — ENCOUNTER SYMPTOMS
TROUBLE SWALLOWING: 0
SHORTNESS OF BREATH: 0
BACK PAIN: 0
SORE THROAT: 1
VOMITING: 0
SINUS PAIN: 1
SINUS PRESSURE: 1
EYE PAIN: 0
NAUSEA: 0
HOARSE VOICE: 0
CHEST TIGHTNESS: 0
EYE REDNESS: 0
RHINORRHEA: 1
ABDOMINAL PAIN: 0
COUGH: 0
COLOR CHANGE: 0
DIARRHEA: 0

## 2020-01-16 NOTE — PROGRESS NOTES
10/27/2018    Abnormal CT of the abdomen 10/27/2018    Chronic bronchitis ([address] 12/31/2018    Gastroparesis 12/31/2018    Hemangioma 02/26/2019    Abnormal findings on diagnostic imaging of liver 02/26/2019    Thoracic back pain 02/26/2019    Weakness present 02/26/2019    B12 deficiency 03/12/2019     Resolved Ambulatory Problems     Diagnosis Date Noted    Acute cystitis without hematuria 02/03/2017    Encounter for screening colonoscopy 02/03/2017    Bleeds easily ([address] 12/31/2018    Syncope due to orthostatic hypotension 02/26/2019    Deep vein thrombosis of lower extremity ([address] 07/21/2019     Past Medical History:   Diagnosis Date    DVT (deep venous thrombosis) ([address]     Headache(784.0)     Hypertension     Hypothyroid 5/20/2015    MI, old     Pneumonia     Stomach ulcer     Thyroid disease       Allergies   Allergen Reactions    Iv Dye [Iodides] Hives, Shortness Of Breath and Other (See Comments)     Pt also passed out      Azithromycin Rash    Augmentin [Amoxicillin-Pot Clavulanate]     Codeine Hives    Influenza Vaccines      Patient couldn't remember the reaction att.  Mucinex [Guaifenesin Er]      Severe nausea    Reglan [Metoclopramide] Itching     welps      Past Surgical History:   Procedure Laterality Date    APPENDECTOMY      CHOLECYSTECTOMY      LIVER SURGERY      UPPER GASTROINTESTINAL ENDOSCOPY        No family history on file. Patient's medications, allergies, past medical, surgical, social and family histories were reviewed and updated as appropriate. Current Outpatient Medications on File Prior to Visit   Medication Sig Dispense Refill    lactulose (CHRONULAC) 10 GM/15ML solution Take 15 mLs by mouth daily as needed (Constipation) 1 Bottle 1    gabapentin (NEURONTIN) 100 MG capsule Take 1 capsule by mouth 2 times daily for 60 days.  60 capsule 1    pantoprazole (PROTONIX) 20 MG tablet Take 1 tablet by mouth 2 times daily 60 tablet 2    senna-docusate (PERICOLACE) 8.6-50 MG per tablet Take 2 tablets by mouth daily as needed for Constipation 16 tablet 0    lisinopril-hydrochlorothiazide (PRINZIDE;ZESTORETIC) 20-25 MG per tablet Take 1 tablet by mouth daily 30 tablet 2    clonazePAM (KLONOPIN) 1 MG tablet Take 1 tablet by mouth nightly as needed (INSOMNIA) for up to 30 days. 30 tablet 0    levothyroxine (SYNTHROID) 75 MCG tablet Take 1 tablet by mouth Daily 90 tablet 3    ondansetron (ZOFRAN) 4 MG tablet Take 1 tablet by mouth every 8 hours as needed for Nausea or Vomiting 30 tablet 5    ELIQUIS 5 MG TABS tablet TAKE TWO TABLETS BY MOUTH TWICE A DAY FOR 7 DAYS 28 tablet 0    nitroGLYCERIN (NITROSTAT) 0.4 MG SL tablet Place 1 tablet under the tongue every 5 minutes as needed for Chest pain 25 tablet 3     Current Facility-Administered Medications on File Prior to Visit   Medication Dose Route Frequency Provider Last Rate Last Dose    0.9 % sodium chloride infusion   Intravenous Once Robyn Sibley APRN - CNP   Stopped at 07/23/19 1530    cyanocobalamin injection 1,000 mcg  1,000 mcg Intramuscular Once [employer] Depaul, APRN           Review of Systems   Constitutional: Negative for activity change, appetite change, chills and fever. HENT: Positive for congestion, postnasal drip, rhinorrhea, sinus pressure, sinus pain, sneezing and sore throat. Negative for ear pain, hoarse voice, nosebleeds and trouble swallowing. Eyes: Negative for pain and redness. Respiratory: Negative for cough, chest tightness and shortness of breath. Cardiovascular: Negative for chest pain, palpitations and leg swelling. Gastrointestinal: Negative for abdominal pain, diarrhea, nausea and vomiting. Genitourinary: Negative for difficulty urinating, dysuria and flank pain. Musculoskeletal: Negative for arthralgias, back pain and gait problem. Skin: Negative for color change, pallor, rash and wound. Allergic/Immunologic: Positive for environmental allergies. Value   06/06/2019 5.6     Microscopic Examination (no units)   Date Value   01/24/2018 YES     LDL Calculated (mg/dL)   Date Value   06/06/2019 104         Lab Results   Component Value Date    WBC 6.6 11/14/2019    NEUTROABS 4.3 11/14/2019    HGB 12.5 11/14/2019    HCT 38.1 11/14/2019    MCV 96.9 11/14/2019     11/14/2019       Lab Results   Component Value Date    TSH 1.62 02/02/2017          ASSESSMENT/PLAN:     Tivis Meigs was seen today for chest congestion, sinusitis and nausea. Diagnoses and all orders for this visit:    Bronchitis  -     cefTRIAXone (ROCEPHIN) injection 1 g  -     methylPREDNISolone sodium (SOLU-MEDROL) injection 40 mg  -     amoxicillin (AMOXIL) 875 MG tablet; Take 1 tablet by mouth 2 times daily for 10 days  -     methylPREDNISolone sodium (SOLU-MEDROL) injection 60 mg    Acute bacterial sinusitis  -     cefTRIAXone (ROCEPHIN) injection 1 g  -     methylPREDNISolone sodium (SOLU-MEDROL) injection 40 mg  -     amoxicillin (AMOXIL) 875 MG tablet; Take 1 tablet by mouth 2 times daily for 10 days  -     methylPREDNISolone sodium (SOLU-MEDROL) injection 60 mg    Chronic bronchitis, unspecified chronic bronchitis type (HCC)  -     amoxicillin (AMOXIL) 875 MG tablet; Take 1 tablet by mouth 2 times daily for 10 days    Chronic midline low back pain with bilateral sciatica  -     methylPREDNISolone sodium (SOLU-MEDROL) injection 60 mg           Administrations This Visit     cefTRIAXone (ROCEPHIN) injection 1 g     Admin Date  01/16/2020 Action  Given Dose  1 g Route  Intramuscular Administered By  Kervin Joshua          methylPREDNISolone sodium (SOLU-MEDROL) injection 60 mg     Admin Date  01/16/2020 Action  Given Dose  60 mg Route  Intramuscular Administered By  Kervin Josiane                Controlled Substances Monitoring:     RX Monitoring 1/9/2020   Attestation The Prescription Monitoring Report for this patient was reviewed today.    Periodic Controlled Substance Monitoring - Chronic Pain > 80 MEDD -        PATIENT COUNSELING     Counseling was provided today regarding the following topics: Healthy eating habits, Regular exercise, substance abuse and healthy sleep habits. Discussed use, benefit, and side effects of prescribed medications. Barriers to medication compliance addressed. All patient questions answered. Patient voiced understanding. Medications Discontinued During This Encounter   Medication Reason    acyclovir (ZOVIRAX) 800 MG tablet LIST CLEANUP    amoxicillin (AMOXIL) 875 MG tablet LIST CLEANUP       Return if symptoms worsen or fail to improve. TATYANA Razo - CNP     Education was provided for discussed topics. Call the office with worsening complaints or any side effects to any medications. If an emergency please call 911.

## 2020-02-05 ENCOUNTER — OFFICE VISIT (OUTPATIENT)
Dept: FAMILY MEDICINE CLINIC | Age: 75
End: 2020-02-05
Payer: MEDICARE

## 2020-02-05 VITALS
DIASTOLIC BLOOD PRESSURE: 88 MMHG | HEIGHT: 63 IN | WEIGHT: 186 LBS | SYSTOLIC BLOOD PRESSURE: 168 MMHG | BODY MASS INDEX: 32.96 KG/M2 | RESPIRATION RATE: 18 BRPM | OXYGEN SATURATION: 98 % | HEART RATE: 82 BPM

## 2020-02-05 PROCEDURE — 1036F TOBACCO NON-USER: CPT | Performed by: NURSE PRACTITIONER

## 2020-02-05 PROCEDURE — G8427 DOCREV CUR MEDS BY ELIG CLIN: HCPCS | Performed by: NURSE PRACTITIONER

## 2020-02-05 PROCEDURE — 1123F ACP DISCUSS/DSCN MKR DOCD: CPT | Performed by: NURSE PRACTITIONER

## 2020-02-05 PROCEDURE — G8400 PT W/DXA NO RESULTS DOC: HCPCS | Performed by: NURSE PRACTITIONER

## 2020-02-05 PROCEDURE — 3017F COLORECTAL CA SCREEN DOC REV: CPT | Performed by: NURSE PRACTITIONER

## 2020-02-05 PROCEDURE — 1090F PRES/ABSN URINE INCON ASSESS: CPT | Performed by: NURSE PRACTITIONER

## 2020-02-05 PROCEDURE — 96372 THER/PROPH/DIAG INJ SC/IM: CPT | Performed by: NURSE PRACTITIONER

## 2020-02-05 PROCEDURE — 99213 OFFICE O/P EST LOW 20 MIN: CPT | Performed by: NURSE PRACTITIONER

## 2020-02-05 PROCEDURE — G8417 CALC BMI ABV UP PARAM F/U: HCPCS | Performed by: NURSE PRACTITIONER

## 2020-02-05 PROCEDURE — G8484 FLU IMMUNIZE NO ADMIN: HCPCS | Performed by: NURSE PRACTITIONER

## 2020-02-05 PROCEDURE — 4040F PNEUMOC VAC/ADMIN/RCVD: CPT | Performed by: NURSE PRACTITIONER

## 2020-02-05 RX ORDER — ERYTHROMYCIN 5 MG/G
OINTMENT OPHTHALMIC NIGHTLY
Qty: 1 TUBE | Refills: 0 | Status: SHIPPED | OUTPATIENT
Start: 2020-02-05 | End: 2020-02-19

## 2020-02-05 RX ORDER — CYANOCOBALAMIN 1000 UG/ML
1000 INJECTION INTRAMUSCULAR; SUBCUTANEOUS ONCE
Status: COMPLETED | OUTPATIENT
Start: 2020-02-05 | End: 2020-02-05

## 2020-02-05 RX ADMIN — CYANOCOBALAMIN 1000 MCG: 1000 INJECTION INTRAMUSCULAR; SUBCUTANEOUS at 14:14

## 2020-02-05 NOTE — PROGRESS NOTES
SUBJECTIVE:    Patient ID: Manuel Ramirez is a 69 y.o. female. Chief Complaint   Patient presents with    Eye Problem     right eye       Hypertension   This is a chronic problem. The current episode started more than 1 year ago. The problem has been waxing and waning since onset. The problem is controlled. Associated symptoms include anxiety, blurred vision and malaise/fatigue. Pertinent negatives include no chest pain, headaches, palpitations or shortness of breath. Agents associated with hypertension include thyroid hormones and NSAIDs. Risk factors for coronary artery disease include stress. Past treatments include ACE inhibitors. The current treatment provides moderate improvement. Compliance problems include exercise and diet. Eye Problem    The right eye is affected. This is a new problem. The current episode started in the past 7 days. The problem has been waxing and waning. There was no injury mechanism. The pain is at a severity of 2/10. The pain is mild. She does not wear contacts. Associated symptoms include blurred vision, an eye discharge, eye redness, a foreign body sensation, itching and a recent URI. Pertinent negatives include no double vision, fever, nausea, photophobia or vomiting. She has tried nothing for the symptoms.         Active Ambulatory Problems     Diagnosis Date Noted    HTN (hypertension) 05/20/2015    Hypothyroidism 05/20/2015    Chest pain 05/20/2015    Elevated serum creatinine 05/21/2015    Headache 05/21/2015    Nausea and vomiting in adult 05/21/2015    Hypertensive urgency 05/22/2015    Pre-syncope 02/03/2017    BPV (benign positional vertigo) 02/03/2017    GERD (gastroesophageal reflux disease) 02/03/2017    Epigastric abdominal pain 02/03/2017    Hypoxia 05/02/2017    Nausea vomiting and diarrhea 05/02/2017    Essential hypertension 05/02/2017    Lung nodule 05/02/2017    Acute pancreatitis 05/03/2017    Hematochezia     Irritable bowel syndrome medical, surgical, social and family histories were reviewed and updated as appropriate. Current Outpatient Medications on File Prior to Visit   Medication Sig Dispense Refill    lactulose (CHRONULAC) 10 GM/15ML solution Take 15 mLs by mouth daily as needed (Constipation) 1 Bottle 1    gabapentin (NEURONTIN) 100 MG capsule Take 1 capsule by mouth 2 times daily for 60 days. 60 capsule 1    pantoprazole (PROTONIX) 20 MG tablet Take 1 tablet by mouth 2 times daily 60 tablet 2    senna-docusate (PERICOLACE) 8.6-50 MG per tablet Take 2 tablets by mouth daily as needed for Constipation 16 tablet 0    lisinopril-hydrochlorothiazide (PRINZIDE;ZESTORETIC) 20-25 MG per tablet Take 1 tablet by mouth daily 30 tablet 2    clonazePAM (KLONOPIN) 1 MG tablet Take 1 tablet by mouth nightly as needed (INSOMNIA) for up to 30 days. 30 tablet 0    levothyroxine (SYNTHROID) 75 MCG tablet Take 1 tablet by mouth Daily 90 tablet 3    ondansetron (ZOFRAN) 4 MG tablet Take 1 tablet by mouth every 8 hours as needed for Nausea or Vomiting 30 tablet 5    ELIQUIS 5 MG TABS tablet TAKE TWO TABLETS BY MOUTH TWICE A DAY FOR 7 DAYS 28 tablet 0    nitroGLYCERIN (NITROSTAT) 0.4 MG SL tablet Place 1 tablet under the tongue every 5 minutes as needed for Chest pain 25 tablet 3     Current Facility-Administered Medications on File Prior to Visit   Medication Dose Route Frequency Provider Last Rate Last Dose    methylPREDNISolone sodium (SOLU-MEDROL) injection 40 mg  40 mg Intravenous Once Marta Hemp, APRN - CNP        0.9 % sodium chloride infusion   Intravenous Once Marta Hemp, APRN - CNP   Stopped at 07/23/19 1530    cyanocobalamin injection 1,000 mcg  1,000 mcg Intramuscular Once [employer] Depaul, APRN           Review of Systems   Constitutional: Positive for malaise/fatigue. Negative for activity change, appetite change, chills and fever.    HENT: Negative for congestion, ear pain, nosebleeds, sore throat and trouble round, and reactive to light. Neck:      Musculoskeletal: Normal range of motion and neck supple. Cardiovascular:      Rate and Rhythm: Normal rate and regular rhythm. Heart sounds: Normal heart sounds. Pulmonary:      Effort: Pulmonary effort is normal.      Breath sounds: Normal breath sounds. Abdominal:      General: Bowel sounds are normal.      Palpations: Abdomen is soft. Tenderness: There is no abdominal tenderness. Musculoskeletal: Normal range of motion. Lymphadenopathy:      Cervical: No cervical adenopathy. Skin:     General: Skin is warm and dry. Neurological:      Mental Status: She is alert and oriented to person, place, and time. Psychiatric:         Behavior: Behavior normal.         Thought Content: Thought content normal.         Judgment: Judgment normal.         No results found for requested labs within last 30 days. Hemoglobin A1C (%)   Date Value   06/06/2019 5.6     Microscopic Examination (no units)   Date Value   01/24/2018 YES     LDL Calculated (mg/dL)   Date Value   06/06/2019 104       Lab Results   Component Value Date    WBC 6.6 11/14/2019    NEUTROABS 4.3 11/14/2019    HGB 12.5 11/14/2019    HCT 38.1 11/14/2019    MCV 96.9 11/14/2019     11/14/2019     Lab Results   Component Value Date    TSH 1.62 02/02/2017        ASSESSMENT/PLAN:     Andriy Kelley was seen today for eye problem.     Diagnoses and all orders for this visit:    B12 deficiency  -     cyanocobalamin injection 1,000 mcg    Allergic conjunctivitis of both eyes and rhinitis  -     erythromycin (ROMYCIN) 5 MG/GM ophthalmic ointment; Place into both eyes nightly for 14 days           Administrations This Visit     cyanocobalamin injection 1,000 mcg     Admin Date  02/05/2020 Action  Given Dose  1000 mcg Route  Intramuscular Administered By  Tarik Holloway                Controlled Substances Monitoring:     RX Monitoring 1/9/2020   Attestation The Prescription Monitoring Report for this patient was reviewed today. Periodic Controlled Substance Monitoring -   Chronic Pain > 80 MEDD -        PATIENT COUNSELING     Counseling was provided today regarding the following topics: Healthy eating habits, Regular exercise, substance abuse and healthy sleep habits. Discussed use, benefit, and side effects of prescribed medications. Barriers to medication compliance addressed. All patient questions answered. Patient voiced understanding. There are no discontinued medications. Return in about 1 month (around 3/5/2020). TATYANA Alcaraz - CNP     Education was provided for discussed topics. Call the office with worsening complaints or any side effects to any medications. If an emergency please call 911. Please note: This chart was generated using Dragon dictation software. Although every effort was made to ensure the accuracy of this automated transcription, some errors in transcription may have occurred.

## 2020-02-05 NOTE — PROGRESS NOTES
Have you seen any other physician or provider since your last visit no    Have you had any other diagnostic tests since your last visit? no    Have you changed or stopped any medications since your last visit? no   Administrations This Visit     cyanocobalamin injection 1,000 mcg     Admin Date  02/05/2020 Action  Given Dose  1000 mcg Route  Intramuscular Administered By  Rodolfo Gonzalez              Patient tolerated injection well. Patient advised to wait 20 minutes in the office following the injection. No signs/symptoms of reaction noted after 20 minutes.

## 2020-02-06 ENCOUNTER — TELEPHONE (OUTPATIENT)
Dept: FAMILY MEDICINE CLINIC | Age: 75
End: 2020-02-06

## 2020-02-06 NOTE — TELEPHONE ENCOUNTER
On 2/5/2020 I spoke with Jean Marie Lamb Representative regarding patient's Nocturnal O2 orders. He advised that [facility] can not supply her oxygen due to she is \"capped\" out on her Medicare allowance at this time for Home Oxygen. Tor Zhang had [facility] and in November asked her provider to have them pick her Oxygen up - [facility] per the orders of TATYANA Enciso did  her O2 tanks in November 2019 and discontinued their service for patient. I contacted [facility] - for the patient to have nocturnal Oxygen provided by them again she will have to have an overnight pulse ox done and go through the process to be re-qualified. I have attempted to contact the patient and inform her and to verify she is willing to have the overnight pulse ox done - no answer. Left message requesting patient return my call. I did reach patient - she is now saying she doesn't want to do the overnight pulse ox at this time - due to she will be out of town for 2 weeks and will be having surgery - she stated when she comes in to see Martin Mohr in about a month she will let him know at that time what she has decided to do.

## 2020-02-16 ASSESSMENT — ENCOUNTER SYMPTOMS
EYE ITCHING: 1
COUGH: 0
SHORTNESS OF BREATH: 0
ABDOMINAL PAIN: 0
COLOR CHANGE: 0
BLURRED VISION: 1
EYE DISCHARGE: 1
CHEST TIGHTNESS: 0
PHOTOPHOBIA: 0
VOMITING: 0
EYE PAIN: 1
BACK PAIN: 0
DIARRHEA: 0
EYE REDNESS: 1
FOREIGN BODY SENSATION: 1
TROUBLE SWALLOWING: 0
NAUSEA: 0
DOUBLE VISION: 0
SORE THROAT: 0

## 2020-02-16 ASSESSMENT — VISUAL ACUITY: OU: 1

## 2020-03-02 RX ORDER — PANTOPRAZOLE SODIUM 20 MG/1
TABLET, DELAYED RELEASE ORAL
Qty: 180 TABLET | Refills: 1 | Status: SHIPPED | OUTPATIENT
Start: 2020-03-02 | End: 2020-08-04

## 2020-03-02 NOTE — TELEPHONE ENCOUNTER
Refill request received from pharmacy.  Medication pending for approval.       Last Office Visit With PCP:  2/5/2020    Next Visit Date:  Future Appointments   Date Time Provider Malena Imani   3/5/2020  1:15 PM TATYANA Dubois CNP [address]   5/21/2020 10:00 AM [facility] 1 [facility]   12/16/2020  1:00 PM TATYANA Dubois CNP [facility] [place] [facility]

## 2020-03-11 ENCOUNTER — OFFICE VISIT (OUTPATIENT)
Dept: FAMILY MEDICINE CLINIC | Age: 75
End: 2020-03-11
Payer: MEDICARE

## 2020-03-11 VITALS
RESPIRATION RATE: 18 BRPM | HEART RATE: 72 BPM | OXYGEN SATURATION: 98 % | WEIGHT: 186 LBS | BODY MASS INDEX: 32.96 KG/M2 | SYSTOLIC BLOOD PRESSURE: 138 MMHG | HEIGHT: 63 IN | DIASTOLIC BLOOD PRESSURE: 76 MMHG

## 2020-03-11 PROCEDURE — 4040F PNEUMOC VAC/ADMIN/RCVD: CPT | Performed by: NURSE PRACTITIONER

## 2020-03-11 PROCEDURE — G8484 FLU IMMUNIZE NO ADMIN: HCPCS | Performed by: NURSE PRACTITIONER

## 2020-03-11 PROCEDURE — G8400 PT W/DXA NO RESULTS DOC: HCPCS | Performed by: NURSE PRACTITIONER

## 2020-03-11 PROCEDURE — G8427 DOCREV CUR MEDS BY ELIG CLIN: HCPCS | Performed by: NURSE PRACTITIONER

## 2020-03-11 PROCEDURE — 96372 THER/PROPH/DIAG INJ SC/IM: CPT | Performed by: NURSE PRACTITIONER

## 2020-03-11 PROCEDURE — G8417 CALC BMI ABV UP PARAM F/U: HCPCS | Performed by: NURSE PRACTITIONER

## 2020-03-11 PROCEDURE — 3017F COLORECTAL CA SCREEN DOC REV: CPT | Performed by: NURSE PRACTITIONER

## 2020-03-11 PROCEDURE — 1123F ACP DISCUSS/DSCN MKR DOCD: CPT | Performed by: NURSE PRACTITIONER

## 2020-03-11 PROCEDURE — 1036F TOBACCO NON-USER: CPT | Performed by: NURSE PRACTITIONER

## 2020-03-11 PROCEDURE — 99214 OFFICE O/P EST MOD 30 MIN: CPT | Performed by: NURSE PRACTITIONER

## 2020-03-11 PROCEDURE — 1090F PRES/ABSN URINE INCON ASSESS: CPT | Performed by: NURSE PRACTITIONER

## 2020-03-11 RX ORDER — GABAPENTIN 100 MG/1
100 CAPSULE ORAL 2 TIMES DAILY
Qty: 60 CAPSULE | Refills: 1 | Status: SHIPPED | OUTPATIENT
Start: 2020-03-11 | End: 2020-07-01 | Stop reason: SDUPTHER

## 2020-03-11 RX ORDER — LISINOPRIL AND HYDROCHLOROTHIAZIDE 25; 20 MG/1; MG/1
1 TABLET ORAL DAILY
Qty: 30 TABLET | Refills: 2 | Status: CANCELLED | OUTPATIENT
Start: 2020-03-11

## 2020-03-11 RX ORDER — AMOXICILLIN 250 MG
2 CAPSULE ORAL DAILY PRN
Qty: 16 TABLET | Refills: 0 | Status: CANCELLED | OUTPATIENT
Start: 2020-03-11

## 2020-03-11 RX ORDER — ONDANSETRON 4 MG/1
4 TABLET, FILM COATED ORAL EVERY 8 HOURS PRN
Qty: 30 TABLET | Refills: 5 | Status: CANCELLED | OUTPATIENT
Start: 2020-03-11

## 2020-03-11 RX ORDER — METHYLPREDNISOLONE SODIUM SUCCINATE 125 MG/2ML
125 INJECTION, POWDER, LYOPHILIZED, FOR SOLUTION INTRAMUSCULAR; INTRAVENOUS ONCE
Status: COMPLETED | OUTPATIENT
Start: 2020-03-11 | End: 2020-03-12

## 2020-03-11 RX ORDER — LEVOTHYROXINE SODIUM 0.07 MG/1
75 TABLET ORAL DAILY
Qty: 90 TABLET | Refills: 3 | Status: SHIPPED | OUTPATIENT
Start: 2020-03-11 | End: 2020-07-08

## 2020-03-11 RX ORDER — CLONAZEPAM 1 MG/1
1 TABLET ORAL NIGHTLY PRN
Qty: 30 TABLET | Refills: 0 | Status: CANCELLED | OUTPATIENT
Start: 2020-03-11 | End: 2020-04-10

## 2020-03-11 RX ORDER — LACTULOSE 10 G/15ML
10 SOLUTION ORAL DAILY PRN
Qty: 1 BOTTLE | Refills: 1 | Status: CANCELLED | OUTPATIENT
Start: 2020-03-11

## 2020-03-11 RX ORDER — CEFTRIAXONE 1 G/1
1 INJECTION, POWDER, FOR SOLUTION INTRAMUSCULAR; INTRAVENOUS ONCE
Status: COMPLETED | OUTPATIENT
Start: 2020-03-11 | End: 2020-03-11

## 2020-03-11 RX ORDER — AMOXICILLIN 875 MG/1
875 TABLET, COATED ORAL 2 TIMES DAILY
Qty: 20 TABLET | Refills: 0 | Status: SHIPPED | OUTPATIENT
Start: 2020-03-11 | End: 2020-03-31 | Stop reason: SDUPTHER

## 2020-03-11 RX ADMIN — CEFTRIAXONE 1 G: 1 INJECTION, POWDER, FOR SOLUTION INTRAMUSCULAR; INTRAVENOUS at 16:29

## 2020-03-11 NOTE — PROGRESS NOTES
SUBJECTIVE:    Patient ID: Manuel Ramirez is a 69 y.o. female. Chief Complaint   Patient presents with    Back Pain     f/u    Hypertension       Tor Zhang is following up today for chronic issues that include chronic low back pain, she take gabapentin for this problem, she is also following up on HTN which is well controlled today with currently prescribed medications. Patient requesting refills on maintenance medications. See orders for additional details. Patient acutely reports sinus pain and pressure that is recurrent. OTC medications not aiding with symptoms. Vital signs are stable today. Hypertension   This is a chronic problem. The current episode started more than 1 year ago. The problem has been waxing and waning since onset. The problem is controlled. Associated symptoms include anxiety and malaise/fatigue. Pertinent negatives include no chest pain, headaches, palpitations or shortness of breath. Agents associated with hypertension include thyroid hormones and NSAIDs. Risk factors for coronary artery disease include stress. Past treatments include ACE inhibitors. The current treatment provides moderate improvement. Compliance problems include exercise and diet. Sinusitis   This is a recurrent problem. The current episode started 1 to 4 weeks ago. The problem has been waxing and waning since onset. There has been no fever. Her pain is at a severity of 4/10. The pain is mild. Associated symptoms include congestion, sinus pressure, sneezing and a sore throat. Pertinent negatives include no chills, coughing, ear pain, headaches, hoarse voice or shortness of breath. Past treatments include spray decongestants, antibiotics and acetaminophen. The treatment provided mild relief.         Active Ambulatory Problems     Diagnosis Date Noted    HTN (hypertension) 05/20/2015    Hypothyroidism 05/20/2015    Chest pain 05/20/2015    Elevated serum creatinine 05/21/2015    Headache 05/21/2015    Nausea and vomiting in adult 05/21/2015    Hypertensive urgency 05/22/2015    Pre-syncope 02/03/2017    BPV (benign positional vertigo) 02/03/2017    GERD (gastroesophageal reflux disease) 02/03/2017    Epigastric abdominal pain 02/03/2017    Hypoxia 05/02/2017    Nausea vomiting and diarrhea 05/02/2017    Essential hypertension 05/02/2017    Lung nodule 05/02/2017    Acute pancreatitis 05/03/2017    Hematochezia     Irritable bowel syndrome with diarrhea 12/10/2017    Stable angina ([address] 12/10/2017    Fibrocystic breast disease (FCBD) 12/10/2017    Grief 06/24/2018    Esophageal dysphagia 06/27/2018    Breast pain, left 06/27/2018    Muscle spasm 06/27/2018    Esophageal stricture 06/27/2018    Breast density 06/27/2018    Scar painful 06/27/2018    Anxiety 10/27/2018    Moderate episode of recurrent major depressive disorder ([address] 10/27/2018    Panic attacks 10/27/2018    Liver lesion 10/27/2018    Fatty pancreas 10/27/2018    Abnormal CT of the abdomen 10/27/2018    Chronic bronchitis ([address] 12/31/2018    Gastroparesis 12/31/2018    Hemangioma 02/26/2019    Abnormal findings on diagnostic imaging of liver 02/26/2019    Thoracic back pain 02/26/2019    Weakness present 02/26/2019    B12 deficiency 03/12/2019     Resolved Ambulatory Problems     Diagnosis Date Noted    Acute cystitis without hematuria 02/03/2017    Encounter for screening colonoscopy 02/03/2017    Bleeds easily ([address] 12/31/2018    Syncope due to orthostatic hypotension 02/26/2019    Deep vein thrombosis of lower extremity ([address] 07/21/2019     Past Medical History:   Diagnosis Date    DVT (deep venous thrombosis) (HCC)     Headache(784.0)     Hypertension     Hypothyroid 5/20/2015    MI, old     Pneumonia     Stomach ulcer     Thyroid disease      Allergies   Allergen Reactions    Iv Dye [Iodides] Hives, Shortness Of Breath and Other (See Comments)     Pt also passed out      Azithromycin Rash    Augmentin [Amoxicillin-Pot Clavulanate]     Codeine Hives    Influenza Vaccines      Patient couldn't remember the reaction att.  Mucinex [Guaifenesin Er]      Severe nausea    Reglan [Metoclopramide] Itching     welps      Past Surgical History:   Procedure Laterality Date    APPENDECTOMY      CHOLECYSTECTOMY      LIVER SURGERY      UPPER GASTROINTESTINAL ENDOSCOPY        History reviewed. No pertinent family history. Patient's medications, allergies, past medical, surgical, social and family histories were reviewed and updated as appropriate. Current Outpatient Medications on File Prior to Visit   Medication Sig Dispense Refill    pantoprazole (PROTONIX) 20 MG tablet TAKE ONE TABLET BY MOUTH TWICE A  tablet 1    lactulose (CHRONULAC) 10 GM/15ML solution Take 15 mLs by mouth daily as needed (Constipation) 1 Bottle 1    senna-docusate (PERICOLACE) 8.6-50 MG per tablet Take 2 tablets by mouth daily as needed for Constipation 16 tablet 0    lisinopril-hydrochlorothiazide (PRINZIDE;ZESTORETIC) 20-25 MG per tablet Take 1 tablet by mouth daily 30 tablet 2    ondansetron (ZOFRAN) 4 MG tablet Take 1 tablet by mouth every 8 hours as needed for Nausea or Vomiting 30 tablet 5    ELIQUIS 5 MG TABS tablet TAKE TWO TABLETS BY MOUTH TWICE A DAY FOR 7 DAYS 28 tablet 0    nitroGLYCERIN (NITROSTAT) 0.4 MG SL tablet Place 1 tablet under the tongue every 5 minutes as needed for Chest pain 25 tablet 3    clonazePAM (KLONOPIN) 1 MG tablet Take 1 tablet by mouth nightly as needed (INSOMNIA) for up to 30 days.  (Patient not taking: Reported on 3/11/2020) 30 tablet 0     Current Facility-Administered Medications on File Prior to Visit   Medication Dose Route Frequency Provider Last Rate Last Dose    methylPREDNISolone sodium (SOLU-MEDROL) injection 40 mg  40 mg Intravenous Once Fide Valencia APRN - CNP        0.9 % sodium chloride infusion   Intravenous Once TATYANA Welch - CNP   Stopped at 07/23/19 [id number]   

## 2020-03-11 NOTE — PROGRESS NOTES
Have you seen any other physician or provider since your last visit yes - Dr. Tess Quesada    Have you had any other diagnostic tests since your last visit? no    Have you changed or stopped any medications since your last visit? no     Administrations This Visit     cefTRIAXone (ROCEPHIN) injection 1 g     Admin Date  03/11/2020 Action  Given Dose  1 g Route  Intramuscular Administered By  Colan Cramp              Patient tolerated injection well. Patient advised to wait 20 minutes in the office following the injection. No signs/symptoms of reaction noted after 20 minutes.     Administrations This Visit     cefTRIAXone (ROCEPHIN) injection 1 g     Admin Date  03/11/2020 Action  Given Dose  1 g Route  Intramuscular Administered By  Coljl Ibarra          methylPREDNISolone sodium (SOLU-MEDROL) injection 125 mg     Admin Date  03/12/2020 Action  Given Dose  125 mg Route  Intravenous Administered By  Joya Ibarra

## 2020-03-12 RX ADMIN — METHYLPREDNISOLONE SODIUM SUCCINATE 125 MG: 125 INJECTION, POWDER, LYOPHILIZED, FOR SOLUTION INTRAMUSCULAR; INTRAVENOUS at 13:41

## 2020-03-23 ASSESSMENT — ENCOUNTER SYMPTOMS
ABDOMINAL PAIN: 0
CHEST TIGHTNESS: 0
COUGH: 0
NAUSEA: 0
HOARSE VOICE: 0
COLOR CHANGE: 0
SINUS PRESSURE: 1
EYE PAIN: 0
EYE REDNESS: 0
SORE THROAT: 1
RHINORRHEA: 1
DIARRHEA: 0
TROUBLE SWALLOWING: 0
BACK PAIN: 1
VOMITING: 0
SHORTNESS OF BREATH: 0
SINUS PAIN: 1

## 2020-03-31 ENCOUNTER — TELEPHONE (OUTPATIENT)
Dept: FAMILY MEDICINE CLINIC | Age: 75
End: 2020-03-31

## 2020-03-31 RX ORDER — AMOXICILLIN 875 MG/1
875 TABLET, COATED ORAL 2 TIMES DAILY
Qty: 20 TABLET | Refills: 0 | Status: SHIPPED | OUTPATIENT
Start: 2020-03-31 | End: 2020-04-10

## 2020-04-03 ENCOUNTER — NURSE ONLY (OUTPATIENT)
Dept: FAMILY MEDICINE CLINIC | Age: 75
End: 2020-04-03
Payer: MEDICARE

## 2020-04-03 PROCEDURE — 96372 THER/PROPH/DIAG INJ SC/IM: CPT | Performed by: NURSE PRACTITIONER

## 2020-04-03 RX ORDER — CEFTRIAXONE 1 G/1
1 INJECTION, POWDER, FOR SOLUTION INTRAMUSCULAR; INTRAVENOUS ONCE
Status: COMPLETED | OUTPATIENT
Start: 2020-04-03 | End: 2020-04-03

## 2020-04-03 RX ORDER — METHYLPREDNISOLONE SODIUM SUCCINATE 125 MG/2ML
125 INJECTION, POWDER, LYOPHILIZED, FOR SOLUTION INTRAMUSCULAR; INTRAVENOUS ONCE
Status: COMPLETED | OUTPATIENT
Start: 2020-04-03 | End: 2020-04-03

## 2020-04-03 RX ADMIN — METHYLPREDNISOLONE SODIUM SUCCINATE 125 MG: 125 INJECTION, POWDER, LYOPHILIZED, FOR SOLUTION INTRAMUSCULAR; INTRAVENOUS at 09:16

## 2020-04-03 RX ADMIN — CEFTRIAXONE 1 G: 1 INJECTION, POWDER, FOR SOLUTION INTRAMUSCULAR; INTRAVENOUS at 09:14

## 2020-04-09 ENCOUNTER — VIRTUAL VISIT (OUTPATIENT)
Dept: PRIMARY CARE CLINIC | Age: 75
End: 2020-04-09
Payer: MEDICARE

## 2020-04-09 PROCEDURE — 99442 PR PHYS/QHP TELEPHONE EVALUATION 11-20 MIN: CPT | Performed by: NURSE PRACTITIONER

## 2020-04-09 ASSESSMENT — ENCOUNTER SYMPTOMS
SHORTNESS OF BREATH: 1
ALLERGIC/IMMUNOLOGIC NEGATIVE: 1
NAUSEA: 1
COUGH: 0
DIARRHEA: 1
EYES NEGATIVE: 1
VOMITING: 1

## 2020-04-13 ENCOUNTER — VIRTUAL VISIT (OUTPATIENT)
Dept: FAMILY MEDICINE CLINIC | Age: 75
End: 2020-04-13
Payer: MEDICARE

## 2020-04-13 PROCEDURE — G2025 DIS SITE TELE SVCS RHC/FQHC: HCPCS | Performed by: NURSE PRACTITIONER

## 2020-04-13 RX ORDER — BISACODYL 10 MG
10 SUPPOSITORY, RECTAL RECTAL DAILY
Qty: 30 SUPPOSITORY | Refills: 0 | Status: SHIPPED | OUTPATIENT
Start: 2020-04-13 | End: 2020-10-06 | Stop reason: ALTCHOICE

## 2020-04-13 RX ORDER — BISACODYL 10 MG
10 SUPPOSITORY, RECTAL RECTAL PRN
Qty: 14 SUPPOSITORY | Refills: 0 | Status: CANCELLED | OUTPATIENT
Start: 2020-04-13 | End: 2020-04-27

## 2020-04-13 ASSESSMENT — ENCOUNTER SYMPTOMS
BACK PAIN: 0
TROUBLE SWALLOWING: 0
BLOATING: 1
ABDOMINAL DISTENTION: 1
CONSTIPATION: 1
CHEST TIGHTNESS: 0
FLATUS: 0
NAUSEA: 0
COLOR CHANGE: 0
HEMATOCHEZIA: 0
RECTAL PAIN: 0
EYE PAIN: 0
ABDOMINAL PAIN: 0
VOMITING: 0
COUGH: 0
SHORTNESS OF BREATH: 0
DIARRHEA: 0
SORE THROAT: 0
EYE REDNESS: 0

## 2020-04-13 NOTE — PROGRESS NOTES
pain, left 06/27/2018    Muscle spasm 06/27/2018    Esophageal stricture 06/27/2018    Breast density 06/27/2018    Scar painful 06/27/2018    Anxiety 10/27/2018    Moderate episode of recurrent major depressive disorder ([address] 10/27/2018    Panic attacks 10/27/2018    Liver lesion 10/27/2018    Fatty pancreas 10/27/2018    Abnormal CT of the abdomen 10/27/2018    Chronic bronchitis ([address] 12/31/2018    Gastroparesis 12/31/2018    Hemangioma 02/26/2019    Abnormal findings on diagnostic imaging of liver 02/26/2019    Thoracic back pain 02/26/2019    Weakness present 02/26/2019    B12 deficiency 03/12/2019     Resolved Ambulatory Problems     Diagnosis Date Noted    Acute cystitis without hematuria 02/03/2017    Encounter for screening colonoscopy 02/03/2017    Bleeds easily ([address] 12/31/2018    Syncope due to orthostatic hypotension 02/26/2019    Deep vein thrombosis of lower extremity ([address] 07/21/2019     Past Medical History:   Diagnosis Date    DVT (deep venous thrombosis) ([address]     Headache(784.0)     Hypertension     Hypothyroid 5/20/2015    MI, old     Pneumonia     Stomach ulcer     Thyroid disease      Allergies   Allergen Reactions    Iv Dye [Iodides] Hives, Shortness Of Breath and Other (See Comments)     Pt also passed out      Azithromycin Rash    Augmentin [Amoxicillin-Pot Clavulanate]     Codeine Hives    Influenza Vaccines      Patient couldn't remember the reaction att.  Mucinex [Guaifenesin Er]      Severe nausea    Reglan [Metoclopramide] Itching     welps      Past Surgical History:   Procedure Laterality Date    APPENDECTOMY      CHOLECYSTECTOMY      LIVER SURGERY      UPPER GASTROINTESTINAL ENDOSCOPY        No family history on file. Patient's medications, allergies, past medical, surgical, social and family histories were reviewed and updated as appropriate.   Current Outpatient Medications on File Prior to Visit   Medication Sig Dispense Refill    for chest pain, palpitations and leg swelling. Gastrointestinal: Positive for abdominal distention, bloating and constipation. Negative for abdominal pain, anorexia, diarrhea, flatus, hematochezia, hemorrhoids, melena, nausea, rectal pain and vomiting. Genitourinary: Negative for difficulty urinating, dysuria and flank pain. Musculoskeletal: Negative for arthralgias, back pain and gait problem. Skin: Negative for color change, pallor, rash and wound. Allergic/Immunologic: Negative for environmental allergies and food allergies. Neurological: Negative for dizziness, numbness and headaches. Hematological: Negative for adenopathy. Does not bruise/bleed easily. Psychiatric/Behavioral: Negative for agitation and sleep disturbance. The patient is not nervous/anxious. OBJECTIVE:  There were no vitals taken for this visit. Telephone Visit    Physical Exam   Unable to perform due to telephone encounter. No results found for requested labs within last 30 days. Hemoglobin A1C (%)   Date Value   06/06/2019 5.6     Microscopic Examination (no units)   Date Value   01/24/2018 YES     LDL Calculated (mg/dL)   Date Value   06/06/2019 104       Lab Results   Component Value Date    WBC 6.6 11/14/2019    NEUTROABS 4.3 11/14/2019    HGB 12.5 11/14/2019    HCT 38.1 11/14/2019    MCV 96.9 11/14/2019     11/14/2019     Lab Results   Component Value Date    TSH 1.62 02/02/2017        ASSESSMENT/PLAN:     Vivian Gates was seen today for constipation. Diagnoses and all orders for this visit:    Constipation, unspecified constipation type  -     bisacodyl (DULCOLAX) 10 MG suppository; Place 1 suppository rectally daily    11-20 minutes spent with patient on the telephone discussing care and plan for constipation. Controlled Substances Monitoring:     RX Monitoring 1/9/2020   Attestation The Prescription Monitoring Report for this patient was reviewed today.    Periodic Controlled Substance

## 2020-04-16 ENCOUNTER — VIRTUAL VISIT (OUTPATIENT)
Dept: FAMILY MEDICINE CLINIC | Age: 75
End: 2020-04-16
Payer: MEDICARE

## 2020-04-16 PROCEDURE — G2025 DIS SITE TELE SVCS RHC/FQHC: HCPCS | Performed by: NURSE PRACTITIONER

## 2020-04-16 RX ORDER — NITROFURANTOIN 25; 75 MG/1; MG/1
100 CAPSULE ORAL 2 TIMES DAILY
Qty: 20 CAPSULE | Refills: 0 | Status: SHIPPED | OUTPATIENT
Start: 2020-04-16 | End: 2020-04-26

## 2020-04-16 RX ORDER — PREDNISONE 20 MG/1
20 TABLET ORAL 2 TIMES DAILY
Qty: 10 TABLET | Refills: 0 | Status: SHIPPED | OUTPATIENT
Start: 2020-04-16 | End: 2020-04-21

## 2020-04-16 ASSESSMENT — ENCOUNTER SYMPTOMS
TROUBLE SWALLOWING: 0
EYE REDNESS: 0
DIARRHEA: 0
NAUSEA: 0
EYE PAIN: 0
BACK PAIN: 0
CHEST TIGHTNESS: 0
COLOR CHANGE: 0
ABDOMINAL PAIN: 0
SORE THROAT: 1
COUGH: 1
SHORTNESS OF BREATH: 0
VOMITING: 0

## 2020-04-30 ENCOUNTER — VIRTUAL VISIT (OUTPATIENT)
Dept: FAMILY MEDICINE CLINIC | Age: 75
End: 2020-04-30
Payer: MEDICARE

## 2020-04-30 PROCEDURE — G2025 DIS SITE TELE SVCS RHC/FQHC: HCPCS | Performed by: NURSE PRACTITIONER

## 2020-04-30 RX ORDER — CLONAZEPAM 1 MG/1
1 TABLET ORAL NIGHTLY PRN
Qty: 30 TABLET | Refills: 0 | Status: SHIPPED | OUTPATIENT
Start: 2020-04-30 | End: 2020-07-01 | Stop reason: SDUPTHER

## 2020-05-01 ASSESSMENT — ENCOUNTER SYMPTOMS
BACK PAIN: 0
COUGH: 0
EYE REDNESS: 0
SHORTNESS OF BREATH: 0
CHEST TIGHTNESS: 0
SORE THROAT: 0
TROUBLE SWALLOWING: 0
EYE PAIN: 0
COLOR CHANGE: 0
VOMITING: 0
NAUSEA: 0
DIARRHEA: 0
ABDOMINAL PAIN: 0

## 2020-05-01 NOTE — PROGRESS NOTES
Bottle 1    senna-docusate (PERICOLACE) 8.6-50 MG per tablet Take 2 tablets by mouth daily as needed for Constipation 16 tablet 0    lisinopril-hydrochlorothiazide (PRINZIDE;ZESTORETIC) 20-25 MG per tablet Take 1 tablet by mouth daily 30 tablet 2    ondansetron (ZOFRAN) 4 MG tablet Take 1 tablet by mouth every 8 hours as needed for Nausea or Vomiting 30 tablet 5    ELIQUIS 5 MG TABS tablet TAKE TWO TABLETS BY MOUTH TWICE A DAY FOR 7 DAYS 28 tablet 0    nitroGLYCERIN (NITROSTAT) 0.4 MG SL tablet Place 1 tablet under the tongue every 5 minutes as needed for Chest pain 25 tablet 3     Current Facility-Administered Medications on File Prior to Visit   Medication Dose Route Frequency Provider Last Rate Last Dose    methylPREDNISolone sodium (SOLU-MEDROL) injection 40 mg  40 mg Intravenous Once Leandrew Mae APRN - CNP        0.9 % sodium chloride infusion   Intravenous Once Leandrew Mae APRN - CNP   Stopped at 07/23/19 1530    cyanocobalamin injection 1,000 mcg  1,000 mcg Intramuscular Once [employer] Depaul, APRN           Review of Systems   Constitutional: Negative for activity change, appetite change, chills and fever. HENT: Negative for congestion, ear pain, nosebleeds, sore throat and trouble swallowing. Eyes: Negative for pain and redness. Respiratory: Negative for cough, chest tightness and shortness of breath. Cardiovascular: Negative for chest pain, palpitations and leg swelling. Gastrointestinal: Negative for abdominal pain, diarrhea, nausea and vomiting. Genitourinary: Negative for difficulty urinating, dysuria and flank pain. Musculoskeletal: Negative for arthralgias, back pain and gait problem. Skin: Negative for color change, pallor, rash and wound. Allergic/Immunologic: Negative for environmental allergies and food allergies. Neurological: Negative for dizziness, numbness and headaches. Hematological: Negative for adenopathy. Does not bruise/bleed easily.

## 2020-06-08 ENCOUNTER — TELEPHONE (OUTPATIENT)
Dept: FAMILY MEDICINE CLINIC | Age: 75
End: 2020-06-08

## 2020-06-10 ENCOUNTER — HOSPITAL ENCOUNTER (OUTPATIENT)
Facility: HOSPITAL | Age: 75
Discharge: HOME | End: 2020-06-10
Payer: MEDICARE

## 2020-06-10 ENCOUNTER — HOSPITAL ENCOUNTER (OUTPATIENT)
Dept: GENERAL RADIOLOGY | Facility: HOSPITAL | Age: 75
Discharge: HOME | End: 2020-06-10
Payer: MEDICARE

## 2020-06-10 ENCOUNTER — HOSPITAL ENCOUNTER (OUTPATIENT)
Dept: MAMMOGRAPHY | Facility: HOSPITAL | Age: 75
Discharge: HOME | End: 2020-06-10
Payer: MEDICARE

## 2020-06-10 ENCOUNTER — NURSE ONLY (OUTPATIENT)
Dept: FAMILY MEDICINE CLINIC | Age: 75
End: 2020-06-10
Payer: MEDICARE

## 2020-06-10 PROCEDURE — 73630 X-RAY EXAM OF FOOT: CPT

## 2020-06-10 PROCEDURE — 73502 X-RAY EXAM HIP UNI 2-3 VIEWS: CPT

## 2020-06-10 PROCEDURE — 77065 DX MAMMO INCL CAD UNI: CPT

## 2020-06-10 PROCEDURE — 96372 THER/PROPH/DIAG INJ SC/IM: CPT | Performed by: NURSE PRACTITIONER

## 2020-06-10 RX ORDER — CYANOCOBALAMIN 1000 UG/ML
1000 INJECTION INTRAMUSCULAR; SUBCUTANEOUS ONCE
Status: COMPLETED | OUTPATIENT
Start: 2020-06-10 | End: 2020-06-10

## 2020-06-10 RX ADMIN — CYANOCOBALAMIN 1000 MCG: 1000 INJECTION INTRAMUSCULAR; SUBCUTANEOUS at 14:24

## 2020-07-01 ENCOUNTER — TELEPHONE (OUTPATIENT)
Dept: FAMILY MEDICINE CLINIC | Age: 75
End: 2020-07-01

## 2020-07-01 RX ORDER — GABAPENTIN 100 MG/1
100 CAPSULE ORAL 2 TIMES DAILY
Qty: 60 CAPSULE | Refills: 1 | Status: SHIPPED
Start: 2020-07-01 | End: 2020-07-21 | Stop reason: SINTOL

## 2020-07-01 RX ORDER — CLONAZEPAM 1 MG/1
1 TABLET ORAL NIGHTLY PRN
Qty: 30 TABLET | Refills: 0 | Status: SHIPPED | OUTPATIENT
Start: 2020-07-01 | End: 2020-07-08

## 2020-07-08 ENCOUNTER — OFFICE VISIT (OUTPATIENT)
Dept: FAMILY MEDICINE CLINIC | Age: 75
End: 2020-07-08
Payer: MEDICARE

## 2020-07-08 VITALS
HEART RATE: 79 BPM | HEIGHT: 63 IN | DIASTOLIC BLOOD PRESSURE: 84 MMHG | SYSTOLIC BLOOD PRESSURE: 132 MMHG | WEIGHT: 188.1 LBS | OXYGEN SATURATION: 98 % | BODY MASS INDEX: 33.33 KG/M2 | RESPIRATION RATE: 18 BRPM

## 2020-07-08 PROCEDURE — 4040F PNEUMOC VAC/ADMIN/RCVD: CPT | Performed by: NURSE PRACTITIONER

## 2020-07-08 PROCEDURE — 3017F COLORECTAL CA SCREEN DOC REV: CPT | Performed by: NURSE PRACTITIONER

## 2020-07-08 PROCEDURE — 1036F TOBACCO NON-USER: CPT | Performed by: NURSE PRACTITIONER

## 2020-07-08 PROCEDURE — 96372 THER/PROPH/DIAG INJ SC/IM: CPT | Performed by: NURSE PRACTITIONER

## 2020-07-08 PROCEDURE — G8400 PT W/DXA NO RESULTS DOC: HCPCS | Performed by: NURSE PRACTITIONER

## 2020-07-08 PROCEDURE — 1090F PRES/ABSN URINE INCON ASSESS: CPT | Performed by: NURSE PRACTITIONER

## 2020-07-08 PROCEDURE — G8417 CALC BMI ABV UP PARAM F/U: HCPCS | Performed by: NURSE PRACTITIONER

## 2020-07-08 PROCEDURE — G8427 DOCREV CUR MEDS BY ELIG CLIN: HCPCS | Performed by: NURSE PRACTITIONER

## 2020-07-08 PROCEDURE — 1123F ACP DISCUSS/DSCN MKR DOCD: CPT | Performed by: NURSE PRACTITIONER

## 2020-07-08 PROCEDURE — 99214 OFFICE O/P EST MOD 30 MIN: CPT | Performed by: NURSE PRACTITIONER

## 2020-07-08 RX ORDER — LEVOTHYROXINE SODIUM 0.07 MG/1
75 TABLET ORAL DAILY
Qty: 90 TABLET | Refills: 3 | Status: SHIPPED | OUTPATIENT
Start: 2020-07-08 | End: 2020-09-16 | Stop reason: SDUPTHER

## 2020-07-08 RX ORDER — NITROGLYCERIN 0.4 MG/1
0.4 TABLET SUBLINGUAL EVERY 5 MIN PRN
Qty: 25 TABLET | Refills: 3 | Status: SHIPPED | OUTPATIENT
Start: 2020-07-08

## 2020-07-08 RX ORDER — CYANOCOBALAMIN 1000 UG/ML
1000 INJECTION INTRAMUSCULAR; SUBCUTANEOUS ONCE
Status: COMPLETED | OUTPATIENT
Start: 2020-07-08 | End: 2020-07-08

## 2020-07-08 RX ORDER — CLONAZEPAM 2 MG/1
2 TABLET ORAL NIGHTLY PRN
Qty: 30 TABLET | Refills: 0 | Status: SHIPPED | OUTPATIENT
Start: 2020-07-08 | End: 2020-09-16 | Stop reason: SDUPTHER

## 2020-07-08 RX ADMIN — CYANOCOBALAMIN 1000 MCG: 1000 INJECTION INTRAMUSCULAR; SUBCUTANEOUS at 15:34

## 2020-07-08 NOTE — PROGRESS NOTES
Chief Complaint   Patient presents with    Foot Swelling     left foot/ankle       Have you seen any other physician or provider since your last visit yes - [facility]    Have you had any other diagnostic tests since your last visit? no    Have you changed or stopped any medications since your last visit? no

## 2020-07-08 NOTE — PATIENT INSTRUCTIONS
The medication list included in this document is our record of what you are currently taking, including any changes that were made at today's visit.  If you find any differences when compared to your medications at home, or have any questions that were not answered at your visit, please contact the office. · Keep a list of your medicines with you. List all of the prescription medicines, nonprescription medicines, supplements, natural remedies, and vitamins that you take. Tell your healthcare providers who treat you about all of the products you are taking. Your provider can provide you with a form to keep track of them. Just ask. · Follow the directions that come with your medicine, including information about food or alcohol. Make sure you know how and when to take your medicine. Do not take more or less than you are supposed to take. · Keep all medicines out of the reach of children. · Store medicines according to the directions on the label. · Monitor yourself. Learn to know how your body reacts to your new medicine and keep track of how it makes you feel before attempting (If your provider has allowed you to do so) to drive or go to work. · Seek emergency medical attention if you think you have used too much of this medicine. An overdose of any prescription medicine can be fatal. Overdose symptoms may include extreme drowsiness, muscle weakness, confusion, cold and clammy skin, pinpoint pupils, shallow breathing, slow heart rate, fainting, or coma. · Don't share prescription medicines with others, even when they seem to have the same symptoms. What may be good for you may be harmful to others. · If you are no longer taking a prescribed medication and you have pills left please take your pills out of their original containers. Mix crushed pills with an undesirable substance, such as cat litter or used coffee grounds.  Put the mixture into a disposable container with a lid, such as an empty margarine tub, or into a sealable bag. Cover up or remove any of your personal information on the empty containers by covering it with black permanent marker or duct tape. Place the sealed container with the mixture, and the empty drug containers, in the trash. · If you use a medication that is in the form of a patch, dispose of used patches by folding them in half so that the sticky sides meet, and then flushing them down a toilet. They should not be placed in the household trash where children or pets can find them. · If you have any questions, ask your provider or pharmacist for more information. · Be sure to keep all appointments for provider visits or tests. We are committed to providing you with the best care possible. In order to help us achieve these goals please remember to bring all medications, herbal products, and over the counter supplements with you to each visit. If your provider has ordered testing for you, please be sure to follow up with our office if you have not received results within 7 days after the testing took place. *If you receive a survey after visiting one of our offices, please take time to share your experience concerning your physician office visit. These surveys are confidential and no health information about you is shared. We are eager to improve for you and we are counting on your feedback to help make that happen.

## 2020-07-08 NOTE — PROGRESS NOTES
SUBJECTIVE:    Patient ID: Jennifer Gomez is a 70 y.o. female. Chief Complaint   Patient presents with    Foot Swelling     left foot/ankle       Patient present to the clinic with acute c/o x's 2 week with Foot Swelling (left foot/ankle). Patient reports in think its another blood clot. Patient also following up on chronic issues:    B12 deficiency  (primary encounter diagnosis)  Requesting monthly b12 injection. Stable angina ([address]  Plan: requesting refills nitroGLYCERIN (NITROSTAT) 0.4 MG SL tablet    Panic attack  Plan: requesting refills clonazePAM (KLONOPIN) 2 MG tablet    Insomnia, unspecified type  Plan: requesting refills clonazePAM (KLONOPIN) 2 MG tablet    Hypothyroidism, unspecified type  Plan: requesting refills levothyroxine (SYNTHROID) 75 MCG tablet    Cough  Plan: XR CHEST STANDARD (2 VW) to follow up on lung nodule.  Per patient request.               Active Ambulatory Problems     Diagnosis Date Noted    HTN (hypertension) 05/20/2015    Hypothyroidism 05/20/2015    Chest pain 05/20/2015    Elevated serum creatinine 05/21/2015    Headache 05/21/2015    Nausea and vomiting in adult 05/21/2015    Hypertensive urgency 05/22/2015    Pre-syncope 02/03/2017    BPV (benign positional vertigo) 02/03/2017    GERD (gastroesophageal reflux disease) 02/03/2017    Epigastric abdominal pain 02/03/2017    Hypoxia 05/02/2017    Nausea vomiting and diarrhea 05/02/2017    Essential hypertension 05/02/2017    Lung nodule 05/02/2017    Acute pancreatitis 05/03/2017    Hematochezia     Irritable bowel syndrome with diarrhea 12/10/2017    Stable angina ([address] 12/10/2017    Fibrocystic breast disease (FCBD) 12/10/2017    Grief 06/24/2018    Esophageal dysphagia 06/27/2018    Breast pain, left 06/27/2018    Muscle spasm 06/27/2018    Esophageal stricture 06/27/2018    Breast density 06/27/2018    Scar painful 06/27/2018    Anxiety 10/27/2018    Moderate episode of recurrent major depressive disorder ([facility] 10/27/2018    Panic attacks 10/27/2018    Liver lesion 10/27/2018    Fatty pancreas 10/27/2018    Abnormal CT of the abdomen 10/27/2018    Chronic bronchitis ([facility] 12/31/2018    Gastroparesis 12/31/2018    Hemangioma 02/26/2019    Abnormal findings on diagnostic imaging of liver 02/26/2019    Thoracic back pain 02/26/2019    Weakness present 02/26/2019    B12 deficiency 03/12/2019     Resolved Ambulatory Problems     Diagnosis Date Noted    Acute cystitis without hematuria 02/03/2017    Encounter for screening colonoscopy 02/03/2017    Bleeds easily ([facility] 12/31/2018    Syncope due to orthostatic hypotension 02/26/2019    Deep vein thrombosis of lower extremity [facility] 07/21/2019     Past Medical History:   Diagnosis Date    DVT (deep venous thrombosis) ([facility]     Headache(784.0)     Hypertension     Hypothyroid 5/20/2015    MI, old     Pneumonia     Stomach ulcer     Thyroid disease      Allergies   Allergen Reactions    Iv Dye [Iodides] Hives, Shortness Of Breath and Other (See Comments)     Pt also passed out      Azithromycin Rash    Augmentin [Amoxicillin-Pot Clavulanate]     Codeine Hives    Influenza Vaccines      Patient couldn't remember the reaction att.  Mucinex [Guaifenesin Er]      Severe nausea    Reglan [Metoclopramide] Itching     welps      Past Surgical History:   Procedure Laterality Date    APPENDECTOMY      CHOLECYSTECTOMY      LIVER SURGERY      UPPER GASTROINTESTINAL ENDOSCOPY        No family history on file. Patient's medications, allergies, past medical, surgical, social and family histories were reviewed and updated as appropriate.   Current Outpatient Medications on File Prior to Visit   Medication Sig Dispense Refill    pantoprazole (PROTONIX) 20 MG tablet TAKE ONE TABLET BY MOUTH TWICE A  tablet 1    senna-docusate (PERICOLACE) 8.6-50 MG per tablet Take 2 tablets by mouth daily as needed for Constipation 16 tablet 0  lisinopril-hydrochlorothiazide (PRINZIDE;ZESTORETIC) 20-25 MG per tablet Take 1 tablet by mouth daily 30 tablet 2    ondansetron (ZOFRAN) 4 MG tablet Take 1 tablet by mouth every 8 hours as needed for Nausea or Vomiting 30 tablet 5    gabapentin (NEURONTIN) 100 MG capsule Take 1 capsule by mouth 2 times daily for 60 days. (Patient not taking: Reported on 7/8/2020) 60 capsule 1    bisacodyl (DULCOLAX) 10 MG suppository Place 1 suppository rectally daily (Patient not taking: Reported on 7/8/2020) 30 suppository 0     Current Facility-Administered Medications on File Prior to Visit   Medication Dose Route Frequency Provider Last Rate Last Dose    methylPREDNISolone sodium (SOLU-MEDROL) injection 40 mg  40 mg Intravenous Once Lorence Feelsamantha, APRN - CNP        0.9 % sodium chloride infusion   Intravenous Once Lorence Feeler, APRN - CNP   Stopped at 07/23/19 1530    cyanocobalamin injection 1,000 mcg  1,000 mcg Intramuscular Once TATYANA Sofia           Review of Systems   Constitutional: Negative for activity change, appetite change, chills and fever. HENT: Negative for congestion, ear pain, nosebleeds, sore throat and trouble swallowing. Eyes: Negative for pain and redness. Respiratory: Negative for cough, chest tightness and shortness of breath. Cardiovascular: Positive for leg swelling (left leg > great leg). Negative for chest pain and palpitations. Gastrointestinal: Negative for abdominal pain, diarrhea, nausea and vomiting. Genitourinary: Negative for difficulty urinating, dysuria and flank pain. Musculoskeletal: Positive for arthralgias (left lower extremity. ). Negative for back pain and gait problem. Skin: Negative for color change, pallor, rash and wound. Allergic/Immunologic: Negative for environmental allergies and food allergies. Neurological: Negative for dizziness, numbness and headaches. Hematological: Negative for adenopathy.  Does not bruise/bleed easily. Psychiatric/Behavioral: Positive for sleep disturbance. Negative for agitation. The patient is nervous/anxious. OBJECTIVE:  /84   Pulse 79   Resp 18   Ht 5' 3\" (1.6 m)   Wt 188 lb 1.6 oz (85.3 kg)   SpO2 98% Comment: room air  BMI 33.32 kg/m²     Physical Exam  Vitals signs and nursing note reviewed. Constitutional:       General: She is not in acute distress. Appearance: Normal appearance. She is well-developed. She is not ill-appearing, toxic-appearing or diaphoretic. HENT:      Head: Normocephalic and atraumatic. Right Ear: Hearing, tympanic membrane, ear canal and external ear normal.      Left Ear: Hearing, tympanic membrane, ear canal and external ear normal.      Nose: Nose normal. No laceration, mucosal edema or rhinorrhea. Right Sinus: No maxillary sinus tenderness or frontal sinus tenderness. Left Sinus: No maxillary sinus tenderness or frontal sinus tenderness. Mouth/Throat:      Dentition: Normal dentition. No dental caries. Pharynx: Uvula midline. Tonsils: No tonsillar exudate. Eyes:      General: Lids are normal. No scleral icterus. Right eye: No discharge. Left eye: No discharge. Conjunctiva/sclera: Conjunctivae normal.      Pupils: Pupils are equal, round, and reactive to light. Neck:      Musculoskeletal: Full passive range of motion without pain, normal range of motion and neck supple. Thyroid: No thyroid mass or thyromegaly. Vascular: Normal carotid pulses. No carotid bruit, hepatojugular reflux or JVD. Trachea: Trachea and phonation normal. No tracheal tenderness or tracheal deviation. Cardiovascular:      Rate and Rhythm: Normal rate and regular rhythm. Pulses: Normal pulses. Heart sounds: Normal heart sounds, S1 normal and S2 normal. Heart sounds not distant. No murmur. No friction rub. No gallop.     Pulmonary:      Effort: Pulmonary effort is normal. No tachypnea, bradypnea or accessory muscle usage. Breath sounds: Normal breath sounds. No stridor. No decreased breath sounds, wheezing, rhonchi or rales. Chest:      Chest wall: No tenderness. Abdominal:      General: Bowel sounds are normal. There is no distension. Palpations: Abdomen is soft. There is no hepatomegaly, splenomegaly or mass. Tenderness: There is no abdominal tenderness. There is no guarding or rebound. Hernia: No hernia is present. Musculoskeletal: Normal range of motion. General: No deformity. Left lower leg: She exhibits tenderness and bony tenderness. Left foot: Tenderness present. Lymphadenopathy:      Cervical: No cervical adenopathy. Skin:     General: Skin is warm and dry. Capillary Refill: Capillary refill takes less than 2 seconds. Coloration: Skin is not pale. Findings: No bruising, erythema or rash. Neurological:      General: No focal deficit present. Mental Status: She is alert and oriented to person, place, and time. Mental status is at baseline. She is not disoriented. GCS: GCS eye subscore is 4. GCS verbal subscore is 5. GCS motor subscore is 6. Cranial Nerves: No cranial nerve deficit. Sensory: Sensory deficit (left lower extremity) present. Motor: No abnormal muscle tone. Coordination: Coordination normal.      Deep Tendon Reflexes: Reflexes normal.   Psychiatric:         Speech: Speech normal.         Behavior: Behavior normal.         Thought Content: Thought content normal.         Judgment: Judgment normal.         No results found for requested labs within last 30 days.      Hemoglobin A1C (%)   Date Value   06/06/2019 5.6     Microscopic Examination (no units)   Date Value   01/24/2018 YES     LDL Calculated (mg/dL)   Date Value   06/06/2019 104       Lab Results   Component Value Date    WBC 6.6 11/14/2019    NEUTROABS 4.3 11/14/2019    HGB 12.5 11/14/2019    HCT 38.1 11/14/2019    MCV 96.9 11/14/2019  11/14/2019     Lab Results   Component Value Date    TSH 1.62 02/02/2017        ASSESSMENT/PLAN:     Angella Sharma was seen today for foot swelling. Diagnoses and all orders for this visit:    Left leg pain  -     Cancel: US DUP LOWER EXTREMITY LEFT ARTERIES; Future  -     US DUP LOWER EXTREMITY LEFT GAYLE; Future    Stable angina (HCC)  -     nitroGLYCERIN (NITROSTAT) 0.4 MG SL tablet; Place 1 tablet under the tongue every 5 minutes as needed for Chest pain    Panic attack  -     clonazePAM (KLONOPIN) 2 MG tablet; Take 1 tablet by mouth nightly as needed (INSOMNIA) for up to 30 days. Insomnia, unspecified type  -     clonazePAM (KLONOPIN) 2 MG tablet; Take 1 tablet by mouth nightly as needed (INSOMNIA) for up to 30 days. Hypothyroidism, unspecified type  -     levothyroxine (SYNTHROID) 75 MCG tablet; Take 1 tablet by mouth Daily    Other specified symptoms and signs involving the circulatory and respiratory systems   -     Cancel: US DUP LOWER EXTREMITY LEFT ARTERIES; Future    Cough  -     XR CHEST STANDARD (2 VW); Future    B12 deficiency  -     cyanocobalamin injection 1,000 mcg           Administrations This Visit     cyanocobalamin injection 1,000 mcg     Admin Date  07/08/2020 Action  Given Dose  1000 mcg Route  Intramuscular Administered By  Emily Us MA                Controlled Substances Monitoring:     RX Monitoring 7/16/2020   Attestation The Prescription Monitoring Report for this patient was reviewed today. Periodic Controlled Substance Monitoring -   Chronic Pain > 80 MEDD -        PATIENT COUNSELING     Counseling was provided today regarding the following topics: Healthy eating habits, Regular exercise, substance abuse and healthy sleep habits. Discussed use, benefit, and side effects of prescribed medications. Barriers to medication compliance addressed. All patient questions answered. Patient voiced understanding.      Medications Discontinued During This Encounter Medication Reason    ELIQUIS 5 MG TABS tablet Therapy completed    lactulose (CHRONULAC) 10 GM/15ML solution Patient Choice    nitroGLYCERIN (NITROSTAT) 0.4 MG SL tablet REORDER    clonazePAM (KLONOPIN) 1 MG tablet     levothyroxine (SYNTHROID) 75 MCG tablet        Return in about 3 months (around 10/8/2020). TATYANA Nguyen CNP     Education was provided for discussed topics. Call the office with worsening complaints or any side effects to any medications. If an emergency please call 911. Please note: This chart was generated using Dragon dictation software. Although every effort was made to ensure the accuracy of this automated transcription, some errors in transcription may have occurred.

## 2020-07-09 ENCOUNTER — HOSPITAL ENCOUNTER (OUTPATIENT)
Dept: GENERAL RADIOLOGY | Facility: HOSPITAL | Age: 75
Discharge: HOME | End: 2020-07-09
Payer: MEDICARE

## 2020-07-09 ENCOUNTER — HOSPITAL ENCOUNTER (OUTPATIENT)
Dept: ULTRASOUND IMAGING | Facility: HOSPITAL | Age: 75
Discharge: HOME | End: 2020-07-09
Payer: MEDICARE

## 2020-07-09 PROCEDURE — 93971 EXTREMITY STUDY: CPT

## 2020-07-09 PROCEDURE — 71046 X-RAY EXAM CHEST 2 VIEWS: CPT

## 2020-07-16 ASSESSMENT — ENCOUNTER SYMPTOMS
VOMITING: 0
SORE THROAT: 0
EYE PAIN: 0
COLOR CHANGE: 0
NAUSEA: 0
EYE REDNESS: 0
BACK PAIN: 0
CHEST TIGHTNESS: 0
DIARRHEA: 0
SHORTNESS OF BREATH: 0
COUGH: 0
TROUBLE SWALLOWING: 0
ABDOMINAL PAIN: 0

## 2020-07-21 ENCOUNTER — OFFICE VISIT (OUTPATIENT)
Dept: FAMILY MEDICINE CLINIC | Age: 75
End: 2020-07-21
Payer: MEDICARE

## 2020-07-21 VITALS
HEART RATE: 80 BPM | DIASTOLIC BLOOD PRESSURE: 84 MMHG | BODY MASS INDEX: 32.96 KG/M2 | SYSTOLIC BLOOD PRESSURE: 188 MMHG | HEIGHT: 63 IN | OXYGEN SATURATION: 98 % | WEIGHT: 186 LBS | RESPIRATION RATE: 20 BRPM

## 2020-07-21 LAB
BILIRUBIN, POC: NEGATIVE
BLOOD URINE, POC: NORMAL
CLARITY, POC: CLEAR
COLOR, POC: YELLOW
GLUCOSE URINE, POC: NEGATIVE
KETONES, POC: NEGATIVE
LEUKOCYTE EST, POC: NORMAL
NITRITE, POC: NEGATIVE
PH, POC: 7.5
PROTEIN, POC: NEGATIVE
SPECIFIC GRAVITY, POC: 1.01
UROBILINOGEN, POC: 0.2

## 2020-07-21 PROCEDURE — 81002 URINALYSIS NONAUTO W/O SCOPE: CPT | Performed by: NURSE PRACTITIONER

## 2020-07-21 PROCEDURE — G8400 PT W/DXA NO RESULTS DOC: HCPCS | Performed by: NURSE PRACTITIONER

## 2020-07-21 PROCEDURE — 3017F COLORECTAL CA SCREEN DOC REV: CPT | Performed by: NURSE PRACTITIONER

## 2020-07-21 PROCEDURE — G8427 DOCREV CUR MEDS BY ELIG CLIN: HCPCS | Performed by: NURSE PRACTITIONER

## 2020-07-21 PROCEDURE — 1036F TOBACCO NON-USER: CPT | Performed by: NURSE PRACTITIONER

## 2020-07-21 PROCEDURE — 99214 OFFICE O/P EST MOD 30 MIN: CPT | Performed by: NURSE PRACTITIONER

## 2020-07-21 PROCEDURE — G8417 CALC BMI ABV UP PARAM F/U: HCPCS | Performed by: NURSE PRACTITIONER

## 2020-07-21 PROCEDURE — 4040F PNEUMOC VAC/ADMIN/RCVD: CPT | Performed by: NURSE PRACTITIONER

## 2020-07-21 PROCEDURE — 1123F ACP DISCUSS/DSCN MKR DOCD: CPT | Performed by: NURSE PRACTITIONER

## 2020-07-21 PROCEDURE — 1090F PRES/ABSN URINE INCON ASSESS: CPT | Performed by: NURSE PRACTITIONER

## 2020-07-21 PROCEDURE — 96372 THER/PROPH/DIAG INJ SC/IM: CPT | Performed by: NURSE PRACTITIONER

## 2020-07-21 RX ORDER — KETOROLAC TROMETHAMINE 30 MG/ML
60 INJECTION, SOLUTION INTRAMUSCULAR; INTRAVENOUS ONCE
Status: COMPLETED | OUTPATIENT
Start: 2020-07-21 | End: 2020-07-21

## 2020-07-21 RX ORDER — METHYLPREDNISOLONE SODIUM SUCCINATE 125 MG/2ML
125 INJECTION, POWDER, LYOPHILIZED, FOR SOLUTION INTRAMUSCULAR; INTRAVENOUS ONCE
Status: COMPLETED | OUTPATIENT
Start: 2020-07-21 | End: 2020-07-23

## 2020-07-21 RX ORDER — ZOLPIDEM TARTRATE 5 MG/1
5 TABLET ORAL NIGHTLY PRN
Qty: 7 TABLET | Refills: 0 | Status: SHIPPED | OUTPATIENT
Start: 2020-07-21 | End: 2020-07-28

## 2020-07-21 RX ORDER — FLUCONAZOLE 150 MG/1
150 TABLET ORAL DAILY
Qty: 3 TABLET | Refills: 0 | Status: SHIPPED | OUTPATIENT
Start: 2020-07-21 | End: 2020-07-24

## 2020-07-21 RX ORDER — CEFTRIAXONE 1 G/1
1 INJECTION, POWDER, FOR SOLUTION INTRAMUSCULAR; INTRAVENOUS ONCE
Status: COMPLETED | OUTPATIENT
Start: 2020-07-21 | End: 2020-07-21

## 2020-07-21 RX ORDER — NYSTATIN 100000 U/G
CREAM TOPICAL
Qty: 30 G | Refills: 0 | Status: SHIPPED | OUTPATIENT
Start: 2020-07-21 | End: 2020-10-06 | Stop reason: ALTCHOICE

## 2020-07-21 RX ADMIN — CEFTRIAXONE 1 G: 1 INJECTION, POWDER, FOR SOLUTION INTRAMUSCULAR; INTRAVENOUS at 15:51

## 2020-07-21 RX ADMIN — KETOROLAC TROMETHAMINE 60 MG: 30 INJECTION, SOLUTION INTRAMUSCULAR; INTRAVENOUS at 15:53

## 2020-07-21 ASSESSMENT — PATIENT HEALTH QUESTIONNAIRE - PHQ9
SUM OF ALL RESPONSES TO PHQ9 QUESTIONS 1 & 2: 0
SUM OF ALL RESPONSES TO PHQ QUESTIONS 1-9: 0
1. LITTLE INTEREST OR PLEASURE IN DOING THINGS: 0
2. FEELING DOWN, DEPRESSED OR HOPELESS: 0
SUM OF ALL RESPONSES TO PHQ QUESTIONS 1-9: 0

## 2020-07-21 NOTE — PROGRESS NOTES
SUBJECTIVE:    Patient ID: Lidia Talbot is a 70 y.o. female. Chief Complaint   Patient presents with    Back Pain     mid upper back - pain radiates up to her neck and around to her chest     Numbness     left arm intermittent numbness    Urinary Frequency     day and night - \"up 8 times last night to go\"     Hypertension       Patient presents to the clinic today with multiple complaints. Patient c/o Back Pain (mid upper back - pain radiates up to her neck and around to her chest ); Numbness (left arm intermittent numbness); Urinary Frequency (day and night - \"up 8 times last night to go\" ); and Hypertension. Patient is somewhat noncomplaint with medications for nerve pain and HTN. Patient is a very anxious person and has a lot going on at home she reports. Patient has had full cardiology workup. Blood pressure somewhat elevated today but she hasnt taken her BP medications.         Active Ambulatory Problems     Diagnosis Date Noted    HTN (hypertension) 05/20/2015    Hypothyroidism 05/20/2015    Chest pain 05/20/2015    Elevated serum creatinine 05/21/2015    Headache 05/21/2015    Nausea and vomiting in adult 05/21/2015    Hypertensive urgency 05/22/2015    Pre-syncope 02/03/2017    BPV (benign positional vertigo) 02/03/2017    GERD (gastroesophageal reflux disease) 02/03/2017    Epigastric abdominal pain 02/03/2017    Hypoxia 05/02/2017    Nausea vomiting and diarrhea 05/02/2017    Essential hypertension 05/02/2017    Lung nodule 05/02/2017    Acute pancreatitis 05/03/2017    Hematochezia     Irritable bowel syndrome with diarrhea 12/10/2017    Stable angina ([address] 12/10/2017    Fibrocystic breast disease (FCBD) 12/10/2017    Grief 06/24/2018    Esophageal dysphagia 06/27/2018    Breast pain, left 06/27/2018    Muscle spasm 06/27/2018    Esophageal stricture 06/27/2018    Breast density 06/27/2018    Scar painful 06/27/2018    Anxiety 10/27/2018    Moderate episode of recurrent major depressive disorder ([facility] 10/27/2018    Panic attacks 10/27/2018    Liver lesion 10/27/2018    Fatty pancreas 10/27/2018    Abnormal CT of the abdomen 10/27/2018    Chronic bronchitis ([facility] 12/31/2018    Gastroparesis 12/31/2018    Hemangioma 02/26/2019    Abnormal findings on diagnostic imaging of liver 02/26/2019    Thoracic back pain 02/26/2019    Weakness present 02/26/2019    B12 deficiency 03/12/2019     Resolved Ambulatory Problems     Diagnosis Date Noted    Acute cystitis without hematuria 02/03/2017    Encounter for screening colonoscopy 02/03/2017    Bleeds easily ([facility] 12/31/2018    Syncope due to orthostatic hypotension 02/26/2019    Deep vein thrombosis of lower extremity ([facility] 07/21/2019     Past Medical History:   Diagnosis Date    [facility]     Headache(784.0)     Hypertension     Hypothyroid 5/20/2015    MI, old     Pneumonia     Stomach ulcer     Thyroid disease      Allergies   Allergen Reactions    Iv Dye [Iodides] Hives, Shortness Of Breath and Other (See Comments)     Pt also passed out      Azithromycin Rash    Augmentin [Amoxicillin-Pot Clavulanate]     Codeine Hives    Influenza Vaccines      Patient couldn't remember the reaction att.  Mucinex [Guaifenesin Er]      Severe nausea    Reglan [Metoclopramide] Itching     welps      Past Surgical History:   Procedure Laterality Date    APPENDECTOMY      CHOLECYSTECTOMY      LIVER SURGERY      UPPER GASTROINTESTINAL ENDOSCOPY        History reviewed. No pertinent family history. Patient's medications, allergies, past medical, surgical, social and family histories were reviewed and updated as appropriate. Current Outpatient Medications on File Prior to Visit   Medication Sig Dispense Refill    clonazePAM (KLONOPIN) 2 MG tablet Take 1 tablet by mouth nightly as needed (INSOMNIA) for up to 30 days.  30 tablet 0    levothyroxine (SYNTHROID) 75 MCG tablet Take 1 tablet by mouth Daily 90 tablet 3    pantoprazole (PROTONIX) 20 MG tablet TAKE ONE TABLET BY MOUTH TWICE A  tablet 1    lisinopril-hydrochlorothiazide (PRINZIDE;ZESTORETIC) 20-25 MG per tablet Take 1 tablet by mouth daily 30 tablet 2    ondansetron (ZOFRAN) 4 MG tablet Take 1 tablet by mouth every 8 hours as needed for Nausea or Vomiting 30 tablet 5    nitroGLYCERIN (NITROSTAT) 0.4 MG SL tablet Place 1 tablet under the tongue every 5 minutes as needed for Chest pain (Patient not taking: Reported on 7/21/2020) 25 tablet 3    bisacodyl (DULCOLAX) 10 MG suppository Place 1 suppository rectally daily (Patient not taking: Reported on 7/8/2020) 30 suppository 0    senna-docusate (PERICOLACE) 8.6-50 MG per tablet Take 2 tablets by mouth daily as needed for Constipation (Patient not taking: Reported on 7/21/2020) 16 tablet 0     Current Facility-Administered Medications on File Prior to Visit   Medication Dose Route Frequency Provider Last Rate Last Dose    0.9 % sodium chloride infusion   Intravenous Once N[place] TATYANA Rider - CNP   Stopped at 07/23/19 1530    cyanocobalamin injection 1,000 mcg  1,000 mcg Intramuscular Once [employer] DepNEO alanisN           Review of Systems   Constitutional: Negative for activity change, appetite change, chills and fever. HENT: Negative for congestion, ear pain, nosebleeds, sore throat and trouble swallowing. Eyes: Negative for pain and redness. Respiratory: Positive for apnea (at rest) and chest tightness. Negative for cough and shortness of breath. Cardiovascular: Negative for chest pain, palpitations and leg swelling. Gastrointestinal: Negative for abdominal pain, diarrhea, nausea and vomiting. Genitourinary: Positive for frequency. Negative for difficulty urinating, dysuria and flank pain. Musculoskeletal: Positive for arthralgias and back pain. Negative for gait problem. Skin: Negative for color change, pallor, rash and wound.    Allergic/Immunologic: Negative for environmental allergies and food allergies. Neurological: Negative for dizziness, numbness and headaches. Hematological: Negative for adenopathy. Does not bruise/bleed easily. Psychiatric/Behavioral: Positive for sleep disturbance. Negative for agitation. The patient is nervous/anxious. OBJECTIVE:  BP (!) 188/84   Pulse 80   Resp 20   Ht 5' 3\" (1.6 m)   Wt 186 lb (84.4 kg)   SpO2 98% Comment: room air  Breastfeeding No   BMI 32.95 kg/m²       Physical Exam  Vitals signs and nursing note reviewed. Constitutional:       General: She is not in acute distress. Appearance: Normal appearance. She is well-developed. She is not ill-appearing, toxic-appearing or diaphoretic. HENT:      Head: Normocephalic and atraumatic. Right Ear: Hearing, tympanic membrane, ear canal and external ear normal.      Left Ear: Hearing, tympanic membrane, ear canal and external ear normal.      Nose: Nose normal. No laceration, mucosal edema or rhinorrhea. Right Sinus: No maxillary sinus tenderness or frontal sinus tenderness. Left Sinus: No maxillary sinus tenderness or frontal sinus tenderness. Mouth/Throat:      Dentition: Normal dentition. No dental caries. Pharynx: Uvula midline. Tonsils: No tonsillar exudate. Eyes:      General: Lids are normal. No scleral icterus. Right eye: No discharge. Left eye: No discharge. Conjunctiva/sclera: Conjunctivae normal.      Pupils: Pupils are equal, round, and reactive to light. Neck:      Musculoskeletal: Full passive range of motion without pain, normal range of motion and neck supple. Thyroid: No thyroid mass or thyromegaly. Vascular: Normal carotid pulses. No carotid bruit, hepatojugular reflux or JVD. Trachea: Trachea and phonation normal. No tracheal tenderness or tracheal deviation. Cardiovascular:      Rate and Rhythm: Normal rate and regular rhythm. Pulses: Normal pulses. Heart sounds: Normal heart sounds, S1 normal and S2 normal. Heart sounds not distant. No murmur. No friction rub. No gallop. Pulmonary:      Effort: Pulmonary effort is normal. No tachypnea, bradypnea or accessory muscle usage. Breath sounds: Normal breath sounds. No stridor. No decreased breath sounds, wheezing, rhonchi or rales. Chest:      Chest wall: No tenderness. Abdominal:      General: Bowel sounds are normal. There is no distension. Palpations: Abdomen is soft. There is no hepatomegaly, splenomegaly or mass. Tenderness: There is no abdominal tenderness. There is no guarding or rebound. Hernia: No hernia is present. Musculoskeletal:         General: No deformity. Lumbar back: She exhibits decreased range of motion, tenderness, bony tenderness, pain and spasm. Lymphadenopathy:      Cervical: No cervical adenopathy. Skin:     General: Skin is warm and dry. Capillary Refill: Capillary refill takes less than 2 seconds. Coloration: Skin is not pale. Findings: No bruising, erythema or rash. Neurological:      Mental Status: She is alert and oriented to person, place, and time. She is not disoriented. GCS: GCS eye subscore is 4. GCS verbal subscore is 5. GCS motor subscore is 6. Cranial Nerves: No cranial nerve deficit. Sensory: No sensory deficit. Motor: No abnormal muscle tone. Coordination: Coordination normal.      Deep Tendon Reflexes: Reflexes normal.   Psychiatric:         Attention and Perception: Attention normal.         Mood and Affect: Mood is anxious. Speech: Speech normal.         Behavior: Behavior normal.         Thought Content: Thought content normal.         Cognition and Memory: Cognition normal.         Judgment: Judgment normal.         No results found for requested labs within last 30 days.      Hemoglobin A1C (%)   Date Value   06/06/2019 5.6     Microscopic Examination (no units)   Date Value 01/24/2018 YES     LDL Calculated (mg/dL)   Date Value   06/06/2019 104       Lab Results   Component Value Date    WBC 6.6 11/14/2019    NEUTROABS 4.3 11/14/2019    HGB 12.5 11/14/2019    HCT 38.1 11/14/2019    MCV 96.9 11/14/2019     11/14/2019     Lab Results   Component Value Date    TSH 1.62 02/02/2017        ASSESSMENT/PLAN:     Norm Klinefelter was seen today for back pain, numbness, urinary frequency and hypertension. Diagnoses and all orders for this visit:    Chest pain, unspecified type  -     EKG 12 Lead  -     ketorolac (TORADOL) injection 60 mg  -     methylPREDNISolone sodium (SOLU-MEDROL) injection 125 mg    Urinary frequency  -     POCT Urinalysis no Micro  -     fluconazole (DIFLUCAN) 150 MG tablet; Take 1 tablet by mouth daily for 3 days    Atypical mole  -     External Referral To Dermatology    At risk for sleep apnea  -     External Referral To Sleep Studies    Insomnia, unspecified type  -     zolpidem (AMBIEN) 5 MG tablet; Take 1 tablet by mouth nightly as needed for Sleep for up to 7 days. Other orders  -     cefTRIAXone (ROCEPHIN) injection 1 g  -     nystatin (MYCOSTATIN) 363384 UNIT/GM cream; Apply topically 2 times daily. Administrations This Visit     cefTRIAXone (ROCEPHIN) injection 1 g     Admin Date  07/21/2020 Action  Given Dose  1 g Route  Intramuscular Administered By  Lisa Medrano          ketorolac (TORADOL) injection 60 mg     Admin Date  07/21/2020 Action  Given Dose  60 mg Route  Intramuscular Administered By  Bunny Hawley                Controlled Substances Monitoring:     RX Monitoring 7/16/2020   Attestation The Prescription Monitoring Report for this patient was reviewed today. Periodic Controlled Substance Monitoring -   Chronic Pain > 80 MEDD -        PATIENT COUNSELING     Counseling was provided today regarding the following topics: Healthy eating habits, Regular exercise, substance abuse and healthy sleep habits.      Discussed use, benefit, and side effects of prescribed medications. Barriers to medication compliance addressed. All patient questions answered. Patient voiced understanding. Medications Discontinued During This Encounter   Medication Reason    gabapentin (NEURONTIN) 100 MG capsule Side effects    methylPREDNISolone sodium (SOLU-MEDROL) injection 40 mg        Return in about 3 months (around 10/21/2020), or if symptoms worsen or fail to improve. TATYANA Condon - CNP     Education was provided for discussed topics. Call the office with worsening complaints or any side effects to any medications. If an emergency please call 911. Please note: This chart was generated using Dragon dictation software. Although every effort was made to ensure the accuracy of this automated transcription, some errors in transcription may have occurred.

## 2020-07-23 ENCOUNTER — NURSE ONLY (OUTPATIENT)
Dept: FAMILY MEDICINE CLINIC | Age: 75
End: 2020-07-23
Payer: MEDICARE

## 2020-07-23 PROCEDURE — 96372 THER/PROPH/DIAG INJ SC/IM: CPT | Performed by: NURSE PRACTITIONER

## 2020-07-23 RX ADMIN — METHYLPREDNISOLONE SODIUM SUCCINATE 125 MG: 125 INJECTION, POWDER, LYOPHILIZED, FOR SOLUTION INTRAMUSCULAR; INTRAVENOUS at 13:34

## 2020-07-23 ASSESSMENT — ENCOUNTER SYMPTOMS
SHORTNESS OF BREATH: 0
ABDOMINAL PAIN: 0
CHEST TIGHTNESS: 1
SORE THROAT: 0
TROUBLE SWALLOWING: 0
EYE PAIN: 0
COLOR CHANGE: 0
DIARRHEA: 0
VOMITING: 0
NAUSEA: 0
BACK PAIN: 1
EYE REDNESS: 0
COUGH: 0
APNEA: 1

## 2020-07-23 NOTE — PROGRESS NOTES
Administrations This Visit     methylPREDNISolone sodium (SOLU-MEDROL) injection 125 mg     Admin Date  07/23/2020  13:34 Action  Given Dose  125 mg Route  Intravenous Site  Dorsogluteal Left Administered By  Jd Segovia    Ordering Provider:  TATYANA Jean CNP    NDC:  3889-1020-27    Lot#:  [id number]    :  Chad Maldonado.     Patient Supplied?:  No

## 2020-08-03 ENCOUNTER — TELEPHONE (OUTPATIENT)
Dept: FAMILY MEDICINE CLINIC | Age: 75
End: 2020-08-03

## 2020-08-03 NOTE — TELEPHONE ENCOUNTER
Patient's referral, along with all pertinent medical records faxed to the attention of [facility] on 08/03/20, they will contact the patient and our office with appointment date/time/location.

## 2020-08-04 ENCOUNTER — OFFICE VISIT (OUTPATIENT)
Dept: FAMILY MEDICINE CLINIC | Age: 75
End: 2020-08-04
Payer: MEDICARE

## 2020-08-04 VITALS
WEIGHT: 189 LBS | DIASTOLIC BLOOD PRESSURE: 80 MMHG | HEART RATE: 80 BPM | RESPIRATION RATE: 18 BRPM | BODY MASS INDEX: 33.49 KG/M2 | SYSTOLIC BLOOD PRESSURE: 176 MMHG | HEIGHT: 63 IN

## 2020-08-04 PROCEDURE — 1090F PRES/ABSN URINE INCON ASSESS: CPT | Performed by: NURSE PRACTITIONER

## 2020-08-04 PROCEDURE — 99213 OFFICE O/P EST LOW 20 MIN: CPT | Performed by: NURSE PRACTITIONER

## 2020-08-04 PROCEDURE — G8427 DOCREV CUR MEDS BY ELIG CLIN: HCPCS | Performed by: NURSE PRACTITIONER

## 2020-08-04 PROCEDURE — 1123F ACP DISCUSS/DSCN MKR DOCD: CPT | Performed by: NURSE PRACTITIONER

## 2020-08-04 PROCEDURE — G8400 PT W/DXA NO RESULTS DOC: HCPCS | Performed by: NURSE PRACTITIONER

## 2020-08-04 PROCEDURE — 4040F PNEUMOC VAC/ADMIN/RCVD: CPT | Performed by: NURSE PRACTITIONER

## 2020-08-04 PROCEDURE — G8417 CALC BMI ABV UP PARAM F/U: HCPCS | Performed by: NURSE PRACTITIONER

## 2020-08-04 PROCEDURE — 3017F COLORECTAL CA SCREEN DOC REV: CPT | Performed by: NURSE PRACTITIONER

## 2020-08-04 PROCEDURE — 1036F TOBACCO NON-USER: CPT | Performed by: NURSE PRACTITIONER

## 2020-08-04 RX ORDER — PANTOPRAZOLE SODIUM 40 MG/1
40 TABLET, DELAYED RELEASE ORAL 2 TIMES DAILY
Qty: 60 TABLET | Refills: 0
Start: 2020-08-04 | End: 2020-10-06 | Stop reason: SDUPTHER

## 2020-08-04 ASSESSMENT — PATIENT HEALTH QUESTIONNAIRE - PHQ9
SUM OF ALL RESPONSES TO PHQ QUESTIONS 1-9: 0
SUM OF ALL RESPONSES TO PHQ QUESTIONS 1-9: 0
1. LITTLE INTEREST OR PLEASURE IN DOING THINGS: 0
2. FEELING DOWN, DEPRESSED OR HOPELESS: 0
SUM OF ALL RESPONSES TO PHQ9 QUESTIONS 1 & 2: 0

## 2020-08-04 NOTE — PROGRESS NOTES
SUBJECTIVE:    Patient ID: Raul Castillo is a 70 y.o. female. Chief Complaint   Patient presents with    Shortness of Breath     awaiting sleep study appointment      Hypertension    Rash     resolved - was treated at ER in [address] [place] [address] W Abdominal Pain       Patient presents to the clinic today to f/u on Shortness of Breath (awaiting sleep study appointment  ); Hypertension; Rash (resolved - was treated at ER in [facility] from gas leaking on her at gas station pump); and chronic Abdominal Pain. Patient has multiple concerns. She reports while in the ER the MD noticed she was a little apneic and asked her if she had sleep apnea. She informed him that she was pending her sleep study referral. Patients rash has resolved. BP today. Requesting CT of abd due to increased abd pain and cramping. No constipation reported.              Active Ambulatory Problems     Diagnosis Date Noted    HTN (hypertension) 05/20/2015    Hypothyroidism 05/20/2015    Chest pain 05/20/2015    Elevated serum creatinine 05/21/2015    Headache 05/21/2015    Nausea and vomiting in adult 05/21/2015    Hypertensive urgency 05/22/2015    Pre-syncope 02/03/2017    BPV (benign positional vertigo) 02/03/2017    GERD (gastroesophageal reflux disease) 02/03/2017    Epigastric abdominal pain 02/03/2017    Hypoxia 05/02/2017    Nausea vomiting and diarrhea 05/02/2017    Essential hypertension 05/02/2017    Lung nodule 05/02/2017    Acute pancreatitis 05/03/2017    Hematochezia     Irritable bowel syndrome with diarrhea 12/10/2017    Stable angina ([address] 12/10/2017    Fibrocystic breast disease (FCBD) 12/10/2017    Grief 06/24/2018    Esophageal dysphagia 06/27/2018    Breast pain, left 06/27/2018    Muscle spasm 06/27/2018    Esophageal stricture 06/27/2018    Breast density 06/27/2018    Scar painful 06/27/2018    Anxiety 10/27/2018    Moderate episode of recurrent major depressive disorder ([address] 10/27/2018    Panic attacks 10/27/2018    Liver lesion 10/27/2018    Fatty pancreas 10/27/2018    Abnormal CT of the abdomen 10/27/2018    Chronic bronchitis ([address] 12/31/2018    Gastroparesis 12/31/2018    Hemangioma 02/26/2019    Abnormal findings on diagnostic imaging of liver 02/26/2019    Thoracic back pain 02/26/2019    Weakness present 02/26/2019    B12 deficiency 03/12/2019     Resolved Ambulatory Problems     Diagnosis Date Noted    Acute cystitis without hematuria 02/03/2017    Encounter for screening colonoscopy 02/03/2017    Bleeds easily ([address] 12/31/2018    Syncope due to orthostatic hypotension 02/26/2019    Deep vein thrombosis of lower extremity ([address] 07/21/2019     Past Medical History:   Diagnosis Date    DVT (deep venous thrombosis) ([address]     Headache(784.0)     Hypertension     Hypothyroid 5/20/2015    MI, old     Pneumonia     Stomach ulcer     Thyroid disease      Allergies   Allergen Reactions    Iv Dye [Iodides] Hives, Shortness Of Breath and Other (See Comments)     Pt also passed out      Azithromycin Rash    Augmentin [Amoxicillin-Pot Clavulanate]     Codeine Hives    Influenza Vaccines      Patient couldn't remember the reaction att.  Mucinex [Guaifenesin Er]      Severe nausea    Reglan [Metoclopramide] Itching     welps      Past Surgical History:   Procedure Laterality Date    APPENDECTOMY      CHOLECYSTECTOMY      LIVER SURGERY      UPPER GASTROINTESTINAL ENDOSCOPY        History reviewed. No pertinent family history. Patient's medications, allergies, past medical, surgical, social and family histories were reviewed and updated as appropriate. Current Outpatient Medications on File Prior to Visit   Medication Sig Dispense Refill    clonazePAM (KLONOPIN) 2 MG tablet Take 1 tablet by mouth nightly as needed (INSOMNIA) for up to 30 days.  30 tablet 0    levothyroxine (SYNTHROID) 75 MCG tablet Take 1 tablet by mouth Daily 90 tablet 3    lisinopril-hydrochlorothiazide (PRINZIDE;ZESTORETIC) 20-25 MG per tablet Take 1 tablet by mouth daily 30 tablet 2    ondansetron (ZOFRAN) 4 MG tablet Take 1 tablet by mouth every 8 hours as needed for Nausea or Vomiting 30 tablet 5    nystatin (MYCOSTATIN) 539130 UNIT/GM cream Apply topically 2 times daily. (Patient not taking: Reported on 8/4/2020) 30 g 0    nitroGLYCERIN (NITROSTAT) 0.4 MG SL tablet Place 1 tablet under the tongue every 5 minutes as needed for Chest pain (Patient not taking: Reported on 7/21/2020) 25 tablet 3    bisacodyl (DULCOLAX) 10 MG suppository Place 1 suppository rectally daily (Patient not taking: Reported on 7/8/2020) 30 suppository 0    senna-docusate (PERICOLACE) 8.6-50 MG per tablet Take 2 tablets by mouth daily as needed for Constipation (Patient not taking: Reported on 7/21/2020) 16 tablet 0     Current Facility-Administered Medications on File Prior to Visit   Medication Dose Route Frequency Provider Last Rate Last Dose    0.9 % sodium chloride infusion   Intravenous Once TATYANA Villar - CNP   Stopped at 07/23/19 1530    cyanocobalamin injection 1,000 mcg  1,000 mcg Intramuscular Once TATYANA Morris           Review of Systems   Constitutional: Negative for activity change, appetite change, chills and fever. HENT: Negative for congestion, ear pain, nosebleeds, sore throat and trouble swallowing. Eyes: Negative for pain and redness. Respiratory: Negative for cough, chest tightness and shortness of breath. Cardiovascular: Negative for chest pain, palpitations and leg swelling. Gastrointestinal: Positive for abdominal pain (at times. none today). Negative for diarrhea, nausea and vomiting. Heartburn   Genitourinary: Negative for difficulty urinating, dysuria and flank pain. Musculoskeletal: Positive for back pain (chronic). Negative for arthralgias and gait problem. Skin: Negative for color change, pallor, rash and wound. Allergic/Immunologic: Negative for environmental allergies and food allergies. Neurological: Negative for dizziness, numbness and headaches. Hematological: Negative for adenopathy. Does not bruise/bleed easily. Psychiatric/Behavioral: Negative for agitation and sleep disturbance. The patient is nervous/anxious. OBJECTIVE:  BP (!) 176/80   Pulse 80   Resp 18   Ht 5' 3\" (1.6 m)   Wt 189 lb (85.7 kg)   Breastfeeding No   BMI 33.48 kg/m²        Physical Exam  Vitals signs and nursing note reviewed. Constitutional:       General: She is not in acute distress. Appearance: Normal appearance. She is well-developed. She is not ill-appearing, toxic-appearing or diaphoretic. HENT:      Head: Normocephalic and atraumatic. Right Ear: Hearing, tympanic membrane, ear canal and external ear normal.      Left Ear: Hearing, tympanic membrane, ear canal and external ear normal.      Nose: Nose normal. No laceration, mucosal edema or rhinorrhea. Right Sinus: No maxillary sinus tenderness or frontal sinus tenderness. Left Sinus: No maxillary sinus tenderness or frontal sinus tenderness. Mouth/Throat:      Dentition: Normal dentition. No dental caries. Pharynx: Uvula midline. Tonsils: No tonsillar exudate. Eyes:      General: Lids are normal. No scleral icterus. Right eye: No discharge. Left eye: No discharge. Conjunctiva/sclera: Conjunctivae normal.      Pupils: Pupils are equal, round, and reactive to light. Neck:      Musculoskeletal: Full passive range of motion without pain, normal range of motion and neck supple. Thyroid: No thyroid mass or thyromegaly. Vascular: Normal carotid pulses. No carotid bruit, hepatojugular reflux or JVD. Trachea: Trachea and phonation normal. No tracheal tenderness or tracheal deviation. Cardiovascular:      Rate and Rhythm: Normal rate and regular rhythm. Pulses: Normal pulses.       Heart sounds: Normal heart sounds, S1 normal and S2 normal. Heart sounds not distant. No murmur. No friction rub. No gallop. Pulmonary:      Effort: Pulmonary effort is normal. No tachypnea, bradypnea or accessory muscle usage. Breath sounds: Normal breath sounds. No stridor. No decreased breath sounds, wheezing, rhonchi or rales. Chest:      Chest wall: No tenderness. Abdominal:      General: Bowel sounds are normal. There is no distension. Palpations: Abdomen is soft. There is no hepatomegaly, splenomegaly or mass. Tenderness: There is abdominal tenderness in the epigastric area and periumbilical area. There is no guarding or rebound. Hernia: A hernia is present. Musculoskeletal:         General: Tenderness present. No deformity. Lumbar back: She exhibits decreased range of motion, tenderness, bony tenderness and pain. Lymphadenopathy:      Cervical: No cervical adenopathy. Skin:     General: Skin is warm and dry. Capillary Refill: Capillary refill takes less than 2 seconds. Coloration: Skin is not pale. Findings: No bruising, erythema or rash. Neurological:      General: No focal deficit present. Mental Status: She is alert and oriented to person, place, and time. Mental status is at baseline. She is not disoriented. GCS: GCS eye subscore is 4. GCS verbal subscore is 5. GCS motor subscore is 6. Cranial Nerves: No cranial nerve deficit. Sensory: No sensory deficit. Motor: No abnormal muscle tone. Coordination: Coordination normal.      Deep Tendon Reflexes: Reflexes normal.   Psychiatric:         Speech: Speech normal.         Behavior: Behavior normal.         Thought Content: Thought content normal.         Judgment: Judgment normal.         No results found for requested labs within last 30 days.      Hemoglobin A1C (%)   Date Value   06/06/2019 5.6     Microscopic Examination (no units)   Date Value   01/24/2018 YES     LDL Calculated (mg/dL)   Date Value   06/06/2019 104       Lab Results   Component Value Date    WBC 6.6 11/14/2019    NEUTROABS 4.3 11/14/2019    HGB 12.5 11/14/2019    HCT 38.1 11/14/2019    MCV 96.9 11/14/2019     11/14/2019     Lab Results   Component Value Date    TSH 1.62 02/02/2017        ASSESSMENT/PLAN:     Clare Patterson was seen today for shortness of breath, hypertension, rash and abdominal pain. Diagnoses and all orders for this visit:    Generalized abdominal pain  -     CT ABDOMEN PELVIS WO CONTRAST Additional Contrast? None; Future    Gastroesophageal reflux disease, esophagitis presence not specified  -     pantoprazole (PROTONIX) 40 MG tablet; Take 1 tablet by mouth 2 times daily (Patient not taking: Reported on 8/4/2020)  -     CT ABDOMEN PELVIS WO CONTRAST Additional Contrast? None; Future               Controlled Substances Monitoring:     RX Monitoring 7/16/2020   Attestation The Prescription Monitoring Report for this patient was reviewed today. Periodic Controlled Substance Monitoring -   Chronic Pain > 80 MEDD -        PATIENT COUNSELING     Counseling was provided today regarding the following topics: Healthy eating habits, Regular exercise, substance abuse and healthy sleep habits. Discussed use, benefit, and side effects of prescribed medications. Barriers to medication compliance addressed. All patient questions answered. Patient voiced understanding. Medications Discontinued During This Encounter   Medication Reason    pantoprazole (PROTONIX) 20 MG tablet        Return in about 1 month (around 9/4/2020). TATYANA Mike - CNP     Education was provided for discussed topics. Call the office with worsening complaints or any side effects to any medications. If an emergency please call 911. Please note: This chart was generated using Dragon dictation software.  Although every effort was made to ensure the accuracy of this automated transcription, some errors in transcription may

## 2020-08-06 ASSESSMENT — ENCOUNTER SYMPTOMS
DIARRHEA: 0
SORE THROAT: 0
NAUSEA: 0
EYE PAIN: 0
EYE REDNESS: 0
TROUBLE SWALLOWING: 0
COUGH: 0
COLOR CHANGE: 0
VOMITING: 0
BACK PAIN: 1
CHEST TIGHTNESS: 0
SHORTNESS OF BREATH: 0
ABDOMINAL PAIN: 1

## 2020-09-15 ENCOUNTER — TELEPHONE (OUTPATIENT)
Dept: FAMILY MEDICINE CLINIC | Age: 75
End: 2020-09-15

## 2020-09-15 NOTE — TELEPHONE ENCOUNTER
Pt called and had to cancel her sick appt again and wants to r/s it   Please contact the patient    Thanks

## 2020-09-16 ENCOUNTER — HOSPITAL ENCOUNTER (OUTPATIENT)
Facility: HOSPITAL | Age: 75
Discharge: HOME | End: 2020-09-16
Payer: MEDICARE

## 2020-09-16 ENCOUNTER — OFFICE VISIT (OUTPATIENT)
Dept: FAMILY MEDICINE CLINIC | Age: 75
End: 2020-09-16
Payer: MEDICARE

## 2020-09-16 VITALS
HEIGHT: 63 IN | HEART RATE: 83 BPM | BODY MASS INDEX: 33.31 KG/M2 | OXYGEN SATURATION: 96 % | RESPIRATION RATE: 18 BRPM | SYSTOLIC BLOOD PRESSURE: 134 MMHG | WEIGHT: 188 LBS | DIASTOLIC BLOOD PRESSURE: 88 MMHG

## 2020-09-16 PROCEDURE — 80061 LIPID PANEL: CPT

## 2020-09-16 PROCEDURE — 84443 ASSAY THYROID STIM HORMONE: CPT

## 2020-09-16 PROCEDURE — 1090F PRES/ABSN URINE INCON ASSESS: CPT | Performed by: NURSE PRACTITIONER

## 2020-09-16 PROCEDURE — 1036F TOBACCO NON-USER: CPT | Performed by: NURSE PRACTITIONER

## 2020-09-16 PROCEDURE — 85025 COMPLETE CBC W/AUTO DIFF WBC: CPT

## 2020-09-16 PROCEDURE — 82746 ASSAY OF FOLIC ACID SERUM: CPT

## 2020-09-16 PROCEDURE — 1123F ACP DISCUSS/DSCN MKR DOCD: CPT | Performed by: NURSE PRACTITIONER

## 2020-09-16 PROCEDURE — G8400 PT W/DXA NO RESULTS DOC: HCPCS | Performed by: NURSE PRACTITIONER

## 2020-09-16 PROCEDURE — G8417 CALC BMI ABV UP PARAM F/U: HCPCS | Performed by: NURSE PRACTITIONER

## 2020-09-16 PROCEDURE — 3017F COLORECTAL CA SCREEN DOC REV: CPT | Performed by: NURSE PRACTITIONER

## 2020-09-16 PROCEDURE — 82306 VITAMIN D 25 HYDROXY: CPT

## 2020-09-16 PROCEDURE — 96372 THER/PROPH/DIAG INJ SC/IM: CPT | Performed by: NURSE PRACTITIONER

## 2020-09-16 PROCEDURE — 80053 COMPREHEN METABOLIC PANEL: CPT

## 2020-09-16 PROCEDURE — 82607 VITAMIN B-12: CPT

## 2020-09-16 PROCEDURE — 83036 HEMOGLOBIN GLYCOSYLATED A1C: CPT

## 2020-09-16 PROCEDURE — 4040F PNEUMOC VAC/ADMIN/RCVD: CPT | Performed by: NURSE PRACTITIONER

## 2020-09-16 PROCEDURE — G8427 DOCREV CUR MEDS BY ELIG CLIN: HCPCS | Performed by: NURSE PRACTITIONER

## 2020-09-16 PROCEDURE — 99214 OFFICE O/P EST MOD 30 MIN: CPT | Performed by: NURSE PRACTITIONER

## 2020-09-16 RX ORDER — LEVOTHYROXINE SODIUM 0.07 MG/1
75 TABLET ORAL DAILY
Qty: 90 TABLET | Refills: 3 | Status: SHIPPED | OUTPATIENT
Start: 2020-09-16 | End: 2021-03-30 | Stop reason: SDUPTHER

## 2020-09-16 RX ORDER — CYANOCOBALAMIN 1000 UG/ML
1000 INJECTION INTRAMUSCULAR; SUBCUTANEOUS ONCE
Status: COMPLETED | OUTPATIENT
Start: 2020-09-16 | End: 2020-09-16

## 2020-09-16 RX ORDER — PANTOPRAZOLE SODIUM 40 MG/1
40 TABLET, DELAYED RELEASE ORAL 2 TIMES DAILY
Qty: 60 TABLET | Refills: 0 | Status: CANCELLED | OUTPATIENT
Start: 2020-09-16

## 2020-09-16 RX ORDER — CLONAZEPAM 2 MG/1
2 TABLET ORAL NIGHTLY PRN
Qty: 30 TABLET | Refills: 0 | Status: SHIPPED | OUTPATIENT
Start: 2020-09-16 | End: 2020-10-06 | Stop reason: SDUPTHER

## 2020-09-16 RX ADMIN — CYANOCOBALAMIN 1000 MCG: 1000 INJECTION INTRAMUSCULAR; SUBCUTANEOUS at 15:37

## 2020-09-17 ENCOUNTER — TELEPHONE (OUTPATIENT)
Dept: FAMILY MEDICINE CLINIC | Age: 75
End: 2020-09-17

## 2020-09-17 LAB
A/G RATIO: 1.9 (ref 0.8–2)
ALBUMIN SERPL-MCNC: 4.5 G/DL (ref 3.4–4.8)
ALP BLD-CCNC: 104 U/L (ref 25–100)
ALT SERPL-CCNC: 13 U/L (ref 4–36)
ANION GAP SERPL CALCULATED.3IONS-SCNC: 11 MMOL/L (ref 3–16)
AST SERPL-CCNC: 18 U/L (ref 8–33)
BASOPHILS ABSOLUTE: 0 K/UL (ref 0–0.1)
BASOPHILS RELATIVE PERCENT: 0.3 %
BILIRUB SERPL-MCNC: 0.3 MG/DL (ref 0.3–1.2)
BUN BLDV-MCNC: 11 MG/DL (ref 6–20)
CALCIUM SERPL-MCNC: 9.8 MG/DL (ref 8.5–10.5)
CHLORIDE BLD-SCNC: 104 MMOL/L (ref 98–107)
CHOLESTEROL, TOTAL: 200 MG/DL (ref 0–200)
CO2: 28 MMOL/L (ref 20–30)
CREAT SERPL-MCNC: 0.7 MG/DL (ref 0.4–1.2)
EOSINOPHILS ABSOLUTE: 0.2 K/UL (ref 0–0.4)
EOSINOPHILS RELATIVE PERCENT: 1.9 %
FOLATE: 14.33 NG/ML
GFR AFRICAN AMERICAN: >59
GFR NON-AFRICAN AMERICAN: >60
GLOBULIN: 2.4 G/DL
GLUCOSE BLD-MCNC: 134 MG/DL (ref 74–106)
HBA1C MFR BLD: 6.1 %
HCT VFR BLD CALC: 44.1 % (ref 37–47)
HDLC SERPL-MCNC: 44 MG/DL (ref 40–60)
HEMOGLOBIN: 14.1 G/DL (ref 11.5–16.5)
IMMATURE GRANULOCYTES #: 0 K/UL
IMMATURE GRANULOCYTES %: 0.3 % (ref 0–5)
LDL CHOLESTEROL CALCULATED: 116 MG/DL
LYMPHOCYTES ABSOLUTE: 1.4 K/UL (ref 1.5–4)
LYMPHOCYTES RELATIVE PERCENT: 15.3 %
MCH RBC QN AUTO: 32 PG (ref 27–32)
MCHC RBC AUTO-ENTMCNC: 32 G/DL (ref 31–35)
MCV RBC AUTO: 100 FL (ref 80–100)
MONOCYTES ABSOLUTE: 0.6 K/UL (ref 0.2–0.8)
MONOCYTES RELATIVE PERCENT: 6 %
NEUTROPHILS ABSOLUTE: 7.1 K/UL (ref 2–7.5)
NEUTROPHILS RELATIVE PERCENT: 76.2 %
PDW BLD-RTO: 12.9 % (ref 11–16)
PLATELET # BLD: 277 K/UL (ref 150–400)
PMV BLD AUTO: 10.3 FL (ref 6–10)
POTASSIUM SERPL-SCNC: 4.3 MMOL/L (ref 3.4–5.1)
RBC # BLD: 4.41 M/UL (ref 3.8–5.8)
SODIUM BLD-SCNC: 143 MMOL/L (ref 136–145)
TOTAL PROTEIN: 6.9 G/DL (ref 6.4–8.3)
TRIGL SERPL-MCNC: 198 MG/DL (ref 0–249)
TSH REFLEX: 1.47 UIU/ML (ref 0.35–5.5)
VITAMIN B-12: >2000 PG/ML (ref 211–911)
VITAMIN D 25-HYDROXY: 14.2 (ref 32–100)
VLDLC SERPL CALC-MCNC: 40 MG/DL
WBC # BLD: 9.4 K/UL (ref 4–11)

## 2020-09-17 ASSESSMENT — ENCOUNTER SYMPTOMS
SHORTNESS OF BREATH: 0
TROUBLE SWALLOWING: 0
CHEST TIGHTNESS: 0
EYE REDNESS: 0
SORE THROAT: 0
EYE PAIN: 0
DIARRHEA: 0
VOMITING: 0
BACK PAIN: 1
NAUSEA: 0
ABDOMINAL PAIN: 1
COUGH: 0
COLOR CHANGE: 0

## 2020-09-17 NOTE — PROGRESS NOTES
SUBJECTIVE:    Patient ID: Kira Mclean is a 70 y.o. female. Chief Complaint   Patient presents with    Nausea    Fatigue       Patient presents to the clinic today to f/u on Shortness of Breath (awaiting sleep study appointment), Hypertension and chronic Abdominal Pain. Patient has multiple concerns. She reports while in the ER the MD noticed she was a little apneic and asked her if she had sleep apnea. Patient reports she has not heard back from her sleep study appointment (in the referral Dr. Kevin Farooq office noted several attempts and voicemail's left for patient to schedule this appointment), Patient also reports she's not heard back about her CT scan of her abdomen. Looking back this was scheduled and patient was a no-show. I have informed patient of both attempts for orders and there appointments. Pt due for routine blood work today. Fatigue   This is a chronic (possible sleep apnea) problem. The current episode started more than 1 year ago. The problem occurs intermittently. The problem has been unchanged. Associated symptoms include abdominal pain (at times. none today) and fatigue. Pertinent negatives include no arthralgias, chest pain, chills, congestion, coughing, fever, headaches, nausea, numbness, rash, sore throat or vomiting. The symptoms are aggravated by exertion. Treatments tried: home oxygen, B12 injections. The treatment provided mild relief.         Active Ambulatory Problems     Diagnosis Date Noted    HTN (hypertension) 05/20/2015    Hypothyroidism 05/20/2015    Chest pain 05/20/2015    Elevated serum creatinine 05/21/2015    Headache 05/21/2015    Nausea and vomiting in adult 05/21/2015    Hypertensive urgency 05/22/2015    Pre-syncope 02/03/2017    BPV (benign positional vertigo) 02/03/2017    GERD (gastroesophageal reflux disease) 02/03/2017    Epigastric abdominal pain 02/03/2017    Hypoxia 05/02/2017    Nausea vomiting and diarrhea 05/02/2017    Essential pain.   Musculoskeletal: Positive for back pain (chronic). Negative for arthralgias and gait problem. Skin: Negative for color change, pallor, rash and wound. Allergic/Immunologic: Negative for environmental allergies and food allergies. Neurological: Negative for dizziness, numbness and headaches. Hematological: Negative for adenopathy. Does not bruise/bleed easily. Psychiatric/Behavioral: Positive for sleep disturbance. Negative for agitation. The patient is nervous/anxious. OBJECTIVE:  /88   Pulse 83   Resp 18   Ht 5' 3\" (1.6 m)   Wt 188 lb (85.3 kg)   SpO2 96% Comment: room air  BMI 33.30 kg/m²        Physical Exam  Vitals signs and nursing note reviewed. Constitutional:       General: She is not in acute distress. Appearance: Normal appearance. She is well-developed. She is not ill-appearing, toxic-appearing or diaphoretic. HENT:      Head: Normocephalic and atraumatic. Right Ear: Hearing, tympanic membrane, ear canal and external ear normal.      Left Ear: Hearing, tympanic membrane, ear canal and external ear normal.      Nose: Nose normal. No laceration, mucosal edema or rhinorrhea. Right Sinus: No maxillary sinus tenderness or frontal sinus tenderness. Left Sinus: No maxillary sinus tenderness or frontal sinus tenderness. Mouth/Throat:      Dentition: Normal dentition. No dental caries. Pharynx: Uvula midline. Tonsils: No tonsillar exudate. Eyes:      General: Lids are normal. No scleral icterus. Right eye: No discharge. Left eye: No discharge. Conjunctiva/sclera: Conjunctivae normal.      Pupils: Pupils are equal, round, and reactive to light. Neck:      Musculoskeletal: Full passive range of motion without pain, normal range of motion and neck supple. Thyroid: No thyroid mass or thyromegaly. Vascular: Normal carotid pulses. No carotid bruit, hepatojugular reflux or JVD.       Trachea: Trachea and phonation normal. No tracheal tenderness or tracheal deviation. Cardiovascular:      Rate and Rhythm: Normal rate and regular rhythm. Pulses: Normal pulses. Heart sounds: Normal heart sounds, S1 normal and S2 normal. Heart sounds not distant. No murmur. No friction rub. No gallop. Pulmonary:      Effort: Pulmonary effort is normal. No tachypnea, bradypnea or accessory muscle usage. Breath sounds: Normal breath sounds. No stridor. No decreased breath sounds, wheezing, rhonchi or rales. Chest:      Chest wall: No tenderness. Abdominal:      General: Bowel sounds are normal. There is no distension. Palpations: Abdomen is soft. There is no hepatomegaly, splenomegaly or mass. Tenderness: There is abdominal tenderness in the epigastric area and periumbilical area. There is no guarding or rebound. Hernia: A hernia is present. Musculoskeletal:         General: Tenderness present. No deformity. Lumbar back: She exhibits decreased range of motion, tenderness, bony tenderness and pain. Lymphadenopathy:      Cervical: No cervical adenopathy. Skin:     General: Skin is warm and dry. Capillary Refill: Capillary refill takes less than 2 seconds. Coloration: Skin is not pale. Findings: No bruising, erythema or rash. Neurological:      General: No focal deficit present. Mental Status: She is alert and oriented to person, place, and time. Mental status is at baseline. She is not disoriented. GCS: GCS eye subscore is 4. GCS verbal subscore is 5. GCS motor subscore is 6. Cranial Nerves: No cranial nerve deficit. Sensory: No sensory deficit. Motor: No abnormal muscle tone. Coordination: Coordination normal.      Deep Tendon Reflexes: Reflexes normal.   Psychiatric:         Speech: Speech normal.         Behavior: Behavior normal.         Thought Content:  Thought content normal.         Judgment: Judgment normal.         Results in Past 30 Days  Result Component Current Result Ref Range Previous Result Ref Range   Alb 4.5 (9/16/2020) 3.4 - 4.8 g/dL Not in Time Range    Albumin/Globulin Ratio 1.9 (9/16/2020) 0.8 - 2.0 Not in Time Range    Alkaline Phosphatase 104 (H) (9/16/2020) 25 - 100 U/L Not in Time Range    ALT 13 (9/16/2020) 4 - 36 U/L Not in Time Range    AST 18 (9/16/2020) 8 - 33 U/L Not in Time Range    BUN 11 (9/16/2020) 6 - 20 mg/dL Not in Time Range    Calcium 9.8 (9/16/2020) 8.5 - 10.5 mg/dL Not in Time Range    Chloride 104 (9/16/2020) 98 - 107 mmol/L Not in Time Range    CO2 28 (9/16/2020) 20 - 30 mmol/L Not in Time Range    CREATININE 0.7 (9/16/2020) 0.4 - 1.2 mg/dL Not in Time Range    GFR  >59 (9/16/2020) >59 Not in Time Range    GFR Non- >60 (9/16/2020) >59 Not in Time Range    Globulin 2.4 (9/16/2020) g/dL Not in Time Range    Glucose 134 (H) (9/16/2020) 74 - 106 mg/dL Not in Time Range    Potassium 4.3 (9/16/2020) 3.4 - 5.1 mmol/L Not in Time Range    Sodium 143 (9/16/2020) 136 - 145 mmol/L Not in Time Range    Total Bilirubin 0.3 (9/16/2020) 0.3 - 1.2 mg/dL Not in Time Range    Total Protein 6.9 (9/16/2020) 6.4 - 8.3 g/dL Not in Time Range      Hemoglobin A1C (%)   Date Value   09/16/2020 6.1 (H)     Microscopic Examination (no units)   Date Value   01/24/2018 YES     LDL Calculated (mg/dL)   Date Value   09/16/2020 116       Lab Results   Component Value Date    WBC 9.4 09/16/2020    NEUTROABS 7.1 09/16/2020    HGB 14.1 09/16/2020    HCT 44.1 09/16/2020    .0 09/16/2020     09/16/2020     Lab Results   Component Value Date    TSH 1.62 02/02/2017        ASSESSMENT/PLAN:     Alex Armstrong was seen today for nausea and fatigue. Diagnoses and all orders for this visit:    Fatigue, unspecified type  -     CBC Auto Differential; Future  -     Comprehensive Metabolic Panel; Future  -     TSH with Reflex; Future  -     Lipid Panel; Future  -     Vitamin D 25 Hydroxy;  Future  -     Vitamin B12 & Folate; Future  -     Hemoglobin A1C; Future    Gastroesophageal reflux disease, esophagitis presence not specified  -     linaclotide (LINZESS) 145 MCG capsule; Take 1 capsule by mouth every morning (before breakfast)    B12 deficiency  -     cyanocobalamin injection 1,000 mcg    Panic attack  -     clonazePAM (KLONOPIN) 2 MG tablet; Take 1 tablet by mouth nightly as needed (INSOMNIA) for up to 30 days. Insomnia, unspecified type  -     clonazePAM (KLONOPIN) 2 MG tablet; Take 1 tablet by mouth nightly as needed (INSOMNIA) for up to 30 days. Hypothyroidism, unspecified type  -     levothyroxine (SYNTHROID) 75 MCG tablet; Take 1 tablet by mouth Daily  -     TSH with Reflex; Future    Vitamin D deficiency  -     Vitamin D 25 Hydroxy; Future    Screening for cholesterol level  -     Lipid Panel; Future    Abnormal finding of blood chemistry, unspecified   -     Lipid Panel; Future  -     Hemoglobin A1C; Future           Administrations This Visit     cyanocobalamin injection 1,000 mcg     Admin Date  09/16/2020 Action  Given Dose  1000 mcg Route  Intramuscular Administered By  Shivam Garrido MA                Controlled Substances Monitoring:     RX Monitoring 9/17/2020   Attestation The Prescription Monitoring Report for this patient was reviewed today. Periodic Controlled Substance Monitoring Possible medication side effects, risk of tolerance/dependence & alternative treatments discussed. ;No signs of potential drug abuse or diversion identified. ;Assessed functional status. Chronic Pain > 80 MEDD -        PATIENT COUNSELING     Counseling was provided today regarding the following topics: Healthy eating habits, Regular exercise, substance abuse and healthy sleep habits. Discussed use, benefit, and side effects of prescribed medications. Barriers to medication compliance addressed. All patient questions answered. Patient voiced understanding.      Medications Discontinued During This Encounter   Medication Reason    senna-docusate (PERICOLACE) 8.6-50 MG per tablet Therapy completed    linaclotide (LINZESS) 145 MCG capsule REORDER    clonazePAM (KLONOPIN) 2 MG tablet REORDER    levothyroxine (SYNTHROID) 75 MCG tablet REORDER       Return if symptoms worsen or fail to improve. TATYANA Torres CNP     Education was provided for discussed topics. Call the office with worsening complaints or any side effects to any medications. If an emergency please call 911. Please note: This chart was generated using Dragon dictation software. Although every effort was made to ensure the accuracy of this automated transcription, some errors in transcription may have occurred.

## 2020-09-21 RX ORDER — ERGOCALCIFEROL 1.25 MG/1
50000 CAPSULE ORAL WEEKLY
Qty: 12 CAPSULE | Refills: 1 | Status: SHIPPED | OUTPATIENT
Start: 2020-09-21 | End: 2020-12-21 | Stop reason: ALTCHOICE

## 2020-09-21 RX ORDER — ONDANSETRON 4 MG/1
TABLET, FILM COATED ORAL
Qty: 30 TABLET | Refills: 4 | Status: SHIPPED | OUTPATIENT
Start: 2020-09-21 | End: 2021-01-04

## 2020-09-22 NOTE — PROGRESS NOTES
Chief Complaint   Patient presents with    Fatigue     f/u she does have a sleep study consult on Oct 21 w/ Dr. Fiordaliza Avila       Have you seen any other physician or provider since your last visit no    Have you had any other diagnostic tests since your last visit? no    Have you changed or stopped any medications since your last visit? no     I have recommended that this patient have a immunization for Shingles, Tdap but she declines at this time. I have discussed the risks and benefits of this examination with her. The patient verbalizes understanding. Health Maintenance Due This Visit   Colonoscopy No   Mammogram No   Annual Wellness Visit No   Microalbumin No   HgbA1C No   Diabetic Eye Exam No    House Bill One Due This Visit   JEREMY No   UDS No   Contract No    Per Roxanne Alanis, APRN - CNP orders Donta Gloria was given 125mg of Solu-medrol  IM, in the L DG & 1g of Rocehpin in the R DG. No complications or reactions were noted. Patient tolerated procedure well. Patient instructed to remain in clinic for 20 minutes afterwards, and to report any adverse reaction to me immediately.

## 2020-09-25 ENCOUNTER — HOSPITAL ENCOUNTER (OUTPATIENT)
Dept: CT IMAGING | Facility: HOSPITAL | Age: 75
Discharge: HOME | End: 2020-09-25
Payer: MEDICARE

## 2020-09-25 PROCEDURE — 74176 CT ABD & PELVIS W/O CONTRAST: CPT

## 2020-10-06 ENCOUNTER — OFFICE VISIT (OUTPATIENT)
Dept: FAMILY MEDICINE CLINIC | Age: 75
End: 2020-10-06
Payer: MEDICARE

## 2020-10-06 VITALS
HEIGHT: 63 IN | RESPIRATION RATE: 18 BRPM | DIASTOLIC BLOOD PRESSURE: 98 MMHG | TEMPERATURE: 96.6 F | OXYGEN SATURATION: 95 % | BODY MASS INDEX: 33.52 KG/M2 | WEIGHT: 189.2 LBS | HEART RATE: 74 BPM | SYSTOLIC BLOOD PRESSURE: 155 MMHG

## 2020-10-06 PROCEDURE — 1036F TOBACCO NON-USER: CPT | Performed by: NURSE PRACTITIONER

## 2020-10-06 PROCEDURE — G8417 CALC BMI ABV UP PARAM F/U: HCPCS | Performed by: NURSE PRACTITIONER

## 2020-10-06 PROCEDURE — 4040F PNEUMOC VAC/ADMIN/RCVD: CPT | Performed by: NURSE PRACTITIONER

## 2020-10-06 PROCEDURE — G8427 DOCREV CUR MEDS BY ELIG CLIN: HCPCS | Performed by: NURSE PRACTITIONER

## 2020-10-06 PROCEDURE — 3017F COLORECTAL CA SCREEN DOC REV: CPT | Performed by: NURSE PRACTITIONER

## 2020-10-06 PROCEDURE — 99214 OFFICE O/P EST MOD 30 MIN: CPT | Performed by: NURSE PRACTITIONER

## 2020-10-06 PROCEDURE — 1090F PRES/ABSN URINE INCON ASSESS: CPT | Performed by: NURSE PRACTITIONER

## 2020-10-06 PROCEDURE — G8484 FLU IMMUNIZE NO ADMIN: HCPCS | Performed by: NURSE PRACTITIONER

## 2020-10-06 PROCEDURE — 1123F ACP DISCUSS/DSCN MKR DOCD: CPT | Performed by: NURSE PRACTITIONER

## 2020-10-06 PROCEDURE — G8400 PT W/DXA NO RESULTS DOC: HCPCS | Performed by: NURSE PRACTITIONER

## 2020-10-06 PROCEDURE — 96372 THER/PROPH/DIAG INJ SC/IM: CPT | Performed by: NURSE PRACTITIONER

## 2020-10-06 RX ORDER — CLONAZEPAM 2 MG/1
2 TABLET ORAL NIGHTLY PRN
Qty: 30 TABLET | Refills: 0 | Status: SHIPPED | OUTPATIENT
Start: 2020-10-06 | End: 2020-12-10 | Stop reason: SDUPTHER

## 2020-10-06 RX ORDER — PANTOPRAZOLE SODIUM 40 MG/1
40 TABLET, DELAYED RELEASE ORAL 2 TIMES DAILY
Qty: 60 TABLET | Refills: 0 | Status: SHIPPED | OUTPATIENT
Start: 2020-10-06 | End: 2020-11-09

## 2020-10-06 RX ORDER — LEVOTHYROXINE SODIUM 0.07 MG/1
75 TABLET ORAL DAILY
Qty: 90 TABLET | Refills: 3 | Status: CANCELLED | OUTPATIENT
Start: 2020-10-06

## 2020-10-06 RX ORDER — LISINOPRIL AND HYDROCHLOROTHIAZIDE 25; 20 MG/1; MG/1
1 TABLET ORAL DAILY
Qty: 90 TABLET | Refills: 3 | Status: SHIPPED | OUTPATIENT
Start: 2020-10-06 | End: 2021-08-11

## 2020-10-06 RX ORDER — AMOXICILLIN 875 MG/1
875 TABLET, COATED ORAL 2 TIMES DAILY
Qty: 20 TABLET | Refills: 0 | Status: SHIPPED | OUTPATIENT
Start: 2020-10-06 | End: 2020-10-16

## 2020-10-06 RX ORDER — CEFTRIAXONE 1 G/1
1 INJECTION, POWDER, FOR SOLUTION INTRAMUSCULAR; INTRAVENOUS ONCE
Status: COMPLETED | OUTPATIENT
Start: 2020-10-06 | End: 2020-10-06

## 2020-10-06 RX ORDER — CLONAZEPAM 2 MG/1
2 TABLET ORAL NIGHTLY PRN
Qty: 30 TABLET | Refills: 0 | Status: CANCELLED | OUTPATIENT
Start: 2020-10-06 | End: 2020-11-05

## 2020-10-06 RX ORDER — METHYLPREDNISOLONE SODIUM SUCCINATE 125 MG/2ML
125 INJECTION, POWDER, LYOPHILIZED, FOR SOLUTION INTRAMUSCULAR; INTRAVENOUS ONCE
Status: COMPLETED | OUTPATIENT
Start: 2020-10-06 | End: 2020-10-06

## 2020-10-06 RX ADMIN — METHYLPREDNISOLONE SODIUM SUCCINATE 125 MG: 125 INJECTION, POWDER, LYOPHILIZED, FOR SOLUTION INTRAMUSCULAR; INTRAVENOUS at 14:45

## 2020-10-06 RX ADMIN — CEFTRIAXONE 1 G: 1 INJECTION, POWDER, FOR SOLUTION INTRAMUSCULAR; INTRAVENOUS at 14:46

## 2020-10-06 ASSESSMENT — PATIENT HEALTH QUESTIONNAIRE - PHQ9
SUM OF ALL RESPONSES TO PHQ QUESTIONS 1-9: 0
SUM OF ALL RESPONSES TO PHQ QUESTIONS 1-9: 0
2. FEELING DOWN, DEPRESSED OR HOPELESS: 0
1. LITTLE INTEREST OR PLEASURE IN DOING THINGS: 0
SUM OF ALL RESPONSES TO PHQ9 QUESTIONS 1 & 2: 0

## 2020-10-06 NOTE — LETTER
[address] [place] [address]  Phone: [phone]  Fax: [phone]    TATYANA Lee CNP        October 6, 2020     Patient: Nidia London   YOB: 1945   Date of Visit: 10/6/2020       To Whom It May Concern: It is my medical opinion that Can Patino needs to remain off work for the rest of the week starting 10/6/20. If you have any questions or concerns, please don't hesitate to call.     Sincerely,        TATYANA Lee CNP

## 2020-10-06 NOTE — PATIENT INSTRUCTIONS
The medication list included in this document is our record of what you are currently taking, including any changes that were made at today's visit.  If you find any differences when compared to your medications at home, or have any questions that were not answered at your visit, please contact the office. · Keep a list of your medicines with you. List all of the prescription medicines, nonprescription medicines, supplements, natural remedies, and vitamins that you take. Tell your healthcare providers who treat you about all of the products you are taking. Your provider can provide you with a form to keep track of them. Just ask. · Follow the directions that come with your medicine, including information about food or alcohol. Make sure you know how and when to take your medicine. Do not take more or less than you are supposed to take. · Keep all medicines out of the reach of children. · Store medicines according to the directions on the label. · Monitor yourself. Learn to know how your body reacts to your new medicine and keep track of how it makes you feel before attempting (If your provider has allowed you to do so) to drive or go to work. · Seek emergency medical attention if you think you have used too much of this medicine. An overdose of any prescription medicine can be fatal. Overdose symptoms may include extreme drowsiness, muscle weakness, confusion, cold and clammy skin, pinpoint pupils, shallow breathing, slow heart rate, fainting, or coma. · Don't share prescription medicines with others, even when they seem to have the same symptoms. What may be good for you may be harmful to others. · If you are no longer taking a prescribed medication and you have pills left please take your pills out of their original containers. Mix crushed pills with an undesirable substance, such as cat litter or used coffee grounds.  Put the mixture into a disposable container with a lid, such as an empty margarine tub, or into a sealable bag. Cover up or remove any of your personal information on the empty containers by covering it with black permanent marker or duct tape. Place the sealed container with the mixture, and the empty drug containers, in the trash. · If you use a medication that is in the form of a patch, dispose of used patches by folding them in half so that the sticky sides meet, and then flushing them down a toilet. They should not be placed in the household trash where children or pets can find them. · If you have any questions, ask your provider or pharmacist for more information. · Be sure to keep all appointments for provider visits or tests. We are committed to providing you with the best care possible. In order to help us achieve these goals please remember to bring all medications, herbal products, and over the counter supplements with you to each visit. If your provider has ordered testing for you, please be sure to follow up with our office if you have not received results within 7 days after the testing took place. *If you receive a survey after visiting one of our offices, please take time to share your experience concerning your physician office visit. These surveys are confidential and no health information about you is shared. We are eager to improve for you and we are counting on your feedback to help make that happen. The patient is sincerely urged to quit smoking. The numerous direct health benefits are discussed. If she decides to quit, there are a number of helpful adjunctive aids, and she can see me to discuss nicotine replacement therapy and bupropion anytime in the future.

## 2020-10-09 ASSESSMENT — ENCOUNTER SYMPTOMS
VOMITING: 0
DIARRHEA: 0
SORE THROAT: 1
TROUBLE SWALLOWING: 0
CHEST TIGHTNESS: 0
ABDOMINAL PAIN: 0
SHORTNESS OF BREATH: 0
EYE REDNESS: 0
EYE PAIN: 0
BACK PAIN: 0
SINUS PRESSURE: 1
HOARSE VOICE: 1
COLOR CHANGE: 0
COUGH: 1
NAUSEA: 0

## 2020-10-09 NOTE — PROGRESS NOTES
SUBJECTIVE:    Patient ID: Ganga Weir is a 70 y.o. female. Chief Complaint   Patient presents with    Fatigue     f/u she does have a sleep study consult on Oct 21 w/ Dr. Jered Self       Follow-up Fatigue (f/u she does have a sleep study consult on Oct 21 w/ Dr. Jered Self). Patient requesting maintenance medication refills. Acutely c/o sinus pressure, cough and seasonal allergies. Fatigue   This is a chronic problem. Associated symptoms include congestion, coughing, fatigue, headaches and a sore throat. Pertinent negatives include no abdominal pain, arthralgias, chest pain, chills, fever, nausea, numbness, rash or vomiting. Sinusitis   This is a new problem. The current episode started yesterday. The problem has been waxing and waning since onset. There has been no fever. Her pain is at a severity of 4/10. The pain is mild. Associated symptoms include congestion, coughing, headaches, a hoarse voice, sinus pressure and a sore throat. Pertinent negatives include no chills, ear pain or shortness of breath. Past treatments include nothing. The treatment provided no relief.         Active Ambulatory Problems     Diagnosis Date Noted    HTN (hypertension) 05/20/2015    Hypothyroidism 05/20/2015    Chest pain 05/20/2015    Elevated serum creatinine 05/21/2015    Headache 05/21/2015    Nausea and vomiting in adult 05/21/2015    Hypertensive urgency 05/22/2015    Pre-syncope 02/03/2017    BPV (benign positional vertigo) 02/03/2017    GERD (gastroesophageal reflux disease) 02/03/2017    Epigastric abdominal pain 02/03/2017    Hypoxia 05/02/2017    Nausea vomiting and diarrhea 05/02/2017    Essential hypertension 05/02/2017    Lung nodule 05/02/2017    Acute pancreatitis 05/03/2017    Hematochezia     Irritable bowel syndrome with diarrhea 12/10/2017    Stable angina ([facility] [address] 12/10/2017    Fibrocystic breast disease ([facility]) 12/10/2017    Grief 06/24/2018    Esophageal dysphagia 06/27/2018    Breast pain, left 06/27/2018    Muscle spasm 06/27/2018    Esophageal stricture 06/27/2018    Breast density 06/27/2018    Scar painful 06/27/2018    Anxiety 10/27/2018    Moderate episode of recurrent major depressive disorder ([address] 10/27/2018    Panic attacks 10/27/2018    Liver lesion 10/27/2018    Fatty pancreas 10/27/2018    Abnormal CT of the abdomen 10/27/2018    Chronic bronchitis ([address] 12/31/2018    Gastroparesis 12/31/2018    Hemangioma 02/26/2019    Abnormal findings on diagnostic imaging of liver 02/26/2019    Thoracic back pain 02/26/2019    Weakness present 02/26/2019    B12 deficiency 03/12/2019     Resolved Ambulatory Problems     Diagnosis Date Noted    Acute cystitis without hematuria 02/03/2017    Encounter for screening colonoscopy 02/03/2017    Bleeds easily ([address] 12/31/2018    Syncope due to orthostatic hypotension 02/26/2019    Deep vein thrombosis of lower extremity ([address] 07/21/2019     Past Medical History:   Diagnosis Date    DVT (deep venous thrombosis) ([address]     Headache(784.0)     Hypertension     Hypothyroid 5/20/2015    MI, old     Pneumonia     Stomach ulcer     Thyroid disease      Allergies   Allergen Reactions    Iv Dye [Iodides] Hives, Shortness Of Breath and Other (See Comments)     Pt also passed out      Azithromycin Rash    Augmentin [Amoxicillin-Pot Clavulanate]     Codeine Hives    Influenza Vaccines      Patient couldn't remember the reaction att.  Mucinex [Guaifenesin Er]      Severe nausea    Reglan [Metoclopramide] Itching     welps      Past Surgical History:   Procedure Laterality Date    APPENDECTOMY      CHOLECYSTECTOMY      LIVER SURGERY      UPPER GASTROINTESTINAL ENDOSCOPY        No family history on file. Patient's medications, allergies, past medical, surgical, social and family histories were reviewed and updated as appropriate.   Current Outpatient Medications on File Prior to Visit   Medication Sig Dispense Refill  ondansetron (ZOFRAN) 4 MG tablet TAKE ONE TABLET BY MOUTH EVERY 8 HOURS AS NEEDED FOR NAUSEA OR VOMITING 30 tablet 4    vitamin D (ERGOCALCIFEROL) 1.25 MG (03170 UT) CAPS capsule Take 1 capsule by mouth once a week 12 capsule 1    levothyroxine (SYNTHROID) 75 MCG tablet Take 1 tablet by mouth Daily 90 tablet 3    nitroGLYCERIN (NITROSTAT) 0.4 MG SL tablet Place 1 tablet under the tongue every 5 minutes as needed for Chest pain 25 tablet 3    linaclotide (LINZESS) 145 MCG capsule Take 1 capsule by mouth every morning (before breakfast) (Patient not taking: Reported on 10/6/2020) 30 capsule 3     Current Facility-Administered Medications on File Prior to Visit   Medication Dose Route Frequency Provider Last Rate Last Dose    0.9 % sodium chloride infusion   Intravenous Once Colletta Coward, APRN - CNP   Stopped at 07/23/19 1530    cyanocobalamin injection 1,000 mcg  1,000 mcg Intramuscular Once [employer] TATYANA August           Review of Systems   Constitutional: Positive for fatigue. Negative for activity change, appetite change, chills and fever. HENT: Positive for congestion, hoarse voice, sinus pressure and sore throat. Negative for ear pain, nosebleeds and trouble swallowing. Eyes: Negative for pain and redness. Respiratory: Positive for cough. Negative for chest tightness and shortness of breath. Cardiovascular: Negative for chest pain, palpitations and leg swelling. Gastrointestinal: Negative for abdominal pain, diarrhea, nausea and vomiting. Genitourinary: Negative for difficulty urinating, dysuria and flank pain. Musculoskeletal: Negative for arthralgias, back pain and gait problem. Skin: Negative for color change, pallor, rash and wound. Allergic/Immunologic: Negative for environmental allergies and food allergies. Neurological: Positive for headaches. Negative for dizziness and numbness. Hematological: Negative for adenopathy. Does not bruise/bleed easily. Psychiatric/Behavioral: Negative for agitation and sleep disturbance. The patient is not nervous/anxious. OBJECTIVE:  BP (!) 155/98   Pulse 74   Temp 96.6 °F (35.9 °C) (Temporal)   Resp 18   Ht 5' 3\" (1.6 m)   Wt 189 lb 3.2 oz (85.8 kg)   SpO2 95% Comment: room air  BMI 33.52 kg/m²     Physical Exam  Vitals signs and nursing note reviewed. Constitutional:       General: She is not in acute distress. Appearance: Normal appearance. She is well-developed. She is not ill-appearing, toxic-appearing or diaphoretic. HENT:      Head: Normocephalic and atraumatic. Right Ear: Hearing, ear canal and external ear normal. Tenderness present. A middle ear effusion is present. Tympanic membrane is bulging. Left Ear: Hearing, ear canal and external ear normal. A middle ear effusion is present. Tympanic membrane is bulging. Nose: Nasal tenderness, mucosal edema, congestion and rhinorrhea present. No laceration. Right Sinus: No maxillary sinus tenderness or frontal sinus tenderness. Left Sinus: No maxillary sinus tenderness or frontal sinus tenderness. Mouth/Throat:      Dentition: Normal dentition. No dental caries. Pharynx: Uvula midline. Posterior oropharyngeal erythema present. Tonsils: No tonsillar exudate. Eyes:      General: Lids are normal. No scleral icterus. Right eye: No discharge. Left eye: No discharge. Conjunctiva/sclera: Conjunctivae normal.      Pupils: Pupils are equal, round, and reactive to light. Neck:      Musculoskeletal: Full passive range of motion without pain, normal range of motion and neck supple. Thyroid: No thyroid mass or thyromegaly. Vascular: Normal carotid pulses. No carotid bruit, hepatojugular reflux or JVD. Trachea: Trachea and phonation normal. No tracheal tenderness or tracheal deviation. Cardiovascular:      Rate and Rhythm: Normal rate and regular rhythm. Pulses: Normal pulses. Heart sounds: Normal heart sounds, S1 normal and S2 normal. Heart sounds not distant. No murmur. No friction rub. No gallop. Pulmonary:      Effort: Pulmonary effort is normal. No tachypnea, bradypnea or accessory muscle usage. Breath sounds: Normal breath sounds. No stridor. No decreased breath sounds, wheezing, rhonchi or rales. Chest:      Chest wall: No tenderness. Abdominal:      General: Bowel sounds are normal. There is no distension. Palpations: Abdomen is soft. There is no hepatomegaly, splenomegaly or mass. Tenderness: There is no abdominal tenderness. There is no guarding or rebound. Hernia: No hernia is present. Musculoskeletal: Normal range of motion. General: No tenderness or deformity. Lymphadenopathy:      Cervical: No cervical adenopathy. Skin:     General: Skin is warm and dry. Capillary Refill: Capillary refill takes less than 2 seconds. Coloration: Skin is not pale. Findings: No bruising, erythema or rash. Neurological:      Mental Status: She is alert and oriented to person, place, and time. She is not disoriented. GCS: GCS eye subscore is 4. GCS verbal subscore is 5. GCS motor subscore is 6. Cranial Nerves: No cranial nerve deficit. Sensory: No sensory deficit. Motor: No abnormal muscle tone. Coordination: Coordination normal.      Deep Tendon Reflexes: Reflexes normal.   Psychiatric:         Speech: Speech normal.         Behavior: Behavior normal.         Thought Content:  Thought content normal.         Judgment: Judgment normal.         Results in Past 30 Days  Result Component Current Result Ref Range Previous Result Ref Range   Alb 4.5 (9/16/2020) 3.4 - 4.8 g/dL Not in Time Range    Albumin/Globulin Ratio 1.9 (9/16/2020) 0.8 - 2.0 Not in Time Range    Alkaline Phosphatase 104 (H) (9/16/2020) 25 - 100 U/L Not in Time Range    ALT 13 (9/16/2020) 4 - 36 U/L Not in Time Range    AST 18 (9/16/2020) 8 - 33 U/L Not in Time Range    BUN 11 (9/16/2020) 6 - 20 mg/dL Not in Time Range    Calcium 9.8 (9/16/2020) 8.5 - 10.5 mg/dL Not in Time Range    Chloride 104 (9/16/2020) 98 - 107 mmol/L Not in Time Range    CO2 28 (9/16/2020) 20 - 30 mmol/L Not in Time Range    CREATININE 0.7 (9/16/2020) 0.4 - 1.2 mg/dL Not in Time Range    GFR  >59 (9/16/2020) >59 Not in Time Range    GFR Non- >60 (9/16/2020) >59 Not in Time Range    Globulin 2.4 (9/16/2020) g/dL Not in Time Range    Glucose 134 (H) (9/16/2020) 74 - 106 mg/dL Not in Time Range    Potassium 4.3 (9/16/2020) 3.4 - 5.1 mmol/L Not in Time Range    Sodium 143 (9/16/2020) 136 - 145 mmol/L Not in Time Range    Total Bilirubin 0.3 (9/16/2020) 0.3 - 1.2 mg/dL Not in Time Range    Total Protein 6.9 (9/16/2020) 6.4 - 8.3 g/dL Not in Time Range      Hemoglobin A1C (%)   Date Value   09/16/2020 6.1 (H)     Microscopic Examination (no units)   Date Value   01/24/2018 YES     LDL Calculated (mg/dL)   Date Value   09/16/2020 116       Lab Results   Component Value Date    WBC 9.4 09/16/2020    NEUTROABS 7.1 09/16/2020    HGB 14.1 09/16/2020    HCT 44.1 09/16/2020    .0 09/16/2020     09/16/2020     Lab Results   Component Value Date    TSH 1.62 02/02/2017        ASSESSMENT/PLAN:     Jenn Dunn was seen today for fatigue. Diagnoses and all orders for this visit:    Osteoarthritis, unspecified osteoarthritis type, unspecified site  -     DEXA Bone Density Axial Skeleton; Future    Panic attack  -     clonazePAM (KLONOPIN) 2 MG tablet; Take 1 tablet by mouth nightly as needed (INSOMNIA) for up to 30 days. Insomnia, unspecified type  -     clonazePAM (KLONOPIN) 2 MG tablet; Take 1 tablet by mouth nightly as needed (INSOMNIA) for up to 30 days. Gastroesophageal reflux disease without esophagitis  -     pantoprazole (PROTONIX) 40 MG tablet;  Take 1 tablet by mouth 2 times daily    Essential hypertension  -     lisinopril-hydroCHLOROthiazide (PRINZIDE;ZESTORETIC) 20-25 MG per tablet; Take 1 tablet by mouth daily    Screening for colon cancer  -     Cologuard (For External Results Only); Future    Abnormal findings on diagnostic imaging of other parts of musculoskeletal system   -     DEXA Bone Density Axial Skeleton; Future    Bronchitis  -     methylPREDNISolone sodium (SOLU-MEDROL) injection 125 mg  -     cefTRIAXone (ROCEPHIN) injection 1 g  -     amoxicillin (AMOXIL) 875 MG tablet; Take 1 tablet by mouth 2 times daily for 10 days    Acute bacterial sinusitis  -     methylPREDNISolone sodium (SOLU-MEDROL) injection 125 mg  -     cefTRIAXone (ROCEPHIN) injection 1 g  -     amoxicillin (AMOXIL) 875 MG tablet; Take 1 tablet by mouth 2 times daily for 10 days    Encounter for screening mammogram for malignant neoplasm of breast  -     JEFF DIGITAL SCREEN W OR WO CAD BILATERAL; Future           Administrations This Visit     cefTRIAXone (ROCEPHIN) injection 1 g     Admin Date  10/06/2020 Action  Given Dose  1 g Route  Intramuscular Administered By  [employer] [place], [place]          methylPREDNISolone sodium (SOLU-MEDROL) injection 125 mg     Admin Date  10/06/2020 Action  Given Dose  125 mg Route  Intravenous Administered By  [employer] [place], [place]                Controlled Substances Monitoring:     RX Monitoring 9/17/2020   Attestation The Prescription Monitoring Report for this patient was reviewed today. Periodic Controlled Substance Monitoring Possible medication side effects, risk of tolerance/dependence & alternative treatments discussed. ;No signs of potential drug abuse or diversion identified. ;Assessed functional status. Chronic Pain > 80 MEDD -        PATIENT COUNSELING     Counseling was provided today regarding the following topics: Healthy eating habits, Regular exercise, substance abuse and healthy sleep habits. Discussed use, benefit, and side effects of prescribed medications. Barriers to medication compliance addressed.   All patient questions answered. Patient voiced understanding. Medications Discontinued During This Encounter   Medication Reason    bisacodyl (DULCOLAX) 10 MG suppository Therapy completed    nystatin (MYCOSTATIN) 664188 UNIT/GM cream Therapy completed    pantoprazole (PROTONIX) 40 MG tablet REORDER    lisinopril-hydrochlorothiazide (PRINZIDE;ZESTORETIC) 20-25 MG per tablet REORDER    clonazePAM (KLONOPIN) 2 MG tablet REORDER       Return in about 1 month (around 11/6/2020). TATYANA Severino CNP     Education was provided for discussed topics. Call the office with worsening complaints or any side effects to any medications. If an emergency please call 911. Please note: This chart was generated using Dragon dictation software. Although every effort was made to ensure the accuracy of this automated transcription, some errors in transcription may have occurred.

## 2020-11-09 RX ORDER — PANTOPRAZOLE SODIUM 40 MG/1
TABLET, DELAYED RELEASE ORAL
Qty: 60 TABLET | Refills: 0 | Status: ON HOLD | OUTPATIENT
Start: 2020-11-09 | End: 2021-03-23 | Stop reason: SDUPTHER

## 2020-12-10 RX ORDER — DEXAMETHASONE SODIUM PHOSPHATE 4 MG/ML
8 INJECTION, SOLUTION INTRA-ARTICULAR; INTRALESIONAL; INTRAMUSCULAR; INTRAVENOUS; SOFT TISSUE ONCE
Status: DISCONTINUED | OUTPATIENT
Start: 2020-12-11 | End: 2021-02-06

## 2020-12-10 RX ORDER — CYANOCOBALAMIN 1000 UG/ML
1000 INJECTION INTRAMUSCULAR; SUBCUTANEOUS ONCE
Status: DISCONTINUED | OUTPATIENT
Start: 2020-12-11 | End: 2021-02-06

## 2020-12-10 RX ORDER — GABAPENTIN 300 MG/1
300 CAPSULE ORAL 2 TIMES DAILY
Qty: 60 CAPSULE | Refills: 1 | Status: SHIPPED | OUTPATIENT
Start: 2020-12-10 | End: 2021-02-25

## 2020-12-10 RX ORDER — CLONAZEPAM 2 MG/1
2 TABLET ORAL NIGHTLY PRN
Qty: 30 TABLET | Refills: 0 | Status: SHIPPED | OUTPATIENT
Start: 2020-12-10 | End: 2021-02-08

## 2020-12-21 ENCOUNTER — TELEPHONE (OUTPATIENT)
Dept: FAMILY MEDICINE CLINIC | Age: 75
End: 2020-12-21

## 2020-12-21 ENCOUNTER — VIRTUAL VISIT (OUTPATIENT)
Dept: FAMILY MEDICINE CLINIC | Age: 75
End: 2020-12-21
Payer: MEDICARE

## 2020-12-21 PROCEDURE — G2025 DIS SITE TELE SVCS RHC/FQHC: HCPCS | Performed by: NURSE PRACTITIONER

## 2020-12-21 RX ORDER — METHYLPREDNISOLONE SODIUM SUCCINATE 125 MG/2ML
125 INJECTION, POWDER, LYOPHILIZED, FOR SOLUTION INTRAMUSCULAR; INTRAVENOUS ONCE
Status: COMPLETED | OUTPATIENT
Start: 2020-12-21 | End: 2020-12-22

## 2020-12-21 RX ORDER — PROMETHAZINE HYDROCHLORIDE 25 MG/1
25 TABLET ORAL 4 TIMES DAILY PRN
Qty: 30 TABLET | Refills: 0 | Status: SHIPPED | OUTPATIENT
Start: 2020-12-21 | End: 2020-12-31

## 2020-12-21 ASSESSMENT — ENCOUNTER SYMPTOMS
RESPIRATORY NEGATIVE: 1
EYES NEGATIVE: 1
GASTROINTESTINAL NEGATIVE: 1

## 2020-12-21 NOTE — PROGRESS NOTES
SUBJECTIVE:  Jenn Valdez is a 70 y.o. female that presents with   Chief Complaint   Patient presents with    Arm Pain     right arm   . HPI:    This is a very pleasant 82-year-old white female who presents today via telephone with complaints of right arm tendinitis she tells me that she has had it for about a week now and that she was supposed to come and get a injection for it and she did not come to get the injection because she got nausea vomiting and just was too sick to be out and did not want expose anybody to anything. So she is asking if she can come and get this first thing in the morning. She says that her right arm is really giving her a lot of pain a lot of discomfort and she thinks that the steroid will help but she tells me she cannot take them by mouth that she has too much trouble with her stomach to take them by mouth but that she can take it as an injection. Review of Systems   Constitutional: Negative. HENT: Negative. Eyes: Negative. Respiratory: Negative. Cardiovascular: Negative. Gastrointestinal: Negative. Endocrine: Negative. Genitourinary: Negative. Musculoskeletal: Positive for arthralgias and myalgias. Neurological: Negative. Psychiatric/Behavioral: Negative. OBJECTIVE:  There were no vitals taken for this visit. Physical Exam  Neurological:      Mental Status: She is alert and oriented to person, place, and time. Psychiatric:         Mood and Affect: Mood normal.         Behavior: Behavior normal.         Thought Content: Thought content normal.         Judgment: Judgment normal.       No results found for requested labs within last 30 days.        Hemoglobin A1C (%)   Date Value   09/16/2020 6.1 (H)     Microscopic Examination (no units)   Date Value   01/24/2018 YES     LDL Calculated (mg/dL)   Date Value   09/16/2020 116         Lab Results   Component Value Date    WBC 9.4 09/16/2020    NEUTROABS 7.1 09/16/2020 HGB 14.1 09/16/2020    HCT 44.1 09/16/2020    .0 09/16/2020     09/16/2020       Lab Results   Component Value Date    TSH 1.62 02/02/2017         ASSESSMENT/PLAN:   Diagnosis Orders   1. Calcific tendonitis of right upper arm           No orders of the defined types were placed in this encounter. Outpatient Encounter Medications as of 12/21/2020   Medication Sig Dispense Refill    promethazine (PHENERGAN) 25 MG tablet Take 1 tablet by mouth 4 times daily as needed for Nausea 30 tablet 0    clonazePAM (KLONOPIN) 2 MG tablet Take 1 tablet by mouth nightly as needed (INSOMNIA) for up to 30 days. 30 tablet 0    gabapentin (NEURONTIN) 300 MG capsule Take 1 capsule by mouth 2 times daily for 60 days.  60 capsule 1    pantoprazole (PROTONIX) 40 MG tablet TAKE ONE TABLET BY MOUTH TWICE A DAY 60 tablet 0    lisinopril-hydroCHLOROthiazide (PRINZIDE;ZESTORETIC) 20-25 MG per tablet Take 1 tablet by mouth daily 90 tablet 3    levothyroxine (SYNTHROID) 75 MCG tablet Take 1 tablet by mouth Daily 90 tablet 3    ondansetron (ZOFRAN) 4 MG tablet TAKE ONE TABLET BY MOUTH EVERY 8 HOURS AS NEEDED FOR NAUSEA OR VOMITING (Patient not taking: Reported on 12/21/2020) 30 tablet 4    [DISCONTINUED] vitamin D (ERGOCALCIFEROL) 1.25 MG (57611 UT) CAPS capsule Take 1 capsule by mouth once a week (Patient not taking: Reported on 12/21/2020) 12 capsule 1    [DISCONTINUED] linaclotide (LINZESS) 145 MCG capsule Take 1 capsule by mouth every morning (before breakfast) (Patient not taking: Reported on 12/21/2020) 30 capsule 3    nitroGLYCERIN (NITROSTAT) 0.4 MG SL tablet Place 1 tablet under the tongue every 5 minutes as needed for Chest pain (Patient not taking: Reported on 12/21/2020) 25 tablet 3     Facility-Administered Encounter Medications as of 12/21/2020   Medication Dose Route Frequency Provider Last Rate Last Admin  methylPREDNISolone sodium (SOLU-MEDROL) injection 125 mg  125 mg Intramuscular Once TATYANA Simeon        cyanocobalamin injection 1,000 mcg  1,000 mcg Intramuscular Once Colletta Coward APRN - CNP        dexamethasone (DECADRON) injection 8 mg  8 mg Intramuscular Once Colletta Coward, APRN - CNP        0.9 % sodium chloride infusion   Intravenous Once Colletta Coward, APRN - CNP   Stopped at 07/23/19 1530    cyanocobalamin injection 1,000 mcg  1,000 mcg Intramuscular Once TATYANA Pa is a 70 y.o. female evaluated via telephone on 12/21/2020. Consent:  She and/or health care decision maker is aware that that she may receive a bill for this telephone service, depending on her insurance coverage, and has provided verbal consent to proceed: Yes      Documentation:  I communicated with the patient and/or health care decision maker about right arm pain. Details of this discussion including any medical advice provided: see visit note      I affirm this is a Patient Initiated Episode with a Patient who has not had a related appointment within my department in the past 7 days or scheduled within the next 24 hours. Patient identification was verified at the start of the visit: Yes    Total Time: minutes: 5-10 minutes    Note: not billable if this call serves to triage the patient into an appointment for the relevant concern      Leigha Valderrama     Return if symptoms worsen or fail to improve. PATIENT COUNSELING    Counseling was provided today regardingthe following topics: Healthy eating habits, Regular exercise, substance abuse and healthy sleep habits. Discussed use, benefit, and side effectsof prescribed medications. Barriers to medication compliance addressed. All patient questions answered. Pt voiced understanding.

## 2020-12-21 NOTE — PROGRESS NOTES
Chief Complaint   Patient presents with    Arm Pain     right arm       Have you seen any other physician or provider since your last visit no    Have you had any other diagnostic tests since your last visit? no    Have you changed or stopped any medications since your last visit? no

## 2020-12-21 NOTE — PATIENT INSTRUCTIONS
· Keep a list of your medicines with you. List all of the prescription medicines, nonprescription medicines, supplements, natural remedies, and vitamins that you take. Tell your healthcare providers who treat you about all of the products you are taking. Your provider can provide you with a form to keep track of them. Just ask. · Follow the directions that come with your medicine, including information about food or alcohol. Make sure you know how and when to take your medicine. Do not take more or less than you are supposed to take. · Keep all medicines out of the reach of children. · Store medicines according to the directions on the label. · Monitor yourself. Learn to know how your body reacts to your new medicine and keep track of how it makes you feel before attempting (If your provider has allowed you to do so) to drive or go to work. · Seek emergency medical attention if you think you have used too much of this medicine. An overdose of any prescription medicine can be fatal. Overdose symptoms may include extreme drowsiness, muscle weakness, confusion, cold and clammy skin, pinpoint pupils, shallow breathing, slow heart rate, fainting, or coma. · Don't share prescription medicines with others, even when they seem to have the same symptoms. What may be good for you may be harmful to others. · If you are no longer taking a prescribed medication and you have pills left please take your pills out of their original containers. Mix crushed pills with an undesirable substance, such as cat litter or used coffee grounds. Put the mixture into a disposable container with a lid, such as an empty margarine tub, or into a sealable bag. Cover up or remove any of your personal information on the empty containers by covering it with black permanent marker or duct tape. Place the sealed container with the mixture, and the empty drug containers, in the trash. · If you use a medication that is in the form of a patch, dispose of used patches by folding them in half so that the sticky sides meet, and then flushing them down a toilet. They should not be placed in the household trash where children or pets can find them. · If you have any questions, ask your provider or pharmacist for more information. · Be sure to keep all appointments for provider visits or tests. We are committed to providing you with the best care possible. In order to help us achieve these goals please remember to bring all medications, herbal products, and over the counter supplements with you to each visit. If your provider has ordered testing for you, please be sure to follow up with our office if you have not received results within 7 days after the testing took place. *If you receive a survey after visiting one of our offices, please take time to share your experience concerning your physician office visit. These surveys are confidential and no health information about you is shared. We are eager to improve for you and we are counting on your feedback to help make that happen.

## 2020-12-21 NOTE — TELEPHONE ENCOUNTER
Patient scheduled to see Dr. James Art [facility]) on 02/16/21 at 10. Patient's referral, along with all pertinent medical records faxed to the attention of Dr. James Art on 12/21/20. I have mailed patient referral with appointment date/time/location.

## 2020-12-22 ENCOUNTER — HOSPITAL ENCOUNTER (OUTPATIENT)
Dept: GENERAL RADIOLOGY | Facility: HOSPITAL | Age: 75
Discharge: HOME | End: 2020-12-22
Payer: MEDICARE

## 2020-12-22 ENCOUNTER — HOSPITAL ENCOUNTER (OUTPATIENT)
Dept: MAMMOGRAPHY | Facility: HOSPITAL | Age: 75
Discharge: HOME | End: 2020-12-22
Payer: MEDICARE

## 2020-12-22 PROCEDURE — 96372 THER/PROPH/DIAG INJ SC/IM: CPT | Performed by: NURSE PRACTITIONER

## 2020-12-22 PROCEDURE — 77080 DXA BONE DENSITY AXIAL: CPT

## 2020-12-22 PROCEDURE — 77066 DX MAMMO INCL CAD BI: CPT

## 2020-12-22 RX ORDER — CYANOCOBALAMIN 1000 UG/ML
1000 INJECTION INTRAMUSCULAR; SUBCUTANEOUS ONCE
Status: COMPLETED | OUTPATIENT
Start: 2020-12-22 | End: 2020-12-22

## 2020-12-22 RX ADMIN — METHYLPREDNISOLONE SODIUM SUCCINATE 125 MG: 125 INJECTION, POWDER, LYOPHILIZED, FOR SOLUTION INTRAMUSCULAR; INTRAVENOUS at 14:43

## 2020-12-22 RX ADMIN — CYANOCOBALAMIN 1000 MCG: 1000 INJECTION INTRAMUSCULAR; SUBCUTANEOUS at 14:46

## 2020-12-22 NOTE — PROGRESS NOTES
Administrations This Visit     methylPREDNISolone sodium (SOLU-MEDROL) injection 125 mg     Admin Date  12/22/2020  14:43 Action  Given Dose  125 mg Route  Intramuscular Site  Dorsogluteal Right Administered By  Jose Luis Sanchez MA    Ordering Provider: TATYANA Dotson    NDC: 1744-0825-32    : Jina Henriquez. Patient Supplied?: No              Administrations This Visit     cyanocobalamin injection 1,000 mcg     Admin Date  12/22/2020  14:46 Action  Given Dose  1,000 mcg Route  Intramuscular Site  Deltoid Left Administered By  Jose Luis Sanchez MA    Ordering Provider: TATYANA Dotson    NDC: 33529-020-74    : [address] [place] [address]    Patient Supplied?: No          methylPREDNISolone sodium (SOLU-MEDROL) injection 125 mg     Admin Date  12/22/2020  14:43 Action  Given Dose  125 mg Route  Intramuscular Site  Dorsogluteal Right Administered By  Luis A Thao    Ordering Provider: TATYANA Dotson    NDC: 3024-4534-19    : Jina Henriquez. Patient Supplied?: No                Patient tolerated injection well. Patient advised to wait 20 minutes in the office following the injection. No signs/symptoms of reaction noted after 20 minutes.

## 2020-12-30 ENCOUNTER — NURSE TRIAGE (OUTPATIENT)
Dept: OTHER | Facility: CLINIC | Age: 75
End: 2020-12-30

## 2020-12-30 NOTE — TELEPHONE ENCOUNTER
Patient reports being really sick the last 3 weeks. Patient reports constant diarrhea. Keeps stating that she is very very sick. Patient reports working everyday and she doesn't eat while working because if she does she she has diarrhea. Patient reports throwing up every time she eats. Patient reports being able to eat mashed potatoes but that is the only thing. Patient states she is still able to walk and work as of now. Patient reports being hospitalized for this in the past.     Patient denies any dizziness upon position changes. Reason for Disposition   MODERATE diarrhea (e.g., 4-6 times / day more than normal) and present > 48 hours (2 days)    Answer Assessment - Initial Assessment Questions  1. DIARRHEA SEVERITY: \"How bad is the diarrhea? \" \"How many extra stools have you had in the past 24 hours than normal?\"     - NO DIARRHEA (SCALE 0)    - MILD (SCALE 1-3): Few loose or mushy BMs; increase of 1-3 stools over normal daily number of stools; mild increase in ostomy output. -  MODERATE (SCALE 4-7): Increase of 4-6 stools daily over normal; moderate increase in ostomy output. * SEVERE (SCALE 8-10; OR 'WORST POSSIBLE'): Increase of 7 or more stools daily over normal; moderate increase in ostomy output; incontinence. Moderate    2. ONSET: \"When did the diarrhea begin? \"       See above. 3. BM CONSISTENCY: \"How loose or watery is the diarrhea? \"       Very watery. 4. VOMITING: \"Are you also vomiting? \" If so, ask: \"How many times in the past 24 hours? \"       6 times. 5. ABDOMINAL PAIN: Catheline Aus you having any abdominal pain? \" If yes: \"What does it feel like? \" (e.g., crampy, dull, intermittent, constant)       Lower belly, cramping, intermittent. 6. ABDOMINAL PAIN SEVERITY: If present, ask: \"How bad is the pain? \"  (e.g., Scale 1-10; mild, moderate, or severe)    - MILD (1-3): doesn't interfere with normal activities, abdomen soft and not tender to touch     - MODERATE (4-7): interferes with normal activities or awakens from sleep, tender to touch     - SEVERE (8-10): excruciating pain, doubled over, unable to do any normal activities        Denies right now. 7. ORAL INTAKE: If vomiting, \"Have you been able to drink liquids? \" \"How much fluids have you had in the past 24 hours? \"      Not a lot because she throws up or has bouts of diarrhea. 8. HYDRATION: \"Any signs of dehydration? \" (e.g., dry mouth [not just dry lips], too weak to stand, dizziness, new weight loss) \"When did you last urinate? \"      Wong Arevalo a few days ago and hit her head. ..ambulance took her to hospital.    9. EXPOSURE: \"Have you traveled to a foreign country recently? \" \"Have you been exposed to anyone with diarrhea? \" \"Could you have eaten any food that was spoiled? \"      Denies    10. ANTIBIOTIC USE: \"Are you taking antibiotics now or have you taken antibiotics in the past 2 months? \"        Abx in the past unsure on how long ago. 11. OTHER SYMPTOMS: \"Do you have any other symptoms? \" (e.g., fever, blood in stool)        Denies    12. PREGNANCY: \"Is there any chance you are pregnant? \" \"When was your last menstrual period? \"        N/A    Protocols used: DIARRHEA-ADULT-[place]    Patient called pre-service center [facility]) to schedule appointment, with red flag complaint, transferred to RN access for triage. See above questions and answers. Caller talking full sentences without any distress on phone. Discussed disposition and patient agreeable. Discussed potential consequences for not following disposition recommendation. Aware to call back with any concerns or persistent, worsening, or new symptoms develop. Warm transfer to [facility] scheduling for appointment. Patient states she only wants to her APRN Vamsi. Per Elayne Garcia is out of the office. Spoke to patient and she is going to follow-up with [facility] for evaluation since Christelle Hogue isn't in. Attention Provider: Thank you for allowing me to participate in the care of your patient.  The patient was connected to triage in response to information provided to the ECC. Please do not respond through this encounter as the response is not directed to a shared pool.

## 2021-01-04 DIAGNOSIS — R11.0 NAUSEA: ICD-10-CM

## 2021-01-04 RX ORDER — ONDANSETRON 4 MG/1
TABLET, FILM COATED ORAL
Qty: 30 TABLET | Refills: 5 | Status: SHIPPED | OUTPATIENT
Start: 2021-01-04 | End: 2021-03-30 | Stop reason: SDUPTHER

## 2021-01-04 NOTE — TELEPHONE ENCOUNTER
Refill request received from pharmacy. Medication pending for approval.       Last Office Visit With PCP:  12/21/2020    Next Visit Date:  No future appointments.     JEREMY JAQUEZ

## 2021-01-07 DIAGNOSIS — M85.88 OSTEOPENIA OF LUMBAR SPINE: Primary | ICD-10-CM

## 2021-01-07 RX ORDER — B-COMPLEX WITH VITAMIN C
1 TABLET ORAL DAILY
Qty: 30 TABLET | Refills: 5 | Status: SHIPPED | OUTPATIENT
Start: 2021-01-07 | End: 2021-03-30 | Stop reason: ALTCHOICE

## 2021-02-05 DIAGNOSIS — F41.0 PANIC ATTACK: ICD-10-CM

## 2021-02-05 DIAGNOSIS — G47.00 INSOMNIA, UNSPECIFIED TYPE: ICD-10-CM

## 2021-02-06 ENCOUNTER — OFFICE VISIT (OUTPATIENT)
Dept: PRIMARY CARE CLINIC | Age: 76
End: 2021-02-06
Payer: MEDICARE

## 2021-02-06 VITALS
BODY MASS INDEX: 33.49 KG/M2 | DIASTOLIC BLOOD PRESSURE: 88 MMHG | TEMPERATURE: 97.8 F | OXYGEN SATURATION: 93 % | HEIGHT: 63 IN | SYSTOLIC BLOOD PRESSURE: 176 MMHG | WEIGHT: 189 LBS | HEART RATE: 81 BPM | RESPIRATION RATE: 16 BRPM

## 2021-02-06 DIAGNOSIS — K59.01 CONSTIPATION BY DELAYED COLONIC TRANSIT: Primary | ICD-10-CM

## 2021-02-06 DIAGNOSIS — I10 ESSENTIAL HYPERTENSION: ICD-10-CM

## 2021-02-06 PROCEDURE — 99213 OFFICE O/P EST LOW 20 MIN: CPT | Performed by: PHYSICIAN ASSISTANT

## 2021-02-06 RX ORDER — LACTULOSE 10 G/15ML
10 SOLUTION ORAL EVERY EVENING
Qty: 473 ML | Refills: 5 | Status: SHIPPED | OUTPATIENT
Start: 2021-02-06 | End: 2021-03-23

## 2021-02-06 ASSESSMENT — ENCOUNTER SYMPTOMS
CHEST TIGHTNESS: 0
ABDOMINAL PAIN: 0
COUGH: 0
VOMITING: 0
BLOOD IN STOOL: 0
CHOKING: 0
EYE PAIN: 0
WHEEZING: 0
EYE ITCHING: 0
DIARRHEA: 1
NAUSEA: 0
BACK PAIN: 0
EYE DISCHARGE: 0
CONSTIPATION: 1
ABDOMINAL DISTENTION: 0
SHORTNESS OF BREATH: 0
EYE REDNESS: 0

## 2021-02-06 NOTE — PROGRESS NOTES
Ben Kim 70 y.o. presents today for   Chief Complaint   Patient presents with    Constipation     x 5 days        HPI:  Ben Kim states she is here for constipation for the last 5 days. She ran out of lactulose that was working well for prn constipation at home, so she took a sample of Babara Rook yesterday. She has felt like she needed to use the bathroom all morning without results. However, after being roomed, she went to restroom multiple times and had large bowel movement accompanied by watery stool. After first episode, she became dizzy and weak and needed to be helped back to her room by myself and Rudy Baez. She recovered quickly after being seated, and made another trip to restroom for diarrhea prior to being released home. She has follow up with her pcp next week for refills on her regular medications. Patient has not taken blood pressure medicine yet today, but reports she will take as soon as she gets home. No family history on file.      Social History     Socioeconomic History    Marital status:      Spouse name: Not on file    Number of children: Not on file    Years of education: Not on file    Highest education level: Not on file   Occupational History    Not on file   Social Needs    Financial resource strain: Not on file    Food insecurity     Worry: Not on file     Inability: Not on file    Transportation needs     Medical: Not on file     Non-medical: Not on file   Tobacco Use    Smoking status: Never Smoker    Smokeless tobacco: Never Used   Substance and Sexual Activity    Alcohol use: No    Drug use: No    Sexual activity: Not on file   Lifestyle    Physical activity     Days per week: Not on file     Minutes per session: Not on file    Stress: Not on file   Relationships    Social connections     Talks on phone: Not on file     Gets together: Not on file     Attends Oriental orthodox service: Not on file Active member of club or organization: Not on file     Attends meetings of clubs or organizations: Not on file     Relationship status: Not on file    Intimate partner violence     Fear of current or ex partner: Not on file     Emotionally abused: Not on file     Physically abused: Not on file     Forced sexual activity: Not on file   Other Topics Concern    Not on file   Social History Narrative    Not on file        Past Surgical History:   Procedure Laterality Date    APPENDECTOMY      CHOLECYSTECTOMY      LIVER SURGERY      UPPER GASTROINTESTINAL ENDOSCOPY          Past Medical History:   Diagnosis Date    Bleeds easily ([facility] 12/31/2018    DVT (deep venous thrombosis) ([facility]     Headache(784.0)     Hypertension     Hypothyroid 5/20/2015    MI, old     Moderate episode of recurrent major depressive disorder ([facility] [address] 10/27/2018    Pneumonia     Stomach ulcer     Thyroid disease         Current Outpatient Medications   Medication Sig Dispense Refill    lactulose (CHRONULAC) 10 GM/15ML solution Take 15 mLs by mouth every evening 473 mL 5    Calcium Carbonate-Vitamin D (OYSTER SHELL CALCIUM/D) 500-200 MG-UNIT TABS Take 1 tablet by mouth daily 30 tablet 5    ondansetron (ZOFRAN) 4 MG tablet TAKE ONE TABLET BY MOUTH EVERY 8 HOURS AS NEEDED FOR NAUSEA OR VOMITING 30 tablet 5    clonazePAM (KLONOPIN) 2 MG tablet Take 1 tablet by mouth nightly as needed (INSOMNIA) for up to 30 days. 30 tablet 0    gabapentin (NEURONTIN) 300 MG capsule Take 1 capsule by mouth 2 times daily for 60 days.  60 capsule 1    pantoprazole (PROTONIX) 40 MG tablet TAKE ONE TABLET BY MOUTH TWICE A DAY 60 tablet 0    lisinopril-hydroCHLOROthiazide (PRINZIDE;ZESTORETIC) 20-25 MG per tablet Take 1 tablet by mouth daily 90 tablet 3    levothyroxine (SYNTHROID) 75 MCG tablet Take 1 tablet by mouth Daily 90 tablet 3  nitroGLYCERIN (NITROSTAT) 0.4 MG SL tablet Place 1 tablet under the tongue every 5 minutes as needed for Chest pain 25 tablet 3     No current facility-administered medications for this visit. Review of Systems   Constitutional: Negative. HENT: Negative. Eyes: Negative for pain, discharge, redness and itching. Respiratory: Negative for cough, choking, chest tightness, shortness of breath and wheezing. Cardiovascular: Negative for chest pain, palpitations and leg swelling. Gastrointestinal: Positive for constipation and diarrhea (watery, just now). Negative for abdominal distention, abdominal pain, blood in stool, nausea and vomiting. Endocrine: Negative for polydipsia and polyuria. Genitourinary: Negative for decreased urine volume, difficulty urinating, dysuria, enuresis, flank pain, genital sores, hematuria and urgency. Musculoskeletal: Negative for arthralgias, back pain, joint swelling and neck pain. Skin: Negative for rash and wound. Neurological: Negative for dizziness, seizures, syncope, weakness and headaches. Psychiatric/Behavioral: Negative for agitation, confusion and sleep disturbance. The patient is not hyperactive. BP (!) 176/88 (Site: Left Upper Arm, Position: Sitting, Cuff Size: Medium Adult)   Pulse 81   Temp 97.8 °F (36.6 °C) (Temporal)   Resp 16   Ht 5' 3\" (1.6 m)   Wt 189 lb (85.7 kg)   SpO2 93% Comment: room air  BMI 33.48 kg/m²      Physical Exam  Vitals signs reviewed. Constitutional:       Appearance: Normal appearance. She is obese. She is not ill-appearing or diaphoretic. HENT:      Head: Normocephalic and atraumatic. Nose: Nose normal.      Mouth/Throat:      Mouth: Mucous membranes are moist.      Pharynx: Oropharynx is clear. Eyes:      Extraocular Movements: Extraocular movements intact. Conjunctiva/sclera: Conjunctivae normal.   Neck:      Musculoskeletal: Normal range of motion and neck supple.    Cardiovascular: Rate and Rhythm: Normal rate and regular rhythm. Pulses: Normal pulses. Heart sounds: Normal heart sounds. No murmur. No gallop. Pulmonary:      Effort: Pulmonary effort is normal. No respiratory distress. Breath sounds: No wheezing, rhonchi or rales. Abdominal:      General: Bowel sounds are normal. There is no distension. Palpations: Abdomen is soft. Tenderness: There is no abdominal tenderness. There is no guarding or rebound. Comments:  bowel sounds hyperactive but present all four quadrants   Musculoskeletal: Normal range of motion. General: No swelling, tenderness, deformity or signs of injury. Right lower leg: No edema. Left lower leg: No edema. Skin:     General: Skin is warm and dry. Findings: No erythema or rash. Neurological:      General: No focal deficit present. Mental Status: She is alert and oriented to person, place, and time. Motor: No weakness. Gait: Gait normal.   Psychiatric:         Mood and Affect: Mood normal.         Behavior: Behavior normal.          tsh 9/17/20 was 1.47    Lab Results   Component Value Date     09/16/2020    K 4.3 09/16/2020     09/16/2020    CO2 28 09/16/2020    BUN 11 09/16/2020    CREATININE 0.7 09/16/2020    GLUCOSE 134 (H) 09/16/2020    CALCIUM 9.8 09/16/2020    PROT 6.9 09/16/2020    LABALBU 4.5 09/16/2020    BILITOT 0.3 09/16/2020    ALKPHOS 104 (H) 09/16/2020    AST 18 09/16/2020    ALT 13 09/16/2020    LABGLOM >60 09/16/2020    GFRAA >59 09/16/2020    AGRATIO 1.9 09/16/2020    GLOB 2.4 09/16/2020       ASSESSMENT/PLAN    1. Constipation by delayed colonic transit  Patient's Linz[employer] therapeutic this afternoon. Counseled patient on importance of not waiting so long before trying a bowel regimen in the future.  Asked patient to take lactulose daily to prevent chronic nausea and promote daily soft bowel movements Increase water intake at home today, tsh and cmp reviewed from 9/2020 and normal    - lactulose (CHRONULAC) 10 GM/15ML solution; Take 15 mLs by mouth every evening  Dispense: 473 mL; Refill: 5       2. Hypertension    Patient agrees to take home medications as soon as she gets home.  F/u pcp for repeat bp next week, to determine if adjustments need to be made      Melissa Richmond, [address]

## 2021-02-08 RX ORDER — CLONAZEPAM 2 MG/1
TABLET ORAL
Qty: 30 TABLET | Refills: 0 | Status: SHIPPED | OUTPATIENT
Start: 2021-02-08 | End: 2021-03-30 | Stop reason: SDUPTHER

## 2021-02-22 RX ORDER — NYSTATIN 100000 U/G
CREAM TOPICAL
Qty: 30 G | Refills: 2 | Status: SHIPPED | OUTPATIENT
Start: 2021-02-22 | End: 2021-02-25 | Stop reason: ALTCHOICE

## 2021-02-22 NOTE — TELEPHONE ENCOUNTER
Refill request received from pharmacy.  Medication pending for approval.       Last Office Visit With PCP:  12/10/2020    Next Visit Date:  Future Appointments   Date Time Provider Malena Diallo   2/23/2021 11:15 AM TATYANA Cruz - CNP [id number] [facility]

## 2021-02-25 ENCOUNTER — OFFICE VISIT (OUTPATIENT)
Dept: FAMILY MEDICINE CLINIC | Age: 76
End: 2021-02-25
Payer: MEDICARE

## 2021-02-25 ENCOUNTER — TELEPHONE (OUTPATIENT)
Dept: FAMILY MEDICINE CLINIC | Age: 76
End: 2021-02-25

## 2021-02-25 VITALS
OXYGEN SATURATION: 99 % | HEART RATE: 76 BPM | RESPIRATION RATE: 24 BRPM | SYSTOLIC BLOOD PRESSURE: 156 MMHG | DIASTOLIC BLOOD PRESSURE: 68 MMHG

## 2021-02-25 DIAGNOSIS — G89.29 CHRONIC MIDLINE LOW BACK PAIN WITH BILATERAL SCIATICA: ICD-10-CM

## 2021-02-25 DIAGNOSIS — M54.41 CHRONIC MIDLINE LOW BACK PAIN WITH BILATERAL SCIATICA: ICD-10-CM

## 2021-02-25 DIAGNOSIS — M54.42 CHRONIC MIDLINE LOW BACK PAIN WITH BILATERAL SCIATICA: ICD-10-CM

## 2021-02-25 DIAGNOSIS — R03.0 ELEVATED BP WITHOUT DIAGNOSIS OF HYPERTENSION: Primary | ICD-10-CM

## 2021-02-25 DIAGNOSIS — F41.0 PANIC ATTACK: ICD-10-CM

## 2021-02-25 PROCEDURE — 1123F ACP DISCUSS/DSCN MKR DOCD: CPT | Performed by: NURSE PRACTITIONER

## 2021-02-25 PROCEDURE — 1090F PRES/ABSN URINE INCON ASSESS: CPT | Performed by: NURSE PRACTITIONER

## 2021-02-25 PROCEDURE — G0444 DEPRESSION SCREEN ANNUAL: HCPCS | Performed by: NURSE PRACTITIONER

## 2021-02-25 PROCEDURE — 1036F TOBACCO NON-USER: CPT | Performed by: NURSE PRACTITIONER

## 2021-02-25 PROCEDURE — G8484 FLU IMMUNIZE NO ADMIN: HCPCS | Performed by: NURSE PRACTITIONER

## 2021-02-25 PROCEDURE — 99214 OFFICE O/P EST MOD 30 MIN: CPT | Performed by: NURSE PRACTITIONER

## 2021-02-25 PROCEDURE — 4040F PNEUMOC VAC/ADMIN/RCVD: CPT | Performed by: NURSE PRACTITIONER

## 2021-02-25 PROCEDURE — 3017F COLORECTAL CA SCREEN DOC REV: CPT | Performed by: NURSE PRACTITIONER

## 2021-02-25 PROCEDURE — G8399 PT W/DXA RESULTS DOCUMENT: HCPCS | Performed by: NURSE PRACTITIONER

## 2021-02-25 PROCEDURE — G8427 DOCREV CUR MEDS BY ELIG CLIN: HCPCS | Performed by: NURSE PRACTITIONER

## 2021-02-25 PROCEDURE — G8417 CALC BMI ABV UP PARAM F/U: HCPCS | Performed by: NURSE PRACTITIONER

## 2021-02-25 RX ORDER — CLONIDINE HYDROCHLORIDE 0.1 MG/1
0.1 TABLET ORAL ONCE
Status: COMPLETED | OUTPATIENT
Start: 2021-02-25 | End: 2021-02-25

## 2021-02-25 RX ADMIN — CLONIDINE HYDROCHLORIDE 0.1 MG: 0.1 TABLET ORAL at 15:23

## 2021-02-25 ASSESSMENT — PATIENT HEALTH QUESTIONNAIRE - PHQ9
SUM OF ALL RESPONSES TO PHQ QUESTIONS 1-9: 0
1. LITTLE INTEREST OR PLEASURE IN DOING THINGS: 0
SUM OF ALL RESPONSES TO PHQ QUESTIONS 1-9: 0

## 2021-02-25 NOTE — TELEPHONE ENCOUNTER
Patient has cancelled the last few scheduled appointments. She is still working at [employer]. Patient has not been prescribed any new medications recently. Patient is noncompliant with prescribed medications and BP medications. Patient also has multiple specialist she's sees more than regularly. Patient does have a history of multiple chronic complaints. All have been address, referrals placed, or patient already seeing the specialist for each of her complaints. Patient also noncompliant with the need for CPAP/Bipap and sleep study.     TATYANA Saul

## 2021-02-25 NOTE — PROGRESS NOTES
Administrations This Visit     cloNIDine (CATAPRES) tablet 0.1 mg     Admin Date  02/25/2021  15:23 Action  Given Dose  0.1 mg Route  Oral Site  Other Administered By  Tammy Guerin RN    Ordering Provider: TATYANA Childress CNP    NDC: 1317-0047-70    Lot#: [id number]    : [id number] [facility]    Patient Supplied?: No    Comments: Oral

## 2021-02-26 RX ORDER — GABAPENTIN 100 MG/1
100 CAPSULE ORAL NIGHTLY PRN
Qty: 30 CAPSULE | Refills: 0 | Status: SHIPPED | OUTPATIENT
Start: 2021-02-26 | End: 2021-03-30 | Stop reason: SDUPTHER

## 2021-02-27 NOTE — PROGRESS NOTES
SUBJECTIVE:    Patient ID: Cr Gutiérrez is a 70 y.o. female. Chief Complaint   Patient presents with    Nausea     symptoms started after taking 1 capsule of Gabapentin 300mg earlier this morning     Diarrhea    Medication Problem    Hypertension     HPI:    Earlier in the day patient called at 11:26 AM contacted John Hawkins MA  saying she had been poisoned due to the medication Izzy Bright gave her. She has been extremely ill since about 30 minutes after taking medication. She is nauseated. Patient is unsure of what medication it is but it was to help her sleep. I asked if it was the Klonopin, patient thinks it is that medication. Patient reported that she was unable to come into clinic because she had to go to work. Amador Miguel,  advised that she should call EMS and go to hospital. Patient was going to hang up and call EMS. Patient showed up to the clinic with the following complaints unscheduled but added on per her request with c/o: Nausea (symptoms started after taking 1 capsule of Gabapentin 300mg earlier this morning ), Diarrhea, and Hypertension. Patient has conflicting stories. Very anxious. History of the same. Non complaint with most of her prescribed medications. Patient had been prescribed Gabapentin 100 mg and requested to increase this a couple of months ago. She take it only PRN. Patient reports that Columba E tOis Lange changed the form of her previous capsule. Patient is anxious but stable. Clonidine will be administered due to HTN.  She is prescribed BP medications, patient advised to DC Gabapentin 300 mg and will restart her on Gabapentin 100 mg per her request.         Active Ambulatory Problems     Diagnosis Date Noted    Hypothyroidism 05/20/2015    Chest pain 05/20/2015    Elevated serum creatinine 05/21/2015    Headache 05/21/2015    Nausea and vomiting in adult 05/21/2015    Hypertensive urgency 05/22/2015    Pre-syncope 02/03/2017    BPV (benign positional vertigo) 02/03/2017    GERD (gastroesophageal reflux disease) 02/03/2017    Epigastric abdominal pain 02/03/2017    Hypoxia 05/02/2017    Nausea vomiting and diarrhea 05/02/2017    Essential hypertension 05/02/2017    Lung nodule 05/02/2017    Acute pancreatitis 05/03/2017    Hematochezia     Irritable bowel syndrome with diarrhea 12/10/2017    Stable angina ([address] 12/10/2017    Fibrocystic breast disease (FCBD) 12/10/2017    Grief 06/24/2018    Esophageal dysphagia 06/27/2018    Breast pain, left 06/27/2018    Muscle spasm 06/27/2018    Esophageal stricture 06/27/2018    Breast density 06/27/2018    Scar painful 06/27/2018    Anxiety 10/27/2018    Moderate episode of recurrent major depressive disorder ([address] 10/27/2018    Panic attacks 10/27/2018    Liver lesion 10/27/2018    Fatty pancreas 10/27/2018    Abnormal CT of the abdomen 10/27/2018    Chronic bronchitis ([address] 12/31/2018    Gastroparesis 12/31/2018    Hemangioma 02/26/2019    Abnormal findings on diagnostic imaging of liver 02/26/2019    Thoracic back pain 02/26/2019    Weakness present 02/26/2019    B12 deficiency 03/12/2019     Resolved Ambulatory Problems     Diagnosis Date Noted    HTN (hypertension) 05/20/2015    Acute cystitis without hematuria 02/03/2017    Encounter for screening colonoscopy 02/03/2017    Bleeds easily ([address] 12/31/2018    Syncope due to orthostatic hypotension 02/26/2019    Deep vein thrombosis of lower extremity ([address] 07/21/2019     Past Medical History:   Diagnosis Date    DVT (deep venous thrombosis) (HCC)     Headache(784.0)     Hypertension     Hypothyroid 5/20/2015    MI, old     Pneumonia     Stomach ulcer     Thyroid disease      Allergies   Allergen Reactions    Iv Dye [Iodides] Hives, Shortness Of Breath and Other (See Comments)     Pt also passed out      Azithromycin Rash    Augmentin [Amoxicillin-Pot Clavulanate]     Codeine Hives    Influenza Vaccines      Patient couldn't remember the reaction att.    Mucinex [Guaifenesin Er]      Severe nausea    Reglan [Metoclopramide] Itching     diomedes      Past Surgical History:   Procedure Laterality Date    APPENDECTOMY      CHOLECYSTECTOMY      LIVER SURGERY      UPPER GASTROINTESTINAL ENDOSCOPY        History reviewed. No pertinent family history. Patient's medications, allergies, past medical, surgical, social and family histories were reviewed and updated as appropriate. Current Outpatient Medications on File Prior to Visit   Medication Sig Dispense Refill    clonazePAM (KLONOPIN) 2 MG tablet TAKE ONE TABLET BY MOUTH ONCE NIGHTLY AS NEEDED FOR INSOMNIA 30 tablet 0    Calcium Carbonate-Vitamin D (OYSTER SHELL CALCIUM/D) 500-200 MG-UNIT TABS Take 1 tablet by mouth daily 30 tablet 5    ondansetron (ZOFRAN) 4 MG tablet TAKE ONE TABLET BY MOUTH EVERY 8 HOURS AS NEEDED FOR NAUSEA OR VOMITING 30 tablet 5    pantoprazole (PROTONIX) 40 MG tablet TAKE ONE TABLET BY MOUTH TWICE A DAY 60 tablet 0    lisinopril-hydroCHLOROthiazide (PRINZIDE;ZESTORETIC) 20-25 MG per tablet Take 1 tablet by mouth daily 90 tablet 3    levothyroxine (SYNTHROID) 75 MCG tablet Take 1 tablet by mouth Daily 90 tablet 3    lactulose (CHRONULAC) 10 GM/15ML solution Take 15 mLs by mouth every evening (Patient not taking: Reported on 2/25/2021) 473 mL 5    nitroGLYCERIN (NITROSTAT) 0.4 MG SL tablet Place 1 tablet under the tongue every 5 minutes as needed for Chest pain (Patient not taking: Reported on 2/25/2021) 25 tablet 3     No current facility-administered medications on file prior to visit. Review of Systems   Constitutional: Negative for activity change, appetite change, chills and fever. HENT: Negative for congestion, ear pain, nosebleeds, sore throat and trouble swallowing. Eyes: Negative for pain and redness. Respiratory: Negative for cough, chest tightness and shortness of breath. Cardiovascular: Negative for chest pain, palpitations and leg swelling. Gastrointestinal: Negative for abdominal pain, diarrhea, nausea and vomiting. Genitourinary: Negative for difficulty urinating, dysuria and flank pain. Musculoskeletal: Negative for arthralgias, back pain and gait problem. Skin: Negative for color change, pallor, rash and wound. Allergic/Immunologic: Negative for environmental allergies and food allergies. Neurological: Negative for dizziness, numbness and headaches. Hematological: Negative for adenopathy. Does not bruise/bleed easily. Psychiatric/Behavioral: Positive for agitation and behavioral problems. Negative for sleep disturbance. The patient is nervous/anxious and is hyperactive. OBJECTIVE:  BP (!) 156/68   Pulse 76   Resp 24   SpO2 99% Comment: room air  Breastfeeding No       Physical Exam  Constitutional:       General: She is not in acute distress. Appearance: She is well-developed. She is not ill-appearing, toxic-appearing or diaphoretic. HENT:      Head: Normocephalic and atraumatic. Nose: Nose normal. No congestion or rhinorrhea. Mouth/Throat:      Mouth: Mucous membranes are moist.      Pharynx: Oropharynx is clear. Uvula midline. No oropharyngeal exudate or posterior oropharyngeal erythema. Eyes:      Extraocular Movements: Extraocular movements intact. Conjunctiva/sclera: Conjunctivae normal.      Pupils: Pupils are equal, round, and reactive to light. Neck:      Musculoskeletal: Normal range of motion and neck supple. Thyroid: No thyromegaly. Cardiovascular:      Rate and Rhythm: Normal rate and regular rhythm. Pulses: Normal pulses. Heart sounds: Normal heart sounds. No murmur. Pulmonary:      Effort: Pulmonary effort is normal. No respiratory distress. Breath sounds: Normal breath sounds. Abdominal:      General: Bowel sounds are normal. There is no distension. Palpations: Abdomen is soft. Tenderness: There is no abdominal tenderness.    Musculoskeletal: Normal range of motion. Lymphadenopathy:      Cervical: No cervical adenopathy. Skin:     General: Skin is warm and dry. Capillary Refill: Capillary refill takes less than 2 seconds. Coloration: Skin is not pale. Neurological:      General: No focal deficit present. Mental Status: She is alert and oriented to person, place, and time. Mental status is at baseline. Cranial Nerves: No cranial nerve deficit. Sensory: No sensory deficit. Motor: No weakness. Coordination: Coordination normal.      Gait: Gait normal.   Psychiatric:         Attention and Perception: She is inattentive. Mood and Affect: Mood is anxious. Affect is inappropriate. Speech: Speech normal.         Behavior: Behavior normal.         Thought Content: Thought content normal.         Judgment: Judgment normal.         No results found for requested labs within last 30 days. Hemoglobin A1C (%)   Date Value   09/16/2020 6.1 (H)     Microscopic Examination (no units)   Date Value   01/24/2018 YES     LDL Calculated (mg/dL)   Date Value   09/16/2020 116       Lab Results   Component Value Date    WBC 9.4 09/16/2020    NEUTROABS 7.1 09/16/2020    HGB 14.1 09/16/2020    HCT 44.1 09/16/2020    .0 09/16/2020     09/16/2020     Lab Results   Component Value Date    TSH 1.62 02/02/2017        ASSESSMENT/PLAN:     Marija Ndiaye was seen today for nausea, diarrhea, medication problem and hypertension. Diagnoses and all orders for this visit:    Elevated BP without diagnosis of hypertension  -     cloNIDine (CATAPRES) tablet 0.1 mg    Chronic midline low back pain with bilateral sciatica  -     gabapentin (NEURONTIN) 100 MG capsule; Take 1 capsule by mouth nightly as needed (back pain) for up to 30 days.     Panic attack  -     ND DEPRESSION SCREEN ANNUAL      The majority of the time spent was discussing medications, usage, medication compliance, anxiety issues, and monitor her BP prior to letting her leave the clinic. Patients BP improved. Patient was talkative and calm and stable at the end of this encounter. Administrations This Visit     cloNIDine (CATAPRES) tablet 0.1 mg     Admin Date  02/25/2021 Action  Given Dose  0.1 mg Route  Oral Administered By  Lesa Miles RN                Controlled Substances Monitoring:     RX Monitoring 9/17/2020   Attestation The Prescription Monitoring Report for this patient was reviewed today. Periodic Controlled Substance Monitoring Possible medication side effects, risk of tolerance/dependence & alternative treatments discussed. ;No signs of potential drug abuse or diversion identified. ;Assessed functional status. Chronic Pain > 80 MEDD -        PATIENT COUNSELING     Counseling was provided today regarding the following topics: Healthy eating habits, Regular exercise, substance abuse and healthy sleep habits. Discussed use, benefit, and side effects of prescribed medications. Barriers to medication compliance addressed. All patient questions answered. Patient voiced understanding. Medications Discontinued During This Encounter   Medication Reason    nystatin (MYCOSTATIN) 692549 UNIT/GM cream Therapy completed    gabapentin (NEURONTIN) 300 MG capsule LIST CLEANUP       Return if symptoms worsen or fail to improve. TATYANA Pelletier - CNP     Education was provided for discussed topics. Call the office with worsening complaints or any side effects to any medications. If an emergency please call 911. Please note: This chart was generated using Dragon dictation software. Although every effort was made to ensure the accuracy of this automated transcription, some errors in transcription may have occurred.

## 2021-03-03 ASSESSMENT — ENCOUNTER SYMPTOMS
EYE PAIN: 0
NAUSEA: 0
SHORTNESS OF BREATH: 0
SORE THROAT: 0
BACK PAIN: 0
TROUBLE SWALLOWING: 0
DIARRHEA: 0
COUGH: 0
CHEST TIGHTNESS: 0
EYE REDNESS: 0
VOMITING: 0
COLOR CHANGE: 0
ABDOMINAL PAIN: 0

## 2021-03-23 ENCOUNTER — APPOINTMENT (OUTPATIENT)
Dept: GENERAL RADIOLOGY | Facility: HOSPITAL | Age: 76
End: 2021-03-23
Payer: MEDICARE

## 2021-03-23 ENCOUNTER — OFFICE VISIT (OUTPATIENT)
Dept: FAMILY MEDICINE CLINIC | Age: 76
End: 2021-03-23
Payer: MEDICARE

## 2021-03-23 ENCOUNTER — HOSPITAL ENCOUNTER (OUTPATIENT)
Facility: HOSPITAL | Age: 76
Setting detail: OBSERVATION
Discharge: HOME | End: 2021-03-23
Attending: EMERGENCY MEDICINE | Admitting: INTERNAL MEDICINE
Payer: MEDICARE

## 2021-03-23 VITALS
RESPIRATION RATE: 20 BRPM | BODY MASS INDEX: 33.84 KG/M2 | DIASTOLIC BLOOD PRESSURE: 82 MMHG | HEIGHT: 63 IN | SYSTOLIC BLOOD PRESSURE: 130 MMHG | WEIGHT: 191 LBS | HEART RATE: 82 BPM | OXYGEN SATURATION: 95 % | TEMPERATURE: 96.6 F

## 2021-03-23 DIAGNOSIS — R09.02 HYPOXIA: ICD-10-CM

## 2021-03-23 DIAGNOSIS — N39.0 ACUTE UTI: ICD-10-CM

## 2021-03-23 DIAGNOSIS — K21.9 GASTROESOPHAGEAL REFLUX DISEASE WITHOUT ESOPHAGITIS: ICD-10-CM

## 2021-03-23 DIAGNOSIS — R07.9 CHEST PAIN, UNSPECIFIED TYPE: ICD-10-CM

## 2021-03-23 DIAGNOSIS — R06.89 DYSPNEA AND RESPIRATORY ABNORMALITIES: Primary | ICD-10-CM

## 2021-03-23 DIAGNOSIS — T78.40XA ALLERGIC REACTION, INITIAL ENCOUNTER: ICD-10-CM

## 2021-03-23 DIAGNOSIS — R06.00 DYSPNEA AND RESPIRATORY ABNORMALITIES: Primary | ICD-10-CM

## 2021-03-23 DIAGNOSIS — J42 CHRONIC BRONCHITIS, UNSPECIFIED CHRONIC BRONCHITIS TYPE (HCC): Primary | ICD-10-CM

## 2021-03-23 PROBLEM — Z87.19 HISTORY OF ESOPHAGEAL STRICTURE: Status: ACTIVE | Noted: 2021-03-23

## 2021-03-23 PROBLEM — G47.34 NOCTURNAL HYPOXIA: Status: ACTIVE | Noted: 2017-05-02

## 2021-03-23 LAB
AMPHETAMINE SCREEN, URINE: NORMAL
APTT: 29.7 SEC (ref 22.5–34.9)
BACTERIA: ABNORMAL /HPF
BARBITURATE SCREEN URINE: NORMAL
BASOPHILS ABSOLUTE: 0.1 K/UL (ref 0–0.1)
BASOPHILS RELATIVE PERCENT: 0.6 %
BENZODIAZEPINE SCREEN, URINE: NORMAL
BILIRUBIN URINE: NEGATIVE
BLOOD, URINE: ABNORMAL
CANNABINOID SCREEN URINE: NORMAL
CLARITY: CLEAR
COCAINE METABOLITE SCREEN URINE: NORMAL
COLOR: YELLOW
D DIMER: 0.5 UG/ML FEU (ref 0–0.6)
EOSINOPHILS ABSOLUTE: 0.1 K/UL (ref 0–0.4)
EOSINOPHILS RELATIVE PERCENT: 1.7 %
EPITHELIAL CELLS, UA: ABNORMAL /HPF (ref 0–5)
FOLATE: 16.96 NG/ML
GLUCOSE URINE: NEGATIVE MG/DL
HCT VFR BLD CALC: 42.3 % (ref 37–47)
HEMOGLOBIN: 13.8 G/DL (ref 11.5–16.5)
IMMATURE GRANULOCYTES #: 0 K/UL
IMMATURE GRANULOCYTES %: 0.5 % (ref 0–5)
INR BLD: 0.94 (ref 0.88–1.11)
KETONES, URINE: NEGATIVE MG/DL
LEUKOCYTE ESTERASE, URINE: ABNORMAL
LYMPHOCYTES ABSOLUTE: 1.7 K/UL (ref 1.5–4)
LYMPHOCYTES RELATIVE PERCENT: 19.9 %
Lab: NORMAL
MCH RBC QN AUTO: 31.7 PG (ref 27–32)
MCHC RBC AUTO-ENTMCNC: 32.6 G/DL (ref 31–35)
MCV RBC AUTO: 97 FL (ref 80–100)
METHADONE SCREEN, URINE: NORMAL
METHAMPHETAMINE, URINE: NORMAL
MICROSCOPIC EXAMINATION: YES
MONOCYTES ABSOLUTE: 0.6 K/UL (ref 0.2–0.8)
MONOCYTES RELATIVE PERCENT: 6.7 %
MUCUS: ABNORMAL /LPF
NEUTROPHILS ABSOLUTE: 5.9 K/UL (ref 2–7.5)
NEUTROPHILS RELATIVE PERCENT: 70.6 %
NITRITE, URINE: NEGATIVE
OPIATE SCREEN URINE: NORMAL
PDW BLD-RTO: 13 % (ref 11–16)
PH UA: 7.5 (ref 5–8)
PHENCYCLIDINE SCREEN URINE: NORMAL
PLATELET # BLD: 213 K/UL (ref 150–400)
PMV BLD AUTO: 9.7 FL (ref 6–10)
PRO-BNP: 151 PG/ML (ref 0–1800)
PROPOXYPHENE SCREEN, URINE: NORMAL
PROTEIN UA: 30 MG/DL
PROTHROMBIN TIME: 12.1 SEC (ref 11.6–13.8)
RBC # BLD: 4.36 M/UL (ref 3.8–5.8)
RBC UA: ABNORMAL /HPF (ref 0–4)
SARS-COV-2, NAAT: NOT DETECTED
SPECIFIC GRAVITY UA: 1.02 (ref 1–1.03)
TRICYCLIC, URINE: NORMAL
TROPONIN: <0.3 NG/ML
TSH SERPL DL<=0.05 MIU/L-ACNC: 0.39 UIU/ML (ref 0.27–4.2)
UR OXYCODONE RAPID SCREEN: NORMAL
URINE REFLEX TO CULTURE: ABNORMAL
URINE TYPE: ABNORMAL
UROBILINOGEN, URINE: 1 E.U./DL
VITAMIN B-12: 448 PG/ML (ref 211–911)
WBC # BLD: 8.3 K/UL (ref 4–11)
WBC UA: ABNORMAL /HPF (ref 0–5)

## 2021-03-23 PROCEDURE — 84443 ASSAY THYROID STIM HORMONE: CPT

## 2021-03-23 PROCEDURE — 85610 PROTHROMBIN TIME: CPT

## 2021-03-23 PROCEDURE — 1090F PRES/ABSN URINE INCON ASSESS: CPT | Performed by: FAMILY MEDICINE

## 2021-03-23 PROCEDURE — 81001 URINALYSIS AUTO W/SCOPE: CPT

## 2021-03-23 PROCEDURE — 82746 ASSAY OF FOLIC ACID SERUM: CPT

## 2021-03-23 PROCEDURE — 99235 HOSP IP/OBS SAME DATE MOD 70: CPT | Performed by: INTERNAL MEDICINE

## 2021-03-23 PROCEDURE — 97161 PT EVAL LOW COMPLEX 20 MIN: CPT

## 2021-03-23 PROCEDURE — G8484 FLU IMMUNIZE NO ADMIN: HCPCS | Performed by: FAMILY MEDICINE

## 2021-03-23 PROCEDURE — 73502 X-RAY EXAM HIP UNI 2-3 VIEWS: CPT

## 2021-03-23 PROCEDURE — 6370000000 HC RX 637 (ALT 250 FOR IP)

## 2021-03-23 PROCEDURE — 6360000002 HC RX W HCPCS: Performed by: PHYSICIAN ASSISTANT

## 2021-03-23 PROCEDURE — G8926 SPIRO NO PERF OR DOC: HCPCS | Performed by: FAMILY MEDICINE

## 2021-03-23 PROCEDURE — 84484 ASSAY OF TROPONIN QUANT: CPT

## 2021-03-23 PROCEDURE — 6360000002 HC RX W HCPCS

## 2021-03-23 PROCEDURE — G8427 DOCREV CUR MEDS BY ELIG CLIN: HCPCS | Performed by: FAMILY MEDICINE

## 2021-03-23 PROCEDURE — 3023F SPIROM DOC REV: CPT | Performed by: FAMILY MEDICINE

## 2021-03-23 PROCEDURE — 99284 EMERGENCY DEPT VISIT MOD MDM: CPT

## 2021-03-23 PROCEDURE — 71045 X-RAY EXAM CHEST 1 VIEW: CPT

## 2021-03-23 PROCEDURE — 96374 THER/PROPH/DIAG INJ IV PUSH: CPT

## 2021-03-23 PROCEDURE — 96376 TX/PRO/DX INJ SAME DRUG ADON: CPT

## 2021-03-23 PROCEDURE — 6360000002 HC RX W HCPCS: Performed by: EMERGENCY MEDICINE

## 2021-03-23 PROCEDURE — 82607 VITAMIN B-12: CPT

## 2021-03-23 PROCEDURE — 83880 ASSAY OF NATRIURETIC PEPTIDE: CPT

## 2021-03-23 PROCEDURE — G8399 PT W/DXA RESULTS DOCUMENT: HCPCS | Performed by: FAMILY MEDICINE

## 2021-03-23 PROCEDURE — 99213 OFFICE O/P EST LOW 20 MIN: CPT | Performed by: FAMILY MEDICINE

## 2021-03-23 PROCEDURE — 97165 OT EVAL LOW COMPLEX 30 MIN: CPT

## 2021-03-23 PROCEDURE — 36415 COLL VENOUS BLD VENIPUNCTURE: CPT

## 2021-03-23 PROCEDURE — G8417 CALC BMI ABV UP PARAM F/U: HCPCS | Performed by: FAMILY MEDICINE

## 2021-03-23 PROCEDURE — 2580000003 HC RX 258: Performed by: EMERGENCY MEDICINE

## 2021-03-23 PROCEDURE — 2700000000 HC OXYGEN THERAPY PER DAY

## 2021-03-23 PROCEDURE — 93005 ELECTROCARDIOGRAM TRACING: CPT

## 2021-03-23 PROCEDURE — G0378 HOSPITAL OBSERVATION PER HR: HCPCS

## 2021-03-23 PROCEDURE — 97535 SELF CARE MNGMENT TRAINING: CPT

## 2021-03-23 PROCEDURE — 87635 SARS-COV-2 COVID-19 AMP PRB: CPT

## 2021-03-23 PROCEDURE — 85730 THROMBOPLASTIN TIME PARTIAL: CPT

## 2021-03-23 PROCEDURE — 4040F PNEUMOC VAC/ADMIN/RCVD: CPT | Performed by: FAMILY MEDICINE

## 2021-03-23 PROCEDURE — 3017F COLORECTAL CA SCREEN DOC REV: CPT | Performed by: FAMILY MEDICINE

## 2021-03-23 PROCEDURE — 96372 THER/PROPH/DIAG INJ SC/IM: CPT

## 2021-03-23 PROCEDURE — 96372 THER/PROPH/DIAG INJ SC/IM: CPT | Performed by: FAMILY MEDICINE

## 2021-03-23 PROCEDURE — 6370000000 HC RX 637 (ALT 250 FOR IP): Performed by: EMERGENCY MEDICINE

## 2021-03-23 PROCEDURE — 96375 TX/PRO/DX INJ NEW DRUG ADDON: CPT

## 2021-03-23 PROCEDURE — 6370000000 HC RX 637 (ALT 250 FOR IP): Performed by: PHYSICIAN ASSISTANT

## 2021-03-23 PROCEDURE — 80307 DRUG TEST PRSMV CHEM ANLYZR: CPT

## 2021-03-23 PROCEDURE — 1036F TOBACCO NON-USER: CPT | Performed by: FAMILY MEDICINE

## 2021-03-23 PROCEDURE — 85379 FIBRIN DEGRADATION QUANT: CPT

## 2021-03-23 PROCEDURE — 85025 COMPLETE CBC W/AUTO DIFF WBC: CPT

## 2021-03-23 PROCEDURE — 1123F ACP DISCUSS/DSCN MKR DOCD: CPT | Performed by: FAMILY MEDICINE

## 2021-03-23 PROCEDURE — 6370000000 HC RX 637 (ALT 250 FOR IP): Performed by: INTERNAL MEDICINE

## 2021-03-23 RX ORDER — ASPIRIN 81 MG/1
81 TABLET ORAL DAILY
COMMUNITY

## 2021-03-23 RX ORDER — ONDANSETRON 4 MG/1
4 TABLET, FILM COATED ORAL EVERY 8 HOURS PRN
Status: DISCONTINUED | OUTPATIENT
Start: 2021-03-23 | End: 2021-03-23 | Stop reason: HOSPADM

## 2021-03-23 RX ORDER — ACETAMINOPHEN 325 MG/1
650 TABLET ORAL EVERY 8 HOURS PRN
Status: DISCONTINUED | OUTPATIENT
Start: 2021-03-23 | End: 2021-03-23 | Stop reason: HOSPADM

## 2021-03-23 RX ORDER — CIPROFLOXACIN 500 MG/1
500 TABLET, FILM COATED ORAL EVERY 12 HOURS SCHEDULED
Status: DISCONTINUED | OUTPATIENT
Start: 2021-03-23 | End: 2021-03-23 | Stop reason: HOSPADM

## 2021-03-23 RX ORDER — DIPHENHYDRAMINE HCL 25 MG
25 TABLET ORAL EVERY 8 HOURS PRN
Status: DISCONTINUED | OUTPATIENT
Start: 2021-03-23 | End: 2021-03-23 | Stop reason: HOSPADM

## 2021-03-23 RX ORDER — ONDANSETRON 4 MG/1
TABLET, FILM COATED ORAL
Status: COMPLETED
Start: 2021-03-23 | End: 2021-03-23

## 2021-03-23 RX ORDER — CLONAZEPAM 0.5 MG/1
2 TABLET ORAL NIGHTLY PRN
Status: DISCONTINUED | OUTPATIENT
Start: 2021-03-23 | End: 2021-03-23 | Stop reason: HOSPADM

## 2021-03-23 RX ORDER — CYANOCOBALAMIN 1000 UG/ML
1000 INJECTION INTRAMUSCULAR; SUBCUTANEOUS
Status: ON HOLD | COMMUNITY
End: 2021-12-29

## 2021-03-23 RX ORDER — CIPROFLOXACIN 500 MG/1
500 TABLET, FILM COATED ORAL ONCE
Status: COMPLETED | OUTPATIENT
Start: 2021-03-23 | End: 2021-03-23

## 2021-03-23 RX ORDER — FAMOTIDINE 20 MG/1
20 TABLET, FILM COATED ORAL DAILY
Status: DISCONTINUED | OUTPATIENT
Start: 2021-03-23 | End: 2021-03-23 | Stop reason: HOSPADM

## 2021-03-23 RX ORDER — PANTOPRAZOLE SODIUM 40 MG/1
40 TABLET, DELAYED RELEASE ORAL 2 TIMES DAILY
Qty: 60 TABLET | Refills: 0 | Status: SHIPPED | OUTPATIENT
Start: 2021-03-23 | End: 2021-11-17 | Stop reason: ALTCHOICE

## 2021-03-23 RX ORDER — 0.9 % SODIUM CHLORIDE 0.9 %
1000 INTRAVENOUS SOLUTION INTRAVENOUS ONCE
Status: COMPLETED | OUTPATIENT
Start: 2021-03-23 | End: 2021-03-23

## 2021-03-23 RX ORDER — HYDROXYZINE PAMOATE 25 MG/1
25 CAPSULE ORAL 3 TIMES DAILY PRN
Status: DISCONTINUED | OUTPATIENT
Start: 2021-03-23 | End: 2021-03-23 | Stop reason: HOSPADM

## 2021-03-23 RX ORDER — OYSTER SHELL CALCIUM WITH VITAMIN D 500; 200 MG/1; [IU]/1
1 TABLET, FILM COATED ORAL DAILY
Status: DISCONTINUED | OUTPATIENT
Start: 2021-03-23 | End: 2021-03-23 | Stop reason: HOSPADM

## 2021-03-23 RX ORDER — CEFTRIAXONE 1 G/1
1000 INJECTION, POWDER, FOR SOLUTION INTRAMUSCULAR; INTRAVENOUS ONCE
Status: COMPLETED | OUTPATIENT
Start: 2021-03-23 | End: 2021-03-23

## 2021-03-23 RX ORDER — ONDANSETRON 2 MG/ML
4 INJECTION INTRAMUSCULAR; INTRAVENOUS ONCE
Status: COMPLETED | OUTPATIENT
Start: 2021-03-23 | End: 2021-03-23

## 2021-03-23 RX ORDER — DEXAMETHASONE SODIUM PHOSPHATE 10 MG/ML
10 INJECTION INTRAMUSCULAR; INTRAVENOUS ONCE
Status: COMPLETED | OUTPATIENT
Start: 2021-03-23 | End: 2021-03-23

## 2021-03-23 RX ORDER — LEVOTHYROXINE SODIUM 0.07 MG/1
75 TABLET ORAL DAILY
Status: DISCONTINUED | OUTPATIENT
Start: 2021-03-23 | End: 2021-03-23 | Stop reason: HOSPADM

## 2021-03-23 RX ORDER — DIPHENHYDRAMINE HYDROCHLORIDE 50 MG/ML
50 INJECTION INTRAMUSCULAR; INTRAVENOUS ONCE
Status: COMPLETED | OUTPATIENT
Start: 2021-03-23 | End: 2021-03-23

## 2021-03-23 RX ORDER — LISINOPRIL AND HYDROCHLOROTHIAZIDE 25; 20 MG/1; MG/1
1 TABLET ORAL DAILY
Status: DISCONTINUED | OUTPATIENT
Start: 2021-03-23 | End: 2021-03-23 | Stop reason: CLARIF

## 2021-03-23 RX ORDER — ONDANSETRON 4 MG/1
4 TABLET, ORALLY DISINTEGRATING ORAL EVERY 8 HOURS PRN
Status: DISCONTINUED | OUTPATIENT
Start: 2021-03-23 | End: 2021-03-23

## 2021-03-23 RX ORDER — MORPHINE SULFATE 4 MG/ML
4 INJECTION, SOLUTION INTRAMUSCULAR; INTRAVENOUS ONCE
Status: COMPLETED | OUTPATIENT
Start: 2021-03-23 | End: 2021-03-23

## 2021-03-23 RX ORDER — GABAPENTIN 100 MG/1
100 CAPSULE ORAL NIGHTLY PRN
Status: DISCONTINUED | OUTPATIENT
Start: 2021-03-23 | End: 2021-03-23 | Stop reason: HOSPADM

## 2021-03-23 RX ORDER — ONDANSETRON 2 MG/ML
INJECTION INTRAMUSCULAR; INTRAVENOUS
Status: COMPLETED
Start: 2021-03-23 | End: 2021-03-23

## 2021-03-23 RX ORDER — CIPROFLOXACIN 500 MG/1
500 TABLET, FILM COATED ORAL EVERY 12 HOURS SCHEDULED
Qty: 10 TABLET | Refills: 0 | Status: SHIPPED | OUTPATIENT
Start: 2021-03-23 | End: 2021-03-23 | Stop reason: ALTCHOICE

## 2021-03-23 RX ORDER — HYDRALAZINE HYDROCHLORIDE 25 MG/1
25 TABLET, FILM COATED ORAL 2 TIMES DAILY PRN
Qty: 90 TABLET | Refills: 3 | Status: SHIPPED | OUTPATIENT
Start: 2021-03-23 | End: 2021-08-11

## 2021-03-23 RX ORDER — LISINOPRIL 20 MG/1
20 TABLET ORAL DAILY
Status: DISCONTINUED | OUTPATIENT
Start: 2021-03-23 | End: 2021-03-23 | Stop reason: HOSPADM

## 2021-03-23 RX ORDER — PANTOPRAZOLE SODIUM 40 MG/1
40 TABLET, DELAYED RELEASE ORAL 2 TIMES DAILY
Status: DISCONTINUED | OUTPATIENT
Start: 2021-03-23 | End: 2021-03-23 | Stop reason: HOSPADM

## 2021-03-23 RX ORDER — MORPHINE SULFATE 2 MG/ML
2 INJECTION, SOLUTION INTRAMUSCULAR; INTRAVENOUS ONCE
Status: COMPLETED | OUTPATIENT
Start: 2021-03-23 | End: 2021-03-23

## 2021-03-23 RX ORDER — HYDROCHLOROTHIAZIDE 25 MG/1
25 TABLET ORAL DAILY
Status: DISCONTINUED | OUTPATIENT
Start: 2021-03-23 | End: 2021-03-23 | Stop reason: HOSPADM

## 2021-03-23 RX ORDER — EPINEPHRINE 1 MG/ML
0.3 INJECTION, SOLUTION, CONCENTRATE INTRAVENOUS ONCE
Status: COMPLETED | OUTPATIENT
Start: 2021-03-23 | End: 2021-03-23

## 2021-03-23 RX ORDER — METHYLPREDNISOLONE SODIUM SUCCINATE 125 MG/2ML
125 INJECTION, POWDER, LYOPHILIZED, FOR SOLUTION INTRAMUSCULAR; INTRAVENOUS ONCE
Status: COMPLETED | OUTPATIENT
Start: 2021-03-23 | End: 2021-03-23

## 2021-03-23 RX ORDER — NYSTATIN 100000 U/G
CREAM TOPICAL
COMMUNITY
End: 2022-02-23 | Stop reason: SDUPTHER

## 2021-03-23 RX ADMIN — CIPROFLOXACIN HYDROCHLORIDE 500 MG: 500 TABLET, FILM COATED ORAL at 10:05

## 2021-03-23 RX ADMIN — METHYLPREDNISOLONE SODIUM SUCCINATE 125 MG: 125 INJECTION, POWDER, LYOPHILIZED, FOR SOLUTION INTRAMUSCULAR; INTRAVENOUS at 17:11

## 2021-03-23 RX ADMIN — LEVOTHYROXINE SODIUM 75 MCG: 0.07 TABLET ORAL at 12:28

## 2021-03-23 RX ADMIN — MORPHINE SULFATE 2 MG: 2 INJECTION, SOLUTION INTRAMUSCULAR; INTRAVENOUS at 05:44

## 2021-03-23 RX ADMIN — MORPHINE SULFATE 4 MG: 4 INJECTION, SOLUTION INTRAMUSCULAR; INTRAVENOUS at 01:50

## 2021-03-23 RX ADMIN — LISINOPRIL 20 MG: 20 TABLET ORAL at 12:28

## 2021-03-23 RX ADMIN — ONDANSETRON HYDROCHLORIDE 4 MG: 4 TABLET, FILM COATED ORAL at 12:36

## 2021-03-23 RX ADMIN — CEFTRIAXONE 1000 MG: 1 INJECTION, POWDER, FOR SOLUTION INTRAMUSCULAR; INTRAVENOUS at 17:10

## 2021-03-23 RX ADMIN — HYDROCHLOROTHIAZIDE 25 MG: 25 TABLET ORAL at 12:28

## 2021-03-23 RX ADMIN — DIPHENHYDRAMINE HYDROCHLORIDE 50 MG: 50 INJECTION INTRAMUSCULAR; INTRAVENOUS at 01:50

## 2021-03-23 RX ADMIN — EPINEPHRINE 0.3 MG: 1 INJECTION, SOLUTION, CONCENTRATE INTRAVENOUS at 01:25

## 2021-03-23 RX ADMIN — ONDANSETRON 4 MG: 2 INJECTION INTRAMUSCULAR; INTRAVENOUS at 04:06

## 2021-03-23 RX ADMIN — OYSTER SHELL CALCIUM WITH VITAMIN D 1 TABLET: 500; 200 TABLET, FILM COATED ORAL at 12:28

## 2021-03-23 RX ADMIN — FAMOTIDINE 20 MG: 20 TABLET, FILM COATED ORAL at 08:30

## 2021-03-23 RX ADMIN — PANTOPRAZOLE SODIUM 40 MG: 40 TABLET, DELAYED RELEASE ORAL at 12:28

## 2021-03-23 RX ADMIN — SODIUM CHLORIDE 1000 ML: 9 INJECTION, SOLUTION INTRAVENOUS at 01:50

## 2021-03-23 RX ADMIN — DEXAMETHASONE SODIUM PHOSPHATE 10 MG: 10 INJECTION INTRAMUSCULAR; INTRAVENOUS at 01:50

## 2021-03-23 RX ADMIN — HYDROXYZINE PAMOATE 25 MG: 25 CAPSULE ORAL at 11:39

## 2021-03-23 RX ADMIN — ONDANSETRON 4 MG: 4 TABLET, FILM COATED ORAL at 12:36

## 2021-03-23 RX ADMIN — CIPROFLOXACIN HYDROCHLORIDE 500 MG: 500 TABLET, FILM COATED ORAL at 03:53

## 2021-03-23 RX ADMIN — APIXABAN 5 MG: 5 TABLET, FILM COATED ORAL at 12:35

## 2021-03-23 ASSESSMENT — PAIN DESCRIPTION - LOCATION
LOCATION: CHEST;JAW
LOCATION: CHEST;JAW
LOCATION: CHEST
LOCATION: CHEST;JAW

## 2021-03-23 ASSESSMENT — PAIN DESCRIPTION - DESCRIPTORS
DESCRIPTORS: SHARP;SHOOTING
DESCRIPTORS: SHARP;SHOOTING

## 2021-03-23 ASSESSMENT — PATIENT HEALTH QUESTIONNAIRE - PHQ9
SUM OF ALL RESPONSES TO PHQ QUESTIONS 1-9: 0
SUM OF ALL RESPONSES TO PHQ9 QUESTIONS 1 & 2: 0
2. FEELING DOWN, DEPRESSED OR HOPELESS: 0

## 2021-03-23 ASSESSMENT — PAIN DESCRIPTION - FREQUENCY
FREQUENCY: CONTINUOUS

## 2021-03-23 ASSESSMENT — PAIN SCALES - GENERAL
PAINLEVEL_OUTOF10: 5
PAINLEVEL_OUTOF10: 7

## 2021-03-23 ASSESSMENT — PAIN DESCRIPTION - PROGRESSION
CLINICAL_PROGRESSION: GRADUALLY IMPROVING
CLINICAL_PROGRESSION: GRADUALLY IMPROVING
CLINICAL_PROGRESSION: GRADUALLY WORSENING

## 2021-03-23 ASSESSMENT — PAIN DESCRIPTION - PAIN TYPE
TYPE: ACUTE PAIN

## 2021-03-23 NOTE — H&P
Short Stay Summary      Patient ID: Henry Courser      Patient's PCP: TATYANA Goldsmith CNP    Admit Date: 3/23/2021     Discharge Date:   3/23/2021    Admitting Physician: Yvette Francisco MD    Discharge Physician: JUSTINE Mir     Reason for this admission:   Chest pain   Nocturnal hypoxia  Allergic reaction     Discharge Diagnoses: Active Hospital Problems    Diagnosis Date Noted    History of esophageal stricture [Z87.19] 03/23/2021    DVT, lower extremity, recurrent ([address] [I82.409] 07/21/2019    Anxiety [F41.9] 10/27/2018    Essential hypertension [I10] 05/02/2017    Nocturnal hypoxia [G47.34] 05/02/2017    Chronic nausea [R11.0] 05/21/2015    Chest pain [R07.9] 05/20/2015    Hypothyroidism [E03.9] 05/20/2015       Procedures:  XR HIP 2-3 VW W PELVIS RIGHT   Final Result      No acute bony abnormality right hip      If concern for acute injury splinting with follow-up recommended. XR CHEST PORTABLE   Final Result   No acute pulmonary process. Consults:   None    HISTORY OF PRESENT ILLNESS:   The patient is a 70 y.o. female with PMH of esophageal stricture, recurrent dvt on eliquis, hypothyroidism, anxiety, chronic nausea, HTN, and others who presents due to chest pain, nausea, and allergic reaction. Patient states she was having chest pain that felt like aching pain in the center of her chest. It radiated down left arm and into left jaw. She took 3 nitroglycerins and a baby aspirin which helped the pain. Also took nyquil for nasal congestion. Due to chest pain she was driving herself to the ED when she started having allergic reaction with hives on face and neck. Was having wheezing and diffculty swallowing. She believes she is allergic to nyquil and that's what caused her reaction. She has known allergy to mucinex and other otc products and she took the nyquil right before the rash started.  She also reports her oxygen levels have been low at night and  She used to have oxygen but she states it was taken away from her and she doesn't know why because she really needs oxygen at night. Non compliant with cpap therapy at home although she has documented history of sleep apnea. She reports she has chest pain everyday and usually takes nitroglycerin. Takes zofran about 4 times per day for nausea. She reports  Chest pain everyday for the last 3-4 months. Has not seen cardiologist.  Per ed physician sats 85 overnight in ED and seh was placed on 2L. Today she is sitting up in bed. No acute distress. No further episodes of chest pain. Asks to be sent home with oxygen for her to use at night like she used to because sometimes she feels she is smothering. She states  is supposed to see cardiologist in [address] [place] [address] next month. Has not seen Dr. Giovanni Miles specialist since 2019. Review of system  Constitutional:  Denies fever or chills. Eyes:  Denies change in visual acuity or discharge. HENT:  Denies nasal congestion or sore throat. Respiratory:  Denies cough or shortness of breath. Cardiovascular:  Denies palpitation or swelling in LEs. Admits to chest pain. GI:  Denies abdominal pain, nausea, vomiting, bloody stools or diarrhea. :  Denies dysuria or frequency. Musculoskeletal:  Denies back pain or joint pain. Integument:  Denies rash or itching. Neurologic:  Denies headache, focal weakness or sensory changes. Endocrine:  Denies polyuria or polydipsia. Psychiatric:  Denies depression or anxiety.     Past Medical History:      Diagnosis Date    Bleeds easily ([address] 12/31/2018    DVT (deep venous thrombosis) (HCC)     Headache(784.0)     Hypertension     Hypothyroid 5/20/2015    MI, old     Moderate episode of recurrent major depressive disorder ([address] 10/27/2018    Pneumonia     Stomach ulcer     Thyroid disease        Past Surgical History:      Procedure Laterality Date    APPENDECTOMY      CHOLECYSTECTOMY      LIVER SURGERY      UPPER GASTROINTESTINAL ENDOSCOPY         Social History:   TOBACCO:   reports that she has never smoked. She has never used smokeless tobacco.  ETOH:   reports no history of alcohol use. OCCUPATION:  None     Family History:   History reviewed. No pertinent family history. Allergies: Iv dye [iodides], Azithromycin, Augmentin [amoxicillin-pot clavulanate], Codeine, Influenza vaccines, Mucinex [guaifenesin er], Nyquil severe cold-flu [phenyleph-doxylamine-dm-apap], and Reglan [metoclopramide]    Medications Prior to Admission:    Prior to Admission medications    Medication Sig Start Date End Date Taking? Authorizing Provider   apixaban (ELIQUIS) 5 MG TABS tablet Take by mouth 2 times daily Samples from cardiology office   Yes Historical Provider, MD   nystatin (MYCOSTATIN) 994563 UNIT/GM cream Apply topically 2 times daily.    Yes Historical Provider, MD   aspirin 81 MG EC tablet Take 81 mg by mouth daily   Yes Historical Provider, MD   cyanocobalamin 1000 MCG/ML injection Inject 1,000 mcg into the muscle every 30 days   Yes Historical Provider, MD   ciprofloxacin (CIPRO) 500 MG tablet Take 1 tablet by mouth every 12 hours for 5 days 3/23/21 3/28/21 Yes Argentina Goldmann, PA   pantoprazole (PROTONIX) 40 MG tablet Take 1 tablet by mouth 2 times daily 3/23/21  Yes Argentina Goldmann, PA   hydrALAZINE (APRESOLINE) 25 MG tablet Take 1 tablet by mouth 2 times daily as needed (systolic bp > 035) 7/97/28  Yes Argentina Goldmann, PA   clonazePAM (KLONOPIN) 2 MG tablet TAKE ONE TABLET BY MOUTH ONCE NIGHTLY AS NEEDED FOR INSOMNIA 2/8/21 3/23/21 Yes TATYANA Cruz CNP   Calcium Carbonate-Vitamin D (OYSTER SHELL CALCIUM/D) 500-200 MG-UNIT TABS Take 1 tablet by mouth daily 1/7/21 1/7/22 Yes TATYANA Cruz CNP   ondansetron (ZOFRAN) 4 MG tablet TAKE ONE TABLET BY MOUTH EVERY 8 HOURS AS NEEDED FOR NAUSEA OR VOMITING 1/4/21  Yes TATYANA Cruz CNP   lisinopril-hydroCHLOROthiazide (PRINZIDE;ZESTORETIC) 20-25 MG per tablet Take 1 tablet by ALKPHOS 104 (H) 09/16/2020    AST 18 09/16/2020    ALT 13 09/16/2020    LABGLOM >60 09/16/2020    GFRAA >59 09/16/2020    AGRATIO 1.9 09/16/2020    GLOB 2.4 09/16/2020     Assessment and Plan/Hospital course: Active Hospital Problems    Diagnosis Date Noted    History of esophageal stricture [Z87.19]  - per chart review and patient report history of esophageal stricture and esophageal spasm  - has not seen GI Dr. Leilani Anderson since 2019 and per last note from GI she did not follow up for egd as recommended  - no difficulty with po intake at this time  - continue gi ppx and f/u with gi .  Discussed importance of egd especially with her history and recurrent chest pain.  03/23/2021    Allergic reaction [T78.40XA]  - patient attributes allergic reaction to nyquil   - received decadron, benadryl, epinephrine in ER and symptoms resolved  03/23/2021    DVT, lower extremity, recurrent ([address] [I82.409]  - continue home eliquis with history recurrent lower extremity dvt  - patient reports she has samples of this medication at home 07/21/2019    Anxiety [F41.9]  - continue clonazepam as needed  10/27/2018    Essential hypertension [I10]  - BP slightly elevated with systolic 506 today however she had not received her lisinopril hctz - this was reordered when med list confirmed by pharmacy  - hydralazine as needed for sbp > 165  - encouraged her to f/u with pcp and cardiology with log of BP readings  05/02/2017    Nocturnal hypoxia [G47.34]  - per chart review has history of sleep apnea and non compliant with cpap   - patient reports she previously used supplemetnal o2 at night and has not had for several months as it was \"taken away from her\"   - Noted to have sats 85% on RA in ED overnight and placed on 2L O2.   - Case management ordered oxygen for the patient   - would benefit from sleep study , defer to pcp  05/02/2017    Acute cystitis without hematuria [N30.00]  - UA indicative of possible UTI and she was started on oral ciprofloxacin  02/03/2017    Chronic nausea [R11.0]  - longstanding history of chronic nausea for several years per chart review  - encourage her to f/u with GI Dr. Sarah Nava which she has not done since 2019. Per chart review no recent egd either.   - continue zofran as needed  - continue ppi bid  05/21/2015    Chest pain [R07.9]  - reports chest pain daily for the last several months that is sometimes relieved by nitroglycerin   - Troponin x 3 negative  - EKG with NSR and T wave inversion V1-V3  - CXR negative   - continue home aspirin daily and nitro as needed   - Obtained record from Dr. Franky Conley and she has not been seen since 2019 and has cancelled last 5 appointments per office report   - Patient states she has follow up at [facility] cardiology possibly Dr. Ann-Marie Parker.  to confirm appointment time and make sure it's as soon as possible with chest pain. - continue protonix BID and f/u with GI as above  05/20/2015    Hypothyroidism [E03.9]  - continue home regimen  - tsh wnl  05/20/2015         Disposition: home    Discharged Condition: Stable    Activity: activity as tolerated  Diet: cardiac diet  Follow Up: Primary Care Physician in one week. Follow up with cardiology Dr. [facility] as soon as possible ( to schedule appointment. Follow up with GI Dr. Sarah Nava as scheduled. Monitor BP daily and write down readings to take to PCP appointment. If systolic blood pressure higher than 165 take hydralazine 25 mg and discuss with pcp.      Discharge Medications:      Current Discharge Medication List           Details   ciprofloxacin (CIPRO) 500 MG tablet Take 1 tablet by mouth every 12 hours for 5 days  Qty: 10 tablet, Refills: 0      hydrALAZINE (APRESOLINE) 25 MG tablet Take 1 tablet by mouth 2 times daily as needed (systolic bp > 704)  Qty: 90 tablet, Refills: 3              Details   pantoprazole (PROTONIX) 40 MG tablet Take 1 tablet by mouth 2 times daily  Qty: 60 tablet, Refills: 0    Associated Diagnoses: Gastroesophageal reflux disease without esophagitis              Details   apixaban (ELIQUIS) 5 MG TABS tablet Take by mouth 2 times daily Samples from cardiology office      nystatin (MYCOSTATIN) 245706 UNIT/GM cream Apply topically 2 times daily. aspirin 81 MG EC tablet Take 81 mg by mouth daily      cyanocobalamin 1000 MCG/ML injection Inject 1,000 mcg into the muscle every 30 days      clonazePAM (KLONOPIN) 2 MG tablet TAKE ONE TABLET BY MOUTH ONCE NIGHTLY AS NEEDED FOR INSOMNIA  Qty: 30 tablet, Refills: 0    Associated Diagnoses: Panic attack; Insomnia, unspecified type      Calcium Carbonate-Vitamin D (OYSTER SHELL CALCIUM/D) 500-200 MG-UNIT TABS Take 1 tablet by mouth daily  Qty: 30 tablet, Refills: 5    Associated Diagnoses: Osteopenia of lumbar spine      ondansetron (ZOFRAN) 4 MG tablet TAKE ONE TABLET BY MOUTH EVERY 8 HOURS AS NEEDED FOR NAUSEA OR VOMITING  Qty: 30 tablet, Refills: 5    Associated Diagnoses: Nausea      lisinopril-hydroCHLOROthiazide (PRINZIDE;ZESTORETIC) 20-25 MG per tablet Take 1 tablet by mouth daily  Qty: 90 tablet, Refills: 3    Associated Diagnoses: Essential hypertension      levothyroxine (SYNTHROID) 75 MCG tablet Take 1 tablet by mouth Daily  Qty: 90 tablet, Refills: 3    Associated Diagnoses: Hypothyroidism, unspecified type      nitroGLYCERIN (NITROSTAT) 0.4 MG SL tablet Place 1 tablet under the tongue every 5 minutes as needed for Chest pain  Qty: 25 tablet, Refills: 3    Associated Diagnoses: Stable angina (HCC)      gabapentin (NEURONTIN) 100 MG capsule Take 1 capsule by mouth nightly as needed (back pain) for up to 30 days. Qty: 30 capsule, Refills: 0    Associated Diagnoses: Chronic midline low back pain with bilateral sciatica           Patient was seen and examined by Dr. Herrera Landeros and plan of care reviewed.       Signed:  Electronically signed by JUSTINE Gonzales on 3/23/2021 at 2:36 PM       Thank you TATYANA Millan - CNP

## 2021-03-23 NOTE — PLAN OF CARE
Problem: Falls - Risk of:  Goal: Will remain free from falls  Description: Will remain free from falls  Outcome: Ongoing     Problem: Pain Control  Goal: Maintain pain level at or below patient's acceptable level (or 5 if patient is unable to determine acceptable level)  Outcome: Ongoing     Problem: Skin Integrity/Risk  Goal: No skin breakdown during hospitalization  Outcome: Ongoing

## 2021-03-23 NOTE — PROGRESS NOTES
Patient discharged at this time. Patient instructed on new medications and follow up. Explained to patient we had left message with Dr Sarah Alvarenga cardiology to schedule appointment. Patient stated she would prefer to schedule GI follow up herself. Patient states best number to reach her at is [phone]. Patient was instructed to monitor BP at home and take recordings to PCP appt. Understanding verbalized. PIV removed. Patient left medical unit via wheelchair. Patient reports she drove herself to hospital and refused ride home, stating she was fine to take herself. No acute distress noted, patient denies any pain at this time.

## 2021-03-23 NOTE — PROGRESS NOTES
Medication Reconciliaton  Med rec performed for pt utilizing fill history from [facility] and by interviewing pt. See notes below:  -Added the following meds as the pt stated they are current meds: ASA 81mg daily, B12 injections once a month (pt stated she is overdue), and nystatin cream (confirmed dose with pharmacy). -Of note, pt has not filled lisinopril since 10/6/20 but pt stated she had an excess of the medication at home and still takes it daily.  -Pt also has not filled pantoprazole since 10/6/20 but stated that she had an excess of it as well and uses a supply her neighbor gave her. Pt confirmed she uses it BID.  -Pt stated that she only has around 6-7 days left of the oyster shell-vitamin d3 med and then she is to stop taking it.  -Lastly, pt stated that she takes Eliquis BID and she gets it as samples from Dr. Mili Hu, cardiologist in [place]. Pt stated that she last got samples from his office 3 weeks ago and has enough until her next cardiologist appt. Called Dr. Elsi Lewis office and they stated that they couldn't tell me if the pt gets samples from them or not, but that they have Eliquis 5mg po BID on her med list and the indication was DVT. Per office the pt is non-compliant overall as she has not been seen there since 6/2019 and has cancelled her last five appointments. Per chart review pt has been referred by provider Fifi Lynn to cardiology with Dr. Khadar Mills but pt keeps cancelling those appointments as well. Overall, cannot confirm if pt still uses Eliquis or not. Encouraged pt to attend her next cardiology appt and not cancel again. Pt stated she will make sure she goes to next appt. Pt did state that her Eliquis is expensive. I stated that if the pt's new cardiologist wants to continue Eliquis, that she needs to let Darren Mccord know and he can prescribe it for a lower cost (around $50-$60 per month) on a discount program with [employer].   Pt verbalized understanding and stated that this cost was affordable for her. Pt will f/u with cardiologist and Lizzie Ann.  -Pt also stated that she gets a constipation medication as samples from [facility]. I believe this is Linzess but unable to confirm as the pt did know know the name of it.     Manuel Ortiz, AdrianoD

## 2021-03-23 NOTE — DISCHARGE SUMMARY
Short Stay Summary      Patient ID: Clay Henry      Patient's PCP: Debbie Ash, APRN - CNP    Admit Date: 3/23/2021     Discharge Date:   3/23/2021    Admitting Physician: Roscoe Pena MD    Discharge Physician: JUSTINE Guallpa     Reason for this admission:   Chest pain   Nocturnal hypoxia  Allergic reaction     Discharge Diagnoses: Active Hospital Problems    Diagnosis Date Noted    History of esophageal stricture [Z87.19] 03/23/2021    DVT, lower extremity, recurrent ([address] [I82.409] 07/21/2019    Anxiety [F41.9] 10/27/2018    Essential hypertension [I10] 05/02/2017    Nocturnal hypoxia [G47.34] 05/02/2017    Chronic nausea [R11.0] 05/21/2015    Chest pain [R07.9] 05/20/2015    Hypothyroidism [E03.9] 05/20/2015       Procedures:  XR HIP 2-3 VW W PELVIS RIGHT   Final Result      No acute bony abnormality right hip      If concern for acute injury splinting with follow-up recommended. XR CHEST PORTABLE   Final Result   No acute pulmonary process. Consults:   None    HISTORY OF PRESENT ILLNESS:   The patient is a 70 y.o. female with PMH of esophageal stricture, recurrent dvt on eliquis, hypothyroidism, anxiety, chronic nausea, HTN, and others who presents due to chest pain, nausea, and allergic reaction. Patient states she was having chest pain that felt like aching pain in the center of her chest. It radiated down left arm and into left jaw. She took 3 nitroglycerins and a baby aspirin which helped the pain. Also took nyquil for nasal congestion. Due to chest pain she was driving herself to the ED when she started having allergic reaction with hives on face and neck. Was having wheezing and diffculty swallowing. She believes she is allergic to nyquil and that's what caused her reaction. She has known allergy to mucinex and other otc products and she took the nyquil right before the rash started.  She also reports her oxygen levels have been low at night and  She used to have oxygen but she states it was taken away from her and she doesn't know why because she really needs oxygen at night. Non compliant with cpap therapy at home although she has documented history of sleep apnea. She reports she has chest pain everyday and usually takes nitroglycerin. Takes zofran about 4 times per day for nausea. She reports  Chest pain everyday for the last 3-4 months. Has not seen cardiologist.  Per ed physician sats 85 overnight in ED and seh was placed on 2L. Today she is sitting up in bed. No acute distress. No further episodes of chest pain. Asks to be sent home with oxygen for her to use at night like she used to because sometimes she feels she is smothering. She states tamica is supposed to see cardiologist in [address] [place] [address] next month. Has not seen Dr. Neftaly Hanson specialist since 2019. Review of system  Constitutional:  Denies fever or chills. Eyes:  Denies change in visual acuity or discharge. HENT:  Denies nasal congestion or sore throat. Respiratory:  Denies cough or shortness of breath. Cardiovascular:  Denies palpitation or swelling in LEs. Admits to chest pain. GI:  Denies abdominal pain, nausea, vomiting, bloody stools or diarrhea. :  Denies dysuria or frequency. Musculoskeletal:  Denies back pain or joint pain. Integument:  Denies rash or itching. Neurologic:  Denies headache, focal weakness or sensory changes. Endocrine:  Denies polyuria or polydipsia. Psychiatric:  Denies depression or anxiety.     Past Medical History:      Diagnosis Date    Bleeds easily ([address] 12/31/2018    DVT (deep venous thrombosis) (HCC)     Headache(784.0)     Hypertension     Hypothyroid 5/20/2015    MI, old     Moderate episode of recurrent major depressive disorder ([address] 10/27/2018    Pneumonia     Stomach ulcer     Thyroid disease        Past Surgical History:      Procedure Laterality Date    APPENDECTOMY      CHOLECYSTECTOMY      LIVER SURGERY      UPPER GASTROINTESTINAL ENDOSCOPY         Social History:   TOBACCO:   reports that she has never smoked. She has never used smokeless tobacco.  ETOH:   reports no history of alcohol use. OCCUPATION:  None     Family History:   History reviewed. No pertinent family history. Allergies: Iv dye [iodides], Azithromycin, Augmentin [amoxicillin-pot clavulanate], Codeine, Influenza vaccines, Mucinex [guaifenesin er], Nyquil severe cold-flu [phenyleph-doxylamine-dm-apap], and Reglan [metoclopramide]    Medications Prior to Admission:    Prior to Admission medications    Medication Sig Start Date End Date Taking? Authorizing Provider   apixaban (ELIQUIS) 5 MG TABS tablet Take by mouth 2 times daily Samples from cardiology office   Yes Historical Provider, MD   nystatin (MYCOSTATIN) 126900 UNIT/GM cream Apply topically 2 times daily.    Yes Historical Provider, MD   aspirin 81 MG EC tablet Take 81 mg by mouth daily   Yes Historical Provider, MD   cyanocobalamin 1000 MCG/ML injection Inject 1,000 mcg into the muscle every 30 days   Yes Historical Provider, MD   ciprofloxacin (CIPRO) 500 MG tablet Take 1 tablet by mouth every 12 hours for 5 days 3/23/21 3/28/21 Yes JUSTINE Chu   pantoprazole (PROTONIX) 40 MG tablet Take 1 tablet by mouth 2 times daily 3/23/21  Yes JUSTINE Chu   hydrALAZINE (APRESOLINE) 25 MG tablet Take 1 tablet by mouth 2 times daily as needed (systolic bp > 215) 7/[birth year]47  Yes JUSTINE Chu   clonazePAM (KLONOPIN) 2 MG tablet TAKE ONE TABLET BY MOUTH ONCE NIGHTLY AS NEEDED FOR INSOMNIA 2/8/21 3/23/21 Yes TATYANA Khoury CNP   Calcium Carbonate-Vitamin D (OYSTER SHELL CALCIUM/D) 500-200 MG-UNIT TABS Take 1 tablet by mouth daily 1/7/21 1/7/22 Yes TATYANA Khoury CNP   ondansetron (ZOFRAN) 4 MG tablet TAKE ONE TABLET BY MOUTH EVERY 8 HOURS AS NEEDED FOR NAUSEA OR VOMITING 1/4/21  Yes TATYANA Khoury CNP   lisinopril-hydroCHLOROthiazide (PRINZIDE;ZESTORETIC) 20-25 MG per tablet Take 1 tablet by mouth daily 10/6/20  Yes Maurice Uribe, APRN - CNP   levothyroxine (SYNTHROID) 75 MCG tablet Take 1 tablet by mouth Daily 9/16/20  Yes Maurice Uribe APRN - CNP   nitroGLYCERIN (NITROSTAT) 0.4 MG SL tablet Place 1 tablet under the tongue every 5 minutes as needed for Chest pain 7/8/20  Yes Maurice Uribe, APRN - CNP   gabapentin (NEURONTIN) 100 MG capsule Take 1 capsule by mouth nightly as needed (back pain) for up to 30 days. 2/26/21 3/28/21  Maurice Uribe, APRN - CNP       Vital Signs  Temp: 97.7 °F (36.5 °C)  Pulse: 83  Resp: 20  BP: (!) 154/71  SpO2: 96 %  O2 Device: Nasal cannula  O2 Flow Rate (L/min): 2 L/min    Vital signs reviewed in electronic chart. Physical exam  Constitutional:  Well developed, well nourished, no acute distress. Eyes:  PERRL, conjunctiva normal, EOMI. HENT:  Atraumatic, external ears normal, external nose/nares normal, oropharynx moist, no pharyngeal exudates. Neck:  Supple. No JVD or thyromegaly. Respiratory:  No respiratory distress, normal breath sounds, no rales, no wheezing. Cardiovascular:  Normal rate, normal rhythm, no murmurs, no gallops, no rubs. GI:  Soft, nondistended, normal bowel sounds, nontender, no organomegaly, no mass. Musculoskeletal:  No edema, no tenderness, no obvious deformities. Patient is moving all extremities. Integument:  Well hydrated, no rash. Neurologic:  Alert & oriented x 3,  no focal deficits noted. Strength is equal throughout. Psychiatric:  Speech and behavior appropriate.       Lab Results   Component Value Date    WBC 8.3 03/23/2021    HGB 13.8 03/23/2021    HCT 42.3 03/23/2021    MCV 97.0 03/23/2021     03/23/2021       Lab Results   Component Value Date     09/16/2020    K 4.3 09/16/2020     09/16/2020    CO2 28 09/16/2020    BUN 11 09/16/2020    CREATININE 0.7 09/16/2020    GLUCOSE 134 (H) 09/16/2020    CALCIUM 9.8 09/16/2020    PROT 6.9 09/16/2020    LABALBU 4.5 09/16/2020    BILITOT 0.3 09/16/2020 oral ciprofloxacin  02/03/2017    Chronic nausea [R11.0]  - longstanding history of chronic nausea for several years per chart review  - encourage her to f/u with GI Dr. Paulette Chambers which she has not done since 2019. Per chart review no recent egd either.   - continue zofran as needed  - continue ppi bid  05/21/2015    Chest pain [R07.9]  - reports chest pain daily for the last several months that is sometimes relieved by nitroglycerin   - Troponin x 3 negative  - EKG with NSR and T wave inversion V1-V3  - CXR negative   - continue home aspirin daily and nitro as needed   - Obtained record from Dr. Rita Cottrell and she has not been seen since 2019 and has cancelled last 5 appointments per office report   - Patient states she has follow up at [facility] cardiology possibly Dr. Jh Bean.  to confirm appointment time and make sure it's as soon as possible with chest pain. - continue protonix BID and f/u with GI as above  05/20/2015    Hypothyroidism [E03.9]  - continue home regimen  - tsh wnl  05/20/2015         Disposition: home    Discharged Condition: Stable    Activity: activity as tolerated  Diet: cardiac diet  Follow Up: Primary Care Physician in one week. Follow up with cardiology Dr. Yane Jasmine [facility] as soon as possible ( to schedule appointment. Follow up with GI Dr. Paulette Chambers as scheduled. Monitor BP daily and write down readings to take to PCP appointment. If systolic blood pressure higher than 165 take hydralazine 25 mg and discuss with pcp.      Discharge Medications:      Current Discharge Medication List           Details   ciprofloxacin (CIPRO) 500 MG tablet Take 1 tablet by mouth every 12 hours for 5 days  Qty: 10 tablet, Refills: 0      hydrALAZINE (APRESOLINE) 25 MG tablet Take 1 tablet by mouth 2 times daily as needed (systolic bp > 401)  Qty: 90 tablet, Refills: 3              Details   pantoprazole (PROTONIX) 40 MG tablet Take 1 tablet by mouth 2 times daily  Qty: 60 tablet, Refills: 0    Associated Diagnoses: Gastroesophageal reflux disease without esophagitis              Details   apixaban (ELIQUIS) 5 MG TABS tablet Take by mouth 2 times daily Samples from cardiology office      nystatin (MYCOSTATIN) 941305 UNIT/GM cream Apply topically 2 times daily. aspirin 81 MG EC tablet Take 81 mg by mouth daily      cyanocobalamin 1000 MCG/ML injection Inject 1,000 mcg into the muscle every 30 days      clonazePAM (KLONOPIN) 2 MG tablet TAKE ONE TABLET BY MOUTH ONCE NIGHTLY AS NEEDED FOR INSOMNIA  Qty: 30 tablet, Refills: 0    Associated Diagnoses: Panic attack; Insomnia, unspecified type      Calcium Carbonate-Vitamin D (OYSTER SHELL CALCIUM/D) 500-200 MG-UNIT TABS Take 1 tablet by mouth daily  Qty: 30 tablet, Refills: 5    Associated Diagnoses: Osteopenia of lumbar spine      ondansetron (ZOFRAN) 4 MG tablet TAKE ONE TABLET BY MOUTH EVERY 8 HOURS AS NEEDED FOR NAUSEA OR VOMITING  Qty: 30 tablet, Refills: 5    Associated Diagnoses: Nausea      lisinopril-hydroCHLOROthiazide (PRINZIDE;ZESTORETIC) 20-25 MG per tablet Take 1 tablet by mouth daily  Qty: 90 tablet, Refills: 3    Associated Diagnoses: Essential hypertension      levothyroxine (SYNTHROID) 75 MCG tablet Take 1 tablet by mouth Daily  Qty: 90 tablet, Refills: 3    Associated Diagnoses: Hypothyroidism, unspecified type      nitroGLYCERIN (NITROSTAT) 0.4 MG SL tablet Place 1 tablet under the tongue every 5 minutes as needed for Chest pain  Qty: 25 tablet, Refills: 3    Associated Diagnoses: Stable angina (HCC)      gabapentin (NEURONTIN) 100 MG capsule Take 1 capsule by mouth nightly as needed (back pain) for up to 30 days. Qty: 30 capsule, Refills: 0    Associated Diagnoses: Chronic midline low back pain with bilateral sciatica           Patient was seen and examined by Dr. Keyana Limon and plan of care reviewed.       Signed:  Electronically signed by JUSTINE Guallpa on 3/23/2021 at 2:36 PM       Thank you Debbie Ash, APRN - CNP for the opportunity to be involved in this patient's care. If you have any questions or concerns please feel free to contact me at [phone].

## 2021-03-23 NOTE — ED NOTES
Patient arrived to ER with chief complaint of chest pain. Patient woke up yesterday around 9 pm. Patient took an aspirin and has now had an allergic reaction to the aspirin. Patient has generalized hives and states she feels like her throat is closing. Patient states chest pain is constant and rates it at 7/10 on the pain scale.       Renetta Martinez RN  03/23/21 [id number]

## 2021-03-23 NOTE — ED NOTES
Patient resting in bed, O2 saturation 84% on room air. Patient placed on 2L NC.      Jacqueline Crespo RN  03/23/21 0783

## 2021-03-23 NOTE — PROGRESS NOTES
Physical Therapy    Facility/Department: [facility] MED SURG  Initial Assessment    NAME: Arina Don  : 1945  MRN: [id number]    Date of Service: 3/23/2021    Discharge Recommendations:  Continue to assess pending progress        Assessment   Assessment: Chest pain, possible allergic reaction; recent fall; patient will benefit from PT to help restore safe, independent functional mobility. Treatment Diagnosis: Chest pain, possible allergic reaction; recent fall  Prognosis: Excellent  Decision Making: Low Complexity  REQUIRES PT FOLLOW UP: Yes  Activity Tolerance  Activity Tolerance: Patient Tolerated treatment well       Patient Diagnosis(es): The primary encounter diagnosis was Dyspnea and respiratory abnormalities. Diagnoses of Hypoxia, Chest pain, unspecified type, Acute UTI, and Allergic reaction, initial encounter were also pertinent to this visit. has a past medical history of Bleeds easily ([address] DVT (deep venous thrombosis) ([address] Headache(784.0), Hypertension, Hypothyroid, MI, old, Moderate episode of recurrent major depressive disorder ([address] Pneumonia, Stomach ulcer, and Thyroid disease. has a past surgical history that includes Cholecystectomy; Liver surgery; Appendectomy; and Upper gastrointestinal endoscopy.     Restrictions  Restrictions/Precautions  Restrictions/Precautions: General Precautions, Fall Risk  Required Braces or Orthoses?: No  Vision/Hearing  Vision: Impaired  Vision Exceptions: Wears glasses for distance  Hearing: Within functional limits     Subjective  General  Chart Reviewed: Yes  Patient assessed for rehabilitation services?: Yes  Response To Previous Treatment: Not applicable  Family / Caregiver Present: No  Referring Practitioner: Wilbert Mays MD  Diagnosis: Chest pain, ? allergic reaction  Follows Commands: Within Functional Limits  Subjective  Subjective: Patient admitted for Observation from ER with chest pain and possible allergic reaction; she is agreeable to consult; is normally independent with functional mobility; states she has had falls the past couple of days, stating it occurs when her chest hurts and her head \"feels hot'. Orientation  Orientation  Overall Orientation Status: Within Normal Limits  Social/Functional History  Social/Functional History  Lives With: Alone  Type of Home: Apartment  Home Layout: One level  Home Access: Level entry  Bathroom Shower/Tub: Tub/Shower unit  Bathroom Toilet: Standard  Bathroom Equipment: Hand-held shower  Home Equipment: [place]  ADL Assistance: Independent  Homemaking Assistance: Independent  Homemaking Responsibilities: Yes  Ambulation Assistance: Independent  Transfer Assistance: Independent  Active : Yes  Occupation: Full time employment  Type of occupation: 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. 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   Cognition        Objective     Observation/Palpation  Posture: Fair  Observation: 2L 02, lying in bed, pleasant and cooperative    AROM RLE (degrees)  RLE AROM: WFL  AROM LLE (degrees)  LLE AROM : WFL  AROM RUE (degrees)  RUE AROM : WFL  AROM LUE (degrees)  LUE AROM : WFL  Strength RLE  Strength RLE: WFL  Strength LLE  Strength LLE: WFL  Strength RUE  Strength RUE: WFL  Strength LUE  Strength LUE: WFL  Tone RLE  RLE Tone: Normotonic  Tone LLE  LLE Tone: Normotonic  Motor Control  Gross Motor?: WFL  Sensation  Overall Sensation Status: WFL  Bed mobility  Supine to Sit: Modified independent  Sit to Supine: Modified independent  Scooting: Modified independent  Transfers  Sit to Stand: Modified independent  Stand to sit: Modified independent  Ambulation  Ambulation?: Yes  Ambulation 1  Assistance: Stand by assistance;Contact guard assistance  Distance: 10 feet x 2 -- to bathroom and back     Balance  Posture: Good  Sitting - Static: Good  Sitting - Dynamic: Good  Standing - Static: Good;-  Standing - Dynamic: Fair;+        Plan   Plan  Times per week: 2-3 days Plan weeks: 2-3 days  Current Treatment Recommendations: Neuromuscular Re-education, Safety Education & Training, Gait Training, ADL/Self-care Training, Transfer Training, Balance Training               Goals  Long term goals  Time Frame for Long term goals : 2-3 days  Long term goal 1: Patient to demonstrate safe, independent basic transfers. Long term goal 2: Patient to demonstrate safe, independent ambulation on level surface, 100 feet x 2. Therapy Time   Individual Concurrent Group Co-treatment   Time In 1034         Time Out 1044         Minutes 495 [address]     Certification of Medical Necessity: It will be understood that this treatment plan is certified medically necessary by the documenting therapist and referring physician mentioned in this report. Unless the physician indicated otherwise through written correspondence with our office, all further referrals will act as certification of medical necessity on this treatment plan. Thank you for this referral.  If you have questions regarding this plan of care, please call [phone].           Revisions to this plan (optional):                     Please sign and return this plan to:   FAX: [phone]      Signature:                                 Date:

## 2021-03-23 NOTE — ED PROVIDER NOTES
[address] [place] [address] Court  eMERGENCY dEPARTMENT eNCOUnter      Pt Name: Joe Ramsay  MRN: [id number]  YOB: 1945  Date of evaluation: 5/03/2025  Provider: Jesus Iniguez MD    CHIEF COMPLAINT       Chief Complaint   Patient presents with    Chest Pain     woke patient around 9 pm    Allergic Reaction         HISTORY OF PRESENT ILLNESS  (Location/Symptom, Timing/Onset, Context/Setting, Quality, Duration,Modifying Factors, Severity.)   Joe Ramsay is a 70 y.o. female who presents to the emergency department who woke up with chest pain at 11pm.  She took Nitro SL x 3 with improvement of pain from a 10 to a 6. She thought the pain would go away but it didn't. Pain was mid chest and radiated through to her back. Pain was \"sharp\" nature. She drove here from [facility]. She took a baby aspirin about 30 min ago just before coming to the ER. While on her way, she began to have hives, itching and difficulty swallowing. She is also wheezing. Patient keeps saying that her \"throat is closing up\" and clutching her neck. She denies COPD and has never smoked a cigarette. 8 years ago she had a negative heart cath. She has been having chest pain every day for the last 3-4 months. She has been taking nitro daily. She also gets nausea daily and takes zofran daily. Patient also took a zofran on her way to the ER. Nursing notes were reviewed.     REVIEW OF SYSTEMS    (2-9 systems for level 4, 10 or more for level 5)   ROS:  General:  No fevers, no chills, no weakness  Cardiovascular:  + chest pain, no palpitations  Respiratory:  + shortness of breath, no cough, + wheezing  Gastrointestinal:  No pain, + nausea, + occasional vomiting, no diarrhea  Musculoskeletal:  No muscle pain, no joint pain  Skin:  No rash, no easy bruising  Neurologic:  No speech problems, no headache, no extremity numbness, no extremity  tingling, no extremity weakness  Psychiatric:  No anxiety  Genitourinary:  No dysuria, no hematuria    Except as noted above the remainder of the review of systems was reviewed and negative. PAST MEDICAL HISTORY     Past Medical History:   Diagnosis Date    Bleeds easily ([facility] 12/31/2018    DVT (deep venous thrombosis) (HCC)     Headache(784.0)     Hypertension     Hypothyroid 5/20/2015    MI, old     Moderate episode of recurrent major depressive disorder ([facility] 10/27/2018    Pneumonia     Stomach ulcer     Thyroid disease          SURGICAL HISTORY       Past Surgical History:   Procedure Laterality Date    APPENDECTOMY      CHOLECYSTECTOMY      LIVER SURGERY      UPPER GASTROINTESTINAL ENDOSCOPY           CURRENT MEDICATIONS       Previous Medications    CALCIUM CARBONATE-VITAMIN D (OYSTER SHELL CALCIUM/D) 500-200 MG-UNIT TABS    Take 1 tablet by mouth daily    CLONAZEPAM (KLONOPIN) 2 MG TABLET    TAKE ONE TABLET BY MOUTH ONCE NIGHTLY AS NEEDED FOR INSOMNIA    GABAPENTIN (NEURONTIN) 100 MG CAPSULE    Take 1 capsule by mouth nightly as needed (back pain) for up to 30 days. LEVOTHYROXINE (SYNTHROID) 75 MCG TABLET    Take 1 tablet by mouth Daily    LISINOPRIL-HYDROCHLOROTHIAZIDE (PRINZIDE;ZESTORETIC) 20-25 MG PER TABLET    Take 1 tablet by mouth daily    NITROGLYCERIN (NITROSTAT) 0.4 MG SL TABLET    Place 1 tablet under the tongue every 5 minutes as needed for Chest pain    ONDANSETRON (ZOFRAN) 4 MG TABLET    TAKE ONE TABLET BY MOUTH EVERY 8 HOURS AS NEEDED FOR NAUSEA OR VOMITING    PANTOPRAZOLE (PROTONIX) 40 MG TABLET    TAKE ONE TABLET BY MOUTH TWICE A DAY       ALLERGIES     Iv dye [iodides], Azithromycin, Augmentin [amoxicillin-pot clavulanate], Codeine, Influenza vaccines, Mucinex [guaifenesin er], and Reglan [metoclopramide]    FAMILY HISTORY     History reviewed. No pertinent family history.        SOCIAL HISTORY       Social History     Socioeconomic History    Marital status:      Spouse name: None    Number of children: None CBC WITH AUTO DIFFERENTIAL    Narrative:     Performed at:  [address] [place] [address] Laboratory  [address] [place], [place] [address], [place] [address]   Phone [phone]   D-DIMER, QUANTITATIVE    Narrative:     Performed at:  [address] [place] [address] Laboratory  [address] [place], [place] [address], [place] [address]   Phone [phone]   TROPONIN    Narrative:     Performed at:  [address] [place] [address] Laboratory  [address] [place], [place] [address], [place] [address]   Phone [phone]   PROTIME-INR    Narrative:     Performed at:  [address] [place] [address] Laboratory  [address] [place], [place] [address], [place] [address]   Phone [phone]   APTT    Narrative:     Performed at:  [address] [place] [address] Laboratory  [address] [place], [place] [address], [place] [address] [address]   Phone [phone]   TROPONIN       I have reviewed and interpreted all ofthe currently available lab results from this visit (if applicable):  Results for orders placed or performed during the hospital encounter of 03/23/21   Brain Natriuretic Peptide   Result Value Ref Range    Pro- 0 - 1,800 pg/mL   CBC Auto Differential   Result Value Ref Range    WBC 8.3 4.0 - 11.0 K/uL    RBC 4.36 3.80 - 5.80 M/uL    Hemoglobin 13.8 11.5 - 16.5 g/dL    Hematocrit 42.3 37.0 - 47.0 %    MCV 97.0 80.0 - 100.0 fL    MCH 31.7 27.0 - 32.0 pg    MCHC 32.6 31.0 - 35.0 g/dL    RDW 13.0 11.0 - 16.0 %    Platelets 378 328 - 543 K/uL    MPV 9.7 6.0 - 10.0 fL    Neutrophils % 70.6 %    Immature Granulocytes % 0.5 0.0 - 5.0 %    Lymphocytes % 19.9 %    Monocytes % 6.7 %    Eosinophils % 1.7 %    Basophils % 0.6 %    Neutrophils Absolute 5.9 2.0 - 7.5 K/uL    Immature Granulocytes # 0.0 K/uL    Lymphocytes Absolute 1.7 1.5 - 4.0 K/uL    Monocytes Absolute 0.6 0.2 - 0.8 K/uL    Eosinophils Absolute 0.1 0.0 - 0.4 K/uL    Basophils Absolute 0.1 0.0 - 0.1 K/uL   D-Dimer, Quantitative   Result Value Ref Range    D-Dimer, Quant 0.50 0.00 - 0.60 ug/mL FEU   Troponin   Result Value Ref Range    Troponin <0.30 <0.30 ng/mL   Protime-INR   Result Value Ref Range    Protime 12.1 11.6 - 13.8 sec    INR 0.94 0.88 - 1.11   APTT   Result Value Ref Range    aPTT 29.7 22.5 - 34.9 sec   Urinalysis Reflex to Culture    Specimen: Urine, clean catch   Result Value Ref Range    Color, UA Yellow Straw/Yellow    Clarity, UA Clear Clear    Glucose, Ur Negative Negative mg/dL    Bilirubin Urine Negative Negative    Ketones, Urine Negative Negative mg/dL    Specific Gravity, UA 1.020 1.005 - 1.030    Blood, Urine TRACE-INTACT (A) Negative    pH, UA 7.5 5.0 - 8.0    Protein, UA 30 (A) Negative mg/dL    Urobilinogen, Urine 1.0 <2.0 E.U./dL    Nitrite, Urine Negative Negative    Leukocyte Esterase, Urine TRACE (A) Negative    Microscopic Examination YES     Urine Type Voided     Urine Reflex to Culture Not Indicated    Microscopic Urinalysis   Result Value Ref Range    Mucus, UA 1+ (A) None Seen /LPF    WBC, UA 6-9 (A) 0 - 5 /HPF    RBC, UA 0-2 0 - 4 /HPF    Epithelial Cells, UA 11-20 (A) 0 - 5 /HPF    Bacteria, UA 1+ (A) None Seen /HPF        All other labs were within normal range or not returned as of this dictation. EMERGENCY DEPARTMENT COURSE and DIFFERENTIAL DIAGNOSIS/MDM:   Vitals:  Vitals:    03/23/21 0135   BP: (!) 158/75   Pulse: 86   Resp: 16   Temp: 98.7 °F (37.1 °C)   TempSrc: Oral   SpO2: 92%   Weight: 178 lb (80.7 kg)   Height: 5' 3\" (1.6 m)           I'm concerned the patient is now having an allergic reaction to the ASA she took on her way to the ER. Patient keeps complaining that her throat is closing which is concerning in the presence of hives and audible wheezing without a history of COPD. I am concerned the patient is having a significant allergic reaction. Although there is risk with given epinephrine to anyone over 65, it is a bigger risk to not treat a potential loss of airway so we will give epinephrine.     Prior to giving the epinephrine, I was able to review an EKG which did not demonstrate any indications of an acute MI at this time. Cardiac work-up has been ordered. Patient is on anticoagulants for a history of DVT. 0145  Patient had an unwitnessed fall while in the bathroom. She says she \"slipped\" off the toilet but had her pants all the way up when we entered the bathroom. Patient complained that she hurt her right hip when she fell but she was able to stand and get into the wheelchair. Nursing staff knows this patient well from previous encounters. They report the patient frequently \"slips\" onto the floor without injury and those episodes were believed to be attention-seeking behavior. She did provide a urine sample. Urine sent to lab. UA was positive. Will give PO antibiotics. Patient began to have oxygen desaturations for no obvious reason. She was begging for oxygen. Her O2 sats dropped to 85%. We put her on 2 L nasal cannula and her saturations improved to 93%. Patient states that she was on oxygen at home but it was \"taken away\". Patient thinks she needs oxygen at home and needs another evaluation/medical justification for this to be given back to her. I am not comfortable sending the patient home with documented hypoxia without oxygen at home. We will admit the patient for observation. [facility] paged. [facility] accepted the patient for admission. CONSULTS:  None    PROCEDURES:  Procedures    CRITICAL CARE TIME    Total Critical Care time was 0 minutes, excluding separately reportable procedures. There was a high probability of clinically significant/life threatening deterioration in the patient's condition which required my urgent intervention. FINAL IMPRESSION      1. Dyspnea and respiratory abnormalities    2. Hypoxia    3. Chest pain, unspecified type    4. Acute UTI    5.  Allergic reaction, initial encounter          DISPOSITION/PLAN   DISPOSITION        PATIENT REFERRED TO:  No follow-up provider specified. DISCHARGE MEDICATIONS:  New Prescriptions    No medications on file       Comment: Please note this report has been produced using speech recognition software and may contain errorsrelated to that system including errors in grammar, punctuation, and spelling, as well as words and phrases that may be inappropriate. If there are any questions or concerns please feel free to contact the dictating providerfor clarification.     Melchor Allen MD  Attending Emergency Physician                Melchor Allen MD  03/23/21 2250

## 2021-03-23 NOTE — PROGRESS NOTES
Sensation  Overall Sensation Status: WFL        LUE AROM (degrees)  LUE AROM : WFL  RUE AROM (degrees)  RUE AROM : WFL  RUE General AROM: Elbow flexion slightly limited secondary to recent fall. Pt states she is following up with ortho. LUE Strength  L Shoulder Flex: 4/5  L Elbow Flex: 4+/5  L Elbow Ext: 4+/5  L Hand General: 4+/5  RUE Strength  R Shoulder Flex: 4/5  R Elbow Flex: 4-/5  R Elbow Ext: 3+/5  R Hand General: 4+/5                   Plan   Plan  Times per week: 3-5  Times per day: Daily  Plan weeks: 1  Current Treatment Recommendations: Balance Training, Functional Mobility Training, Endurance Training, Self-Care / ADL, Patient/Caregiver Education & Training    G-Code     OutComes Score                                                  AM-PAC Score             Goals  Short term goals  Time Frame for Short term goals: 1 week  Short term goal 1: Pt to complete toileting with MOD I. Short term goal 2: Pt to complete bathing with MOD I. Short term goal 3: Pt to tolerate x20 minutes of activity to increase functional activity tolerance. Short term goal 4: Pt to complete hygiene/grooming standing at sink with no LOB with MOD I. Therapy Time   Individual Concurrent Group Co-treatment   Time In 1021         Time Out 1048         Minutes 27              This note serves as a DC summary in the event of pt discharge.      Mireille Greenwood OTR/L

## 2021-03-23 NOTE — PROGRESS NOTES
Administrations This Visit     cefTRIAXone (ROCEPHIN) injection 1,000 mg     Admin Date  03/23/2021  17:10 Action  Given Dose  1,000 mg Route  Intramuscular Site  Dorsogluteal Right Administered By  Nestor Penaloza RN    Ordering Provider: Judie Mcneil MD    NDC: 60218-976-92    Lot#: [id number]    : [address]    Patient Supplied?: No          methylPREDNISolone sodium (SOLU-MEDROL) injection 125 mg     Admin Date  03/23/2021  17:11 Action  Given Dose  125 mg Route  Intramuscular Site  Dorsogluteal Left Administered By  Nestor Penaloza RN    Ordering Provider: Judie Mcneil MD    NDC: 7319-7565-22    Lot#: [id number]    : Ronnell Redden. Patient Supplied?: No              Patient tolerated injection well. Patient advised to wait 20 minutes in the office following the injection. No signs/symptoms of reaction noted after 20 minutes.

## 2021-03-24 VITALS
BODY MASS INDEX: 34.66 KG/M2 | SYSTOLIC BLOOD PRESSURE: 154 MMHG | OXYGEN SATURATION: 96 % | DIASTOLIC BLOOD PRESSURE: 71 MMHG | RESPIRATION RATE: 20 BRPM | HEIGHT: 63 IN | WEIGHT: 195.6 LBS | HEART RATE: 83 BPM | TEMPERATURE: 97.7 F

## 2021-03-24 NOTE — FLOWSHEET NOTE
03/23/21 0526   Assessment   Charting Type Admission   Neurological   Level of Consciousness Alert (0)   Orientation Level Oriented X4   Cognition Appropriate judgement   Language Clear   Size R Pupil (mm) 3   R Pupil Shape Round   Size L Pupil (mm) 3   L Pupil Shape Round   R Hand  Moderate   L Hand  Moderate   R Foot Dorsiflexion Weak   L Foot Dorsiflexion Weak   R Foot Plantar Flexion Weak   L Foot Plantar Flexion Weak   RUE Motor Response Responds to command   Sensation RUE Full sensation   LUE Motor Response Responds to command   Sensation LUE Full sensation   RLE Motor Response Responds to command   Sensation RLE Full sensation   LLE Motor Response Responds to command   Sensation LLE Full sensation   Tongue Deviation None   [place] Coma Scale   Eye Opening 4   Best Verbal Response 5   Best Motor Response 6   [place] Coma Scale Score 15   HEENT   Teeth Dentures upper   Respiratory   Respiratory (WDL) WDL   Respiratory Pattern Regular   Respiratory Depth Normal   Respiratory Quality/Effort Unlabored   Chest Assessment Chest expansion asymmetrical   L Breath Sounds Clear   R Breath Sounds Clear   Breath Sounds   Right Upper Lobe Clear   Right Middle Lobe Clear   Right Lower Lobe Clear   Left Upper Lobe Clear   Left Lower Lobe Clear   Cardiac   Cardiac (WDL) X  (chest pain)   Cardiac Regularity Regular   Cardiac Rhythm NSR   Rhythm Interpretation   Pulse 75   Cardiac Monitor   Telemetry Monitor On Yes   Telemetry Audible Yes   Telemetry Alarms Set Yes   Telemetry Box Number [id number]   Gastrointestinal   Abdominal (WDL) WDL   Peripheral Vascular   Peripheral Vascular (WDL) WDL   Skin Color/Condition   Skin Color/Condition (WDL) WDL   Skin Color Appropriate for ethnicity   Skin Condition/Temp Warm;Dry   Skin Integrity   Skin Integrity (WDL) WDL   Musculoskeletal   Musculoskeletal (WDL) X   RUE Full movement   LUE Full movement   RL Extremity Weakness   LL Extremity Weakness   Genitourinary   Genitourinary (WDL) WDL   Anus/Rectum   Anus/Rectum (WDL) WDL   Psychosocial   Psychosocial (WDL) WDL   Provider Notification   Reason for Communication Evaluate   Provider Name Mikki Frazier   Provider Notification [facility] Clinician (CNS, NP, CNM, CRNA, PA)   Method of Communication Call   Response See orders   Notification Time 4744     Patient admitted to observation from ED. Report received from Yu Adhikari at 4868, patient arrived to floor at 0500. Patient reports that she drove herself to the hospital after waking up with CP. Pt reports that she has a large amount of cash in her purse and there is no one to pick it up. She wishes to keep her purse in the room with her. Pt placed on telemetry, box [id number]. She now has complaints of sharp and shooting chest pain that radiates to her left jaw. She rates this pain at 10/10 and reports that is worse than when the ED. 12-lead EKG obtained and revealed NSR. JUSTINE Aiken contacted. See orders for interventions. Patient's pain improving. Patient oriented to room. Call light and bedside table in reach. Patient instructed to call for assistance if she needs to get up. She verbalizes understanding.

## 2021-03-25 ENCOUNTER — CARE COORDINATION (OUTPATIENT)
Dept: CARE COORDINATION | Age: 76
End: 2021-03-25

## 2021-03-26 NOTE — CARE COORDINATION
[facility] Transitions Initial Follow Up Call    Call within 2 business days of discharge: Yes     Patient: Chidi Butler Patient : 1945 MRN: <[id number]>    Last Discharge [address] [address]       Complaint Diagnosis Description Type Department Provider    3/23/21 Chest Pain; Allergic Reaction Dyspnea and respiratory abnormalities . .. ED to Hosp-Admission (Discharged) (ADMIT) [facility] MS Rubina Lee MD; Evelin Angel. .. RARS: No data recorded     Spoke with: Attempting HFU call, unsuccessful. Message left with contact information as well as request for BP monitoring and information of appointment.      Discharge department/facility: [facility]    Non-face-to-face services provided:  Scheduled appointment with PCPMaritza  Obtained and reviewed discharge summary and/or continuity of care documents    Follow Up  Future Appointments   Date Time Provider Malena Diallo   3/30/2021 11:00 AM Aiyana Mcmullen, APRN - CNP [address] [place]       Jerardo Gaffney RN

## 2021-03-29 NOTE — CARE COORDINATION
[facility] Transitions Initial Follow Up Call    Call within 2 business days of discharge: Yes     Patient: Mati Burksr Patient : 1945 MRN: <[id number]>    Last Discharge [facility]       Complaint Diagnosis Description Type Department Provider    3/23/21 Chest Pain; Allergic Reaction Dyspnea and respiratory abnormalities . .. ED to Hosp-Admission (Discharged) (ADMIT) [facility] MS Angel Aranda MD; Darvin Dandy. .. RARS: No data recorded     Spoke with: Multiple unsuccessful attempts for HFU call. Message left with contact information.      Discharge department/facility: [facility]    Non-face-to-face services provided:  Scheduled appointment with PCPRashaad  Obtained and reviewed discharge summary and/or continuity of care documents    Follow Up  Future Appointments   Date Time Provider Malena Diallo   3/30/2021 11:00 AM TATYANA Stokes - CNP [address] [place] Butt RN

## 2021-03-30 ENCOUNTER — OFFICE VISIT (OUTPATIENT)
Dept: FAMILY MEDICINE CLINIC | Age: 76
End: 2021-03-30
Payer: MEDICARE

## 2021-03-30 VITALS
SYSTOLIC BLOOD PRESSURE: 140 MMHG | HEIGHT: 63 IN | RESPIRATION RATE: 18 BRPM | OXYGEN SATURATION: 96 % | HEART RATE: 84 BPM | TEMPERATURE: 97.6 F | BODY MASS INDEX: 34.02 KG/M2 | DIASTOLIC BLOOD PRESSURE: 88 MMHG | WEIGHT: 192 LBS

## 2021-03-30 DIAGNOSIS — F41.0 PANIC ATTACK: ICD-10-CM

## 2021-03-30 DIAGNOSIS — I10 HYPERTENSION, UNSPECIFIED TYPE: ICD-10-CM

## 2021-03-30 DIAGNOSIS — M54.42 CHRONIC MIDLINE LOW BACK PAIN WITH BILATERAL SCIATICA: ICD-10-CM

## 2021-03-30 DIAGNOSIS — B96.89 ACUTE BACTERIAL SINUSITIS: ICD-10-CM

## 2021-03-30 DIAGNOSIS — G47.00 INSOMNIA, UNSPECIFIED TYPE: ICD-10-CM

## 2021-03-30 DIAGNOSIS — E03.9 HYPOTHYROIDISM, UNSPECIFIED TYPE: ICD-10-CM

## 2021-03-30 DIAGNOSIS — G89.29 CHRONIC MIDLINE LOW BACK PAIN WITH BILATERAL SCIATICA: ICD-10-CM

## 2021-03-30 DIAGNOSIS — J01.90 ACUTE BACTERIAL SINUSITIS: ICD-10-CM

## 2021-03-30 DIAGNOSIS — M54.41 CHRONIC MIDLINE LOW BACK PAIN WITH BILATERAL SCIATICA: ICD-10-CM

## 2021-03-30 DIAGNOSIS — E53.8 B12 DEFICIENCY: ICD-10-CM

## 2021-03-30 DIAGNOSIS — Z09 HOSPITAL DISCHARGE FOLLOW-UP: Primary | ICD-10-CM

## 2021-03-30 DIAGNOSIS — R11.0 NAUSEA: ICD-10-CM

## 2021-03-30 DIAGNOSIS — G47.34 NOCTURNAL HYPOXIA: ICD-10-CM

## 2021-03-30 PROCEDURE — 96372 THER/PROPH/DIAG INJ SC/IM: CPT | Performed by: NURSE PRACTITIONER

## 2021-03-30 PROCEDURE — 99496 TRANSJ CARE MGMT HIGH F2F 7D: CPT | Performed by: NURSE PRACTITIONER

## 2021-03-30 PROCEDURE — 1111F DSCHRG MED/CURRENT MED MERGE: CPT | Performed by: NURSE PRACTITIONER

## 2021-03-30 RX ORDER — METHYLPREDNISOLONE SODIUM SUCCINATE 125 MG/2ML
60 INJECTION, POWDER, LYOPHILIZED, FOR SOLUTION INTRAMUSCULAR; INTRAVENOUS ONCE
Status: COMPLETED | OUTPATIENT
Start: 2021-03-30 | End: 2021-03-30

## 2021-03-30 RX ORDER — CYANOCOBALAMIN 1000 UG/ML
1000 INJECTION INTRAMUSCULAR; SUBCUTANEOUS ONCE
Status: COMPLETED | OUTPATIENT
Start: 2021-03-30 | End: 2021-03-30

## 2021-03-30 RX ORDER — GABAPENTIN 100 MG/1
100 CAPSULE ORAL NIGHTLY PRN
Qty: 30 CAPSULE | Refills: 0 | Status: SHIPPED | OUTPATIENT
Start: 2021-03-30 | End: 2021-08-11

## 2021-03-30 RX ORDER — ONDANSETRON 4 MG/1
TABLET, FILM COATED ORAL
Qty: 30 TABLET | Refills: 5 | Status: SHIPPED | OUTPATIENT
Start: 2021-03-30 | End: 2021-05-24 | Stop reason: ALTCHOICE

## 2021-03-30 RX ORDER — LEVOTHYROXINE SODIUM 0.07 MG/1
75 TABLET ORAL DAILY
Qty: 90 TABLET | Refills: 3 | Status: SHIPPED | OUTPATIENT
Start: 2021-03-30 | End: 2022-02-14 | Stop reason: SDUPTHER

## 2021-03-30 RX ORDER — CLONAZEPAM 2 MG/1
TABLET ORAL
Qty: 30 TABLET | Refills: 0 | Status: SHIPPED | OUTPATIENT
Start: 2021-03-30 | End: 2021-05-24 | Stop reason: SDUPTHER

## 2021-03-30 RX ADMIN — METHYLPREDNISOLONE SODIUM SUCCINATE 60 MG: 125 INJECTION, POWDER, LYOPHILIZED, FOR SOLUTION INTRAMUSCULAR; INTRAVENOUS at 13:03

## 2021-03-30 RX ADMIN — CYANOCOBALAMIN 1000 MCG: 1000 INJECTION INTRAMUSCULAR; SUBCUTANEOUS at 13:05

## 2021-03-30 ASSESSMENT — ENCOUNTER SYMPTOMS
ABDOMINAL PAIN: 0
SHORTNESS OF BREATH: 0
NAUSEA: 0
SORE THROAT: 0
VOMITING: 0
COLOR CHANGE: 0
TROUBLE SWALLOWING: 0
CHEST TIGHTNESS: 0
EYE REDNESS: 0
COUGH: 0
DIARRHEA: 0
EYE PAIN: 0
BACK PAIN: 0

## 2021-03-30 NOTE — LETTER
[address] [place] [address]  Phone: [phone]  Fax: [phone]    TATYANA Guzman CNP        March 30, 2021     Patient: Xu Doe   YOB: 1945   Date of Visit: 3/30/2021       To Whom It May Concern: It is my medical opinion that Genice Pill due to respiratory issues may remove her face mask as needed if she begins to feel short of breath/panicy. If you have any questions or concerns, please don't hesitate to call.     Sincerely,        TATYANA Guzman CNP

## 2021-03-30 NOTE — LETTER
[address] [place] [address]. [address] [place] [address]  Phone: [phone]  Fax: [phone]    TATYANA Dubose CNP        March 30, 2021     Patient: Ileana Tobar   YOB: 1945   Date of Visit: 3/30/2021       To Whom It May Concern: It is my medical opinion that Clara Stapleton be off work starting 4/14/21 thru 4/28/21 due to heart conditions and referral to cardiologist.    If you have any questions or concerns, please don't hesitate to call.     Sincerely,        TATYANA Dubose CNP

## 2021-03-30 NOTE — PROGRESS NOTES
Post-Discharge Transitional Care Management Services or Hospital Follow Up      Darvin Garcia   YOB: 1945    Date of Office Visit:  3/30/2021  Date of Hospital Admission: 3/23/21  Date of Hospital Discharge: 3/23/21  Risk of hospital readmission (high >=14%.  Medium >=10%) :No data recorded    Care management risk score Rising risk (score 2-5) and Complex Care (Scores >=6): 4     Non face to face  following discharge, date last encounter closed (first attempt may have been earlier): 3/29/2021  9:10 AM    Call initiated 2 business days of discharge: Yes    Patient Active Problem List   Diagnosis    Hypothyroidism    Chest pain    Elevated serum creatinine    Headache    Chronic nausea    Hypertensive urgency    Pre-syncope    BPV (benign positional vertigo)    Acute cystitis without hematuria    GERD (gastroesophageal reflux disease)    Epigastric abdominal pain    Nocturnal hypoxia    Nausea vomiting and diarrhea    Essential hypertension    Lung nodule    Acute pancreatitis    Hematochezia    Irritable bowel syndrome with diarrhea    Stable angina (HCC)    Fibrocystic breast disease (FCBD)    Grief    Esophageal dysphagia    Breast pain, left    Muscle spasm    Esophageal stricture    Breast density    Scar painful    Anxiety    Moderate episode of recurrent major depressive disorder (HCC)    Panic attacks    Liver lesion    Fatty pancreas    Abnormal CT of the abdomen    Chronic bronchitis (HCC)    Gastroparesis    Hemangioma    Abnormal findings on diagnostic imaging of liver    Thoracic back pain    Weakness present    B12 deficiency    DVT, lower extremity, recurrent (HCC)    History of esophageal stricture    Allergic reaction       Allergies   Allergen Reactions    Iv Dye [Iodides] Hives, Shortness Of Breath and Other (See Comments)     Pt also passed out      Azithromycin Rash    Augmentin [Amoxicillin-Pot Clavulanate]     Codeine Hives    Influenza Vaccines      Patient couldn't remember the reaction att.  Mucinex [Guaifenesin Er]      Severe nausea    Nyquil Severe Cold-Flu [Phenyleph-Doxylamine-Dm-Apap]     Reglan [Metoclopramide] Itching     welps       Medications listed as ordered at the time of discharge from [facility] Medication Instructions SHIRELY:    Printed on:04/20/21 5411   Medication Information                      apixaban (ELIQUIS) 5 MG TABS tablet  Take by mouth 2 times daily Samples from cardiology office             aspirin 81 MG EC tablet  Take 81 mg by mouth daily             clonazePAM (KLONOPIN) 2 MG tablet  TAKE ONE TABLET BY MOUTH ONCE NIGHTLY AS NEEDED FOR INSOMNIA             cyanocobalamin 1000 MCG/ML injection  Inject 1,000 mcg into the muscle every 30 days             gabapentin (NEURONTIN) 100 MG capsule  Take 1 capsule by mouth nightly as needed (back pain) for up to 30 days. hydrALAZINE (APRESOLINE) 25 MG tablet  Take 1 tablet by mouth 2 times daily as needed (systolic bp > 834)             levothyroxine (SYNTHROID) 75 MCG tablet  Take 1 tablet by mouth Daily             lisinopril-hydroCHLOROthiazide (PRINZIDE;ZESTORETIC) 20-25 MG per tablet  Take 1 tablet by mouth daily             nitroGLYCERIN (NITROSTAT) 0.4 MG SL tablet  Place 1 tablet under the tongue every 5 minutes as needed for Chest pain             nystatin (MYCOSTATIN) 327654 UNIT/GM cream  Apply topically 2 times daily.              ondansetron (ZOFRAN) 4 MG tablet  TAKE ONE TABLET BY MOUTH EVERY 8 HOURS AS NEEDED FOR NAUSEA OR VOMITING             pantoprazole (PROTONIX) 40 MG tablet  Take 1 tablet by mouth 2 times daily                   Medications marked \"taking\" at this time  Outpatient Medications Marked as Taking for the 3/30/21 encounter (Office Visit) with TATYANA Flowers CNP   Medication Sig Dispense Refill    clonazePAM (KLONOPIN) 2 MG tablet TAKE ONE TABLET BY MOUTH ONCE NIGHTLY AS NEEDED FOR INSOMNIA 30 tablet 0    gabapentin (NEURONTIN) 100 MG capsule Take 1 capsule by mouth nightly as needed (back pain) for up to 30 days. 30 capsule 0    levothyroxine (SYNTHROID) 75 MCG tablet Take 1 tablet by mouth Daily 90 tablet 3    ondansetron (ZOFRAN) 4 MG tablet TAKE ONE TABLET BY MOUTH EVERY 8 HOURS AS NEEDED FOR NAUSEA OR VOMITING 30 tablet 5    apixaban (ELIQUIS) 5 MG TABS tablet Take by mouth 2 times daily Samples from cardiology office      nystatin (MYCOSTATIN) 040245 UNIT/GM cream Apply topically 2 times daily.  aspirin 81 MG EC tablet Take 81 mg by mouth daily      cyanocobalamin 1000 MCG/ML injection Inject 1,000 mcg into the muscle every 30 days      pantoprazole (PROTONIX) 40 MG tablet Take 1 tablet by mouth 2 times daily 60 tablet 0    hydrALAZINE (APRESOLINE) 25 MG tablet Take 1 tablet by mouth 2 times daily as needed (systolic bp > 807) 90 tablet 3    lisinopril-hydroCHLOROthiazide (PRINZIDE;ZESTORETIC) 20-25 MG per tablet Take 1 tablet by mouth daily 90 tablet 3    nitroGLYCERIN (NITROSTAT) 0.4 MG SL tablet Place 1 tablet under the tongue every 5 minutes as needed for Chest pain 25 tablet 3        Medications patient taking as of now reconciled against medications ordered at time of hospital discharge: Yes    Chief Complaint   Patient presents with    Hypertension     hospital f/u       History of Present illness - Follow up of Hospital diagnosis(es):    Diagnosis Orders   1. Hospital discharge follow-up  CA DISCHARGE MEDS RECONCILED W/ CURRENT OUTPATIENT MED LIST   2. Panic attack  clonazePAM (KLONOPIN) 2 MG tablet    CA DISCHARGE MEDS RECONCILED W/ CURRENT OUTPATIENT MED LIST   3. Insomnia, unspecified type  clonazePAM (KLONOPIN) 2 MG tablet    CA DISCHARGE MEDS RECONCILED W/ CURRENT OUTPATIENT MED LIST   4. Chronic midline low back pain with bilateral sciatica  gabapentin (NEURONTIN) 100 MG capsule    CA DISCHARGE MEDS RECONCILED W/ CURRENT OUTPATIENT MED LIST   5. Hypertension, unspecified type  W[place] DISCHARGE MEDS RECONCILED W/ CURRENT OUTPATIENT MED LIST   6. Nocturnal hypoxia  WI DISCHARGE MEDS RECONCILED W/ CURRENT OUTPATIENT MED LIST   7. Hypothyroidism, unspecified type  levothyroxine (SYNTHROID) 75 MCG tablet   8. Nausea  ondansetron (ZOFRAN) 4 MG tablet   9. Acute bacterial sinusitis     10. B12 deficiency  cyanocobalamin injection 1,000 mcg       Inpatient course: Discharge summary reviewed- see chart. Interval history/Current status: Reviewed. Stable. A comprehensive review of systems was negative except for what was noted in the HPI. Vitals:    03/30/21 1125 03/30/21 1136   BP: (!) 140/80 (!) 140/88   Pulse: 84    Resp: 18    Temp: 97.6 °F (36.4 °C)    TempSrc: Infrared    SpO2: 96%    Weight: 192 lb (87.1 kg)    Height: 5' 3\" (1.6 m)      Body mass index is 34.01 kg/m².    Wt Readings from Last 3 Encounters:   03/30/21 192 lb (87.1 kg)   03/23/21 191 lb (86.6 kg)   03/23/21 195 lb 9.6 oz (88.7 kg)     BP Readings from Last 3 Encounters:   03/30/21 (!) 140/88   03/23/21 130/82   03/23/21 (!) 154/71        Physical Exam:  General Appearance: alert and oriented to person, place and time, well developed and well- nourished, in no acute distress  Skin: warm and dry, no rash or erythema  Head: normocephalic and atraumatic  Eyes: pupils equal, round, and reactive to light, extraocular eye movements intact, conjunctivae normal  ENT: tympanic membrane, external ear and ear canal normal bilaterally, nose without deformity, nasal mucosa and turbinates normal without polyps  Neck: supple and non-tender without mass, no thyromegaly or thyroid nodules, no cervical lymphadenopathy  Pulmonary/Chest: clear to auscultation bilaterally- no wheezes, rales or rhonchi, normal air movement, no respiratory distress  Cardiovascular: normal rate, regular rhythm, normal S1 and S2, no murmurs, rubs, clicks, or gallops, distal pulses intact, no carotid bruits  Abdomen: soft, non-tender, non-distended, normal bowel sounds, no masses or organomegaly  Extremities: no cyanosis, clubbing or edema  Musculoskeletal: normal range of motion, no joint swelling, deformity or tenderness  Neurologic: reflexes normal and symmetric, no cranial nerve deficit, gait, coordination and speech normal    Assessment/Plan:  1. Panic attack    - clonazePAM (KLONOPIN) 2 MG tablet; TAKE ONE TABLET BY MOUTH ONCE NIGHTLY AS NEEDED FOR INSOMNIA  Dispense: 30 tablet; Refill: 0  - [place] DISCHARGE MEDS RECONCILED W/ CURRENT OUTPATIENT MED LIST    2. Insomnia, unspecified type    - clonazePAM (KLONOPIN) 2 MG tablet; TAKE ONE TABLET BY MOUTH ONCE NIGHTLY AS NEEDED FOR INSOMNIA  Dispense: 30 tablet; Refill: 0  - NC DISCHARGE MEDS RECONCILED W/ CURRENT OUTPATIENT MED LIST    3. Chronic midline low back pain with bilateral sciatica    - gabapentin (NEURONTIN) 100 MG capsule; Take 1 capsule by mouth nightly as needed (back pain) for up to 30 days. Dispense: 30 capsule; Refill: 0  - NC DISCHARGE MEDS RECONCILED W/ CURRENT OUTPATIENT MED LIST    4. Hypertension, unspecified type    - NC DISCHARGE MEDS RECONCILED W/ CURRENT OUTPATIENT MED LIST    5. Hospital discharge follow-up    - NC DISCHARGE MEDS RECONCILED W/ CURRENT OUTPATIENT MED LIST    6.  Nocturnal hypoxia    - NC DISCHARGE MEDS RECONCILED W/ CURRENT OUTPATIENT MED LIST        Medical Decision Making: moderate complexity

## 2021-03-30 NOTE — PROGRESS NOTES
Chief Complaint   Patient presents with    Hypertension     hospital f/u       Have you seen any other physician or provider since your last visit yes - MW    Have you had any other diagnostic tests since your last visit? yes - in chart    Have you changed or stopped any medications since your last visit? yes -        Administrations This Visit     cyanocobalamin injection 1,000 mcg     Admin Date  03/30/2021  13:05 Action  Given Dose  1,000 mcg Route  Intramuscular Site  Deltoid Left Administered By  Ivan Kong MA    Ordering Provider: TATYANA Bartlett CNP    NDC: 03219-740-24    Lot#:     : [id number] [place] [address]    Patient Supplied?: No          methylPREDNISolone sodium (SOLU-MEDROL) injection 60 mg     Admin Date  03/30/2021  13:03 Action  Given Dose  60 mg Route  Intramuscular Site  Dorsogluteal Left Administered By  Ivan Kong MA    Ordering Provider: TATYANA Bartlett CNP    NDC: 2982-6104-06    Lot#: [id number]    : [id number]. Patient Supplied?: No              Patient tolerated injection well. Patient advised to wait 20 minutes in the office following the injection. No signs/symptoms of reaction noted after 20 minutes.

## 2021-03-31 NOTE — PROGRESS NOTES
SUBJECTIVE:    Patient ID: Zahra Bergman is a 70 y.o. female. Chief Complaint   Patient presents with    Nasal Congestion    Chest Congestion       HPI: Office visit  She is in the office today after being recently hospitalized. She still having quite a bit of cough and congestion. She does have a few days until she sees at having a hospital follow-up visit. She is having some tightness in her chest.  She does have significant chronic bronchitis. She is not having any true chest pain. She is having good blood pressure readings at home. She is not having nausea vomiting or other new symptoms      Review of Systems   Constitutional: Negative for activity change, appetite change, chills and fever. HENT: Negative for congestion, ear pain, nosebleeds, sore throat and trouble swallowing. Eyes: Negative for pain and redness. Respiratory: Negative for cough, chest tightness and shortness of breath. Cardiovascular: Negative for chest pain, palpitations and leg swelling. Gastrointestinal: Negative for abdominal pain, diarrhea, nausea and vomiting. Genitourinary: Negative for difficulty urinating, dysuria and flank pain. Musculoskeletal: Negative for arthralgias, back pain and gait problem. Skin: Negative for color change, pallor, rash and wound. Allergic/Immunologic: Negative for environmental allergies and food allergies. Neurological: Negative for dizziness, numbness and headaches. Hematological: Negative for adenopathy. Does not bruise/bleed easily. Psychiatric/Behavioral: Positive for agitation and behavioral problems. Negative for sleep disturbance. The patient is nervous/anxious and is hyperactive. All other systems reviewed and are negative.        OBJECTIVE:  /82   Pulse 82   Temp 96.6 °F (35.9 °C) (Infrared)   Resp 20   Ht 5' 3\" (1.6 m)   Wt 191 lb (86.6 kg)   SpO2 95% Comment: room air  Breastfeeding No   BMI 33.83 kg/m²    Wt Readings from Last 3 Encounters: 03/30/21 192 lb (87.1 kg)   03/23/21 191 lb (86.6 kg)   03/23/21 195 lb 9.6 oz (88.7 kg)     BP Readings from Last 3 Encounters:   03/30/21 (!) 140/88   03/23/21 130/82   03/23/21 (!) 154/71      Pulse Readings from Last 3 Encounters:   03/30/21 84   03/23/21 82   03/23/21 83     Body mass index is 33.83 kg/m². Resp Readings from Last 3 Encounters:   03/30/21 18   03/23/21 20   03/23/21 20     Past medical, surgical, family and social history were reviewed and updated with the patient. Physical Exam  Vitals signs and nursing note reviewed. Constitutional:       General: She is not in acute distress. Appearance: She is well-developed. She is not ill-appearing, toxic-appearing or diaphoretic. HENT:      Head: Normocephalic and atraumatic. Nose: Nose normal. No congestion or rhinorrhea. Mouth/Throat:      Mouth: Mucous membranes are moist.      Pharynx: Oropharynx is clear. Uvula midline. No oropharyngeal exudate or posterior oropharyngeal erythema. Eyes:      Extraocular Movements: Extraocular movements intact. Conjunctiva/sclera: Conjunctivae normal.      Pupils: Pupils are equal, round, and reactive to light. Neck:      Musculoskeletal: Normal range of motion and neck supple. Thyroid: No thyromegaly. Cardiovascular:      Rate and Rhythm: Normal rate and regular rhythm. Pulses: Normal pulses. Heart sounds: Normal heart sounds. No murmur. Pulmonary:      Effort: Pulmonary effort is normal. No respiratory distress. Comments: Bilateral harsh breath sounds, that do clear somewhat with cough. Abdominal:      General: Bowel sounds are normal. There is no distension. Palpations: Abdomen is soft. Tenderness: There is no abdominal tenderness. Musculoskeletal: Normal range of motion. Lymphadenopathy:      Cervical: No cervical adenopathy. Skin:     General: Skin is warm and dry. Capillary Refill: Capillary refill takes less than 2 seconds. Coloration: Skin is not pale. Neurological:      General: No focal deficit present. Mental Status: She is alert and oriented to person, place, and time. Mental status is at baseline. Cranial Nerves: No cranial nerve deficit. Sensory: No sensory deficit. Motor: No weakness. Coordination: Coordination normal.      Gait: Gait normal.   Psychiatric:         Attention and Perception: She is inattentive. Mood and Affect: Mood is anxious. Affect is inappropriate. Speech: Speech normal.         Behavior: Behavior normal.         Thought Content: Thought content normal.         Judgment: Judgment normal.          No results found for requested labs within last 30 days. Hemoglobin A1C (%)   Date Value   09/16/2020 6.1 (H)     Microscopic Examination (no units)   Date Value   03/23/2021 YES     LDL Calculated (mg/dL)   Date Value   09/16/2020 116       Lab Results   Component Value Date    WBC 8.3 03/23/2021    NEUTROABS 5.9 03/23/2021    HGB 13.8 03/23/2021    HCT 42.3 03/23/2021    MCV 97.0 03/23/2021     03/23/2021     Lab Results   Component Value Date    TSH 0.39 03/23/2021       ASSESSMENT:    Diagnosis Orders   1. Chronic bronchitis, unspecified chronic bronchitis type ([facility]          PLAN:  Orders Placed This Encounter   Medications    methylPREDNISolone sodium (SOLU-MEDROL) injection 125 mg    cefTRIAXone (ROCEPHIN) injection 1,000 mg     Order Specific Question:   Antimicrobial Indications     Answer:   Upper Respiratory Infection        Medications Discontinued During This Encounter   Medication Reason    ciprofloxacin (CIPRO) 500 MG tablet Therapy completed       Controlled Substances Monitoring:      Please note: This chart was generated using Dragon dictation software. Although every effort was made to ensure the accuracy of this automated transcription, some errors in transcription may have occurred.

## 2021-05-04 ENCOUNTER — APPOINTMENT (OUTPATIENT)
Dept: GENERAL RADIOLOGY | Facility: HOSPITAL | Age: 76
End: 2021-05-04
Payer: MEDICARE

## 2021-05-04 ENCOUNTER — HOSPITAL ENCOUNTER (EMERGENCY)
Facility: HOSPITAL | Age: 76
Discharge: HOME | End: 2021-05-04
Attending: EMERGENCY MEDICINE
Payer: MEDICARE

## 2021-05-04 VITALS
HEIGHT: 63 IN | OXYGEN SATURATION: 97 % | WEIGHT: 192 LBS | TEMPERATURE: 97.5 F | RESPIRATION RATE: 20 BRPM | BODY MASS INDEX: 34.02 KG/M2 | HEART RATE: 67 BPM | DIASTOLIC BLOOD PRESSURE: 79 MMHG | SYSTOLIC BLOOD PRESSURE: 173 MMHG

## 2021-05-04 DIAGNOSIS — R11.2 NAUSEA AND VOMITING, INTRACTABILITY OF VOMITING NOT SPECIFIED, UNSPECIFIED VOMITING TYPE: ICD-10-CM

## 2021-05-04 DIAGNOSIS — L50.9 URTICARIA: ICD-10-CM

## 2021-05-04 DIAGNOSIS — N39.0 ACUTE UTI: Primary | ICD-10-CM

## 2021-05-04 DIAGNOSIS — R07.9 CHEST PAIN, UNSPECIFIED TYPE: ICD-10-CM

## 2021-05-04 LAB
A/G RATIO: 1.3 (ref 0.8–2)
ALBUMIN SERPL-MCNC: 3.9 G/DL (ref 3.4–4.8)
ALP BLD-CCNC: 87 U/L (ref 25–100)
ALT SERPL-CCNC: 8 U/L (ref 4–36)
AMORPHOUS: ABNORMAL /HPF
ANION GAP SERPL CALCULATED.3IONS-SCNC: 7 MMOL/L (ref 3–16)
AST SERPL-CCNC: 15 U/L (ref 8–33)
BACTERIA: ABNORMAL /HPF
BASOPHILS ABSOLUTE: 0.1 K/UL (ref 0–0.1)
BASOPHILS RELATIVE PERCENT: 0.7 %
BILIRUB SERPL-MCNC: 0.4 MG/DL (ref 0.3–1.2)
BILIRUBIN URINE: NEGATIVE
BLOOD, URINE: ABNORMAL
BUN BLDV-MCNC: 14 MG/DL (ref 6–20)
CALCIUM SERPL-MCNC: 9 MG/DL (ref 8.5–10.5)
CHLORIDE BLD-SCNC: 102 MMOL/L (ref 98–107)
CLARITY: ABNORMAL
CO2: 31 MMOL/L (ref 20–30)
COLOR: YELLOW
CREAT SERPL-MCNC: 0.8 MG/DL (ref 0.4–1.2)
EKG ATRIAL RATE: 66 BPM
EKG DIAGNOSIS: NORMAL
EKG P AXIS: 32 DEGREES
EKG P-R INTERVAL: 170 MS
EKG Q-T INTERVAL: 390 MS
EKG QRS DURATION: 90 MS
EKG QTC CALCULATION (BAZETT): 408 MS
EKG R AXIS: 18 DEGREES
EKG T AXIS: 25 DEGREES
EKG VENTRICULAR RATE: 66 BPM
EOSINOPHILS ABSOLUTE: 0.1 K/UL (ref 0–0.4)
EOSINOPHILS RELATIVE PERCENT: 1.7 %
EPITHELIAL CELLS, UA: ABNORMAL /HPF (ref 0–5)
GFR AFRICAN AMERICAN: >59
GFR NON-AFRICAN AMERICAN: >60
GLOBULIN: 2.9 G/DL
GLUCOSE BLD-MCNC: 112 MG/DL (ref 74–106)
GLUCOSE URINE: NEGATIVE MG/DL
HCT VFR BLD CALC: 39.7 % (ref 37–47)
HEMOGLOBIN: 12.7 G/DL (ref 11.5–16.5)
IMMATURE GRANULOCYTES #: 0 K/UL
IMMATURE GRANULOCYTES %: 0.4 % (ref 0–5)
KETONES, URINE: NEGATIVE MG/DL
LEUKOCYTE ESTERASE, URINE: ABNORMAL
LIPASE: 20 U/L (ref 5.6–51.3)
LYMPHOCYTES ABSOLUTE: 2.2 K/UL (ref 1.5–4)
LYMPHOCYTES RELATIVE PERCENT: 30.2 %
MCH RBC QN AUTO: 31.4 PG (ref 27–32)
MCHC RBC AUTO-ENTMCNC: 32 G/DL (ref 31–35)
MCV RBC AUTO: 98.3 FL (ref 80–100)
MICROSCOPIC EXAMINATION: YES
MONOCYTES ABSOLUTE: 0.5 K/UL (ref 0.2–0.8)
MONOCYTES RELATIVE PERCENT: 7.2 %
MUCUS: ABNORMAL /LPF
NEUTROPHILS ABSOLUTE: 4.3 K/UL (ref 2–7.5)
NEUTROPHILS RELATIVE PERCENT: 59.8 %
NITRITE, URINE: NEGATIVE
PDW BLD-RTO: 12.8 % (ref 11–16)
PH UA: 6 (ref 5–8)
PLATELET # BLD: 192 K/UL (ref 150–400)
PMV BLD AUTO: 9.4 FL (ref 6–10)
POTASSIUM SERPL-SCNC: 3.9 MMOL/L (ref 3.4–5.1)
PRO-BNP: 105 PG/ML (ref 0–1800)
PROTEIN UA: NEGATIVE MG/DL
RAPID INFLUENZA  B AGN: NEGATIVE
RAPID INFLUENZA A AGN: NEGATIVE
RBC # BLD: 4.04 M/UL (ref 3.8–5.8)
RBC UA: ABNORMAL /HPF (ref 0–4)
SODIUM BLD-SCNC: 140 MMOL/L (ref 136–145)
SPECIFIC GRAVITY UA: >=1.03 (ref 1–1.03)
TOTAL PROTEIN: 6.8 G/DL (ref 6.4–8.3)
TROPONIN: <0.3 NG/ML
URINE REFLEX TO CULTURE: ABNORMAL
URINE TYPE: ABNORMAL
UROBILINOGEN, URINE: 0.2 E.U./DL
WBC # BLD: 7.2 K/UL (ref 4–11)
WBC UA: ABNORMAL /HPF (ref 0–5)

## 2021-05-04 PROCEDURE — 83880 ASSAY OF NATRIURETIC PEPTIDE: CPT

## 2021-05-04 PROCEDURE — 6360000002 HC RX W HCPCS: Performed by: EMERGENCY MEDICINE

## 2021-05-04 PROCEDURE — 87804 INFLUENZA ASSAY W/OPTIC: CPT

## 2021-05-04 PROCEDURE — 84484 ASSAY OF TROPONIN QUANT: CPT

## 2021-05-04 PROCEDURE — 96375 TX/PRO/DX INJ NEW DRUG ADDON: CPT

## 2021-05-04 PROCEDURE — 6370000000 HC RX 637 (ALT 250 FOR IP): Performed by: EMERGENCY MEDICINE

## 2021-05-04 PROCEDURE — 96374 THER/PROPH/DIAG INJ IV PUSH: CPT

## 2021-05-04 PROCEDURE — 93005 ELECTROCARDIOGRAM TRACING: CPT

## 2021-05-04 PROCEDURE — 83690 ASSAY OF LIPASE: CPT

## 2021-05-04 PROCEDURE — 2580000003 HC RX 258: Performed by: EMERGENCY MEDICINE

## 2021-05-04 PROCEDURE — 80053 COMPREHEN METABOLIC PANEL: CPT

## 2021-05-04 PROCEDURE — 81001 URINALYSIS AUTO W/SCOPE: CPT

## 2021-05-04 PROCEDURE — 71045 X-RAY EXAM CHEST 1 VIEW: CPT

## 2021-05-04 PROCEDURE — 87040 BLOOD CULTURE FOR BACTERIA: CPT

## 2021-05-04 PROCEDURE — 99284 EMERGENCY DEPT VISIT MOD MDM: CPT

## 2021-05-04 PROCEDURE — 85025 COMPLETE CBC W/AUTO DIFF WBC: CPT

## 2021-05-04 PROCEDURE — 36415 COLL VENOUS BLD VENIPUNCTURE: CPT

## 2021-05-04 RX ORDER — DEXAMETHASONE SODIUM PHOSPHATE 10 MG/ML
10 INJECTION INTRAMUSCULAR; INTRAVENOUS ONCE
Status: COMPLETED | OUTPATIENT
Start: 2021-05-04 | End: 2021-05-04

## 2021-05-04 RX ORDER — DIPHENHYDRAMINE HYDROCHLORIDE 50 MG/ML
50 INJECTION INTRAMUSCULAR; INTRAVENOUS ONCE
Status: COMPLETED | OUTPATIENT
Start: 2021-05-04 | End: 2021-05-04

## 2021-05-04 RX ORDER — LEVOFLOXACIN 500 MG/1
500 TABLET, FILM COATED ORAL DAILY
Qty: 10 TABLET | Refills: 0 | Status: SHIPPED | OUTPATIENT
Start: 2021-05-04 | End: 2021-05-14

## 2021-05-04 RX ORDER — LEVOFLOXACIN 500 MG/1
500 TABLET, FILM COATED ORAL ONCE
Status: COMPLETED | OUTPATIENT
Start: 2021-05-04 | End: 2021-05-04

## 2021-05-04 RX ORDER — 0.9 % SODIUM CHLORIDE 0.9 %
1000 INTRAVENOUS SOLUTION INTRAVENOUS ONCE
Status: COMPLETED | OUTPATIENT
Start: 2021-05-04 | End: 2021-05-04

## 2021-05-04 RX ORDER — DROPERIDOL 2.5 MG/ML
1.25 INJECTION, SOLUTION INTRAMUSCULAR; INTRAVENOUS ONCE
Status: COMPLETED | OUTPATIENT
Start: 2021-05-04 | End: 2021-05-04

## 2021-05-04 RX ADMIN — DROPERIDOL 1.25 MG: 2.5 INJECTION, SOLUTION INTRAMUSCULAR; INTRAVENOUS at 03:33

## 2021-05-04 RX ADMIN — DEXAMETHASONE SODIUM PHOSPHATE 10 MG: 10 INJECTION INTRAMUSCULAR; INTRAVENOUS at 03:34

## 2021-05-04 RX ADMIN — SODIUM CHLORIDE 1000 ML: 9 INJECTION, SOLUTION INTRAVENOUS at 03:32

## 2021-05-04 RX ADMIN — LEVOFLOXACIN 500 MG: 500 TABLET, FILM COATED ORAL at 04:47

## 2021-05-04 RX ADMIN — DIPHENHYDRAMINE HYDROCHLORIDE 50 MG: 50 INJECTION, SOLUTION INTRAMUSCULAR; INTRAVENOUS at 03:32

## 2021-05-04 NOTE — ED PROVIDER NOTES
[address] [place] [address] Court  eMERGENCY dEPARTMENT eNCOUnter      Pt Name: Lucía Hurtado  MRN: [id number]  YOB: 1945  Date of evaluation: 6/9/2922  Provider: Ashley Byers MD    CHIEF COMPLAINT       Chief Complaint   Patient presents with    Allergic Reaction         HISTORY OF PRESENT ILLNESS  (Location/Symptom, Timing/Onset, Context/Setting, Quality, Duration,Modifying Factors, Severity.)   Lucía Hurtado is a 70 y.o. female who presents to the emergency department with multiple complaints. Hives--Patient developed hives 1 hour ago. She took Benadryl 25mg PO. She is better but is still complaining of itching. Fever--She reports a fever of 100.2 earlier tonight around 7pm.  She also reports \"shivering\" and having chills. Fall--Patient states that she was feeling weak and fell tonight just prior to arrival.  She landed on her left hip but says that she \"did not break anything\" but expects to just be sore tomorrow. Chest pain--She reports the onset of chest pain arouind 7-8pm.  Pain is \"sharp\" in nature. She reports associated left hand numbness. She took one nitro SL at the onset of the pain and another Nitro just prior to arrival.  She reports having \"heart problems\" for \"a long time\". No stents or CABG. She is scheduled for a nuclear stress test at [facility] on 5-6-12 per Dr. Chantell Berg. Nausea and vomiting--Patient was driving here but had to pull over because she had nausea and she threw up in the parking lot. EMS picked her up in the parking lot and brought her to the ER. Nursing notes were reviewed.     REVIEW OF SYSTEMS    (2-9 systems for level 4, 10 or more for level 5)   ROS:  General:  + subjective fevers, + chills, + weakness  Cardiovascular:  No chest pain, no palpitations  Respiratory:  + shortness of breath, no cough, no wheezing  Gastrointestinal:  + lower abd pain, + nausea, + vomiting, no diarrhea  Musculoskeletal:  No muscle pain, no joint pain  Skin:  + rash/hjives, no easy bruising  Neurologic:  No speech problems, no headache, no extremity numbness, no extremity  tingling, no extremity weakness  Psychiatric:  No anxiety  Genitourinary:  No dysuria, no hematuria    Except as noted above the remainder of the review of systems was reviewed and negative. PAST MEDICAL HISTORY     Past Medical History:   Diagnosis Date    Bleeds easily ([facility] 12/31/2018    DVT (deep venous thrombosis) (HCC)     Headache(784.0)     Hypertension     Hypothyroid 5/20/2015    MI, old     Moderate episode of recurrent major depressive disorder ([facility] 10/27/2018    Pneumonia     Stomach ulcer     Thyroid disease          SURGICAL HISTORY       Past Surgical History:   Procedure Laterality Date    APPENDECTOMY      CHOLECYSTECTOMY      LIVER SURGERY      UPPER GASTROINTESTINAL ENDOSCOPY           CURRENT MEDICATIONS       Previous Medications    APIXABAN (ELIQUIS) 5 MG TABS TABLET    Take by mouth 2 times daily Samples from cardiology office    ASPIRIN 81 MG EC TABLET    Take 81 mg by mouth daily    CLONAZEPAM (KLONOPIN) 2 MG TABLET    TAKE ONE TABLET BY MOUTH ONCE NIGHTLY AS NEEDED FOR INSOMNIA    CYANOCOBALAMIN 1000 MCG/ML INJECTION    Inject 1,000 mcg into the muscle every 30 days    GABAPENTIN (NEURONTIN) 100 MG CAPSULE    Take 1 capsule by mouth nightly as needed (back pain) for up to 30 days. HYDRALAZINE (APRESOLINE) 25 MG TABLET    Take 1 tablet by mouth 2 times daily as needed (systolic bp > 716)    LEVOTHYROXINE (SYNTHROID) 75 MCG TABLET    Take 1 tablet by mouth Daily    LISINOPRIL-HYDROCHLOROTHIAZIDE (PRINZIDE;ZESTORETIC) 20-25 MG PER TABLET    Take 1 tablet by mouth daily    NITROGLYCERIN (NITROSTAT) 0.4 MG SL TABLET    Place 1 tablet under the tongue every 5 minutes as needed for Chest pain    NYSTATIN (MYCOSTATIN) 278866 UNIT/GM CREAM    Apply topically 2 times daily.     ONDANSETRON (ZOFRAN) 4 MG TABLET    TAKE ONE TABLET BY MOUTH EVERY 8 HOURS AS NEEDED FOR NAUSEA OR VOMITING    PANTOPRAZOLE (PROTONIX) 40 MG TABLET    Take 1 tablet by mouth 2 times daily       ALLERGIES     Iv dye [iodides], Azithromycin, Augmentin [amoxicillin-pot clavulanate], Codeine, Influenza vaccines, Mucinex [guaifenesin er], Nyquil severe cold-flu [phenyleph-doxylamine-dm-apap], and Reglan [metoclopramide]    FAMILY HISTORY     History reviewed. No pertinent family history. SOCIAL HISTORY       Social History     Socioeconomic History    Marital status:      Spouse name: None    Number of children: None    Years of education: None    Highest education level: None   Occupational History    None   Social Needs    Financial resource strain: None    Food insecurity     Worry: None     Inability: None    Transportation needs     Medical: None     Non-medical: None   Tobacco Use    Smoking status: Never Smoker    Smokeless tobacco: Never Used   Substance and Sexual Activity    Alcohol use: No    Drug use: No    Sexual activity: None   Lifestyle    Physical activity     Days per week: None     Minutes per session: None    Stress: None   Relationships    Social connections     Talks on phone: None     Gets together: None     Attends Judaism service: None     Active member of club or organization: None     Attends meetings of clubs or organizations: None     Relationship status: None    Intimate partner violence     Fear of current or ex partner: None     Emotionally abused: None     Physically abused: None     Forced sexual activity: None   Other Topics Concern    None   Social History Narrative    None         PHYSICAL EXAM    (up to 7 for level 4, 8 or more for level 5)     ED Triage Vitals   BP Temp Temp src Pulse Resp SpO2 Height Weight   -- -- -- -- -- -- -- --       Physical Exam  General :Patient is awake, alert, oriented, in no acute distress, nontoxic appearing  HEENT: Pupils are equally round and reactive to light, EOMI. SMALL (*)     All other components within normal limits    Narrative:     Performed at:  [address] [place] [address] Laboratory  [address] [place] [address],  [place], [place] [address]   Phone [phone]   MICROSCOPIC URINALYSIS - Abnormal; Notable for the following components:    Mucus, UA 3+ (*)     WBC, UA 6-9 (*)     Epithelial Cells, UA 21-50 (*)     Bacteria, UA 1+ (*)     All other components within normal limits    Narrative:     Performed at:  [address] [place] [address] Laboratory  [address] [place] [address],  [place], [place]   Phone [phone]   [place] A/B ANTIGENS    Narrative:     Performed at:  [address] [place] [address] Laboratory  [address] [place] [address],  [place], [place]   Phone [phone]   CULTURE, BLOOD 1   BRAIN NATRIURETIC PEPTIDE    Narrative:     Performed at:  [address] [place] [address] Laboratory  [address] [place] [address],  [place], [place]   Phone [phone]   CBC WITH AUTO DIFFERENTIAL    Narrative:     Performed at:  [address] [place] [address] Laboratory  [address] [place] [address],  [place], [place]   Phone [phone]   LIPASE    Narrative:     Performed at:  [address] [place] [address] Laboratory  [address] [place] [address],  [place], [place]   Phone [phone]   TROPONIN    Narrative:     Performed at:  [address] [place] [address] Laboratory  [address] [place] [address],  [place], [place]   Phone [phone]       I have reviewed and interpreted all ofthe currently available lab results from this visit (if applicable):  Results for orders placed or performed during the hospital encounter of 05/04/21   Rapid influenza A/B antigens    Specimen: Nares   Result Value Ref Range    Rapid Influenza A Ag Negative Negative    Rapid Influenza B Ag Negative Negative   Brain Natriuretic Peptide   Result Value Ref Range    Pro- 0 - 1,800 pg/mL   CBC Auto Differential Result Value Ref Range    WBC 7.2 4.0 - 11.0 K/uL    RBC 4.04 3.80 - 5.80 M/uL    Hemoglobin 12.7 11.5 - 16.5 g/dL    Hematocrit 39.7 37.0 - 47.0 %    MCV 98.3 80.0 - 100.0 fL    MCH 31.4 27.0 - 32.0 pg    MCHC 32.0 31.0 - 35.0 g/dL    RDW 12.8 11.0 - 16.0 %    Platelets 478 713 - 735 K/uL    MPV 9.4 6.0 - 10.0 fL    Neutrophils % 59.8 %    Immature Granulocytes % 0.4 0.0 - 5.0 %    Lymphocytes % 30.2 %    Monocytes % 7.2 %    Eosinophils % 1.7 %    Basophils % 0.7 %    Neutrophils Absolute 4.3 2.0 - 7.5 K/uL    Immature Granulocytes # 0.0 K/uL    Lymphocytes Absolute 2.2 1.5 - 4.0 K/uL    Monocytes Absolute 0.5 0.2 - 0.8 K/uL    Eosinophils Absolute 0.1 0.0 - 0.4 K/uL    Basophils Absolute 0.1 0.0 - 0.1 K/uL   Comprehensive Metabolic Panel   Result Value Ref Range    Sodium 140 136 - 145 mmol/L    Potassium 3.9 3.4 - 5.1 mmol/L    Chloride 102 98 - 107 mmol/L    CO2 31 (H) 20 - 30 mmol/L    Anion Gap 7 3 - 16    Glucose 112 (H) 74 - 106 mg/dL    BUN 14 6 - 20 mg/dL    CREATININE 0.8 0.4 - 1.2 mg/dL    GFR Non-African American >60 >59    GFR African American >59 >59    Calcium 9.0 8.5 - 10.5 mg/dL    Total Protein 6.8 6.4 - 8.3 g/dL    Albumin 3.9 3.4 - 4.8 g/dL    Albumin/Globulin Ratio 1.3 0.8 - 2.0    Total Bilirubin 0.4 0.3 - 1.2 mg/dL    Alkaline Phosphatase 87 25 - 100 U/L    ALT 8 4 - 36 U/L    AST 15 8 - 33 U/L    Globulin 2.9 g/dL   Lipase   Result Value Ref Range    Lipase 20.0 5.6 - 51.3 U/L   Troponin   Result Value Ref Range    Troponin <0.30 <0.30 ng/mL   Urinalysis Reflex to Culture    Specimen: Urine, clean catch   Result Value Ref Range    Color, UA Yellow Straw/Yellow    Clarity, UA CLOUDY (A) Clear    Glucose, Ur Negative Negative mg/dL    Bilirubin Urine Negative Negative    Ketones, Urine Negative Negative mg/dL    Specific Gravity, UA >=1.030 1.005 - 1.030    Blood, Urine TRACE-INTACT (A) Negative    pH, UA 6.0 5.0 - 8.0    Protein, UA Negative Negative mg/dL    Urobilinogen, Urine 0.2 <2.0 E. U./dL    Nitrite, Urine Negative Negative    Leukocyte Esterase, Urine SMALL (A) Negative    Microscopic Examination YES     Urine Type Cleancatch     Urine Reflex to Culture Not Indicated    Microscopic Urinalysis   Result Value Ref Range    Mucus, UA 3+ (A) None Seen /LPF    WBC, UA 6-9 (A) 0 - 5 /HPF    RBC, UA 0-2 0 - 4 /HPF    Epithelial Cells, UA 21-50 (A) 0 - 5 /HPF    Bacteria, UA 1+ (A) None Seen /HPF    Amorphous, UA 2+ /HPF        All other labs were within normal range or not returned as of this dictation. EMERGENCY DEPARTMENT COURSE and DIFFERENTIAL DIAGNOSIS/MDM:   Vitals:  Vitals:    05/04/21 0337   BP: (!) 188/74   Pulse: 69   Resp: 16   Temp: 97.5 °F (36.4 °C)   TempSrc: Oral   SpO2: 96%   Weight: 192 lb (87.1 kg)   Height: 5' 3\" (1.6 m)       Patient lives alone. She works 3 different jobs. Her labs are all WNL except for the urine. Her hives have resolved. She has not vomited while she was here. She will be discharged. The patient will follow-up with their PCP in 1-2 days for reevaluation. If the patient or family members have any further concerns or any worsening symptoms they will return to the ED for reevaluation. CONSULTS:  None    PROCEDURES:  Procedures    CRITICAL CARE TIME    Total Critical Care time was 0 minutes, excluding separately reportable procedures. There was a high probability of clinically significant/life threatening deterioration in the patient's condition which required my urgent intervention. FINAL IMPRESSION      1. Acute UTI    2. Urticaria    3. Chest pain, unspecified type    4.  Nausea and vomiting, intractability of vomiting not specified, unspecified vomiting type          DISPOSITION/PLAN   DISPOSITION        PATIENT REFERRED TO:  TATYANA Rosales CNP  [phone]    Schedule an appointment as soon as possible for a visit in 2 days  As needed      DISCHARGE MEDICATIONS:  New Prescriptions LEVOFLOXACIN (LEVAQUIN) 500 MG TABLET    Take 1 tablet by mouth daily for 10 days       Comment: Please note this report has been produced using speech recognition software and may contain errorsrelated to that system including errors in grammar, punctuation, and spelling, as well as words and phrases that may be inappropriate. If there are any questions or concerns please feel free to contact the dictating providerfor clarification.     Ashley Byers MD  Attending Emergency Physician                Ashley Byers MD  05/04/21 6364

## 2021-05-04 NOTE — ED NOTES
Patient's ride has arrived. Discharge instructions, RX, and follow up care reviewed with patient. Patient verbalized understanding. No complaints or concerns at this time.       Azul Murguia RN  05/04/21 2092

## 2021-05-08 LAB — BLOOD CULTURE, ROUTINE: NORMAL

## 2021-05-24 ENCOUNTER — HOSPITAL ENCOUNTER (OUTPATIENT)
Facility: HOSPITAL | Age: 76
Discharge: HOME | End: 2021-05-24
Payer: MEDICARE

## 2021-05-24 ENCOUNTER — OFFICE VISIT (OUTPATIENT)
Dept: FAMILY MEDICINE CLINIC | Age: 76
End: 2021-05-24
Payer: MEDICARE

## 2021-05-24 VITALS
HEART RATE: 68 BPM | TEMPERATURE: 98.2 F | HEIGHT: 63 IN | RESPIRATION RATE: 18 BRPM | DIASTOLIC BLOOD PRESSURE: 60 MMHG | WEIGHT: 190 LBS | SYSTOLIC BLOOD PRESSURE: 110 MMHG | BODY MASS INDEX: 33.66 KG/M2 | OXYGEN SATURATION: 98 %

## 2021-05-24 DIAGNOSIS — R19.7 NAUSEA VOMITING AND DIARRHEA: ICD-10-CM

## 2021-05-24 DIAGNOSIS — G47.00 INSOMNIA, UNSPECIFIED TYPE: ICD-10-CM

## 2021-05-24 DIAGNOSIS — E53.8 B12 DEFICIENCY: ICD-10-CM

## 2021-05-24 DIAGNOSIS — R10.9 ABDOMINAL PAIN, UNSPECIFIED ABDOMINAL LOCATION: ICD-10-CM

## 2021-05-24 DIAGNOSIS — F41.0 PANIC ATTACK: Primary | ICD-10-CM

## 2021-05-24 DIAGNOSIS — F41.0 PANIC ATTACK: ICD-10-CM

## 2021-05-24 DIAGNOSIS — R11.2 NAUSEA VOMITING AND DIARRHEA: ICD-10-CM

## 2021-05-24 PROCEDURE — 96372 THER/PROPH/DIAG INJ SC/IM: CPT | Performed by: PHYSICIAN ASSISTANT

## 2021-05-24 PROCEDURE — 83690 ASSAY OF LIPASE: CPT

## 2021-05-24 PROCEDURE — 80053 COMPREHEN METABOLIC PANEL: CPT

## 2021-05-24 PROCEDURE — 82150 ASSAY OF AMYLASE: CPT

## 2021-05-24 PROCEDURE — 99213 OFFICE O/P EST LOW 20 MIN: CPT | Performed by: PHYSICIAN ASSISTANT

## 2021-05-24 RX ORDER — HYOSCYAMINE SULFATE 0.125 MG
125 TABLET ORAL EVERY 4 HOURS PRN
Qty: 30 TABLET | Refills: 0 | Status: SHIPPED | OUTPATIENT
Start: 2021-05-24 | End: 2021-10-06

## 2021-05-24 RX ORDER — CYANOCOBALAMIN 1000 UG/ML
1000 INJECTION INTRAMUSCULAR; SUBCUTANEOUS ONCE
Status: COMPLETED | OUTPATIENT
Start: 2021-05-24 | End: 2021-05-24

## 2021-05-24 RX ORDER — ONDANSETRON 4 MG/1
8 TABLET, FILM COATED ORAL 3 TIMES DAILY PRN
Qty: 15 TABLET | Refills: 0 | Status: SHIPPED | OUTPATIENT
Start: 2021-05-24 | End: 2022-02-01 | Stop reason: SDUPTHER

## 2021-05-24 RX ORDER — ISOSORBIDE MONONITRATE 30 MG/1
30 TABLET, EXTENDED RELEASE ORAL DAILY
COMMUNITY
End: 2021-10-06 | Stop reason: SINTOL

## 2021-05-24 RX ORDER — CLONAZEPAM 2 MG/1
TABLET ORAL
Qty: 30 TABLET | Refills: 0 | Status: SHIPPED | OUTPATIENT
Start: 2021-05-24 | End: 2021-06-23 | Stop reason: SDUPTHER

## 2021-05-24 RX ORDER — CLONAZEPAM 2 MG/1
TABLET ORAL
Qty: 30 TABLET | Refills: 0 | Status: CANCELLED | OUTPATIENT
Start: 2021-05-24 | End: 2021-06-24

## 2021-05-24 RX ORDER — PROPRANOLOL HYDROCHLORIDE 20 MG/1
20 TABLET ORAL 2 TIMES DAILY
COMMUNITY
End: 2021-08-11

## 2021-05-24 RX ADMIN — CYANOCOBALAMIN 1000 MCG: 1000 INJECTION INTRAMUSCULAR; SUBCUTANEOUS at 15:55

## 2021-05-24 ASSESSMENT — ENCOUNTER SYMPTOMS
ABDOMINAL DISTENTION: 0
DIARRHEA: 1
NAUSEA: 1
RESPIRATORY NEGATIVE: 1
ABDOMINAL PAIN: 1
VOMITING: 1
CONSTIPATION: 0

## 2021-05-24 ASSESSMENT — PATIENT HEALTH QUESTIONNAIRE - PHQ9
SUM OF ALL RESPONSES TO PHQ9 QUESTIONS 1 & 2: 0
SUM OF ALL RESPONSES TO PHQ QUESTIONS 1-9: 0

## 2021-05-24 NOTE — TELEPHONE ENCOUNTER
Patient called, requested refill.        Next Office Visit Date:  Future Appointments   Date Time Provider Malena Diallo   6/16/2021  1:45 PM Merissa Herron, APRN - CNP [address] scanned for review

## 2021-05-24 NOTE — PROGRESS NOTES
SUBJECTIVE:    Patient ID: Cleveland Rainey is a 70 y. o.female. Chief Complaint   Patient presents with    Nausea & Vomiting     started x1 day ago    Diarrhea     started x1 day ago     Abdominal Pain       HPI:    Pt here with c/o NVD with abd pain x 1 day. She states she has vomited ~12x, denies bile/blood/coffee grounds appearance. She has had diarrhea multiple times - loose/watery/brown. Denies blood or melena. She has taken Zofran with some relief. Pt has h/o DVT, anxiety, HTN, hypothyroid and h/o pancreatitis. Pt is s/p josiah, appy    Patient's medications, allergies, past medical, surgical, social and family histories were reviewed and updated as appropriate in electronic medical record. Current Outpatient Medications on File Prior to Visit   Medication Sig Dispense Refill    isosorbide mononitrate (IMDUR) 30 MG extended release tablet Take 30 mg by mouth daily      propranolol (INDERAL) 20 MG tablet Take 20 mg by mouth 2 times daily      levothyroxine (SYNTHROID) 75 MCG tablet Take 1 tablet by mouth Daily 90 tablet 3    ondansetron (ZOFRAN) 4 MG tablet TAKE ONE TABLET BY MOUTH EVERY 8 HOURS AS NEEDED FOR NAUSEA OR VOMITING 30 tablet 5    apixaban (ELIQUIS) 5 MG TABS tablet Take by mouth 2 times daily Samples from cardiology office      aspirin 81 MG EC tablet Take 81 mg by mouth daily      cyanocobalamin 1000 MCG/ML injection Inject 1,000 mcg into the muscle every 30 days      pantoprazole (PROTONIX) 40 MG tablet Take 1 tablet by mouth 2 times daily 60 tablet 0    lisinopril-hydroCHLOROthiazide (PRINZIDE;ZESTORETIC) 20-25 MG per tablet Take 1 tablet by mouth daily 90 tablet 3    clonazePAM (KLONOPIN) 2 MG tablet TAKE ONE TABLET BY MOUTH ONCE NIGHTLY AS NEEDED FOR INSOMNIA 30 tablet 0    gabapentin (NEURONTIN) 100 MG capsule Take 1 capsule by mouth nightly as needed (back pain) for up to 30 days.  30 capsule 0    nystatin (MYCOSTATIN) 103643 UNIT/GM cream Apply topically 2 times daily. (Patient not taking: Reported on 5/24/2021)      hydrALAZINE (APRESOLINE) 25 MG tablet Take 1 tablet by mouth 2 times daily as needed (systolic bp > 724) (Patient not taking: Reported on 5/24/2021) 90 tablet 3    nitroGLYCERIN (NITROSTAT) 0.4 MG SL tablet Place 1 tablet under the tongue every 5 minutes as needed for Chest pain (Patient not taking: Reported on 5/24/2021) 25 tablet 3     No current facility-administered medications on file prior to visit. Review of Systems   Constitutional: Negative. Negative for chills and fever. HENT: Negative. Respiratory: Negative. Cardiovascular: Negative. Gastrointestinal: Positive for abdominal pain, diarrhea, nausea and vomiting. Negative for abdominal distention and constipation. Genitourinary: Negative for dysuria and hematuria. Skin: Negative. Neurological: Negative. Past Medical History:   Diagnosis Date    Bleeds easily ([facility] [facility] 12/31/2018    DVT (deep venous thrombosis) (HCC)     Headache(784.0)     Hypertension     Hypothyroid 5/20/2015    MI, old     Moderate episode of recurrent major depressive disorder ([facility] 10/27/2018    Pneumonia     Stomach ulcer     Thyroid disease      Past Surgical History:   Procedure Laterality Date    APPENDECTOMY      CHOLECYSTECTOMY      LIVER SURGERY      UPPER GASTROINTESTINAL ENDOSCOPY       History reviewed. No pertinent family history. Social History     Tobacco Use   Smoking Status Never Smoker   Smokeless Tobacco Never Used       OBJECTIVE:   Wt Readings from Last 3 Encounters:   05/24/21 190 lb (86.2 kg)   05/04/21 192 lb (87.1 kg)   03/30/21 192 lb (87.1 kg)     BP Readings from Last 3 Encounters:   05/24/21 110/60   05/04/21 (!) 173/79   03/30/21 (!) 140/88       /60   Pulse 68   Temp 98.2 °F (36.8 °C) (Infrared)   Resp 18   Ht 5' 3\" (1.6 m)   Wt 190 lb (86.2 kg)   SpO2 98% Comment: room air  Breastfeeding No   BMI 33.66 kg/m²    Physical Exam  Vitals reviewed. Constitutional:       General: She is not in acute distress. Appearance: Normal appearance. She is normal weight. She is not ill-appearing or toxic-appearing. HENT:      Head: Normocephalic and atraumatic. Eyes:      Conjunctiva/sclera: Conjunctivae normal.      Pupils: Pupils are equal, round, and reactive to light. Cardiovascular:      Rate and Rhythm: Normal rate and regular rhythm. Heart sounds: Normal heart sounds. No murmur heard. No gallop. Pulmonary:      Effort: Pulmonary effort is normal.      Breath sounds: Normal breath sounds. No wheezing, rhonchi or rales. Abdominal:      General: Abdomen is flat. A surgical scar is present. Bowel sounds are normal. There is no distension. There are no signs of injury. Palpations: Abdomen is soft. There is no fluid wave, mass or pulsatile mass. Tenderness: There is abdominal tenderness in the right upper quadrant. There is no guarding or rebound. Skin:     General: Skin is warm and dry. Neurological:      Mental Status: She is alert and oriented to person, place, and time. Psychiatric:         Mood and Affect: Mood normal.         Behavior: Behavior normal.         Thought Content:  Thought content normal.         Judgment: Judgment normal.         Lab Results   Component Value Date     05/04/2021    K 3.9 05/04/2021     05/04/2021    CO2 31 05/04/2021    GLUCOSE 112 05/04/2021    BUN 14 05/04/2021    CREATININE 0.8 05/04/2021    CALCIUM 9.0 05/04/2021    PROT 6.8 05/04/2021    LABALBU 3.9 05/04/2021    BILITOT 0.4 05/04/2021    ALT 8 05/04/2021    AST 15 05/04/2021     Hemoglobin A1C (%)   Date Value   09/16/2020 6.1 (H)     Microscopic Examination (no units)   Date Value   05/04/2021 YES     LDL Calculated (mg/dL)   Date Value   09/16/2020 116       Lab Results   Component Value Date    WBC 7.2 05/04/2021    NEUTROABS 4.3 05/04/2021    HGB 12.7 05/04/2021    HCT 39.7 05/04/2021    MCV 98.3 05/04/2021     05/04/2021     Lab Results   Component Value Date    TSH 0.39 03/23/2021       ASSESSMENT/PLAN:     1. Panic attack    2. Insomnia, unspecified type    3. Nausea vomiting and diarrhea  - AMYLASE; Future  - COMPREHENSIVE METABOLIC PANEL; Future  - LIPASE; Future  - ondansetron (ZOFRAN) 4 MG tablet; Take 2 tablets by mouth 3 times daily as needed for Nausea or Vomiting  Dispense: 15 tablet; Refill: 0  - hyoscyamine (ANASPAZ;LEVSIN) 125 MCG tablet; Take 1 tablet by mouth every 4 hours as needed for Cramping  Dispense: 30 tablet; Refill: 0    4. Abdominal pain, unspecified abdominal location  - AMYLASE; Future  - COMPREHENSIVE METABOLIC PANEL; Future  - LIPASE; Future  -Worsening abd pain or not improving go to ER for eval and tx    At this time, vitals are stable, pt is non-toxic/NAD and appears well. No orders of the defined types were placed in this encounter.        Electronically signed by JUSTINE Lerma on 5/24/2021 at 3:37 PM

## 2021-05-24 NOTE — PROGRESS NOTES
Administrations This Visit     cyanocobalamin injection 1,000 mcg     Admin Date  05/24/2021  15:55 Action  Given Dose  1,000 mcg Route  Intramuscular Site  Deltoid Left Administered By  Rico Garrido RN    Ordering Provider: JUSTINE Servin [address]: 50809-693-03    Lot#:     : [address] [place] [address]    Patient Supplied?: No              Patient tolerated injection well. Patient advised to wait 20 minutes in the office following the injection. No signs/symptoms of reaction noted after 20 minutes.

## 2021-05-25 LAB
A/G RATIO: 1.6 (ref 0.8–2)
ALBUMIN SERPL-MCNC: 4.2 G/DL (ref 3.4–4.8)
ALP BLD-CCNC: 80 U/L (ref 25–100)
ALT SERPL-CCNC: 11 U/L (ref 4–36)
AMYLASE: 36 U/L (ref 20–104)
ANION GAP SERPL CALCULATED.3IONS-SCNC: 8 MMOL/L (ref 3–16)
AST SERPL-CCNC: 16 U/L (ref 8–33)
BILIRUB SERPL-MCNC: 0.4 MG/DL (ref 0.3–1.2)
BUN BLDV-MCNC: 16 MG/DL (ref 6–20)
CALCIUM SERPL-MCNC: 9.3 MG/DL (ref 8.5–10.5)
CHLORIDE BLD-SCNC: 104 MMOL/L (ref 98–107)
CO2: 30 MMOL/L (ref 20–30)
CREAT SERPL-MCNC: 1 MG/DL (ref 0.4–1.2)
GFR AFRICAN AMERICAN: >59
GFR NON-AFRICAN AMERICAN: 54
GLOBULIN: 2.6 G/DL
GLUCOSE BLD-MCNC: 105 MG/DL (ref 74–106)
LIPASE: 14 U/L (ref 5.6–51.3)
POTASSIUM SERPL-SCNC: 4.1 MMOL/L (ref 3.4–5.1)
SODIUM BLD-SCNC: 142 MMOL/L (ref 136–145)
TOTAL PROTEIN: 6.8 G/DL (ref 6.4–8.3)

## 2021-06-23 ENCOUNTER — NURSE ONLY (OUTPATIENT)
Dept: FAMILY MEDICINE CLINIC | Age: 76
End: 2021-06-23
Payer: MEDICARE

## 2021-06-23 DIAGNOSIS — E53.8 B12 DEFICIENCY: Primary | ICD-10-CM

## 2021-06-23 DIAGNOSIS — G47.00 INSOMNIA, UNSPECIFIED TYPE: ICD-10-CM

## 2021-06-23 DIAGNOSIS — F41.0 PANIC ATTACK: ICD-10-CM

## 2021-06-23 PROCEDURE — 96372 THER/PROPH/DIAG INJ SC/IM: CPT | Performed by: NURSE PRACTITIONER

## 2021-06-23 RX ORDER — CLONAZEPAM 2 MG/1
TABLET ORAL
Qty: 30 TABLET | Refills: 0 | Status: SHIPPED | OUTPATIENT
Start: 2021-06-23 | End: 2021-11-17

## 2021-06-23 RX ORDER — CYANOCOBALAMIN 1000 UG/ML
1000 INJECTION INTRAMUSCULAR; SUBCUTANEOUS ONCE
Status: COMPLETED | OUTPATIENT
Start: 2021-06-23 | End: 2021-06-23

## 2021-06-23 RX ADMIN — CYANOCOBALAMIN 1000 MCG: 1000 INJECTION INTRAMUSCULAR; SUBCUTANEOUS at 16:21

## 2021-08-04 ENCOUNTER — NURSE TRIAGE (OUTPATIENT)
Dept: OTHER | Facility: CLINIC | Age: 76
End: 2021-08-04

## 2021-08-04 NOTE — TELEPHONE ENCOUNTER
Received call from Federica Sosa at [facility] with [employer] Complaint. Brief description of triage: Pt reports having Dysuria, urinary frequency x2 days. Triage indicates for patient to have appt with PCP today or go to [facility] if no appts available. Care advice provided, patient verbalizes understanding; denies any other questions or concerns; instructed to call back for any new or worsening symptoms. Writer provided warm transfer to [facility] at [facility] for appointment scheduling. Attention Provider: Thank you for allowing me to participate in the care of your patient. The patient was connected to triage in response to information provided to the [facility]. Please do not respond through this encounter as the response is not directed to a shared pool. Reason for Disposition   Urinating more frequently than usual (i.e., frequency)    Answer Assessment - Initial Assessment Questions  1. SYMPTOM: \"What's the main symptom you're concerned about? \" (e.g., frequency, incontinence)      Dysuria, urinary frequency    2. ONSET: \"When did the  Dysuria, urinary frequency  start? \"      2 days ago. 3. PAIN: \"Is there any pain? \" If so, ask: \"How bad is it? \" (Scale: 1-10; mild, moderate, severe)      6-7/10    4. CAUSE: \"What do you think is causing the symptoms? \"      Possible UTI    5. OTHER SYMPTOMS: \"Do you have any other symptoms? \" (e.g., fever, flank pain, blood in urine, pain with urination)          Dysuria, urinary frequency    6. PREGNANCY: \"Is there any chance you are pregnant? \" \"When was your last menstrual period? \"      N/A    Protocols used: URINARY SYMPTOMS-ADULT-[place]

## 2021-08-11 ENCOUNTER — OFFICE VISIT (OUTPATIENT)
Dept: FAMILY MEDICINE CLINIC | Age: 76
End: 2021-08-11
Payer: MEDICARE

## 2021-08-11 VITALS
BODY MASS INDEX: 34.02 KG/M2 | RESPIRATION RATE: 18 BRPM | SYSTOLIC BLOOD PRESSURE: 147 MMHG | WEIGHT: 192 LBS | DIASTOLIC BLOOD PRESSURE: 78 MMHG | TEMPERATURE: 96.4 F | HEART RATE: 83 BPM | HEIGHT: 63 IN | OXYGEN SATURATION: 95 %

## 2021-08-11 DIAGNOSIS — I10 ESSENTIAL HYPERTENSION: ICD-10-CM

## 2021-08-11 DIAGNOSIS — I10 HYPERTENSION, UNSPECIFIED TYPE: ICD-10-CM

## 2021-08-11 DIAGNOSIS — I82.4Z2 DEEP VEIN THROMBOSIS (DVT) OF DISTAL VEIN OF LEFT LOWER EXTREMITY, UNSPECIFIED CHRONICITY (HCC): ICD-10-CM

## 2021-08-11 DIAGNOSIS — M19.90 OSTEOARTHRITIS, UNSPECIFIED OSTEOARTHRITIS TYPE, UNSPECIFIED SITE: ICD-10-CM

## 2021-08-11 DIAGNOSIS — M54.41 CHRONIC MIDLINE LOW BACK PAIN WITH BILATERAL SCIATICA: ICD-10-CM

## 2021-08-11 DIAGNOSIS — Z12.11 SCREENING FOR COLON CANCER: Primary | ICD-10-CM

## 2021-08-11 DIAGNOSIS — J44.1 ACUTE EXACERBATION OF CHRONIC OBSTRUCTIVE PULMONARY DISEASE (COPD) (HCC): ICD-10-CM

## 2021-08-11 DIAGNOSIS — I20.8 STABLE ANGINA (HCC): ICD-10-CM

## 2021-08-11 DIAGNOSIS — G89.29 CHRONIC MIDLINE LOW BACK PAIN WITH BILATERAL SCIATICA: ICD-10-CM

## 2021-08-11 DIAGNOSIS — M54.42 CHRONIC MIDLINE LOW BACK PAIN WITH BILATERAL SCIATICA: ICD-10-CM

## 2021-08-11 DIAGNOSIS — F33.1 MODERATE EPISODE OF RECURRENT MAJOR DEPRESSIVE DISORDER (HCC): ICD-10-CM

## 2021-08-11 PROCEDURE — 99214 OFFICE O/P EST MOD 30 MIN: CPT | Performed by: FAMILY MEDICINE

## 2021-08-11 PROCEDURE — 3017F COLORECTAL CA SCREEN DOC REV: CPT | Performed by: FAMILY MEDICINE

## 2021-08-11 PROCEDURE — G8399 PT W/DXA RESULTS DOCUMENT: HCPCS | Performed by: FAMILY MEDICINE

## 2021-08-11 PROCEDURE — G8417 CALC BMI ABV UP PARAM F/U: HCPCS | Performed by: FAMILY MEDICINE

## 2021-08-11 PROCEDURE — 3023F SPIROM DOC REV: CPT | Performed by: FAMILY MEDICINE

## 2021-08-11 PROCEDURE — G8926 SPIRO NO PERF OR DOC: HCPCS | Performed by: FAMILY MEDICINE

## 2021-08-11 PROCEDURE — 1123F ACP DISCUSS/DSCN MKR DOCD: CPT | Performed by: FAMILY MEDICINE

## 2021-08-11 PROCEDURE — 96372 THER/PROPH/DIAG INJ SC/IM: CPT | Performed by: FAMILY MEDICINE

## 2021-08-11 PROCEDURE — 4040F PNEUMOC VAC/ADMIN/RCVD: CPT | Performed by: FAMILY MEDICINE

## 2021-08-11 PROCEDURE — G8427 DOCREV CUR MEDS BY ELIG CLIN: HCPCS | Performed by: FAMILY MEDICINE

## 2021-08-11 PROCEDURE — 1090F PRES/ABSN URINE INCON ASSESS: CPT | Performed by: FAMILY MEDICINE

## 2021-08-11 PROCEDURE — 1036F TOBACCO NON-USER: CPT | Performed by: FAMILY MEDICINE

## 2021-08-11 RX ORDER — CEFTRIAXONE 1 G/1
1000 INJECTION, POWDER, FOR SOLUTION INTRAMUSCULAR; INTRAVENOUS ONCE
Status: COMPLETED | OUTPATIENT
Start: 2021-08-11 | End: 2021-08-11

## 2021-08-11 RX ORDER — METHYLPREDNISOLONE SODIUM SUCCINATE 125 MG/2ML
125 INJECTION, POWDER, LYOPHILIZED, FOR SOLUTION INTRAMUSCULAR; INTRAVENOUS ONCE
Status: COMPLETED | OUTPATIENT
Start: 2021-08-11 | End: 2021-08-11

## 2021-08-11 RX ORDER — IRBESARTAN AND HYDROCHLOROTHIAZIDE 150; 12.5 MG/1; MG/1
1 TABLET, FILM COATED ORAL DAILY
COMMUNITY
End: 2021-09-22

## 2021-08-11 RX ORDER — PROPRANOLOL HYDROCHLORIDE 20 MG/1
20 TABLET ORAL DAILY
COMMUNITY
End: 2021-11-27

## 2021-08-11 RX ADMIN — METHYLPREDNISOLONE SODIUM SUCCINATE 125 MG: 125 INJECTION, POWDER, LYOPHILIZED, FOR SOLUTION INTRAMUSCULAR; INTRAVENOUS at 15:07

## 2021-08-11 RX ADMIN — CEFTRIAXONE 1000 MG: 1 INJECTION, POWDER, FOR SOLUTION INTRAMUSCULAR; INTRAVENOUS at 15:06

## 2021-08-11 SDOH — ECONOMIC STABILITY: FOOD INSECURITY: WITHIN THE PAST 12 MONTHS, YOU WORRIED THAT YOUR FOOD WOULD RUN OUT BEFORE YOU GOT MONEY TO BUY MORE.: NEVER TRUE

## 2021-08-11 SDOH — ECONOMIC STABILITY: FOOD INSECURITY: WITHIN THE PAST 12 MONTHS, THE FOOD YOU BOUGHT JUST DIDN'T LAST AND YOU DIDN'T HAVE MONEY TO GET MORE.: NEVER TRUE

## 2021-08-11 ASSESSMENT — ENCOUNTER SYMPTOMS
CONSTIPATION: 0
DIARRHEA: 1
VOMITING: 1
RESPIRATORY NEGATIVE: 1
ABDOMINAL DISTENTION: 0
ABDOMINAL PAIN: 1
NAUSEA: 1

## 2021-08-11 ASSESSMENT — SOCIAL DETERMINANTS OF HEALTH (SDOH): HOW HARD IS IT FOR YOU TO PAY FOR THE VERY BASICS LIKE FOOD, HOUSING, MEDICAL CARE, AND HEATING?: NOT VERY HARD

## 2021-08-11 NOTE — PROGRESS NOTES
Chief Complaint   Patient presents with    Hypertension    Shortness of Breath    Sinus Problem       Have you seen any other physician or provider since your last visit yes - [facility]    Have you had any other diagnostic tests since your last visit? no    Have you changed or stopped any medications since your last visit? no       Administrations This Visit     cefTRIAXone (ROCEPHIN) injection 1,000 mg     Admin Date  08/11/2021  15:06 Action  Given Dose  1,000 mg Route  Intramuscular Site  Dorsogluteal Left Administered By  Renetta Castillo MA    Ordering Provider: Alcon Lara MD    NDC: 37239-664-15    : [facility]    Patient Supplied?: No          methylPREDNISolone sodium (SOLU-MEDROL) injection 125 mg     Admin Date  08/11/2021  15:07 Action  Given Dose  125 mg Route  Intramuscular Site  Deltoid Left Administered By  Renetta Castillo [place]    Ordering Provider: Alcon Lara MD    NDC: 8754-3728-61    : Morgan Osborn. Patient Supplied?: No                Patient tolerated injection well. Patient advised to wait 20 minutes in the office following the injection. No signs/symptoms of reaction noted after 20 minutes.

## 2021-08-11 NOTE — PROGRESS NOTES
reviewed and updated with the patient. Physical Exam  Vitals and nursing note reviewed. Constitutional:       General: She is not in acute distress. Appearance: She is well-developed. She is not ill-appearing, toxic-appearing or diaphoretic. HENT:      Head: Normocephalic and atraumatic. Nose: Nose normal. No congestion or rhinorrhea. Mouth/Throat:      Mouth: Mucous membranes are moist.      Pharynx: Oropharynx is clear. Uvula midline. No oropharyngeal exudate or posterior oropharyngeal erythema. Eyes:      Extraocular Movements: Extraocular movements intact. Conjunctiva/sclera: Conjunctivae normal.      Pupils: Pupils are equal, round, and reactive to light. Neck:      Thyroid: No thyromegaly. Cardiovascular:      Rate and Rhythm: Normal rate and regular rhythm. Pulses: Normal pulses. Heart sounds: Normal heart sounds. No murmur heard. Pulmonary:      Effort: Pulmonary effort is normal. No respiratory distress. Comments: Bilateral harsh breath sounds, that do clear somewhat with cough. Abdominal:      General: Bowel sounds are normal. There is no distension. Palpations: Abdomen is soft. Tenderness: There is no abdominal tenderness. Musculoskeletal:         General: Normal range of motion. Cervical back: Normal range of motion and neck supple. Lymphadenopathy:      Cervical: No cervical adenopathy. Skin:     General: Skin is warm and dry. Capillary Refill: Capillary refill takes less than 2 seconds. Coloration: Skin is not pale. Neurological:      General: No focal deficit present. Mental Status: She is alert and oriented to person, place, and time. Mental status is at baseline. Cranial Nerves: No cranial nerve deficit. Sensory: No sensory deficit. Motor: No weakness. Coordination: Coordination normal.      Gait: Gait normal.   Psychiatric:         Attention and Perception: She is inattentive.          Mood and Affect: Mood is anxious. Affect is inappropriate. Speech: Speech normal.         Behavior: Behavior normal.         Thought Content: Thought content normal.         Judgment: Judgment normal.          No results found for requested labs within last 30 days. Hemoglobin A1C (%)   Date Value   09/16/2020 6.1 (H)     Microscopic Examination (no units)   Date Value   05/04/2021 YES     LDL Calculated (mg/dL)   Date Value   09/16/2020 116       Lab Results   Component Value Date    WBC 7.2 05/04/2021    NEUTROABS 4.3 05/04/2021    HGB 12.7 05/04/2021    HCT 39.7 05/04/2021    MCV 98.3 05/04/2021     05/04/2021     Lab Results   Component Value Date    TSH 0.39 03/23/2021       ASSESSMENT:    Diagnosis Orders   1. Screening for colon cancer  Cologuard (For External Results Only)   2. Moderate episode of recurrent major depressive disorder ([facility] [address]     3. Deep vein thrombosis (DVT) of distal vein of left lower extremity, unspecified chronicity ([facility] [address]     4. Stable angina ([facility] [address]     5. Essential hypertension     6. Osteoarthritis, unspecified osteoarthritis type, unspecified site     7. Chronic midline low back pain with bilateral sciatica     8. Hypertension, unspecified type     9.  Acute exacerbation of chronic obstructive pulmonary disease (COPD) ([facility])          PLAN:  Orders Placed This Encounter   Medications    methylPREDNISolone sodium (SOLU-MEDROL) injection 125 mg    cefTRIAXone (ROCEPHIN) injection 1,000 mg     Order Specific Question:   Antimicrobial Indications     Answer:   Upper Respiratory Infection     Order Specific Question:   Antimicrobial Indications     Answer:   COPD Exacerbation        Medications Discontinued During This Encounter   Medication Reason    apixaban (ELIQUIS) 5 MG TABS tablet LIST CLEANUP    gabapentin (NEURONTIN) 100 MG capsule LIST CLEANUP    hydrALAZINE (APRESOLINE) 25 MG tablet LIST CLEANUP    lisinopril-hydroCHLOROthiazide (PRINZIDE;ZESTORETIC) 20-25 MG per tablet LIST CLEANUP    propranolol (INDERAL) 20 MG tablet LIST CLEANUP       Controlled Substances Monitoring:      Please note: This chart was generated using Dragon dictation software. Although every effort was made to ensure the accuracy of this automated transcription, some errors in transcription may have occurred.

## 2021-08-12 ENCOUNTER — NURSE ONLY (OUTPATIENT)
Dept: FAMILY MEDICINE CLINIC | Age: 76
End: 2021-08-12
Payer: MEDICARE

## 2021-08-12 DIAGNOSIS — J01.90 ACUTE BACTERIAL SINUSITIS: Primary | ICD-10-CM

## 2021-08-12 DIAGNOSIS — B96.89 ACUTE BACTERIAL SINUSITIS: Primary | ICD-10-CM

## 2021-08-12 PROCEDURE — 96372 THER/PROPH/DIAG INJ SC/IM: CPT | Performed by: FAMILY MEDICINE

## 2021-08-12 RX ORDER — CEFTRIAXONE 1 G/1
1000 INJECTION, POWDER, FOR SOLUTION INTRAMUSCULAR; INTRAVENOUS ONCE
Status: COMPLETED | OUTPATIENT
Start: 2021-08-12 | End: 2021-08-12

## 2021-08-12 RX ORDER — METHYLPREDNISOLONE SODIUM SUCCINATE 125 MG/2ML
125 INJECTION, POWDER, LYOPHILIZED, FOR SOLUTION INTRAMUSCULAR; INTRAVENOUS ONCE
Status: COMPLETED | OUTPATIENT
Start: 2021-08-12 | End: 2021-08-12

## 2021-08-12 RX ADMIN — METHYLPREDNISOLONE SODIUM SUCCINATE 125 MG: 125 INJECTION, POWDER, LYOPHILIZED, FOR SOLUTION INTRAMUSCULAR; INTRAVENOUS at 13:58

## 2021-08-12 RX ADMIN — CEFTRIAXONE 1000 MG: 1 INJECTION, POWDER, FOR SOLUTION INTRAMUSCULAR; INTRAVENOUS at 13:57

## 2021-08-12 NOTE — PROGRESS NOTES
Administrations This Visit     cefTRIAXone (ROCEPHIN) injection 1,000 mg     Admin Date  08/12/2021  13:57 Action  Given Dose  1,000 mg Route  Intramuscular Site  Dorsogluteal Right Administered By  Verner Sciara, RN    Ordering Provider: Jessenia Israel MD    NDC: 25259-409-51    Lot#: [id number]    : [employer]    Patient Supplied?: No          methylPREDNISolone sodium (SOLU-MEDROL) injection 125 mg     Admin Date  08/12/2021  13:58 Action  Given Dose  125 mg Route  Intramuscular Site  Dorsogluteal Left Administered By  Verner Sciara, RN    Ordering Provider: Jessenia Israel MD    NDC: 8796-0503-92    Lot#: [id number]    : Gary Suresh. Patient Supplied?: No              Patient tolerated injection well. Patient advised to wait 20 minutes in the office following the injection. No signs/symptoms of reaction noted after 20 minutes.

## 2021-08-19 ENCOUNTER — OFFICE VISIT (OUTPATIENT)
Dept: FAMILY MEDICINE CLINIC | Age: 76
End: 2021-08-19
Payer: MEDICARE

## 2021-08-19 VITALS — TEMPERATURE: 98.8 F | OXYGEN SATURATION: 98 %

## 2021-08-19 DIAGNOSIS — R11.2 NAUSEA AND VOMITING, INTRACTABILITY OF VOMITING NOT SPECIFIED, UNSPECIFIED VOMITING TYPE: ICD-10-CM

## 2021-08-19 DIAGNOSIS — I10 HYPERTENSION, UNSPECIFIED TYPE: ICD-10-CM

## 2021-08-19 DIAGNOSIS — R19.7 DIARRHEA, UNSPECIFIED TYPE: Primary | ICD-10-CM
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
## 2021-08-19 PROCEDURE — 99213 OFFICE O/P EST LOW 20 MIN: CPT | Performed by: PHYSICIAN ASSISTANT

## 2021-08-19 ASSESSMENT — ENCOUNTER SYMPTOMS
RESPIRATORY NEGATIVE: 1
NAUSEA: 1
DIARRHEA: 1
VOMITING: 1

## 2021-08-19 NOTE — PROGRESS NOTES
SUBJECTIVE:    Patient ID: Venkatesh Kwon is a 78 y. o.female. Chief Complaint   Patient presents with    Abdominal Pain     mid lower quad     Diarrhea     resolved - x1 day - took Immodium AD     Nausea    Headache       HPI:    Pt here with c/o NVD for the last 3 days. She also c/o HA. Pt states she took immodium - 3 tablets total. She states the vomit was food - 6x. She states diarrhea is loose/watery. She states the diarrhea 6-8x. She states her BM was brown and small. Denies blood, melena. She was in the hospital 2 weeks ago for acute bronchitis/PNA. She states today vomiting and diarrhea have resolved. She is now having a HA, abd pain and dizziness. Patient's medications, allergies, past medical, surgical, social and family histories were reviewed and updated as appropriate in electronic medical record. Current Outpatient Medications on File Prior to Visit   Medication Sig Dispense Refill    propranolol (INDERAL) 20 MG tablet Take 20 mg by mouth daily      irbesartan-hydroCHLOROthiazide (AVALIDE) 150-12.5 MG per tablet Take 1 tablet by mouth daily      clonazePAM (KLONOPIN) 2 MG tablet TAKE ONE TABLET BY MOUTH ONCE NIGHTLY AS NEEDED FOR INSOMNIA 30 tablet 0    isosorbide mononitrate (IMDUR) 30 MG extended release tablet Take 30 mg by mouth daily      ondansetron (ZOFRAN) 4 MG tablet Take 2 tablets by mouth 3 times daily as needed for Nausea or Vomiting 15 tablet 0    hyoscyamine (ANASPAZ;LEVSIN) 125 MCG tablet Take 1 tablet by mouth every 4 hours as needed for Cramping 30 tablet 0    levothyroxine (SYNTHROID) 75 MCG tablet Take 1 tablet by mouth Daily 90 tablet 3    nystatin (MYCOSTATIN) 096357 UNIT/GM cream Apply topically 2 times daily.  (Patient not taking: Reported on 5/24/2021)      aspirin 81 MG EC tablet Take 81 mg by mouth daily      cyanocobalamin 1000 MCG/ML injection Inject 1,000 mcg into the muscle every 30 days      pantoprazole (PROTONIX) 40 MG tablet Take 1 tablet by mouth 2 times daily 60 tablet 0    nitroGLYCERIN (NITROSTAT) 0.4 MG SL tablet Place 1 tablet under the tongue every 5 minutes as needed for Chest pain (Patient not taking: Reported on 5/24/2021) 25 tablet 3     No current facility-administered medications on file prior to visit. Review of Systems   Constitutional: Negative. Negative for chills and fever. HENT: Negative. Respiratory: Negative. Cardiovascular: Negative. Gastrointestinal: Positive for diarrhea, nausea and vomiting. Skin: Negative. Neurological: Negative. Psychiatric/Behavioral: Negative. Past Medical History:   Diagnosis Date    Bleeds easily ([facility] [address] 12/31/2018    [facility]     Headache(784.0)     Hypertension     Hypothyroid 5/20/2015    MI, old     Moderate episode of recurrent major depressive disorder ([facility] 10/27/2018    Pneumonia     Stomach ulcer     Thyroid disease      Past Surgical History:   Procedure Laterality Date    APPENDECTOMY      CHOLECYSTECTOMY      LIVER SURGERY      UPPER GASTROINTESTINAL ENDOSCOPY       No family history on file. Social History     Tobacco Use   Smoking Status Never Smoker   Smokeless Tobacco Never Used       OBJECTIVE:   Wt Readings from Last 3 Encounters:   08/11/21 192 lb (87.1 kg)   05/24/21 190 lb (86.2 kg)   05/04/21 192 lb (87.1 kg)     BP Readings from Last 3 Encounters:   08/11/21 (!) 147/78   05/24/21 110/60   05/04/21 (!) 173/79       Temp 98.8 °F (37.1 °C) (Infrared)   SpO2 98% Comment: room air   Physical Exam  Vitals reviewed. Constitutional:       General: She is not in acute distress. Appearance: Normal appearance. She is normal weight. She is not ill-appearing or toxic-appearing. HENT:      Head: Normocephalic and atraumatic. Mouth/Throat:      Mouth: Mucous membranes are moist.      Pharynx: Oropharynx is clear.    Eyes:      Conjunctiva/sclera: Conjunctivae normal.      Pupils: Pupils are equal, round, and reactive to light. Cardiovascular:      Rate and Rhythm: Normal rate and regular rhythm. Heart sounds: Normal heart sounds. No murmur heard. No gallop. Pulmonary:      Effort: Pulmonary effort is normal.      Breath sounds: Normal breath sounds. No wheezing, rhonchi or rales. Abdominal:      General: Abdomen is flat. Bowel sounds are normal.      Palpations: Abdomen is soft. Tenderness: There is abdominal tenderness. Comments: lower abd ttp, no acute abd, no GR   Skin:     General: Skin is warm and dry. Neurological:      Mental Status: She is alert and oriented to person, place, and time. Psychiatric:         Mood and Affect: Mood normal.         Behavior: Behavior normal.         Thought Content: Thought content normal.         Judgment: Judgment normal.         Lab Results   Component Value Date     05/24/2021    K 4.1 05/24/2021     05/24/2021    CO2 30 05/24/2021    GLUCOSE 105 05/24/2021    BUN 16 05/24/2021    CREATININE 1.0 05/24/2021    CALCIUM 9.3 05/24/2021    PROT 6.8 05/24/2021    LABALBU 4.2 05/24/2021    BILITOT 0.4 05/24/2021    ALT 11 05/24/2021    AST 16 05/24/2021     Hemoglobin A1C (%)   Date Value   09/16/2020 6.1 (H)     Microscopic Examination (no units)   Date Value   05/04/2021 YES     LDL Calculated (mg/dL)   Date Value   09/16/2020 116       Lab Results   Component Value Date    WBC 7.2 05/04/2021    NEUTROABS 4.3 05/04/2021    HGB 12.7 05/04/2021    HCT 39.7 05/04/2021    MCV 98.3 05/04/2021     05/04/2021     Lab Results   Component Value Date    TSH 0.39 03/23/2021       ASSESSMENT/PLAN:     1. Diarrhea, unspecified type  - COVID-19; Future  - C DIFF TOXIN/ANTIGEN; Future  - Fecal Leukocytes; Future  - OVA & PARASITE ID/COUNT #1; Future  - GI Bacterial Pathogens By PCR; Future    2. Nausea and vomiting, intractability of vomiting not specified, unspecified vomiting type    3. Hypertension, unspecified type    4.  Unspecified place in hospital as the place of occurrence of the external cause   - GI Bacterial Pathogens By PCR; Future  - pt recently discharged from hospital and being admitted and given abx       No orders of the defined types were placed in this encounter.        Electronically signed by Rowdy Nevarez on 8/19/2021 at 11:03 AM

## 2021-08-31 DIAGNOSIS — R19.7 DIARRHEA, UNSPECIFIED TYPE: ICD-10-CM

## 2021-08-31 LAB — SARS-COV-2: NEGATIVE

## 2021-09-20 ENCOUNTER — TELEPHONE (OUTPATIENT)
Dept: PRIMARY CARE CLINIC | Age: 76
End: 2021-09-20

## 2021-09-22 ENCOUNTER — TELEPHONE (OUTPATIENT)
Dept: FAMILY MEDICINE CLINIC | Age: 76
End: 2021-09-22

## 2021-09-22 RX ORDER — LISINOPRIL AND HYDROCHLOROTHIAZIDE 25; 20 MG/1; MG/1
1 TABLET ORAL 2 TIMES DAILY
Qty: 60 TABLET | Refills: 5 | Status: ON HOLD | OUTPATIENT
Start: 2021-09-22 | End: 2021-12-29

## 2021-09-22 NOTE — TELEPHONE ENCOUNTER
Heart doctor (DR. Mcmahan) changed her blood pressure medication to Propranolol, but it does not help. The only medication that helps Lisinopril/ HCTZ 20Mg/25 Mg PO BID. Her blood pressure is running anywhere from 140-210 with Propranolol. Will you send her some Lisinopril/ HCTZ into pharmacy?

## 2021-10-06 ENCOUNTER — NURSE ONLY (OUTPATIENT)
Dept: FAMILY MEDICINE CLINIC | Age: 76
End: 2021-10-06
Payer: MEDICARE

## 2021-10-06 DIAGNOSIS — E53.8 B12 DEFICIENCY: Primary | ICD-10-CM

## 2021-10-06 PROCEDURE — 96372 THER/PROPH/DIAG INJ SC/IM: CPT | Performed by: FAMILY MEDICINE

## 2021-10-06 RX ORDER — CYANOCOBALAMIN 1000 UG/ML
1000 INJECTION INTRAMUSCULAR; SUBCUTANEOUS ONCE
Status: COMPLETED | OUTPATIENT
Start: 2021-10-06 | End: 2021-10-06

## 2021-10-06 RX ADMIN — CYANOCOBALAMIN 1000 MCG: 1000 INJECTION INTRAMUSCULAR; SUBCUTANEOUS at 14:26

## 2021-10-07 ENCOUNTER — OFFICE VISIT (OUTPATIENT)
Dept: FAMILY MEDICINE CLINIC | Age: 76
End: 2021-10-07
Payer: MEDICARE

## 2021-10-07 ENCOUNTER — TELEPHONE (OUTPATIENT)
Dept: FAMILY MEDICINE CLINIC | Age: 76
End: 2021-10-07

## 2021-10-07 VITALS
WEIGHT: 187.6 LBS | RESPIRATION RATE: 18 BRPM | OXYGEN SATURATION: 96 % | HEIGHT: 63 IN | DIASTOLIC BLOOD PRESSURE: 80 MMHG | TEMPERATURE: 96.3 F | SYSTOLIC BLOOD PRESSURE: 140 MMHG | BODY MASS INDEX: 33.24 KG/M2 | HEART RATE: 72 BPM

## 2021-10-07 DIAGNOSIS — Z48.02 VISIT FOR SUTURE REMOVAL: ICD-10-CM

## 2021-10-07 DIAGNOSIS — J01.90 ACUTE BACTERIAL SINUSITIS: Primary | ICD-10-CM

## 2021-10-07 DIAGNOSIS — B96.89 ACUTE BACTERIAL SINUSITIS: Primary | ICD-10-CM

## 2021-10-07 PROCEDURE — 1090F PRES/ABSN URINE INCON ASSESS: CPT | Performed by: NURSE PRACTITIONER

## 2021-10-07 PROCEDURE — 96372 THER/PROPH/DIAG INJ SC/IM: CPT | Performed by: NURSE PRACTITIONER

## 2021-10-07 PROCEDURE — 1036F TOBACCO NON-USER: CPT | Performed by: NURSE PRACTITIONER

## 2021-10-07 PROCEDURE — 3017F COLORECTAL CA SCREEN DOC REV: CPT | Performed by: NURSE PRACTITIONER

## 2021-10-07 PROCEDURE — 4040F PNEUMOC VAC/ADMIN/RCVD: CPT | Performed by: NURSE PRACTITIONER

## 2021-10-07 PROCEDURE — 99213 OFFICE O/P EST LOW 20 MIN: CPT | Performed by: NURSE PRACTITIONER

## 2021-10-07 PROCEDURE — G8417 CALC BMI ABV UP PARAM F/U: HCPCS | Performed by: NURSE PRACTITIONER

## 2021-10-07 PROCEDURE — G8484 FLU IMMUNIZE NO ADMIN: HCPCS | Performed by: NURSE PRACTITIONER

## 2021-10-07 PROCEDURE — 1123F ACP DISCUSS/DSCN MKR DOCD: CPT | Performed by: NURSE PRACTITIONER

## 2021-10-07 PROCEDURE — G8427 DOCREV CUR MEDS BY ELIG CLIN: HCPCS | Performed by: NURSE PRACTITIONER

## 2021-10-07 PROCEDURE — G8399 PT W/DXA RESULTS DOCUMENT: HCPCS | Performed by: NURSE PRACTITIONER

## 2021-10-07 RX ORDER — CEFTRIAXONE 1 G/1
500 INJECTION, POWDER, FOR SOLUTION INTRAMUSCULAR; INTRAVENOUS ONCE
Status: COMPLETED | OUTPATIENT
Start: 2021-10-07 | End: 2021-10-07

## 2021-10-07 RX ORDER — DEXAMETHASONE SODIUM PHOSPHATE 4 MG/ML
4 INJECTION, SOLUTION INTRA-ARTICULAR; INTRALESIONAL; INTRAMUSCULAR; INTRAVENOUS; SOFT TISSUE ONCE
Status: COMPLETED | OUTPATIENT
Start: 2021-10-07 | End: 2021-10-07

## 2021-10-07 RX ORDER — DEXAMETHASONE SODIUM PHOSPHATE 4 MG/ML
8 INJECTION, SOLUTION INTRA-ARTICULAR; INTRALESIONAL; INTRAMUSCULAR; INTRAVENOUS; SOFT TISSUE ONCE
Qty: 2 ML | Refills: 0
Start: 2021-10-07 | End: 2021-10-07

## 2021-10-07 RX ORDER — DEXAMETHASONE SODIUM PHOSPHATE 4 MG/ML
8 INJECTION, SOLUTION INTRA-ARTICULAR; INTRALESIONAL; INTRAMUSCULAR; INTRAVENOUS; SOFT TISSUE ONCE
Qty: 2 ML | Refills: 0 | Status: SHIPPED
Start: 2021-10-07 | End: 2021-10-07 | Stop reason: CLARIF

## 2021-10-07 RX ORDER — CEFTRIAXONE 500 MG/1
500 INJECTION, POWDER, FOR SOLUTION INTRAMUSCULAR; INTRAVENOUS ONCE
Qty: 1 EACH | Refills: 0 | Status: SHIPPED
Start: 2021-10-07 | End: 2021-10-07 | Stop reason: CLARIF

## 2021-10-07 RX ORDER — CEFTRIAXONE 500 MG/1
500 INJECTION, POWDER, FOR SOLUTION INTRAMUSCULAR; INTRAVENOUS ONCE
Qty: 1 EACH | Refills: 0
Start: 2021-10-07 | End: 2021-10-07

## 2021-10-07 RX ORDER — TOBRAMYCIN 3 MG/ML
SOLUTION/ DROPS OPHTHALMIC
COMMUNITY
Start: 2021-09-23 | End: 2021-11-27

## 2021-10-07 RX ADMIN — DEXAMETHASONE SODIUM PHOSPHATE 4 MG: 4 INJECTION, SOLUTION INTRA-ARTICULAR; INTRALESIONAL; INTRAMUSCULAR; INTRAVENOUS; SOFT TISSUE at 14:55

## 2021-10-07 RX ADMIN — CEFTRIAXONE 500 MG: 1 INJECTION, POWDER, FOR SOLUTION INTRAMUSCULAR; INTRAVENOUS at 14:48

## 2021-10-07 ASSESSMENT — ENCOUNTER SYMPTOMS
SINUS PAIN: 1
ROS SKIN COMMENTS: SUTURE REMOVAL

## 2021-10-07 NOTE — PROGRESS NOTES
Rosas Aguilar 78 y.o. presents today for   Chief Complaint   Patient presents with    Suture / Staple Removal        HPI:  Rosas Aguilar  Is here today to have sutures removed. She states she had a small spot on her left arm and was told to have it removed last year but she didn't. When it turned green and got bigger and her arm began to swell she decided it was time to have taken off. She went to Dr. Sarah Bowens in [place] who removed it. She had sent for pathology but didn't hear about the results. No family history on file. Social History     Socioeconomic History    Marital status:      Spouse name: Not on file    Number of children: Not on file    Years of education: Not on file    Highest education level: Not on file   Occupational History    Not on file   Tobacco Use    Smoking status: Never Smoker    Smokeless tobacco: Never Used   Vaping Use    Vaping Use: Never used   Substance and Sexual Activity    Alcohol use: No    Drug use: No    Sexual activity: Not on file   Other Topics Concern    Not on file   Social History Narrative    Not on file     Social Determinants of Health     Financial Resource Strain: Low Risk     Difficulty of Paying Living Expenses: Not very hard   Food Insecurity: No Food Insecurity    Worried About Running Out of Food in the Last Year: Never true    Araseli of Food in the Last Year: Never true   Transportation Needs:     Lack of Transportation (Medical):      Lack of Transportation (Non-Medical):    Physical Activity:     Days of Exercise per Week:     Minutes of Exercise per Session:    Stress:     Feeling of Stress :    Social Connections:     Frequency of Communication with Friends and Family:     Frequency of Social Gatherings with Friends and Family:     Attends [employer] Services:     Active Member of Clubs or Organizations:     Attends Club or Organization Meetings:     Marital Status:    Intimate Partner Violence:     Fear of Current or Ex-Partner:     Emotionally Abused:     Physically Abused:     Sexually Abused:         Past Surgical History:   Procedure Laterality Date    APPENDECTOMY      CHOLECYSTECTOMY      LIVER SURGERY      UPPER GASTROINTESTINAL ENDOSCOPY          Past Medical History:   Diagnosis Date    Bleeds easily ([facility] [address] 12/31/2018    DVT (deep venous thrombosis) ([facility]     Headache(784.0)     Hypertension     Hypothyroid 5/20/2015    MI, old     Moderate episode of recurrent major depressive disorder ([facility] 10/27/2018    Pneumonia     Stomach ulcer     Thyroid disease         Current Outpatient Medications   Medication Sig Dispense Refill    tobramycin (TOBREX) 0.3 % ophthalmic solution       lisinopril-hydroCHLOROthiazide (PRINZIDE;ZESTORETIC) 20-25 MG per tablet Take 1 tablet by mouth 2 times daily 60 tablet 5    propranolol (INDERAL) 20 MG tablet Take 20 mg by mouth daily      clonazePAM (KLONOPIN) 2 MG tablet TAKE ONE TABLET BY MOUTH ONCE NIGHTLY AS NEEDED FOR INSOMNIA 30 tablet 0    ondansetron (ZOFRAN) 4 MG tablet Take 2 tablets by mouth 3 times daily as needed for Nausea or Vomiting 15 tablet 0    levothyroxine (SYNTHROID) 75 MCG tablet Take 1 tablet by mouth Daily 90 tablet 3    nystatin (MYCOSTATIN) 824480 UNIT/GM cream Apply topically 2 times daily.  aspirin 81 MG EC tablet Take 81 mg by mouth daily      cyanocobalamin 1000 MCG/ML injection Inject 1,000 mcg into the muscle every 30 days      pantoprazole (PROTONIX) 40 MG tablet Take 1 tablet by mouth 2 times daily 60 tablet 0    nitroGLYCERIN (NITROSTAT) 0.4 MG SL tablet Place 1 tablet under the tongue every 5 minutes as needed for Chest pain (Patient not taking: Reported on 5/24/2021) 25 tablet 3     No current facility-administered medications for this visit. Review of Systems   Constitutional: Positive for fatigue. HENT: Positive for congestion, postnasal drip and sinus pain. Skin: Positive for wound. Suture removal   Neurological: Positive for headaches. All other systems reviewed and are negative. BP (!) 140/80   Pulse 72   Temp 96.3 °F (35.7 °C) (Temporal)   Resp 18   Ht 5' 3\" (1.6 m)   Wt 187 lb 9.6 oz (85.1 kg)   SpO2 96%   BMI 33.23 kg/m²      Physical Exam  Vitals reviewed. Constitutional:       Appearance: Normal appearance. HENT:      Head: Normocephalic and atraumatic. Nose: Congestion present. Mouth/Throat:      Pharynx: Posterior oropharyngeal erythema present. Eyes:      Extraocular Movements: Extraocular movements intact. Conjunctiva/sclera: Conjunctivae normal.      Pupils: Pupils are equal, round, and reactive to light. Cardiovascular:      Rate and Rhythm: Normal rate and regular rhythm. Pulses: Normal pulses. Heart sounds: Normal heart sounds. Pulmonary:      Effort: Pulmonary effort is normal.      Breath sounds: Normal breath sounds. Musculoskeletal:         General: Normal range of motion. Skin:     General: Skin is warm. Capillary Refill: Capillary refill takes less than 2 seconds. Comments: Suture removal on left posterior forearm   Neurological:      General: No focal deficit present. Mental Status: She is alert and oriented to person, place, and time. Psychiatric:         Mood and Affect: Mood normal.         Behavior: Behavior normal.          ASSESSMENT/PLAN    1. Acute bacterial sinusitis  Medications administered to patient in office. Patient tolerated well. - cefTRIAXone (ROCEPHIN) injection 500 mg  - dexamethasone (DECADRON) injection 4 mg    2. Visit for suture removal  Sutures removed from patient's left posterior forearm.              TATYANA Ramon - CNP

## 2021-10-07 NOTE — TELEPHONE ENCOUNTER
I'm not sure who prescribed the Tobrex but it wasn't [facility] ([facility] .I didn't assess her eyes today, I removed sutures. I would be uncomfortable prescribing a medication that I didn't initiate and didn't evaluate her for. I recommend she contact the prescriber of the Tobrex and ask for an alternative. Thank you!   Georgie Jaimes

## 2021-10-20 ENCOUNTER — TELEPHONE (OUTPATIENT)
Dept: FAMILY MEDICINE CLINIC | Age: 76
End: 2021-10-20

## 2021-10-21 RX ORDER — AMOXICILLIN 500 MG/1
500 CAPSULE ORAL 3 TIMES DAILY
Qty: 30 CAPSULE | Refills: 0 | Status: SHIPPED | OUTPATIENT
Start: 2021-10-21 | End: 2021-10-31

## 2021-10-28 ENCOUNTER — OFFICE VISIT (OUTPATIENT)
Dept: FAMILY MEDICINE CLINIC | Age: 76
End: 2021-10-28
Payer: MEDICARE

## 2021-10-28 VITALS
DIASTOLIC BLOOD PRESSURE: 80 MMHG | HEART RATE: 79 BPM | OXYGEN SATURATION: 94 % | RESPIRATION RATE: 18 BRPM | HEIGHT: 63 IN | SYSTOLIC BLOOD PRESSURE: 142 MMHG | BODY MASS INDEX: 33.23 KG/M2

## 2021-10-28 DIAGNOSIS — L98.9 SKIN SORE: Primary | ICD-10-CM

## 2021-10-28 DIAGNOSIS — Z12.31 BREAST CANCER SCREENING BY MAMMOGRAM: ICD-10-CM

## 2021-10-28 DIAGNOSIS — J02.9 ACUTE PHARYNGITIS, UNSPECIFIED ETIOLOGY: ICD-10-CM

## 2021-10-28 DIAGNOSIS — J42 CHRONIC BRONCHITIS, UNSPECIFIED CHRONIC BRONCHITIS TYPE (HCC): ICD-10-CM

## 2021-10-28 PROCEDURE — 1123F ACP DISCUSS/DSCN MKR DOCD: CPT | Performed by: NURSE PRACTITIONER

## 2021-10-28 PROCEDURE — G8427 DOCREV CUR MEDS BY ELIG CLIN: HCPCS | Performed by: NURSE PRACTITIONER

## 2021-10-28 PROCEDURE — 96372 THER/PROPH/DIAG INJ SC/IM: CPT | Performed by: NURSE PRACTITIONER

## 2021-10-28 PROCEDURE — G8484 FLU IMMUNIZE NO ADMIN: HCPCS | Performed by: NURSE PRACTITIONER

## 2021-10-28 PROCEDURE — 1090F PRES/ABSN URINE INCON ASSESS: CPT | Performed by: NURSE PRACTITIONER

## 2021-10-28 PROCEDURE — G8417 CALC BMI ABV UP PARAM F/U: HCPCS | Performed by: NURSE PRACTITIONER

## 2021-10-28 PROCEDURE — 3017F COLORECTAL CA SCREEN DOC REV: CPT | Performed by: NURSE PRACTITIONER

## 2021-10-28 PROCEDURE — G8926 SPIRO NO PERF OR DOC: HCPCS | Performed by: NURSE PRACTITIONER

## 2021-10-28 PROCEDURE — G8399 PT W/DXA RESULTS DOCUMENT: HCPCS | Performed by: NURSE PRACTITIONER

## 2021-10-28 PROCEDURE — 1036F TOBACCO NON-USER: CPT | Performed by: NURSE PRACTITIONER

## 2021-10-28 PROCEDURE — 99214 OFFICE O/P EST MOD 30 MIN: CPT | Performed by: NURSE PRACTITIONER

## 2021-10-28 PROCEDURE — 4040F PNEUMOC VAC/ADMIN/RCVD: CPT | Performed by: NURSE PRACTITIONER

## 2021-10-28 PROCEDURE — 3023F SPIROM DOC REV: CPT | Performed by: NURSE PRACTITIONER

## 2021-10-28 RX ORDER — MUPIROCIN CALCIUM 20 MG/G
CREAM TOPICAL
Qty: 15 G | Refills: 2 | Status: SHIPPED | OUTPATIENT
Start: 2021-10-28 | End: 2022-03-31 | Stop reason: SDUPTHER

## 2021-10-28 RX ORDER — SULFAMETHOXAZOLE AND TRIMETHOPRIM 800; 160 MG/1; MG/1
1 TABLET ORAL 2 TIMES DAILY
Qty: 14 TABLET | Refills: 0 | Status: SHIPPED | OUTPATIENT
Start: 2021-10-28 | End: 2021-11-04

## 2021-10-28 RX ORDER — DEXAMETHASONE SODIUM PHOSPHATE 4 MG/ML
8 INJECTION, SOLUTION INTRA-ARTICULAR; INTRALESIONAL; INTRAMUSCULAR; INTRAVENOUS; SOFT TISSUE ONCE
Status: COMPLETED | OUTPATIENT
Start: 2021-10-28 | End: 2021-10-28

## 2021-10-28 RX ORDER — DEXAMETHASONE SODIUM PHOSPHATE 10 MG/ML
8 INJECTION INTRAMUSCULAR; INTRAVENOUS ONCE
Status: DISCONTINUED | OUTPATIENT
Start: 2021-10-28 | End: 2021-10-28

## 2021-10-28 RX ADMIN — DEXAMETHASONE SODIUM PHOSPHATE 8 MG: 4 INJECTION, SOLUTION INTRA-ARTICULAR; INTRALESIONAL; INTRAMUSCULAR; INTRAVENOUS; SOFT TISSUE at 11:31

## 2021-10-28 ASSESSMENT — ENCOUNTER SYMPTOMS
SHORTNESS OF BREATH: 1
SORE THROAT: 1

## 2021-10-28 NOTE — PROGRESS NOTES
Maria Del Carmen Oviedo 78 y.o. presents today for   Chief Complaint   Patient presents with    Blister     on left hand    Pharyngitis        HPI:  Maria Del Carmen Oviedo is a 21-year-old female who presents to the clinic today with a blister on her left finger and a sore throat. Pulmonologist/COPD  She saw pulmonoligist in [facility] Dr. Andres Umaña [facility]) who took her off her pantoprazole telling her that was bad for her heart to stay on it long term. She was given two medications but didn't pick them up because they were almost $400 and she doesn't know what they were for. She isn't happy with them, says they wanted her to be on oxygen all day and she doesn't think she needs it since her 02 sat was 98% when they told her that. She is requesting chest x-rays today. She admits she has them done annually. Small skin abscess on left middle finger  Patient states she noticed a small blister on her left middle finger several days ago. She admits she had a similar place on her right temple and several on her back that have now cleared. She is concerned it may be MRSA. Pharyngitis  Patient admits her throat has not stopped hurting since her last visit. She knows she has drainage and seasonal allergies but admits that her taking Claritin put her in the hospital due to her heart acting funny when she takes his medications. Is requesting a steroid shot today. No family history on file.      Social History     Socioeconomic History    Marital status:      Spouse name: Not on file    Number of children: Not on file    Years of education: Not on file    Highest education level: Not on file   Occupational History    Not on file   Tobacco Use    Smoking status: Never Smoker    Smokeless tobacco: Never Used   Vaping Use    Vaping Use: Never used   Substance and Sexual Activity    Alcohol use: No    Drug use: No    Sexual activity: Not on file   Other Topics Concern    Not on file   Social History Narrative    Not on file     Social Determinants of Health     Financial Resource Strain: Low Risk     Difficulty of Paying Living Expenses: Not very hard   Food Insecurity: No Food Insecurity    Worried About Running Out of Food in the Last Year: Never true    Araseli of Food in the Last Year: Never true   Transportation Needs:     Lack of Transportation (Medical):  Lack of Transportation (Non-Medical):    Physical Activity:     Days of Exercise per Week:     Minutes of Exercise per Session:    Stress:     Feeling of Stress :    Social Connections:     Frequency of Communication with Friends and Family:     Frequency of Social Gatherings with Friends and Family:     Attends [employer] Services:     Active Member of Clubs or Organizations:     Attends Club or Organization Meetings:     Marital Status:    Intimate Partner Violence:     Fear of Current or Ex-Partner:     Emotionally Abused:     Physically Abused:     Sexually Abused:         Past Surgical History:   Procedure Laterality Date    APPENDECTOMY      CHOLECYSTECTOMY      LIVER SURGERY      UPPER GASTROINTESTINAL ENDOSCOPY          Past Medical History:   Diagnosis Date    [facility] 12/31/2018    DVT (deep venous thrombosis) ([facility]     Headache(784.0)     Hypertension     Hypothyroid 5/20/2015    MI, old     Moderate episode of recurrent major depressive disorder ([facility] 10/27/2018    Pneumonia     Stomach ulcer     Thyroid disease         Current Outpatient Medications   Medication Sig Dispense Refill    sulfamethoxazole-trimethoprim (BACTRIM DS;SEPTRA DS) 800-160 MG per tablet Take 1 tablet by mouth 2 times daily for 7 days 14 tablet 0    mupirocin (BACTROBAN) 2 % cream Apply 3 times daily.  15 g 2    tobramycin (TOBREX) 0.3 % ophthalmic solution       lisinopril-hydroCHLOROthiazide (PRINZIDE;ZESTORETIC) 20-25 MG per tablet Take 1 tablet by mouth 2 times daily 60 tablet 5    propranolol (INDERAL) 20 MG tablet Take 20 mg by mouth daily      clonazePAM (KLONOPIN) 2 MG tablet TAKE ONE TABLET BY MOUTH ONCE NIGHTLY AS NEEDED FOR INSOMNIA 30 tablet 0    ondansetron (ZOFRAN) 4 MG tablet Take 2 tablets by mouth 3 times daily as needed for Nausea or Vomiting 15 tablet 0    levothyroxine (SYNTHROID) 75 MCG tablet Take 1 tablet by mouth Daily 90 tablet 3    nystatin (MYCOSTATIN) 259825 UNIT/GM cream Apply topically 2 times daily.  aspirin 81 MG EC tablet Take 81 mg by mouth daily      cyanocobalamin 1000 MCG/ML injection Inject 1,000 mcg into the muscle every 30 days      amoxicillin (AMOXIL) 500 MG capsule Take 1 capsule by mouth 3 times daily for 10 days (Patient not taking: Reported on 10/28/2021) 30 capsule 0    pantoprazole (PROTONIX) 40 MG tablet Take 1 tablet by mouth 2 times daily (Patient not taking: Reported on 10/28/2021) 60 tablet 0    nitroGLYCERIN (NITROSTAT) 0.4 MG SL tablet Place 1 tablet under the tongue every 5 minutes as needed for Chest pain (Patient not taking: Reported on 5/24/2021) 25 tablet 3     No current facility-administered medications for this visit. Review of Systems   HENT: Positive for sore throat. Respiratory: Positive for shortness of breath. Skin: Positive for wound. Skin abscess on right middle finger        BP (!) 142/80   Pulse 79   Resp 18   Ht 5' 3\" (1.6 m)   SpO2 94% Comment: RA  BMI 33.23 kg/m²      Physical Exam  Constitutional:       Appearance: Normal appearance. HENT:      Head: Normocephalic and atraumatic. Right Ear: Tympanic membrane, ear canal and external ear normal.      Left Ear: Tympanic membrane, ear canal and external ear normal.      Nose: Nose normal.      Mouth/Throat:      Mouth: Mucous membranes are moist.      Pharynx: Oropharynx is clear. Eyes:      Extraocular Movements: Extraocular movements intact. Conjunctiva/sclera: Conjunctivae normal.      Pupils: Pupils are equal, round, and reactive to light. Cardiovascular:      Rate and Rhythm: Normal rate and regular rhythm. Pulses: Normal pulses. Heart sounds: Normal heart sounds. Pulmonary:      Effort: Pulmonary effort is normal.      Breath sounds: Normal breath sounds. Abdominal:      General: Bowel sounds are normal.      Palpations: Abdomen is soft. Musculoskeletal:         General: Normal range of motion. Cervical back: Normal range of motion and neck supple. Skin:     General: Skin is warm and dry. Capillary Refill: Capillary refill takes less than 2 seconds. Findings: Lesion present. Neurological:      General: No focal deficit present. Mental Status: She is alert and oriented to person, place, and time. Psychiatric:         Mood and Affect: Mood normal.         Behavior: Behavior normal.          ASSESSMENT/PLAN    1. Skin sore  This patient in order to know if abscess contains MRSA would need to culture patient states that set necessary she will just take an antibiotic. She admits she does not think that she is allergic to Bactrim is allergic to a lot of antibiotics. Advised her to take as directed and to use mupirocin cream as directed and to follow-up if signs and symptoms persist or worsen. Patient is agreeable to plan of care. - sulfamethoxazole-trimethoprim (BACTRIM DS;SEPTRA DS) 800-160 MG per tablet; Take 1 tablet by mouth 2 times daily for 7 days  Dispense: 14 tablet; Refill: 0  - mupirocin (BACTROBAN) 2 % cream; Apply 3 times daily. Dispense: 15 g; Refill: 2    2. Acute pharyngitis, unspecified etiology  Advised patient her pharyngitis is likely due to postnasal drainage but patient admits she cannot take any type of medication helps with seasonal allergies and the only him that helps is a steroid shot.  - dexamethasone (DECADRON) injection 8 mg    3. Breast cancer screen by mammogram  Patient admits she has not had any malignancies and no family history of breast cancer but has had suspicious lesions in her breast that required biopsies and was told to have a mammogram done annually. - JEFF DIGITAL SCREEN W OR WO CAD BILATERAL; Future    4. Chronic bronchitis, unspecified chronic bronchitis type [facility])  Patient states she thinks she has chronic bronchitis she knows she has COPD she is never smoked today in her life. She does not use any daily inhalers. Gave patient 2 samples of Spiriva and taught her how to use it and directed her to use it daily. Advised her to follow-up with Dr. Dianne Wilks at her next visit and ensure that is most appropriate inhaler for her. Agreeable to plan of care.   - XR ACUTE ABD SERIES CHEST 1 VW; Future             Rodolfo Castañeda, APRN - CNP

## 2021-10-28 NOTE — PROGRESS NOTES
Chief Complaint   Patient presents with   Yvan Askew     on left hand    Pharyngitis        Have you seen any other physician or provider since your last visit yes - [facility]    Have you had any other diagnostic tests since your last visit? yes - chest xray    Have you changed or stopped any medications since your last visit? no     Administrations This Visit     dexamethasone (DECADRON) injection 8 mg     Admin Date  10/28/2021  11:31 Action  Given Dose  8 mg Route  IntraMUSCular Site  Dorsogluteal Right Administered By  Tushar Fontaine MA    Ordering Provider: TATYANA Montoya CNP    NDC: 32887-359-17    : [facility]    Patient Supplied?: No                  Patient tolerated injection well. Patient advised to wait 20 minutes in the office following the injection. No signs/symptoms of reaction noted after 20 minutes.

## 2021-10-29 ENCOUNTER — TELEPHONE (OUTPATIENT)
Dept: FAMILY MEDICINE CLINIC | Age: 76
End: 2021-10-29

## 2021-10-29 DIAGNOSIS — B96.89 ACUTE BACTERIAL SINUSITIS: Primary | ICD-10-CM

## 2021-10-29 DIAGNOSIS — J01.90 ACUTE BACTERIAL SINUSITIS: Primary | ICD-10-CM

## 2021-10-29 RX ORDER — AMOXICILLIN AND CLAVULANATE POTASSIUM 875; 125 MG/1; MG/1
1 TABLET, FILM COATED ORAL 2 TIMES DAILY
Qty: 14 TABLET | Refills: 0 | Status: SHIPPED | OUTPATIENT
Start: 2021-10-29 | End: 2021-11-29 | Stop reason: SDUPTHER

## 2021-11-17 ENCOUNTER — HOSPITAL ENCOUNTER (OUTPATIENT)
Facility: HOSPITAL | Age: 76
Discharge: HOME | End: 2021-11-17
Payer: MEDICARE

## 2021-11-17 ENCOUNTER — OFFICE VISIT (OUTPATIENT)
Dept: FAMILY MEDICINE CLINIC | Age: 76
End: 2021-11-17
Payer: MEDICARE

## 2021-11-17 VITALS
HEART RATE: 78 BPM | TEMPERATURE: 97.8 F | DIASTOLIC BLOOD PRESSURE: 76 MMHG | SYSTOLIC BLOOD PRESSURE: 150 MMHG | HEIGHT: 63 IN | WEIGHT: 185.8 LBS | BODY MASS INDEX: 32.92 KG/M2

## 2021-11-17 DIAGNOSIS — R53.83 FATIGUE, UNSPECIFIED TYPE: ICD-10-CM

## 2021-11-17 DIAGNOSIS — J02.9 ACUTE PHARYNGITIS, UNSPECIFIED ETIOLOGY: Primary | ICD-10-CM

## 2021-11-17 DIAGNOSIS — Z13.220 SCREENING, LIPID: ICD-10-CM

## 2021-11-17 DIAGNOSIS — I10 ESSENTIAL HYPERTENSION: ICD-10-CM

## 2021-11-17 DIAGNOSIS — E53.8 B12 DEFICIENCY: ICD-10-CM

## 2021-11-17 DIAGNOSIS — E55.9 VITAMIN D DEFICIENCY: ICD-10-CM

## 2021-11-17 DIAGNOSIS — G47.00 INSOMNIA, UNSPECIFIED TYPE: ICD-10-CM

## 2021-11-17 DIAGNOSIS — J42 CHRONIC BRONCHITIS, UNSPECIFIED CHRONIC BRONCHITIS TYPE (HCC): ICD-10-CM

## 2021-11-17 DIAGNOSIS — I10 HYPERTENSION, UNSPECIFIED TYPE: ICD-10-CM

## 2021-11-17 DIAGNOSIS — F41.0 PANIC ATTACK: ICD-10-CM

## 2021-11-17 DIAGNOSIS — M19.90 OSTEOARTHRITIS, UNSPECIFIED OSTEOARTHRITIS TYPE, UNSPECIFIED SITE: ICD-10-CM

## 2021-11-17 PROCEDURE — 85027 COMPLETE CBC AUTOMATED: CPT

## 2021-11-17 PROCEDURE — 80061 LIPID PANEL: CPT

## 2021-11-17 PROCEDURE — G8399 PT W/DXA RESULTS DOCUMENT: HCPCS | Performed by: FAMILY MEDICINE

## 2021-11-17 PROCEDURE — 3017F COLORECTAL CA SCREEN DOC REV: CPT | Performed by: FAMILY MEDICINE

## 2021-11-17 PROCEDURE — G8417 CALC BMI ABV UP PARAM F/U: HCPCS | Performed by: FAMILY MEDICINE

## 2021-11-17 PROCEDURE — 84439 ASSAY OF FREE THYROXINE: CPT

## 2021-11-17 PROCEDURE — G8484 FLU IMMUNIZE NO ADMIN: HCPCS | Performed by: FAMILY MEDICINE

## 2021-11-17 PROCEDURE — G8427 DOCREV CUR MEDS BY ELIG CLIN: HCPCS | Performed by: FAMILY MEDICINE

## 2021-11-17 PROCEDURE — 80053 COMPREHEN METABOLIC PANEL: CPT

## 2021-11-17 PROCEDURE — 99214 OFFICE O/P EST MOD 30 MIN: CPT | Performed by: FAMILY MEDICINE

## 2021-11-17 PROCEDURE — 3023F SPIROM DOC REV: CPT | Performed by: FAMILY MEDICINE

## 2021-11-17 PROCEDURE — 1123F ACP DISCUSS/DSCN MKR DOCD: CPT | Performed by: FAMILY MEDICINE

## 2021-11-17 PROCEDURE — 4040F PNEUMOC VAC/ADMIN/RCVD: CPT | Performed by: FAMILY MEDICINE

## 2021-11-17 PROCEDURE — 82607 VITAMIN B-12: CPT

## 2021-11-17 PROCEDURE — 82746 ASSAY OF FOLIC ACID SERUM: CPT

## 2021-11-17 PROCEDURE — 1090F PRES/ABSN URINE INCON ASSESS: CPT | Performed by: FAMILY MEDICINE

## 2021-11-17 PROCEDURE — G8926 SPIRO NO PERF OR DOC: HCPCS | Performed by: FAMILY MEDICINE

## 2021-11-17 PROCEDURE — 84443 ASSAY THYROID STIM HORMONE: CPT

## 2021-11-17 PROCEDURE — 1036F TOBACCO NON-USER: CPT | Performed by: FAMILY MEDICINE

## 2021-11-17 PROCEDURE — 82306 VITAMIN D 25 HYDROXY: CPT

## 2021-11-17 RX ORDER — BUDESONIDE AND FORMOTEROL FUMARATE DIHYDRATE 160; 4.5 UG/1; UG/1
AEROSOL RESPIRATORY (INHALATION)
COMMUNITY
Start: 2021-10-27 | End: 2021-11-27

## 2021-11-17 RX ORDER — CLONAZEPAM 2 MG/1
TABLET ORAL
Qty: 30 TABLET | Refills: 2 | Status: SHIPPED | OUTPATIENT
Start: 2021-11-17 | End: 2022-07-01 | Stop reason: SDUPTHER

## 2021-11-17 RX ORDER — ALBUTEROL SULFATE 90 UG/1
2 AEROSOL, METERED RESPIRATORY (INHALATION) EVERY 6 HOURS PRN
COMMUNITY
Start: 2021-10-27

## 2021-11-17 RX ORDER — AMOXICILLIN 875 MG/1
875 TABLET, COATED ORAL 2 TIMES DAILY
Qty: 20 TABLET | Refills: 0 | Status: SHIPPED | OUTPATIENT
Start: 2021-11-17 | End: 2021-11-27

## 2021-11-17 ASSESSMENT — ENCOUNTER SYMPTOMS
ABDOMINAL DISTENTION: 0
ABDOMINAL PAIN: 1
RESPIRATORY NEGATIVE: 1
CONSTIPATION: 0
VOMITING: 1
DIARRHEA: 1
NAUSEA: 1

## 2021-11-17 NOTE — PROGRESS NOTES
SUBJECTIVE:    Patient ID: Ana Camacho is a 78 y.o. female. Chief Complaint   Patient presents with    Follow-up     skin sore       HPI: office visit  She is in the office today in follow-up of a place on her skin that she is concerned about she is having a lot of sinus issues she is having some sore throat. Does complain of some increased fatigue. She says she is about the same with her shortness of breath. She still struggles with her chronic bronchitis. Her anxiety does seem to be worse. Her blood pressures have been good at home. She is not having any chest pain at this time. She does continue to complain of more arthritic pains as she is getting older. She is not having medication problems that she can tell. Review of Systems   Constitutional: Negative. Negative for chills and fever. HENT: Negative. Respiratory: Negative. Cardiovascular: Negative. Gastrointestinal: Positive for abdominal pain, diarrhea, nausea and vomiting. Negative for abdominal distention and constipation. Genitourinary: Negative for dysuria and hematuria. Skin: Negative. Neurological: Negative. All other systems reviewed and are negative. OBJECTIVE:  BP (!) 150/76 (Site: Right Upper Arm, Position: Sitting)   Pulse 78   Temp 97.8 °F (36.6 °C) (Temporal)   Ht 5' 3\" (1.6 m)   Wt 185 lb 12.8 oz (84.3 kg)   BMI 32.91 kg/m²    Wt Readings from Last 3 Encounters:   11/29/21 187 lb 9.6 oz (85.1 kg)   11/27/21 185 lb (83.9 kg)   11/17/21 185 lb 12.8 oz (84.3 kg)     BP Readings from Last 3 Encounters:   11/29/21 130/76   11/27/21 (!) 166/76   11/17/21 (!) 150/76      Pulse Readings from Last 3 Encounters:   11/29/21 80   11/27/21 88   11/17/21 78     Body mass index is 32.91 kg/m². Resp Readings from Last 3 Encounters:   11/29/21 18   11/27/21 18   10/28/21 18     Past medical, surgical, family and social history were reviewed and updated with the patient.      Physical Exam  Vitals and nursing Speech normal.         Behavior: Behavior normal.         Thought Content:  Thought content normal.         Judgment: Judgment normal.          Results in Past 30 Days  Result Component Current Result Ref Range Previous Result Ref Range   Albumin/Globulin Ratio 1.3 (11/27/2021) 0.8 - 2.0 1.8 (11/17/2021) 0.8 - 2.0   Albumin 4.2 (11/27/2021) 3.4 - 4.8 g/dL 4.5 (11/17/2021) 3.4 - 4.8 g/dL   Alkaline Phosphatase 101 (H) (11/27/2021) 25 - 100 U/L 91 (11/17/2021) 25 - 100 U/L   ALT 32 (11/27/2021) 4 - 36 U/L 13 (11/17/2021) 4 - 36 U/L   AST 42 (H) (11/27/2021) 8 - 33 U/L 16 (11/17/2021) 8 - 33 U/L   BUN 14 (11/27/2021) 6 - 20 mg/dL 13 (11/17/2021) 6 - 20 mg/dL   Calcium 9.2 (11/27/2021) 8.5 - 10.5 mg/dL 9.6 (11/17/2021) 8.5 - 10.5 mg/dL   Chloride 100 (11/27/2021) 98 - 107 mmol/L 106 (11/17/2021) 98 - 107 mmol/L   CO2 27 (11/27/2021) 20 - 30 mmol/L 29 (11/17/2021) 20 - 30 mmol/L   CREATININE 0.9 (11/27/2021) 0.4 - 1.2 mg/dL 0.8 (11/17/2021) 0.4 - 1.2 mg/dL   GFR  >59 (11/27/2021) >59 >59 (11/17/2021) >59   GFR Non- >60 (11/27/2021) >59 >60 (11/17/2021) >59   Globulin 3.3 (11/27/2021) Not Established g/dL 2.5 (11/17/2021) Not Established g/dL   Glucose 127 (H) (11/27/2021) 74 - 106 mg/dL 99 (11/17/2021) 74 - 106 mg/dL   Potassium reflex Magnesium 3.8 (11/27/2021) 3.4 - 5.1 mmol/L 4.2 (11/17/2021) 3.4 - 5.1 mmol/L   Sodium 140 (11/27/2021) 136 - 145 mmol/L 145 (11/17/2021) 136 - 145 mmol/L   Total Bilirubin 0.4 (11/27/2021) 0.3 - 1.2 mg/dL 0.5 (11/17/2021) 0.3 - 1.2 mg/dL   Total Protein 7.5 (11/27/2021) 6.4 - 8.3 g/dL 7.0 (11/17/2021) 6.4 - 8.3 g/dL     Hemoglobin A1C (%)   Date Value   09/16/2020 6.1 (H)     Microscopic Examination (no units)   Date Value   05/04/2021 YES     LDL Calculated (mg/dL)   Date Value   11/17/2021 117       Lab Results   Component Value Date    WBC 9.0 11/27/2021    NEUTROABS 6.7 11/27/2021    HGB 14.0 11/27/2021    HCT 42.3 11/27/2021    MCV 97.5 11/27/2021  11/27/2021     Lab Results   Component Value Date    TSH 1.21 11/17/2021       ASSESSMENT:    Diagnosis Orders   1. Acute pharyngitis, unspecified etiology     2. Panic attack  clonazePAM (KLONOPIN) 2 MG tablet   3. Insomnia, unspecified type  clonazePAM (KLONOPIN) 2 MG tablet   4. B12 deficiency  VITAMIN B12 & FOLATE   5. Essential hypertension  COMPREHENSIVE METABOLIC PANEL    CBC   6. Osteoarthritis, unspecified osteoarthritis type, unspecified site     7. Screening, lipid  LIPID PANEL   8. Fatigue, unspecified type  TSH without Reflex    T4, FREE   9. Vitamin D deficiency  VITAMIN D 25 HYDROXY   10. Chronic bronchitis, unspecified chronic bronchitis type ([facility]     11. Hypertension, unspecified type          PLAN:  Orders Placed This Encounter   Medications    clonazePAM (KLONOPIN) 2 MG tablet     Sig: TAKE ONE TABLET BY MOUTH ONCE NIGHTLY AS NEEDED FOR INSOMNIA     Dispense:  30 tablet     Refill:  2    amoxicillin (AMOXIL) 875 MG tablet     Sig: Take 1 tablet by mouth 2 times daily for 10 days     Dispense:  20 tablet     Refill:  0        Medications Discontinued During This Encounter   Medication Reason    pantoprazole (PROTONIX) 40 MG tablet Therapy completed    clonazePAM (KLONOPIN) 2 MG tablet        Controlled Substances Monitoring:      Please note: This chart was generated using Dragon dictation software. Although every effort was made to ensure the accuracy of this automated transcription, some errors in transcription may have occurred.

## 2021-11-18 LAB
A/G RATIO: 1.8 (ref 0.8–2)
ALBUMIN SERPL-MCNC: 4.5 G/DL (ref 3.4–4.8)
ALP BLD-CCNC: 91 U/L (ref 25–100)
ALT SERPL-CCNC: 13 U/L (ref 4–36)
ANION GAP SERPL CALCULATED.3IONS-SCNC: 10 MMOL/L (ref 3–16)
AST SERPL-CCNC: 16 U/L (ref 8–33)
BILIRUB SERPL-MCNC: 0.5 MG/DL (ref 0.3–1.2)
BUN BLDV-MCNC: 13 MG/DL (ref 6–20)
CALCIUM SERPL-MCNC: 9.6 MG/DL (ref 8.5–10.5)
CHLORIDE BLD-SCNC: 106 MMOL/L (ref 98–107)
CHOLESTEROL, TOTAL: 199 MG/DL (ref 0–200)
CO2: 29 MMOL/L (ref 20–30)
CREAT SERPL-MCNC: 0.8 MG/DL (ref 0.4–1.2)
FOLATE: 18.59 NG/ML
GFR AFRICAN AMERICAN: >59
GFR NON-AFRICAN AMERICAN: >60
GLOBULIN: 2.5 G/DL
GLUCOSE BLD-MCNC: 99 MG/DL (ref 74–106)
HCT VFR BLD CALC: 42.3 % (ref 37–47)
HDLC SERPL-MCNC: 53 MG/DL (ref 40–60)
HEMOGLOBIN: 13.6 G/DL (ref 11.5–16.5)
LDL CHOLESTEROL CALCULATED: 117 MG/DL
MCH RBC QN AUTO: 31.8 PG (ref 27–32)
MCHC RBC AUTO-ENTMCNC: 32.2 G/DL (ref 31–35)
MCV RBC AUTO: 98.8 FL (ref 80–100)
PDW BLD-RTO: 13.2 % (ref 11–16)
PLATELET # BLD: 224 K/UL (ref 150–400)
PMV BLD AUTO: 10.4 FL (ref 6–10)
POTASSIUM SERPL-SCNC: 4.2 MMOL/L (ref 3.4–5.1)
RBC # BLD: 4.28 M/UL (ref 3.8–5.8)
SODIUM BLD-SCNC: 145 MMOL/L (ref 136–145)
T4 FREE: 1.37 NG/DL (ref 0.89–1.76)
TOTAL PROTEIN: 7 G/DL (ref 6.4–8.3)
TRIGL SERPL-MCNC: 144 MG/DL (ref 0–249)
TSH SERPL DL<=0.05 MIU/L-ACNC: 1.21 UIU/ML (ref 0.27–4.2)
VITAMIN B-12: 440 PG/ML (ref 211–911)
VITAMIN D 25-HYDROXY: 12.2 (ref 32–100)
VLDLC SERPL CALC-MCNC: 29 MG/DL
WBC # BLD: 6.4 K/UL (ref 4–11)

## 2021-11-18 RX ORDER — ERGOCALCIFEROL 1.25 MG/1
50000 CAPSULE ORAL WEEKLY
Qty: 12 CAPSULE | Refills: 1 | Status: SHIPPED | OUTPATIENT
Start: 2021-11-18 | End: 2022-10-12

## 2021-11-27 ENCOUNTER — HOSPITAL ENCOUNTER (EMERGENCY)
Facility: HOSPITAL | Age: 76
Discharge: HOME | End: 2021-11-27
Attending: EMERGENCY MEDICINE
Payer: MEDICARE

## 2021-11-27 ENCOUNTER — APPOINTMENT (OUTPATIENT)
Dept: GENERAL RADIOLOGY | Facility: HOSPITAL | Age: 76
End: 2021-11-27
Payer: MEDICARE

## 2021-11-27 VITALS
BODY MASS INDEX: 32.78 KG/M2 | DIASTOLIC BLOOD PRESSURE: 76 MMHG | TEMPERATURE: 98.3 F | HEIGHT: 63 IN | SYSTOLIC BLOOD PRESSURE: 166 MMHG | HEART RATE: 88 BPM | RESPIRATION RATE: 18 BRPM | OXYGEN SATURATION: 92 % | WEIGHT: 185 LBS

## 2021-11-27 DIAGNOSIS — B34.9 SYSTEMIC VIRAL ILLNESS: Primary | ICD-10-CM

## 2021-11-27 LAB
A/G RATIO: 1.3 (ref 0.8–2)
ALBUMIN SERPL-MCNC: 4.2 G/DL (ref 3.4–4.8)
ALP BLD-CCNC: 101 U/L (ref 25–100)
ALT SERPL-CCNC: 32 U/L (ref 4–36)
ANION GAP SERPL CALCULATED.3IONS-SCNC: 13 MMOL/L (ref 3–16)
AST SERPL-CCNC: 42 U/L (ref 8–33)
BASE EXCESS ARTERIAL: 4.8 MMOL/L (ref -3–3)
BASOPHILS ABSOLUTE: 0.1 K/UL (ref 0–0.1)
BASOPHILS RELATIVE PERCENT: 0.6 %
BILIRUB SERPL-MCNC: 0.4 MG/DL (ref 0.3–1.2)
BUN BLDV-MCNC: 14 MG/DL (ref 6–20)
CALCIUM SERPL-MCNC: 9.2 MG/DL (ref 8.5–10.5)
CHLORIDE BLD-SCNC: 100 MMOL/L (ref 98–107)
CO2: 27 MMOL/L (ref 20–30)
CREAT SERPL-MCNC: 0.9 MG/DL (ref 0.4–1.2)
EOSINOPHILS ABSOLUTE: 0.2 K/UL (ref 0–0.4)
EOSINOPHILS RELATIVE PERCENT: 1.7 %
FIO2: 0.21 %
GFR AFRICAN AMERICAN: >59
GFR NON-AFRICAN AMERICAN: >60
GLOBULIN: 3.3 G/DL
GLUCOSE BLD-MCNC: 127 MG/DL (ref 74–106)
HCO3 ARTERIAL: 29.7 MMOL/L (ref 22–26)
HCT VFR BLD CALC: 42.3 % (ref 37–47)
HEMOGLOBIN: 14 G/DL (ref 11.5–16.5)
IMMATURE GRANULOCYTES #: 0 K/UL
IMMATURE GRANULOCYTES %: 0.3 % (ref 0–5)
LACTIC ACID, SEPSIS: 1.8 MMOL/L (ref 0.4–1.9)
LYMPHOCYTES ABSOLUTE: 1.2 K/UL (ref 1.5–4)
LYMPHOCYTES RELATIVE PERCENT: 13.5 %
MCH RBC QN AUTO: 32.3 PG (ref 27–32)
MCHC RBC AUTO-ENTMCNC: 33.1 G/DL (ref 31–35)
MCV RBC AUTO: 97.5 FL (ref 80–100)
MONOCYTES ABSOLUTE: 0.8 K/UL (ref 0.2–0.8)
MONOCYTES RELATIVE PERCENT: 9.1 %
NEUTROPHILS ABSOLUTE: 6.7 K/UL (ref 2–7.5)
NEUTROPHILS RELATIVE PERCENT: 74.8 %
O2 SAT, ARTERIAL: 89 %
O2 THERAPY: ABNORMAL
PCO2 ARTERIAL: 45.3 MMHG (ref 35–45)
PDW BLD-RTO: 13.2 % (ref 11–16)
PH ARTERIAL: 7.43 (ref 7.35–7.45)
PLATELET # BLD: 208 K/UL (ref 150–400)
PMV BLD AUTO: 9.8 FL (ref 6–10)
PO2 ARTERIAL: 56.7 MMHG (ref 80–100)
POTASSIUM REFLEX MAGNESIUM: 3.8 MMOL/L (ref 3.4–5.1)
RAPID INFLUENZA  B AGN: NEGATIVE
RAPID INFLUENZA A AGN: NEGATIVE
RBC # BLD: 4.34 M/UL (ref 3.8–5.8)
S PYO AG THROAT QL: NEGATIVE
SARS-COV-2, NAAT: NOT DETECTED
SODIUM BLD-SCNC: 140 MMOL/L (ref 136–145)
TCO2 ARTERIAL: 31.1 MMOL/L (ref 24–30)
TOTAL PROTEIN: 7.5 G/DL (ref 6.4–8.3)
WBC # BLD: 9 K/UL (ref 4–11)

## 2021-11-27 PROCEDURE — 87635 SARS-COV-2 COVID-19 AMP PRB: CPT

## 2021-11-27 PROCEDURE — 96374 THER/PROPH/DIAG INJ IV PUSH: CPT

## 2021-11-27 PROCEDURE — 87804 INFLUENZA ASSAY W/OPTIC: CPT

## 2021-11-27 PROCEDURE — 80053 COMPREHEN METABOLIC PANEL: CPT

## 2021-11-27 PROCEDURE — 83605 ASSAY OF LACTIC ACID: CPT

## 2021-11-27 PROCEDURE — 82803 BLOOD GASES ANY COMBINATION: CPT

## 2021-11-27 PROCEDURE — 36600 WITHDRAWAL OF ARTERIAL BLOOD: CPT

## 2021-11-27 PROCEDURE — 87880 STREP A ASSAY W/OPTIC: CPT

## 2021-11-27 PROCEDURE — 36415 COLL VENOUS BLD VENIPUNCTURE: CPT

## 2021-11-27 PROCEDURE — 99282 EMERGENCY DEPT VISIT SF MDM: CPT

## 2021-11-27 PROCEDURE — 71045 X-RAY EXAM CHEST 1 VIEW: CPT

## 2021-11-27 PROCEDURE — 85025 COMPLETE CBC W/AUTO DIFF WBC: CPT

## 2021-11-27 PROCEDURE — 2580000003 HC RX 258: Performed by: EMERGENCY MEDICINE

## 2021-11-27 PROCEDURE — 6360000002 HC RX W HCPCS: Performed by: EMERGENCY MEDICINE

## 2021-11-27 RX ORDER — SODIUM CHLORIDE, SODIUM LACTATE, POTASSIUM CHLORIDE, AND CALCIUM CHLORIDE .6; .31; .03; .02 G/100ML; G/100ML; G/100ML; G/100ML
1000 INJECTION, SOLUTION INTRAVENOUS ONCE
Status: COMPLETED | OUTPATIENT
Start: 2021-11-27 | End: 2021-11-27

## 2021-11-27 RX ORDER — ONDANSETRON 2 MG/ML
4 INJECTION INTRAMUSCULAR; INTRAVENOUS ONCE
Status: COMPLETED | OUTPATIENT
Start: 2021-11-27 | End: 2021-11-27

## 2021-11-27 RX ADMIN — SODIUM CHLORIDE, POTASSIUM CHLORIDE, SODIUM LACTATE AND CALCIUM CHLORIDE 1000 ML: 600; 310; 30; 20 INJECTION, SOLUTION INTRAVENOUS at 16:56

## 2021-11-27 RX ADMIN — ONDANSETRON 4 MG: 2 INJECTION INTRAMUSCULAR; INTRAVENOUS at 17:11

## 2021-11-27 NOTE — ED TRIAGE NOTES
Pt arrived with a cc of allergic reaction. She stated that she started getting hives at her work at about . She has one or two hives noted to her neck, but nowhere else. 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

## 2021-11-27 NOTE — ED NOTES
Reviewed discharge plan with Nini Kraft. Encouraged her to f/u with Khang Mei MD and she understood. NAD noted on discharge, gait steady.       Electronically signed by Carmelina Griffin RN on 11/27/2021 at 5:52 PM     Carmelina Griffin RN  11/27/21 [id number]

## 2021-11-27 NOTE — ED PROVIDER NOTES
[address]ill[address] Iyer [address] [facility] ENCOUNTER      Pt Name: Jj Ulrich  MRN: [id number]  YOB: 1945  Date of evaluation: 11/27/2021  Provider: Saad Randall MD    CHIEF COMPLAINT       Chief Complaint   Patient presents with    Allergic Reaction    Fever    Pharyngitis    Chills    Emesis    Diarrhea         HISTORY OF PRESENT ILLNESS  (Location/Symptom, Timing/Onset, Context/Setting, Quality, Duration, Modifying Factors, Severity.)   Jj Ulrich is a 78 y.o. female who presents to the emergency department of \"allergic reaction\" she developed a rash on the lateral aspect of her neck after being at work for a few minutes about an hour ago however since she has left work and come here it is almost completely resolved without any medication. While she is here she also complains of being sick for the last 3 days with a temp up to 102 head congestion cough productive of yellow sputum sore throat shortness of breath nausea vomiting and diarrhea she is thrown up and had diarrhea about 3 times each today. She did just finish a course of amoxicillin that was prescribed by her primary care for apparently a sinus infection. Nursing notes were reviewed. REVIEW OFSYSTEMS    (2-9 systems for level 4, 10 or more for level 5)   ROS:  General:  + fevers, no chills, no weakness  Cardiovascular:  No chest pain, no palpitations  Respiratory: + shortness of breath, + cough, no wheezing  Gastrointestinal:  No pain, + nausea, + vomiting, + diarrhea  Musculoskeletal:  No muscle pain, no joint pain  Skin:  No rash, no easy bruising  Neurologic:  No speech problems, no headache, no extremity weakness  Psychiatric:  No anxiety  Genitourinary:  No dysuria, no hematuria    Except as noted above the remainder of the review of systems was reviewed and negative.        PAST MEDICAL HISTORY     Past Medical History:   Diagnosis Date    Bleeds easily ([address] 12/31/2018    DVT History    Marital status:      Spouse name: None    Number of children: None    Years of education: None    Highest education level: None   Occupational History    None   Tobacco Use    Smoking status: Never Smoker    Smokeless tobacco: Never Used   Vaping Use    Vaping Use: Never used   Substance and Sexual Activity    Alcohol use: No    Drug use: No    Sexual activity: None   Other Topics Concern    None   Social History Narrative    None     Social Determinants of Health     Financial Resource Strain: Low Risk     Difficulty of Paying Living Expenses: Not very hard   Food Insecurity: No Food Insecurity    Worried About Running Out of Food in the Last Year: Never true    Araseli of Food in the Last Year: Never true   Transportation Needs:     Lack of Transportation (Medical): Not on file    Lack of Transportation (Non-Medical):  Not on file   Physical Activity:     Days of Exercise per Week: Not on file    Minutes of Exercise per Session: Not on file   Stress:     Feeling of Stress : Not on file   Social Connections:     Frequency of Communication with Friends and Family: Not on file    Frequency of Social Gatherings with Friends and Family: Not on file    Attends [employer] Services: Not on file    Active Member of [address] [place] [address] or Organizations: Not on file    Attends Club or Organization Meetings: Not on file    Marital Status: Not on file   Intimate Partner Violence:     Fear of Current or Ex-Partner: Not on file    Emotionally Abused: Not on file    Physically Abused: Not on file    Sexually Abused: Not on file   Housing Stability:     Unable to Pay for Housing in the Last Year: Not on file    Number of [place] in the Last Year: Not on file    Unstable Housing in the Last Year: Not on file         PHYSICAL EXAM    (up to 7 for level 4, 8 or more for level 5)     ED Triage Vitals [11/27/21 1631]   BP Temp Temp Source Pulse Resp SpO2 Height Weight   (!) 151/96 97.8 °F (36.6 °C) Oral 88 18 94 % 5' 3\" (1.6 m) 185 lb (83.9 kg)       Physical Exam  General :Patient is awake, alert, oriented, in no acute distress, nontoxic appearing  HEENT: Pupils are equally round and reactive to light, EOMI, conjunctivae clear. Neck: Neck is supple, full range of motion, trachea midline  Cardiac: Heart regular rate, rhythm, patient has a 2/6 systolic murmur  Lungs: Lungs slight scattered expiratory wheeze chest wall: There is no tenderness to palpation over the chest wall or over ribs  Abdomen: Abdomen is soft, nontender, nondistended. There is no firm or pulsatile masses, no rebound rigidity or guarding. Musculoskeletal: 5 out of 5 strength in all 4 extremities. No focal muscle deficits are appreciated  Neuro: Motor intact, sensory intact, level of consciousness is normal, Dermatology: Skin is warm and dry she has a few papular lesions on the left side of her neck nothing else noted as far as a rash goes. Psych: Mentation is grossly normal, cognition is grossly normal. Affect is appropriate.       DIAGNOSTIC RESULTS     EKG: All EKG's are interpreted by the Emergency Department Physician who either signs or Co-signs this chart in the [address] a cardiologist.        RADIOLOGY:   Non-plain film images such as CT, Ultrasound and MRI are read by the radiologist. Plain radiographic images are visualized and preliminarily interpreted by the emergency physician with the below findings:      ? Radiologist's Report Reviewed:  XR CHEST PORTABLE   Final Result      No convincing evidence for active cardiopulmonary process            ED BEDSIDE ULTRASOUND:   Performed by ED Physician - none    LABS:    I have reviewed and interpreted all of the currently available lab results from this visit (ifapplicable):  Results for orders placed or performed during the hospital encounter of 11/27/21   COVID-19, Rapid    Specimen: Nasopharyngeal Swab   Result Value Ref Range    SARS-CoV-2, NAAT Not Detected Not Detected Rapid Influenza A/B Antigens    Specimen: Nasopharyngeal   Result Value Ref Range    Rapid Influenza A Ag Negative Negative    Rapid Influenza B Ag Negative Negative   Strep Screen Group A Throat    Specimen: Throat   Result Value Ref Range    Rapid Strep A Screen Negative Negative   CBC Auto Differential   Result Value Ref Range    WBC 9.0 4.0 - 11.0 K/uL    RBC 4.34 3.80 - 5.80 M/uL    Hemoglobin 14.0 11.5 - 16.5 g/dL    Hematocrit 42.3 37.0 - 47.0 %    MCV 97.5 80.0 - 100.0 fL    MCH 32.3 (H) 27.0 - 32.0 pg    MCHC 33.1 31.0 - 35.0 g/dL    RDW 13.2 11.0 - 16.0 %    Platelets 278 278 - 566 K/uL    MPV 9.8 6.0 - 10.0 fL    Neutrophils % 74.8 %    Immature Granulocytes % 0.3 0.0 - 5.0 %    Lymphocytes % 13.5 %    Monocytes % 9.1 %    Eosinophils % 1.7 %    Basophils % 0.6 %    Neutrophils Absolute 6.7 2.0 - 7.5 K/uL    Immature Granulocytes # 0.0 K/uL    Lymphocytes Absolute 1.2 (L) 1.5 - 4.0 K/uL    Monocytes Absolute 0.8 0.2 - 0.8 K/uL    Eosinophils Absolute 0.2 0.0 - 0.4 K/uL    Basophils Absolute 0.1 0.0 - 0.1 K/uL   Comprehensive Metabolic Panel w/ Reflex to MG   Result Value Ref Range    Sodium 140 136 - 145 mmol/L    Potassium reflex Magnesium 3.8 3.4 - 5.1 mmol/L    Chloride 100 98 - 107 mmol/L    CO2 27 20 - 30 mmol/L    Anion Gap 13 3 - 16    Glucose 127 (H) 74 - 106 mg/dL    BUN 14 6 - 20 mg/dL    CREATININE 0.9 0.4 - 1.2 mg/dL    GFR Non-African American >60 >59    GFR African American >59 >59    Calcium 9.2 8.5 - 10.5 mg/dL    Total Protein 7.5 6.4 - 8.3 g/dL    Albumin 4.2 3.4 - 4.8 g/dL    Albumin/Globulin Ratio 1.3 0.8 - 2.0    Total Bilirubin 0.4 0.3 - 1.2 mg/dL    Alkaline Phosphatase 101 (H) 25 - 100 U/L    ALT 32 4 - 36 U/L    AST 42 (H) 8 - 33 U/L    Globulin 3.3 Not Established g/dL   Blood Gas, Arterial   Result Value Ref Range    pH, Arterial 7.435 7.350 - 7.450    pCO2, Arterial 45.3 (H) 35.0 - 45.0 mmHg    pO2, Arterial 56.7 (L) 80.0 - 100.0 mmHg    HCO3, Arterial 29.7 (H) 22.0 - 26.0 mmol/L    Base Excess, Arterial 4.8 (H) -3.0 - 3.0 mmol/L    O2 Sat, Arterial 89.0 (L) >92 %    TCO2, Arterial 31.1 (H) 24.0 - 30.0 mmol/L    O2 Therapy Unknown     FIO2 0.21 Not Established %   Lactate, Sepsis   Result Value Ref Range    Lactic Acid, Sepsis 1.8 0.4 - 1.9 mmol/L        All other labs were within normal range or not returned as of this dictation. EMERGENCY DEPARTMENT COURSE and DIFFERENTIAL DIAGNOSIS/MDM:   Vitals:    Vitals:    11/27/21 1718 11/27/21 1725 11/27/21 1730 11/27/21 1741   BP:   (!) 166/76    Pulse:       Resp:       Temp:    98.3 °F (36.8 °C)   TempSrc:       SpO2: 91% 92%     Weight:       Height:           MEDICATIONS ADMINISTERED IN ED:  Medications   lactated ringers bolus (1,000 mLs IntraVENous New Bag 11/27/21 1656)   ondansetron (ZOFRAN) injection 4 mg (4 mg IntraVENous Given 11/27/21 1711)       Patient has been nauseous here and dry heaving somewhat she was given a dose of Zofran and that is feeling better now her chest x-ray is clear COVID-19 and flu swabs are negative her blood work is within normal limits other than her blood gas she is slightly hypoxic she is retaining CO2 a little bit with a PCO2 45 and her oxygen saturation is only 89% since she already has home O2 I have instructed her to go ahead and wear her oxygen around-the-clock for the next 24 to 48 hours and to follow-up with her family physician in two or 3 days for reevaluation. The patient will follow-up with their PCP in 1-2 days for reevaluation. If the patient or family members have anyfurther concerns or any worsening symptoms they will return to the ED for reevaluation. CONSULTS:  None    PROCEDURES:  Procedures    CRITICAL CARE TIME    Total Critical Care time was 0 minutes, excluding separately reportable procedures. There was a high probability of clinically significant/life threatening deterioration in the patient's condition which required my urgent intervention.       FINAL IMPRESSION 1. Systemic viral illness New Problem         DISPOSITION/PLAN   DISPOSITION    Stable discharge to home    PATIENT REFERRED TO:  Demi Perea MD  Ricky [address] [place] [address]  [phone]    Call in 2 days        DISCHARGE MEDICATIONS:  New Prescriptions    No medications on file       Comment: Please note this report has been produced using speech recognition software and may contain errorsrelated to that system including errors in grammar, punctuation, and spelling, as well as words and phrases that may be inappropriate. If there are any questions or concerns please feel free to contact the dictating providerfor clarification.     Ana Cristina Jain MD  Attending Emergency Physician              Ana Cristina Jain MD  11/27/21 0080

## 2021-11-29 ENCOUNTER — OFFICE VISIT (OUTPATIENT)
Dept: FAMILY MEDICINE CLINIC | Age: 76
End: 2021-11-29
Payer: MEDICARE

## 2021-11-29 VITALS
SYSTOLIC BLOOD PRESSURE: 130 MMHG | HEIGHT: 63 IN | RESPIRATION RATE: 18 BRPM | TEMPERATURE: 96.6 F | DIASTOLIC BLOOD PRESSURE: 76 MMHG | BODY MASS INDEX: 33.24 KG/M2 | OXYGEN SATURATION: 93 % | HEART RATE: 80 BPM | WEIGHT: 187.6 LBS

## 2021-11-29 DIAGNOSIS — J01.90 ACUTE BACTERIAL SINUSITIS: Primary | ICD-10-CM

## 2021-11-29 DIAGNOSIS — B96.89 ACUTE BACTERIAL SINUSITIS: Primary | ICD-10-CM

## 2021-11-29 DIAGNOSIS — J44.9 CHRONIC OBSTRUCTIVE PULMONARY DISEASE, UNSPECIFIED COPD TYPE (HCC): ICD-10-CM

## 2021-11-29 PROCEDURE — 1090F PRES/ABSN URINE INCON ASSESS: CPT | Performed by: NURSE PRACTITIONER

## 2021-11-29 PROCEDURE — G8399 PT W/DXA RESULTS DOCUMENT: HCPCS | Performed by: NURSE PRACTITIONER

## 2021-11-29 PROCEDURE — 1123F ACP DISCUSS/DSCN MKR DOCD: CPT | Performed by: NURSE PRACTITIONER

## 2021-11-29 PROCEDURE — 99214 OFFICE O/P EST MOD 30 MIN: CPT | Performed by: NURSE PRACTITIONER

## 2021-11-29 PROCEDURE — G8417 CALC BMI ABV UP PARAM F/U: HCPCS | Performed by: NURSE PRACTITIONER

## 2021-11-29 PROCEDURE — G8427 DOCREV CUR MEDS BY ELIG CLIN: HCPCS | Performed by: NURSE PRACTITIONER

## 2021-11-29 PROCEDURE — 3017F COLORECTAL CA SCREEN DOC REV: CPT | Performed by: NURSE PRACTITIONER

## 2021-11-29 PROCEDURE — G8484 FLU IMMUNIZE NO ADMIN: HCPCS | Performed by: NURSE PRACTITIONER

## 2021-11-29 PROCEDURE — 1036F TOBACCO NON-USER: CPT | Performed by: NURSE PRACTITIONER

## 2021-11-29 PROCEDURE — 96372 THER/PROPH/DIAG INJ SC/IM: CPT | Performed by: NURSE PRACTITIONER

## 2021-11-29 PROCEDURE — 4040F PNEUMOC VAC/ADMIN/RCVD: CPT | Performed by: NURSE PRACTITIONER

## 2021-11-29 RX ORDER — CEFTRIAXONE 500 MG/1
500 INJECTION, POWDER, FOR SOLUTION INTRAMUSCULAR; INTRAVENOUS ONCE
Status: COMPLETED | OUTPATIENT
Start: 2021-11-29 | End: 2021-11-29

## 2021-11-29 RX ORDER — DEXAMETHASONE SODIUM PHOSPHATE 10 MG/ML
10 INJECTION INTRAMUSCULAR; INTRAVENOUS ONCE
Status: COMPLETED | OUTPATIENT
Start: 2021-11-29 | End: 2021-11-29

## 2021-11-29 RX ORDER — BUDESONIDE, GLYCOPYRROLATE, AND FORMOTEROL FUMARATE 160; 9; 4.8 UG/1; UG/1; UG/1
2 AEROSOL, METERED RESPIRATORY (INHALATION) DAILY
Qty: 1 EACH | Refills: 1 | COMMUNITY
Start: 2021-11-29 | End: 2022-10-12

## 2021-11-29 RX ORDER — AMOXICILLIN AND CLAVULANATE POTASSIUM 875; 125 MG/1; MG/1
1 TABLET, FILM COATED ORAL 2 TIMES DAILY
Qty: 14 TABLET | Refills: 0 | Status: SHIPPED | OUTPATIENT
Start: 2021-11-29 | End: 2021-12-06

## 2021-11-29 RX ADMIN — DEXAMETHASONE SODIUM PHOSPHATE 10 MG: 10 INJECTION INTRAMUSCULAR; INTRAVENOUS at 15:48

## 2021-11-29 RX ADMIN — CEFTRIAXONE 500 MG: 500 INJECTION, POWDER, FOR SOLUTION INTRAMUSCULAR; INTRAVENOUS at 15:47

## 2021-11-29 ASSESSMENT — ENCOUNTER SYMPTOMS
NAUSEA: 1
WHEEZING: 1
SHORTNESS OF BREATH: 1
DIARRHEA: 1
SINUS PRESSURE: 1
COUGH: 1
SORE THROAT: 1

## 2021-11-29 NOTE — ADDENDUM NOTE
Addended by: Angel Hyman on: 11/29/2021 05:14 PM     Modules accepted: Orders, Level of Service, SmartSet

## 2021-11-29 NOTE — PROGRESS NOTES
Chief Complaint   Patient presents with    Cough    Head Congestion       Have you seen any other physician or provider since your last visit yes - [facility]  [facility] ED    Have you had any other diagnostic tests since your last visit? no    Have you changed or stopped any medications since your last visit? no

## 2021-11-29 NOTE — PROGRESS NOTES
Leanna Preciado 78 y.o. presents today for   Chief Complaint   Patient presents with    Cough    Head Congestion        HPI:  Leanna Preciado states went to [facility] Wednesday night and did nothing for her but give her a zofran shot, she passed out they sent her home anyway. Saturday went to [facility] and jeyson did chest xray and gave her zofran which helped still has head cold and copngestion, her throat is sore but much better. She says she knows she needs steroid shot and antibiotic shot and that is the only thing that helps her. She also needs an oral antibiotic on top of the shots or she will end back up in the ER. She is also requesting sample of inhalers for her COPD because they are so expensive at the pharmacy. No family history on file. Social History     Socioeconomic History    Marital status:      Spouse name: Not on file    Number of children: Not on file    Years of education: Not on file    Highest education level: Not on file   Occupational History    Not on file   Tobacco Use    Smoking status: Never Smoker    Smokeless tobacco: Never Used   Vaping Use    Vaping Use: Never used   Substance and Sexual Activity    Alcohol use: No    Drug use: No    Sexual activity: Not on file   Other Topics Concern    Not on file   Social History Narrative    Not on file     Social Determinants of Health     Financial Resource Strain: Low Risk     Difficulty of Paying Living Expenses: Not very hard   Food Insecurity: No Food Insecurity    Worried About Running Out of Food in the Last Year: Never true    Araseli of Food in the Last Year: Never true   Transportation Needs:     Lack of Transportation (Medical): Not on file    Lack of Transportation (Non-Medical):  Not on file   Physical Activity:     Days of Exercise per Week: Not on file    Minutes of Exercise per Session: Not on file   Stress:     Feeling of Stress : Not on file   Social Connections:     Frequency of Communication with Friends and Family: Not on file    Frequency of Social Gatherings with Friends and Family: Not on file    Attends Jainism Services: Not on file    Active Member of Clubs or Organizations: Not on file    Attends Club or Organization Meetings: Not on file    Marital Status: Not on file   Intimate Partner Violence:     Fear of Current or Ex-Partner: Not on file    Emotionally Abused: Not on file    Physically Abused: Not on file    Sexually Abused: Not on file   Housing Stability:     Unable to Pay for Housing in the Last Year: Not on file    Number of [place] in the Last Year: Not on file    Unstable Housing in the Last Year: Not on file        Past Surgical History:   Procedure Laterality Date    APPENDECTOMY      CHOLECYSTECTOMY      LIVER SURGERY      UPPER GASTROINTESTINAL ENDOSCOPY          Past Medical History:   Diagnosis Date    [address] [facility] [address] [address] [address] 12/31/2018    DVT (deep venous thrombosis) ([facility]     Headache(784.0)     Hypertension     Hypothyroid 5/20/2015    MI, old     [facility] of [facility] 10[facility]27/[facility]    Pneumonia     Stomach ulcer     Thyroid disease         Current Outpatient Medications   Medication Sig Dispense Refill    amoxicillin-clavulanate (AUGMENTIN) 875-125 MG per tablet Take 1 tablet by mouth 2 times daily for 7 days 14 tablet 0    vitamin D (ERGOCALCIFEROL) 1.25 MG (34449 UT) CAPS capsule Take 1 capsule by mouth once a week 12 capsule 1    albuterol sulfate  (90 Base) MCG/ACT inhaler       clonazePAM (KLONOPIN) 2 MG tablet TAKE ONE TABLET BY MOUTH ONCE NIGHTLY AS NEEDED FOR INSOMNIA 30 tablet 2    lisinopril-hydroCHLOROthiazide (PRINZIDE;ZESTORETIC) 20-25 MG per tablet Take 1 tablet by mouth 2 times daily 60 tablet 5    ondansetron (ZOFRAN) 4 MG tablet Take 2 tablets by mouth 3 times daily as needed for Nausea or Vomiting 15 tablet 0    levothyroxine (SYNTHROID) 75 MCG tablet Take 1 tablet by mouth Daily 90 tablet 3    aspirin 81 MG EC tablet Take 81 mg by mouth daily      cyanocobalamin 1000 MCG/ML injection Inject 1,000 mcg into the muscle every 30 days      nitroGLYCERIN (NITROSTAT) 0.4 MG SL tablet Place 1 tablet under the tongue every 5 minutes as needed for Chest pain 25 tablet 3    nystatin (MYCOSTATIN) 088474 UNIT/GM cream Apply topically 2 times daily. (Patient not taking: Reported on 11/17/2021)       No current facility-administered medications for this visit. Review of Systems   Constitutional: Positive for fatigue. HENT: Positive for congestion, sinus pressure and sore throat. Respiratory: Positive for cough, shortness of breath and wheezing. Gastrointestinal: Positive for diarrhea and nausea. All other systems reviewed and are negative. /76   Pulse 80   Temp 96.6 °F (35.9 °C) (Temporal)   Resp 18   Ht 5' 3\" (1.6 m)   Wt 187 lb 9.6 oz (85.1 kg)   SpO2 93% Comment: RA  BMI 33.23 kg/m²      Physical Exam  Constitutional:       Appearance: Normal appearance. HENT:      Head: Normocephalic and atraumatic. Right Ear: Tympanic membrane, ear canal and external ear normal.      Left Ear: Tympanic membrane, ear canal and external ear normal.      Nose: Congestion present. Mouth/Throat:      Mouth: Mucous membranes are moist.      Pharynx: Oropharynx is clear. Posterior oropharyngeal erythema present. Eyes:      Extraocular Movements: Extraocular movements intact. Conjunctiva/sclera: Conjunctivae normal.      Pupils: Pupils are equal, round, and reactive to light. Cardiovascular:      Rate and Rhythm: Normal rate and regular rhythm. Pulses: Normal pulses. Heart sounds: Normal heart sounds. Pulmonary:      Effort: Pulmonary effort is normal.      Breath sounds: Normal breath sounds. Abdominal:      General: Bowel sounds are normal.      Palpations: Abdomen is soft.    Musculoskeletal:         General: Normal range of motion. Cervical back: Normal range of motion and neck supple. Skin:     General: Skin is warm and dry. Capillary Refill: Capillary refill takes less than 2 seconds. Neurological:      General: No focal deficit present. Mental Status: She is alert and oriented to person, place, and time. Psychiatric:         Mood and Affect: Mood normal.         Behavior: Behavior normal.          ASSESSMENT/PLAN    1. Acute bacterial sinusitis  Advised pt to take medication as directed and to f/u if s/s persist or worsen. She is agreeable to poc.  - amoxicillin-clavulanate (AUGMENTIN) 875-125 MG per tablet; Take 1 tablet by mouth 2 times daily for 7 days  Dispense: 14 tablet; Refill: 0  - dexamethasone (DECADRON) injection 10 mg  - cefTRIAXone (ROCEPHIN) injection 500 mg     2. COPD  Gave pt two samples of Breztri and told her to use them as directed. If she feels they helped her COPD symptoms we can try to get insurance to cover them. She is agreeable to poc.     TATYANA Bruce - CNP

## 2021-11-29 NOTE — PROGRESS NOTES
Administrations This Visit     cefTRIAXone (ROCEPHIN) injection 500 mg     Admin Date  11/29/2021  15:47 Action  Given Dose  500 mg Route  IntraMUSCular Site  Dorsogluteal Right Administered By  Eve Geiger RN    Ordering Provider: TATYANA Alonzo CNP    NDC: 20938-425-19    Lot#: [id number]    : [employer]    Patient Supplied?: No          dexamethasone (DECADRON) injection 10 mg     Admin Date  11/29/2021  15:48 Action  Given Dose  10 mg Route  IntraMUSCular Site  Dorsogluteal Left Administered By  Eve Geiger RN    Ordering Provider: TATYANA Alonzo CNP    NDC: 9008-1919-89    Lot#: [id number]    : [facility]    Patient Supplied?: No              Patient tolerated injection well. Patient advised to wait 20 minutes in the office following the injection. No signs/symptoms of reaction noted after 20 minutes.

## 2021-12-01 ENCOUNTER — OFFICE VISIT (OUTPATIENT)
Dept: FAMILY MEDICINE CLINIC | Age: 76
End: 2021-12-01
Payer: MEDICARE

## 2021-12-01 VITALS
TEMPERATURE: 97.2 F | SYSTOLIC BLOOD PRESSURE: 176 MMHG | RESPIRATION RATE: 20 BRPM | OXYGEN SATURATION: 95 % | HEART RATE: 71 BPM | DIASTOLIC BLOOD PRESSURE: 88 MMHG | BODY MASS INDEX: 33.59 KG/M2 | HEIGHT: 63 IN | WEIGHT: 189.6 LBS

## 2021-12-01 DIAGNOSIS — J42 CHRONIC BRONCHITIS, UNSPECIFIED CHRONIC BRONCHITIS TYPE (HCC): Primary | ICD-10-CM

## 2021-12-01 PROCEDURE — 96372 THER/PROPH/DIAG INJ SC/IM: CPT | Performed by: NURSE PRACTITIONER

## 2021-12-01 PROCEDURE — 3017F COLORECTAL CA SCREEN DOC REV: CPT | Performed by: NURSE PRACTITIONER

## 2021-12-01 PROCEDURE — G8427 DOCREV CUR MEDS BY ELIG CLIN: HCPCS | Performed by: NURSE PRACTITIONER

## 2021-12-01 PROCEDURE — G8484 FLU IMMUNIZE NO ADMIN: HCPCS | Performed by: NURSE PRACTITIONER

## 2021-12-01 PROCEDURE — G8399 PT W/DXA RESULTS DOCUMENT: HCPCS | Performed by: NURSE PRACTITIONER

## 2021-12-01 PROCEDURE — 1123F ACP DISCUSS/DSCN MKR DOCD: CPT | Performed by: NURSE PRACTITIONER

## 2021-12-01 PROCEDURE — 99213 OFFICE O/P EST LOW 20 MIN: CPT | Performed by: NURSE PRACTITIONER

## 2021-12-01 PROCEDURE — 1090F PRES/ABSN URINE INCON ASSESS: CPT | Performed by: NURSE PRACTITIONER

## 2021-12-01 PROCEDURE — G8926 SPIRO NO PERF OR DOC: HCPCS | Performed by: NURSE PRACTITIONER

## 2021-12-01 PROCEDURE — G8417 CALC BMI ABV UP PARAM F/U: HCPCS | Performed by: NURSE PRACTITIONER

## 2021-12-01 PROCEDURE — 4040F PNEUMOC VAC/ADMIN/RCVD: CPT | Performed by: NURSE PRACTITIONER

## 2021-12-01 PROCEDURE — 1036F TOBACCO NON-USER: CPT | Performed by: NURSE PRACTITIONER

## 2021-12-01 PROCEDURE — 3023F SPIROM DOC REV: CPT | Performed by: NURSE PRACTITIONER

## 2021-12-01 RX ORDER — DEXAMETHASONE SODIUM PHOSPHATE 10 MG/ML
8 INJECTION INTRAMUSCULAR; INTRAVENOUS ONCE
Status: COMPLETED | OUTPATIENT
Start: 2021-12-01 | End: 2021-12-01

## 2021-12-01 RX ORDER — CEFTRIAXONE SODIUM 250 MG/1
250 INJECTION, POWDER, FOR SOLUTION INTRAMUSCULAR; INTRAVENOUS ONCE
Status: COMPLETED | OUTPATIENT
Start: 2021-12-01 | End: 2021-12-01

## 2021-12-01 RX ADMIN — CEFTRIAXONE SODIUM 250 MG: 250 INJECTION, POWDER, FOR SOLUTION INTRAMUSCULAR; INTRAVENOUS at 14:09

## 2021-12-01 RX ADMIN — DEXAMETHASONE SODIUM PHOSPHATE 8 MG: 10 INJECTION INTRAMUSCULAR; INTRAVENOUS at 14:10

## 2021-12-01 ASSESSMENT — ENCOUNTER SYMPTOMS
COUGH: 1
SHORTNESS OF BREATH: 1

## 2021-12-01 NOTE — PROGRESS NOTES
Bita Spaulding 78 y.o. presents today for   Chief Complaint   Patient presents with    URI     X 1 week - antibiotics not working    Extremity Weakness        HPI:  Bita Spaulding states she feels terrible and the medication is not working. She missed her back doctor appt today because she feels so bad. She was seen two days ago and was given a steroid injection, an abx injection and sent oral abx to pharmacy. She is insisting she have another steroid and abx injection or she says she will not get better. She wants to know if she is no better in two days if she can come back for more shots. No family history on file. Social History     Socioeconomic History    Marital status:      Spouse name: Not on file    Number of children: Not on file    Years of education: Not on file    Highest education level: Not on file   Occupational History    Not on file   Tobacco Use    Smoking status: Never Smoker    Smokeless tobacco: Never Used   Vaping Use    Vaping Use: Never used   Substance and Sexual Activity    Alcohol use: No    Drug use: No    Sexual activity: Not on file   Other Topics Concern    Not on file   Social History Narrative    Not on file     Social Determinants of Health     Financial Resource Strain: Low Risk     Difficulty of Paying Living Expenses: Not very hard   Food Insecurity: No Food Insecurity    Worried About Running Out of Food in the Last Year: Never true    Araseli of Food in the Last Year: Never true   Transportation Needs:     Lack of Transportation (Medical): Not on file    Lack of Transportation (Non-Medical):  Not on file   Physical Activity:     Days of Exercise per Week: Not on file    Minutes of Exercise per Session: Not on file   Stress:     Feeling of Stress : Not on file   Social Connections:     Frequency of Communication with Friends and Family: Not on file    Frequency of Social Gatherings with Friends and Family: Not on file   [facility] Attends Latter-day Services: Not on file    Active Member of Clubs or Organizations: Not on file    Attends Club or Organization Meetings: Not on file    Marital Status: Not on file   Intimate Partner Violence:     Fear of Current or Ex-Partner: Not on file    Emotionally Abused: Not on file    Physically Abused: Not on file    Sexually Abused: Not on file   Housing Stability:     Unable to Pay for Housing in the Last Year: Not on file    Number of [place] in the Last Year: Not on file    Unstable Housing in the Last Year: Not on file        Past Surgical History:   Procedure Laterality Date    APPENDECTOMY      CHOLECYSTECTOMY      LIVER SURGERY      UPPER GASTROINTESTINAL ENDOSCOPY          Past Medical History:   Diagnosis Date    Bleeds easily ([facility] 12/31/2018    [facility]     Headache(784.0)     Hypertension     Hypothyroid 5/20/2015    MI, old     Moderate episode of recurrent major depressive disorder ([facility] 10/27/2018    Pneumonia     Stomach ulcer     Thyroid disease         Current Outpatient Medications   Medication Sig Dispense Refill    albuterol (PROVENTIL) (5 MG/ML) 0.5% nebulizer solution Take 1 mL by nebulization 4 times daily as needed for Wheezing 120 each 3    amoxicillin-clavulanate (AUGMENTIN) 875-125 MG per tablet Take 1 tablet by mouth 2 times daily for 7 days 14 tablet 0    Budeson-Glycopyrrol-Formoterol (BREZTRI AEROSPHERE) 160-9-4.8 MCG/ACT AERO Inhale 2 puffs into the lungs daily 1 each 1    vitamin D (ERGOCALCIFEROL) 1.25 MG (31980 UT) CAPS capsule Take 1 capsule by mouth once a week 12 capsule 1    albuterol sulfate  (90 Base) MCG/ACT inhaler       clonazePAM (KLONOPIN) 2 MG tablet TAKE ONE TABLET BY MOUTH ONCE NIGHTLY AS NEEDED FOR INSOMNIA 30 tablet 2    lisinopril-hydroCHLOROthiazide (PRINZIDE;ZESTORETIC) 20-25 MG per tablet Take 1 tablet by mouth 2 times daily 60 tablet 5    ondansetron (ZOFRAN) 4 MG tablet Take 2 tablets by mouth 3 times daily as needed for Nausea or Vomiting 15 tablet 0    levothyroxine (SYNTHROID) 75 MCG tablet Take 1 tablet by mouth Daily 90 tablet 3    nystatin (MYCOSTATIN) 979841 UNIT/GM cream Apply topically 2 times daily.  aspirin 81 MG EC tablet Take 81 mg by mouth daily      cyanocobalamin 1000 MCG/ML injection Inject 1,000 mcg into the muscle every 30 days      nitroGLYCERIN (NITROSTAT) 0.4 MG SL tablet Place 1 tablet under the tongue every 5 minutes as needed for Chest pain 25 tablet 3     No current facility-administered medications for this visit. Review of Systems   Constitutional: Positive for fatigue. HENT: Positive for congestion. Respiratory: Positive for cough and shortness of breath. Psychiatric/Behavioral: Positive for sleep disturbance. All other systems reviewed and are negative. BP (!) 176/88   Pulse 71   Temp 97.2 °F (36.2 °C) (Infrared)   Resp 20   Ht 5' 3\" (1.6 m)   Wt 189 lb 9.6 oz (86 kg)   SpO2 95%   BMI 33.59 kg/m²      Physical Exam  Constitutional:       Appearance: Normal appearance. HENT:      Head: Normocephalic and atraumatic. Right Ear: Tympanic membrane, ear canal and external ear normal.      Left Ear: Tympanic membrane, ear canal and external ear normal.      Nose: Congestion present. Mouth/Throat:      Mouth: Mucous membranes are moist.      Pharynx: Oropharynx is clear. Posterior oropharyngeal erythema present. Eyes:      Extraocular Movements: Extraocular movements intact. Conjunctiva/sclera: Conjunctivae normal.      Pupils: Pupils are equal, round, and reactive to light. Cardiovascular:      Rate and Rhythm: Normal rate and regular rhythm. Pulses: Normal pulses. Heart sounds: Normal heart sounds. Pulmonary:      Effort: Pulmonary effort is normal.      Breath sounds: Normal breath sounds. Abdominal:      General: Bowel sounds are normal.      Palpations: Abdomen is soft.    Musculoskeletal: General: Normal range of motion. Cervical back: Normal range of motion and neck supple. Skin:     General: Skin is warm and dry. Capillary Refill: Capillary refill takes less than 2 seconds. Neurological:      General: No focal deficit present. Mental Status: She is alert and oriented to person, place, and time. Psychiatric:         Mood and Affect: Mood normal.         Behavior: Behavior normal.          ASSESSMENT/PLAN    1. Chronic bronchitis, unspecified chronic bronchitis type ([address]  Advised pt that too many antibiotics can be harmful and that she should finish taking her oral antibiotics and rest for her body to heal.  She insists on more abx and steroid shots stating that is all she needs and that is what she always gets and it will help her. Told pt to go home and rest and finish antibiotics and drink plenty of water. She is agreeable to poc.  - albuterol (PROVENTIL) (5 MG/ML) 0.5% nebulizer solution; Take 1 mL by nebulization 4 times daily as needed for Wheezing  Dispense: 120 each;  Refill: 3  - cefTRIAXone (ROCEPHIN) injection 250 mg  - dexamethasone (DECADRON) injection 8 mg             TATYANA Montoya - CNP

## 2021-12-28 ENCOUNTER — APPOINTMENT (OUTPATIENT)
Dept: GENERAL RADIOLOGY | Facility: HOSPITAL | Age: 76
End: 2021-12-28
Payer: MEDICARE

## 2021-12-28 ENCOUNTER — HOSPITAL ENCOUNTER (OUTPATIENT)
Facility: HOSPITAL | Age: 76
Setting detail: OBSERVATION
Discharge: NURSING, REHAB OR LONG-TERM CARE | End: 2021-12-30
Attending: EMERGENCY MEDICINE | Admitting: INTERNAL MEDICINE
Payer: MEDICARE

## 2021-12-28 DIAGNOSIS — S62.102A WRIST FRACTURE, CLOSED, LEFT, INITIAL ENCOUNTER: Primary | ICD-10-CM

## 2021-12-28 DIAGNOSIS — S62.142A: ICD-10-CM

## 2021-12-28 DIAGNOSIS — S62.132A: ICD-10-CM

## 2021-12-28 DIAGNOSIS — S62.112A: ICD-10-CM

## 2021-12-28 DIAGNOSIS — S62.002A: ICD-10-CM

## 2021-12-28 DIAGNOSIS — R07.2 PRECORDIAL PAIN: ICD-10-CM

## 2021-12-28 LAB
A/G RATIO: 1.5 (ref 0.8–2)
ALBUMIN SERPL-MCNC: 4.1 G/DL (ref 3.4–4.8)
ALP BLD-CCNC: 98 U/L (ref 25–100)
ALT SERPL-CCNC: 12 U/L (ref 4–36)
ANION GAP SERPL CALCULATED.3IONS-SCNC: 10 MMOL/L (ref 3–16)
AST SERPL-CCNC: 16 U/L (ref 8–33)
BASOPHILS ABSOLUTE: 0 K/UL (ref 0–0.1)
BASOPHILS RELATIVE PERCENT: 0.5 %
BILIRUB SERPL-MCNC: 0.3 MG/DL (ref 0.3–1.2)
BUN BLDV-MCNC: 14 MG/DL (ref 6–20)
CALCIUM SERPL-MCNC: 8.7 MG/DL (ref 8.5–10.5)
CHLORIDE BLD-SCNC: 105 MMOL/L (ref 98–107)
CO2: 25 MMOL/L (ref 20–30)
CREAT SERPL-MCNC: 0.7 MG/DL (ref 0.4–1.2)
EKG ATRIAL RATE: 72 BPM
EKG DIAGNOSIS: NORMAL
EKG P AXIS: 64 DEGREES
EKG P-R INTERVAL: 166 MS
EKG Q-T INTERVAL: 402 MS
EKG QRS DURATION: 86 MS
EKG QTC CALCULATION (BAZETT): 440 MS
EKG R AXIS: 61 DEGREES
EKG T AXIS: 67 DEGREES
EKG VENTRICULAR RATE: 72 BPM
EOSINOPHILS ABSOLUTE: 0.1 K/UL (ref 0–0.4)
EOSINOPHILS RELATIVE PERCENT: 2.1 %
GFR AFRICAN AMERICAN: >59
GFR NON-AFRICAN AMERICAN: >60
GLOBULIN: 2.7 G/DL
GLUCOSE BLD-MCNC: 105 MG/DL (ref 74–106)
HCT VFR BLD CALC: 40.1 % (ref 37–47)
HEMOGLOBIN: 13.1 G/DL (ref 11.5–16.5)
IMMATURE GRANULOCYTES #: 0 K/UL
IMMATURE GRANULOCYTES %: 0.3 % (ref 0–5)
LYMPHOCYTES ABSOLUTE: 1.5 K/UL (ref 1.5–4)
LYMPHOCYTES RELATIVE PERCENT: 23.4 %
MCH RBC QN AUTO: 31.6 PG (ref 27–32)
MCHC RBC AUTO-ENTMCNC: 32.7 G/DL (ref 31–35)
MCV RBC AUTO: 96.9 FL (ref 80–100)
MONOCYTES ABSOLUTE: 0.4 K/UL (ref 0.2–0.8)
MONOCYTES RELATIVE PERCENT: 5.8 %
NEUTROPHILS ABSOLUTE: 4.2 K/UL (ref 2–7.5)
NEUTROPHILS RELATIVE PERCENT: 67.9 %
PDW BLD-RTO: 13.2 % (ref 11–16)
PLATELET # BLD: 199 K/UL (ref 150–400)
PMV BLD AUTO: 9.4 FL (ref 6–10)
POTASSIUM SERPL-SCNC: 4.1 MMOL/L (ref 3.4–5.1)
RBC # BLD: 4.14 M/UL (ref 3.8–5.8)
SODIUM BLD-SCNC: 140 MMOL/L (ref 136–145)
TOTAL PROTEIN: 6.8 G/DL (ref 6.4–8.3)
TROPONIN: <0.3 NG/ML
WBC # BLD: 6.2 K/UL (ref 4–11)

## 2021-12-28 PROCEDURE — 80053 COMPREHEN METABOLIC PANEL: CPT

## 2021-12-28 PROCEDURE — G0378 HOSPITAL OBSERVATION PER HR: HCPCS

## 2021-12-28 PROCEDURE — 73610 X-RAY EXAM OF ANKLE: CPT

## 2021-12-28 PROCEDURE — 84484 ASSAY OF TROPONIN QUANT: CPT

## 2021-12-28 PROCEDURE — 36415 COLL VENOUS BLD VENIPUNCTURE: CPT

## 2021-12-28 PROCEDURE — 96375 TX/PRO/DX INJ NEW DRUG ADDON: CPT

## 2021-12-28 PROCEDURE — 99282 EMERGENCY DEPT VISIT SF MDM: CPT

## 2021-12-28 PROCEDURE — 6360000002 HC RX W HCPCS: Performed by: INTERNAL MEDICINE

## 2021-12-28 PROCEDURE — 93005 ELECTROCARDIOGRAM TRACING: CPT

## 2021-12-28 PROCEDURE — 96374 THER/PROPH/DIAG INJ IV PUSH: CPT

## 2021-12-28 PROCEDURE — 71045 X-RAY EXAM CHEST 1 VIEW: CPT

## 2021-12-28 PROCEDURE — 73110 X-RAY EXAM OF WRIST: CPT

## 2021-12-28 PROCEDURE — 6360000002 HC RX W HCPCS: Performed by: EMERGENCY MEDICINE

## 2021-12-28 PROCEDURE — 29125 APPL SHORT ARM SPLINT STATIC: CPT

## 2021-12-28 PROCEDURE — 85025 COMPLETE CBC W/AUTO DIFF WBC: CPT

## 2021-12-28 PROCEDURE — 6370000000 HC RX 637 (ALT 250 FOR IP): Performed by: EMERGENCY MEDICINE

## 2021-12-28 RX ORDER — ASPIRIN 325 MG
325 TABLET ORAL ONCE
Status: COMPLETED | OUTPATIENT
Start: 2021-12-28 | End: 2021-12-28

## 2021-12-28 RX ORDER — MORPHINE SULFATE 4 MG/ML
4 INJECTION, SOLUTION INTRAMUSCULAR; INTRAVENOUS ONCE
Status: COMPLETED | OUTPATIENT
Start: 2021-12-28 | End: 2021-12-28

## 2021-12-28 RX ORDER — KETOROLAC TROMETHAMINE 30 MG/ML
10 INJECTION, SOLUTION INTRAMUSCULAR; INTRAVENOUS ONCE
Status: COMPLETED | OUTPATIENT
Start: 2021-12-28 | End: 2021-12-28

## 2021-12-28 RX ORDER — DIPHENHYDRAMINE HYDROCHLORIDE 50 MG/ML
25 INJECTION INTRAMUSCULAR; INTRAVENOUS ONCE
Status: COMPLETED | OUTPATIENT
Start: 2021-12-28 | End: 2021-12-28

## 2021-12-28 RX ORDER — ONDANSETRON 2 MG/ML
4 INJECTION INTRAMUSCULAR; INTRAVENOUS ONCE
Status: COMPLETED | OUTPATIENT
Start: 2021-12-28 | End: 2021-12-28

## 2021-12-28 RX ADMIN — ONDANSETRON 4 MG: 2 INJECTION INTRAMUSCULAR; INTRAVENOUS at 18:13

## 2021-12-28 RX ADMIN — MORPHINE SULFATE 4 MG: 4 INJECTION, SOLUTION INTRAMUSCULAR; INTRAVENOUS at 18:13

## 2021-12-28 RX ADMIN — ASPIRIN 325 MG ORAL TABLET 325 MG: 325 PILL ORAL at 18:13

## 2021-12-28 RX ADMIN — KETOROLAC TROMETHAMINE 9.9 MG: 30 INJECTION, SOLUTION INTRAMUSCULAR; INTRAVENOUS at 22:34

## 2021-12-28 RX ADMIN — DIPHENHYDRAMINE HYDROCHLORIDE 25 MG: 50 INJECTION, SOLUTION INTRAMUSCULAR; INTRAVENOUS at 19:27

## 2021-12-28 ASSESSMENT — PAIN DESCRIPTION - LOCATION: LOCATION: WRIST

## 2021-12-28 ASSESSMENT — HEART SCORE: ECG: 0

## 2021-12-28 ASSESSMENT — PAIN SCALES - GENERAL
PAINLEVEL_OUTOF10: 10

## 2021-12-28 ASSESSMENT — PAIN DESCRIPTION - ORIENTATION: ORIENTATION: LEFT

## 2021-12-28 ASSESSMENT — PAIN DESCRIPTION - FREQUENCY: FREQUENCY: CONTINUOUS

## 2021-12-28 NOTE — ED NOTES
Pt calling out- nurse to bedside- patient c/o chest pain and nausea- stated it just started right before xray- states pain radiates from mid-sternal into her back- reported complaint to MD- new orders received      Laverne Haro, RN  12/28/21 2329

## 2021-12-28 NOTE — ED PROVIDER NOTES
[id number] [address]      Pt Name: Luis Rod  MRN: [id number]  [place]: 1945  Date of evaluation: 12/28/2021  Provider: Claudio Villarreal, [address] [place] [address]       Chief Complaint   Patient presents with    Fall    Wrist Pain     left    Ankle Pain     left         HISTORY OF PRESENT ILLNESS  (Location/Symptom, Timing/Onset, Context/Setting, Quality, Duration, Modifying Factors, Severity.)   Luis Rod is a 78 y.o. female who presents to the emergency department patient fell prior to arrival and complains of pain to left wrist and left ankle. While waiting patient developed chest pain as well. No previous cardiac stents, no CABG, no pacemaker. Patient had heart cath in the past.  Patient also has a history of hypertension. Denies smoking and hyperlipidemia. No radiation of pain patient also admits nausea. Patient has an allergy to codeine but states tolerates morphine. Nausea no vomiting. Mild shortness of breath. Nursing notes were reviewed. REVIEW OFSYSTEMS    (2-9 systems for level 4, 10 or more for level 5)   ROS:  General:  No fevers, no chills, no weakness  Cardiovascular:  +chest pain, no palpitations  Respiratory:  +shortness of breath, no cough, no wheezing  Gastrointestinal:  No pain, +nausea, no vomiting, no diarrhea  Musculoskeletal:  No muscle pain, left wrist and left ankle pain  Skin:  No rash, no easy bruising  Neurologic:  No speech problems, no headache, no extremity weakness  Psychiatric:  No anxiety  Genitourinary:  No dysuria, no hematuria    Except as noted above the remainder of the review of systems was reviewed and negative.        PAST MEDICAL HISTORY     Past Medical History:   Diagnosis Date    Bleeds easily ([address] 12/31/2018    COPD (chronic obstructive pulmonary disease) ([address] 1/6/2022    [facility]     Headache(784.0)     Hypertension     Hypothyroid 5/20/2015    MI, old     Moderate [metoclopramide]    FAMILY HISTORY     History reviewed. No pertinent family history. SOCIAL HISTORY       Social History     Socioeconomic History    Marital status:      Spouse name: None    Number of children: None    Years of education: None    Highest education level: None   Occupational History    None   Tobacco Use    Smoking status: Never Smoker    Smokeless tobacco: Never Used   Vaping Use    Vaping Use: Never used   Substance and Sexual Activity    Alcohol use: No    Drug use: No    Sexual activity: None   Other Topics Concern    None   Social History Narrative    None     Social Determinants of Health     Financial Resource Strain: Low Risk     Difficulty of Paying Living Expenses: Not very hard   Food Insecurity: No Food Insecurity    Worried About Running Out of Food in the Last Year: Never true    Araseli of Food in the Last Year: Never true   Transportation Needs:     Lack of Transportation (Medical): Not on file    Lack of Transportation (Non-Medical):  Not on file   Physical Activity:     Days of Exercise per Week: Not on file    Minutes of Exercise per Session: Not on file   Stress:     Feeling of Stress : Not on file   Social Connections:     Frequency of Communication with Friends and Family: Not on file    Frequency of Social Gatherings with Friends and Family: Not on file    Attends [employer] Services: Not on file    Active Member of [address] [place] [address] Homeschool Snowboarding or Organizations: Not on file    Attends Club or Organization Meetings: Not on file    Marital Status: Not on file   Intimate Partner Violence:     Fear of Current or Ex-Partner: Not on file    Emotionally Abused: Not on file    Physically Abused: Not on file    Sexually Abused: Not on file   Housing Stability:     Unable to Pay for Housing in the Last Year: Not on file    Number of [place] in the Last Year: Not on file    Unstable Housing in the Last Year: Not on file         PHYSICAL EXAM    (up to 7 for level 4, 8 or more for level 5)     ED Triage Vitals [12/28/21 1639]   BP Temp Temp Source Pulse Resp SpO2 Height Weight   (!) 191/105 98.3 °F (36.8 °C) Oral 76 16 96 % 5' 3\" (1.6 m) 178 lb (80.7 kg)       Physical Exam  General :Patient is awake, alert, oriented, in no acute distress, nontoxic appearing  HEENT: Pupils are equally round and reactive to light, EOMI, conjunctivae clear. Oral mucosa is moist, no exudate. Uvula is midline  Neck: Neck is supple, full range of motion, trachea midline  Cardiac: Heart regular rate, rhythm, no murmurs, rubs, or gallops  Lungs: Lungs are clear to auscultation, there is no wheezing, rhonchi, or rales. There is no use of accessory muscles. Abdomen: Abdomen is soft, nontender, nondistended. There is no firm or pulsatile masses, no rebound rigidity or guarding. Musculoskeletal: 5 out of 5 strength in all 4 extremities. No focal muscle deficits are appreciated tenderness left wrist and left ankle  Neuro: Motor intact, sensory intact, level of consciousness is normal, cerebellar function is normal, reflexes are grossly normal.   Dermatology: Skin is warm and dry  Psych: Mentation is grossly normal, cognition is grossly normal. Affect is appropriate. DIAGNOSTIC RESULTS     EKG: All EKG's are interpreted by the Emergency Department Physician who either signs or Co-signs this chart in the absenceof a cardiologist.    The EKG interpreted by me shows normal sinus rhythm rate of 72 normal axis normal ST segments normal WA interval and normal QT interval.    RADIOLOGY:   Non-plain film images such as CT, Ultrasound and MRI are read by the radiologist. Plain radiographic images are visualized and preliminarily interpreted by the emergency physician with the below findings:      ? Radiologist's Report Reviewed:  XR CHEST PORTABLE   Final Result      No acute cardiopulmonary findings. No significant change from the prior study.          XR WRIST LEFT (MIN 3 VIEWS)   Final Result Acute triquetral fracture, along the dorsal aspect of the carpal bones. Localized soft tissue swelling. Radiocarpal joint space narrowing. No definite fracture at the wrist.      XR ANKLE LEFT (MIN 3 VIEWS)   Final Result      No acute findings.             ED BEDSIDE ULTRASOUND:   Performed by ED Physician - none    LABS:    I have reviewed and interpreted all of the currently available lab results from this visit (ifapplicable):  Results for orders placed or performed during the hospital encounter of 12/28/21   COVID-19, Rapid    Specimen: Nasopharyngeal Swab   Result Value Ref Range    SARS-CoV-2, NAAT Not Detected Not Detected   CBC Auto Differential   Result Value Ref Range    WBC 6.2 4.0 - 11.0 K/uL    RBC 4.14 3.80 - 5.80 M/uL    Hemoglobin 13.1 11.5 - 16.5 g/dL    Hematocrit 40.1 37.0 - 47.0 %    MCV 96.9 80.0 - 100.0 fL    MCH 31.6 27.0 - 32.0 pg    MCHC 32.7 31.0 - 35.0 g/dL    RDW 13.2 11.0 - 16.0 %    Platelets 110 787 - 152 K/uL    MPV 9.4 6.0 - 10.0 fL    Neutrophils % 67.9 %    Immature Granulocytes % 0.3 0.0 - 5.0 %    Lymphocytes % 23.4 %    Monocytes % 5.8 %    Eosinophils % 2.1 %    Basophils % 0.5 %    Neutrophils Absolute 4.2 2.0 - 7.5 K/uL    Immature Granulocytes # 0.0 K/uL    Lymphocytes Absolute 1.5 1.5 - 4.0 K/uL    Monocytes Absolute 0.4 0.2 - 0.8 K/uL    Eosinophils Absolute 0.1 0.0 - 0.4 K/uL    Basophils Absolute 0.0 0.0 - 0.1 K/uL   Comprehensive Metabolic Panel   Result Value Ref Range    Sodium 140 136 - 145 mmol/L    Potassium 4.1 3.4 - 5.1 mmol/L    Chloride 105 98 - 107 mmol/L    CO2 25 20 - 30 mmol/L    Anion Gap 10 3 - 16    Glucose 105 74 - 106 mg/dL    BUN 14 6 - 20 mg/dL    CREATININE 0.7 0.4 - 1.2 mg/dL    GFR Non-African American >60 >59    GFR African American >59 >59    Calcium 8.7 8.5 - 10.5 mg/dL    Total Protein 6.8 6.4 - 8.3 g/dL    Albumin 4.1 3.4 - 4.8 g/dL    Albumin/Globulin Ratio 1.5 0.8 - 2.0    Total Bilirubin 0.3 0.3 - 1.2 mg/dL    Alkaline Phosphatase 98 25 - 100 U/L    ALT 12 4 - 36 U/L    AST 16 8 - 33 U/L    Globulin 2.7 Not Established g/dL   Troponin   Result Value Ref Range    Troponin <0.30 <0.30 ng/mL   TSH without Reflex   Result Value Ref Range    TSH 0.28 0.27 - 4.20 uIU/mL   Troponin   Result Value Ref Range    Troponin <0.30 <0.30 ng/mL   EKG 12 Lead   Result Value Ref Range    Ventricular Rate 72 BPM    Atrial Rate 72 BPM    P-R Interval 166 ms    QRS Duration 86 ms    Q-T Interval 402 ms    QTc Calculation (Bazett) 440 ms    P Axis 64 degrees    R Axis 61 degrees    T Axis 67 degrees    Diagnosis       EKG performed in ER and to be interpretated by [facility] ([id number]),  Jorge Willingham ([id number]) on 12/28/2021 5:44:35 PM        All other labs were within normal range or not returned as of this dictation. EMERGENCY DEPARTMENT COURSE and DIFFERENTIAL DIAGNOSIS/MDM:   Vitals:    Vitals:    12/29/21 1557 12/29/21 2002 12/30/21 0558 12/30/21 0708   BP: 132/61 (!) 160/75 (!) 143/70 (!) 156/75   Pulse: 73 79 78 80   Resp: 18 18  18   Temp: 99.1 °F (37.3 °C) 98.7 °F (37.1 °C) 99.1 °F (37.3 °C) 99 °F (37.2 °C)   TempSrc: Axillary Oral  Axillary   SpO2: 94% 93% 92% 93%   Weight:       Height:           MEDICATIONS ADMINISTERED IN ED:  Medications   aspirin tablet 325 mg (325 mg Oral Given 12/28/21 1813)   morphine sulfate (PF) injection 4 mg (4 mg IntraVENous Given 12/28/21 1813)   ondansetron (ZOFRAN) injection 4 mg (4 mg IntraVENous Given 12/28/21 1813)   diphenhydrAMINE (BENADRYL) injection 25 mg (25 mg IntraVENous Given 12/28/21 1927)   ketorolac (TORADOL) injection 9.9 mg (9.9 mg IntraVENous Given 12/28/21 2234)       Spoke with Dr Diane Zuleta admit. Patient splinted patient told will need to follow-up with Dr. Davon Wong in [place] needs to call for an appointment after she is discharged from the hospital.  She will be ruled out here for chest pain. The patient will follow-up with their PCP in 1-2 days for reevaluation.   If the patient or family members have anyfurther concerns or any worsening symptoms they will return to the ED for reevaluation. CONSULTS:  None    PROCEDURES:  Procedures I fabricated and fitted a custom dorsal splint to splint her left triquetral fracture this was a short splint. Neurovascular intact after splint placement. CRITICAL CARE TIME    Total Critical Care time was 0 minutes, excluding separately reportable procedures. There was a high probability of clinically significant/life threatening deterioration in the patient's condition which required my urgent intervention. FINAL IMPRESSION      1. Wrist fracture, closed, left, initial encounter    2. Precordial pain    3. Closed ipierzmjkqvde-azurbprv-cttsij-triquetral-perilunate fracture dislocation of left wrist, initial encounter          DISPOSITION/PLAN   DISPOSITION        PATIENT REFERRED TO:  No follow-up provider specified. DISCHARGE MEDICATIONS:  Discharge Medication List as of 12/30/2021  4:01 PM      START taking these medications    Details   traMADol (ULTRAM) 50 MG tablet Take 2 tablets by mouth every 6 hours as needed for Pain for up to 3 days. , Disp-30 tablet, R-0Normal             Comment: Please note this report has been produced using speech recognition software and may contain errorsrelated to that system including errors in grammar, punctuation, and spelling, as well as words and phrases that may be inappropriate. If there are any questions or concerns please feel free to contact the dictating providerfor clarification.     Rebecca Dunlap DO  Attending Emergency Physician              Rebecca Dunlap,   12/28/21 5571       Rebecca Dunlap DO  01/10/22 0700

## 2021-12-29 PROBLEM — S62.102A WRIST FRACTURE, CLOSED, LEFT, INITIAL ENCOUNTER: Status: ACTIVE | Noted: 2021-12-29

## 2021-12-29 LAB
SARS-COV-2, NAAT: NOT DETECTED
TROPONIN: <0.3 NG/ML

## 2021-12-29 PROCEDURE — G0378 HOSPITAL OBSERVATION PER HR: HCPCS

## 2021-12-29 PROCEDURE — 96372 THER/PROPH/DIAG INJ SC/IM: CPT

## 2021-12-29 PROCEDURE — 96375 TX/PRO/DX INJ NEW DRUG ADDON: CPT

## 2021-12-29 PROCEDURE — 87635 SARS-COV-2 COVID-19 AMP PRB: CPT

## 2021-12-29 PROCEDURE — 84484 ASSAY OF TROPONIN QUANT: CPT

## 2021-12-29 PROCEDURE — 96376 TX/PRO/DX INJ SAME DRUG ADON: CPT

## 2021-12-29 PROCEDURE — 2700000000 HC OXYGEN THERAPY PER DAY

## 2021-12-29 PROCEDURE — 6370000000 HC RX 637 (ALT 250 FOR IP): Performed by: INTERNAL MEDICINE

## 2021-12-29 PROCEDURE — 36415 COLL VENOUS BLD VENIPUNCTURE: CPT

## 2021-12-29 PROCEDURE — 99219 PR INITIAL OBSERVATION CARE/DAY 50 MINUTES: CPT | Performed by: INTERNAL MEDICINE

## 2021-12-29 PROCEDURE — 6360000002 HC RX W HCPCS: Performed by: INTERNAL MEDICINE

## 2021-12-29 PROCEDURE — 93005 ELECTROCARDIOGRAM TRACING: CPT

## 2021-12-29 RX ORDER — ACETAMINOPHEN 325 MG/1
650 TABLET ORAL EVERY 6 HOURS PRN
Status: DISCONTINUED | OUTPATIENT
Start: 2021-12-29 | End: 2021-12-30 | Stop reason: HOSPADM

## 2021-12-29 RX ORDER — LISINOPRIL AND HYDROCHLOROTHIAZIDE 20; 12.5 MG/1; MG/1
1 TABLET ORAL DAILY
Status: ON HOLD | COMMUNITY
End: 2021-12-30 | Stop reason: HOSPADM

## 2021-12-29 RX ORDER — ASPIRIN 81 MG/1
81 TABLET, CHEWABLE ORAL DAILY
Status: DISCONTINUED | OUTPATIENT
Start: 2021-12-30 | End: 2021-12-30 | Stop reason: HOSPADM

## 2021-12-29 RX ORDER — ONDANSETRON 2 MG/ML
4 INJECTION INTRAMUSCULAR; INTRAVENOUS EVERY 6 HOURS PRN
Status: DISCONTINUED | OUTPATIENT
Start: 2021-12-29 | End: 2021-12-30 | Stop reason: HOSPADM

## 2021-12-29 RX ORDER — PROMETHAZINE HYDROCHLORIDE 25 MG/ML
6.25 INJECTION, SOLUTION INTRAMUSCULAR; INTRAVENOUS EVERY 6 HOURS PRN
Status: DISCONTINUED | OUTPATIENT
Start: 2021-12-29 | End: 2021-12-30 | Stop reason: HOSPADM

## 2021-12-29 RX ORDER — PROMETHAZINE HYDROCHLORIDE 25 MG/ML
6.25 INJECTION, SOLUTION INTRAMUSCULAR; INTRAVENOUS EVERY 6 HOURS PRN
Status: DISCONTINUED | OUTPATIENT
Start: 2021-12-29 | End: 2021-12-29

## 2021-12-29 RX ORDER — TRAMADOL HYDROCHLORIDE 50 MG/1
50 TABLET ORAL EVERY 6 HOURS PRN
Status: DISCONTINUED | OUTPATIENT
Start: 2021-12-29 | End: 2021-12-30 | Stop reason: HOSPADM

## 2021-12-29 RX ADMIN — ONDANSETRON HYDROCHLORIDE 4 MG: 2 INJECTION, SOLUTION INTRAMUSCULAR; INTRAVENOUS at 08:10

## 2021-12-29 RX ADMIN — ACETAMINOPHEN 650 MG: 325 TABLET, FILM COATED ORAL at 05:07

## 2021-12-29 RX ADMIN — ONDANSETRON HYDROCHLORIDE 4 MG: 2 INJECTION, SOLUTION INTRAMUSCULAR; INTRAVENOUS at 21:21

## 2021-12-29 RX ADMIN — ENOXAPARIN SODIUM 40 MG: 100 INJECTION SUBCUTANEOUS at 21:21

## 2021-12-29 RX ADMIN — TRAMADOL HYDROCHLORIDE 50 MG: 50 TABLET ORAL at 05:07

## 2021-12-29 RX ADMIN — PROMETHAZINE HYDROCHLORIDE 6.25 MG: 25 INJECTION INTRAMUSCULAR; INTRAVENOUS at 12:28

## 2021-12-29 RX ADMIN — PROMETHAZINE HYDROCHLORIDE 6.25 MG: 25 INJECTION INTRAMUSCULAR; INTRAVENOUS at 22:49

## 2021-12-29 ASSESSMENT — PAIN SCALES - GENERAL: PAINLEVEL_OUTOF10: 10

## 2021-12-29 NOTE — PLAN OF CARE
Problem: Falls - Risk of:  Goal: Will remain free from falls  Description: Will remain free from falls  12/29/2021 1300 by Cory Wells RN  Outcome: Met This Shift  12/28/2021 2339 by Jarocho Gongora RN  Outcome: Ongoing

## 2021-12-29 NOTE — ACP (ADVANCE CARE PLANNING)
Advance Care Planning     General Advance Care Planning (ACP) Conversation    Date of Conversation: 12/29/2021  Conducted with: Patient with Decision Making Capacity    Healthcare Decision Maker:    Primary Decision Maker: Ilan Hernandez - Brother/Sister - [phone]    Supplemental (Other) Decision Maker: Dave Landa - Other - [phone]  Click here to complete [address] including selection of the Healthcare Decision Maker Relationship (ie \"Primary\"). Today we documented Decision Maker(s) consistent with Legal Next of Kin hierarchy. Content/Action Overview: Has ACP document(s) on file - reflects the patient's care preferences  Reviewed DNR/DNI and patient elects Full Code (Attempt Resuscitation)  artificial nutrition, ventilation preferences, resuscitation preferences and end of life care preferences (vegetative state/imminent death)  Patient does not want long term life support.      Length of Voluntary ACP Conversation in minutes:  <16 minutes (Non-Billable)    TIN Gandhi Intern

## 2021-12-29 NOTE — PROGRESS NOTES
Pt vomited moderate amt. Got pt up to chair to change linens with 2 assist and she collapse. Assisted to floor without injury. Called Dr Constance Richey for anti nausea medicine. Administered zofran dose and pt assisted back to bed without incident.

## 2021-12-29 NOTE — CARE COORDINATION
PT: attempt to consult; patient pleasant, declined to attempt to get up at this time due to nausea. Will f/u later today.   Electronically signed by Mani Murray PT on 12/29/2021 at 10:51 AM

## 2021-12-29 NOTE — PROGRESS NOTES
Pt requesting pain medications. On call provider gave a verbal order for ultram and tylenol. Medications were flagged for pt allergies with mild reactions. On call provider notified of flag who okayed to go ahead and order.

## 2021-12-29 NOTE — ED NOTES
Report to Philippe Painter RN on medical unit at this time. Pt to be admitted to room 9 on medical unit.       Paula Sanford RN  12/28/21 1921

## 2021-12-29 NOTE — CARE COORDINATION
Pt states she is not feeling well and declined OT evaluation at this time. Will attempt on 12/30 barring any complications/concerns.

## 2021-12-29 NOTE — PROGRESS NOTES
Pt admitted from ER to unit into room 9 for rule out chest pain. Pt transported via stretcher. Pt transferred self to bed without difficulty. Pt appears in no acute distress, currently on 2L NC. Pt provided with warm blanket. Pt denies any needs at this time. Pt encouraged to call out if any needs. Call light and bedside table within reach. Will continue to monitor Pt.

## 2021-12-29 NOTE — FLOWSHEET NOTE
12/28/21 2030   Assessment   Charting Type Admission   [place] Coma Scale   Eye Opening 4   Best Verbal Response 5   Best Motor Response 6   [place] Coma Scale Score 15   HEENT   HEENT (WDL) X   Right Eye Impaired vision   Left Eye Impaired vision   Voice Normal   Teeth Dentures upper   Respiratory   Respiratory (WDL) WDL   Respiratory Pattern Regular   Respiratory Depth Normal   Respiratory Quality/Effort Unlabored   Chest Assessment Chest expansion symmetrical;Trachea midline   L Breath Sounds Clear   R Breath Sounds Clear   Cardiac   Cardiac (WDL) WDL   Cardiac Regularity Regular   Heart Sounds S1, S2   Cardiac Monitor   Telemetry Monitor On No   Gastrointestinal   Abdominal (WDL) X   Abdomen Inspection Rounded   Last BM (including prior to admit) 12/28/21   Tenderness Tenderness   RUQ Bowel Sounds Active   LUQ Bowel Sounds Active   RLQ Bowel Sounds Active   LLQ Bowel Sounds Active   Peripheral Vascular   Peripheral Vascular (WDL) WDL   Edema None   RLE Neurovascular Assessment   Color Pink   Temperature Warm   R Pedal Pulse +3   LLE Neurovascular Assessment   Color Pink   Temperature Warm   L Pedal Pulse +3   Skin Color/Condition   Skin Color/Condition (WDL) WDL   Skin Integrity   Skin Integrity (WDL) X   Skin Integrity Abrasion   Location Rt knee   Preventative Dressing No   Musculoskeletal   Musculoskeletal (WDL) X   RUE Full movement   LUE Full movement   RL Extremity Weakness   LL Extremity Weakness   Musculoskeletal Details   L Wrist Immobilizer; Injury/trauma   L Ankle Other (Comment)  (pain)   Genitourinary   Genitourinary (WDL) WDL   Flank Tenderness No   Suprapubic Tenderness No   Dysuria No   Anus/Rectum   Anus/Rectum (WDL) WDL   Psychosocial   Psychosocial (WDL) WDL

## 2021-12-30 ENCOUNTER — HOSPITAL ENCOUNTER (INPATIENT)
Facility: HOSPITAL | Age: 76
LOS: 6 days | Discharge: LEFT BEFORE DISCHARGE | DRG: 561 | End: 2022-01-05
Attending: INTERNAL MEDICINE | Admitting: INTERNAL MEDICINE
Payer: MEDICARE

## 2021-12-30 VITALS
WEIGHT: 178 LBS | SYSTOLIC BLOOD PRESSURE: 156 MMHG | TEMPERATURE: 99 F | BODY MASS INDEX: 31.54 KG/M2 | OXYGEN SATURATION: 93 % | HEART RATE: 80 BPM | RESPIRATION RATE: 18 BRPM | DIASTOLIC BLOOD PRESSURE: 75 MMHG | HEIGHT: 63 IN

## 2021-12-30 DIAGNOSIS — G47.00 INSOMNIA, UNSPECIFIED TYPE: ICD-10-CM

## 2021-12-30 DIAGNOSIS — F41.0 PANIC ATTACK: ICD-10-CM

## 2021-12-30 PROBLEM — R53.81 DECLINING FUNCTIONAL STATUS: Status: ACTIVE | Noted: 2021-12-30

## 2021-12-30 LAB — TSH SERPL DL<=0.05 MIU/L-ACNC: 0.28 UIU/ML (ref 0.27–4.2)

## 2021-12-30 PROCEDURE — 97530 THERAPEUTIC ACTIVITIES: CPT

## 2021-12-30 PROCEDURE — 99217 PR OBSERVATION CARE DISCHARGE MANAGEMENT: CPT | Performed by: INTERNAL MEDICINE

## 2021-12-30 PROCEDURE — 97535 SELF CARE MNGMENT TRAINING: CPT

## 2021-12-30 PROCEDURE — 6360000002 HC RX W HCPCS: Performed by: INTERNAL MEDICINE

## 2021-12-30 PROCEDURE — 2700000000 HC OXYGEN THERAPY PER DAY

## 2021-12-30 PROCEDURE — 1200000002 HC SEMI PRIVATE SWING BED

## 2021-12-30 PROCEDURE — 97165 OT EVAL LOW COMPLEX 30 MIN: CPT

## 2021-12-30 PROCEDURE — G0378 HOSPITAL OBSERVATION PER HR: HCPCS

## 2021-12-30 PROCEDURE — 96376 TX/PRO/DX INJ SAME DRUG ADON: CPT

## 2021-12-30 PROCEDURE — 36415 COLL VENOUS BLD VENIPUNCTURE: CPT

## 2021-12-30 PROCEDURE — 97161 PT EVAL LOW COMPLEX 20 MIN: CPT

## 2021-12-30 PROCEDURE — 6370000000 HC RX 637 (ALT 250 FOR IP): Performed by: INTERNAL MEDICINE

## 2021-12-30 PROCEDURE — 84443 ASSAY THYROID STIM HORMONE: CPT

## 2021-12-30 RX ORDER — ERGOCALCIFEROL 1.25 MG/1
50000 CAPSULE ORAL WEEKLY
Status: DISCONTINUED | OUTPATIENT
Start: 2021-12-30 | End: 2022-01-06 | Stop reason: HOSPADM

## 2021-12-30 RX ORDER — CLONAZEPAM 0.5 MG/1
2 TABLET ORAL NIGHTLY PRN
Status: DISCONTINUED | OUTPATIENT
Start: 2021-12-30 | End: 2021-12-30 | Stop reason: HOSPADM

## 2021-12-30 RX ORDER — ERGOCALCIFEROL 1.25 MG/1
50000 CAPSULE ORAL WEEKLY
Status: DISCONTINUED | OUTPATIENT
Start: 2021-12-31 | End: 2021-12-30 | Stop reason: HOSPADM

## 2021-12-30 RX ORDER — CLONAZEPAM 0.5 MG/1
1 TABLET ORAL 2 TIMES DAILY PRN
Status: CANCELLED | OUTPATIENT
Start: 2021-12-30

## 2021-12-30 RX ORDER — ERGOCALCIFEROL 1.25 MG/1
50000 CAPSULE ORAL WEEKLY
Status: CANCELLED | OUTPATIENT
Start: 2021-12-30

## 2021-12-30 RX ORDER — ONDANSETRON 4 MG/1
8 TABLET, FILM COATED ORAL 3 TIMES DAILY PRN
Status: CANCELLED | OUTPATIENT
Start: 2021-12-30

## 2021-12-30 RX ORDER — NITROGLYCERIN 0.4 MG/1
0.4 TABLET SUBLINGUAL EVERY 5 MIN PRN
Status: CANCELLED | OUTPATIENT
Start: 2021-12-30

## 2021-12-30 RX ORDER — ASPIRIN 81 MG/1
81 TABLET ORAL DAILY
Status: DISCONTINUED | OUTPATIENT
Start: 2021-12-30 | End: 2022-01-06 | Stop reason: HOSPADM

## 2021-12-30 RX ORDER — ASPIRIN 81 MG/1
81 TABLET ORAL DAILY
Status: CANCELLED | OUTPATIENT
Start: 2021-12-30

## 2021-12-30 RX ORDER — NITROGLYCERIN 0.4 MG/1
0.4 TABLET SUBLINGUAL EVERY 5 MIN PRN
Status: DISCONTINUED | OUTPATIENT
Start: 2021-12-30 | End: 2022-01-06 | Stop reason: HOSPADM

## 2021-12-30 RX ORDER — TRAMADOL HYDROCHLORIDE 50 MG/1
100 TABLET ORAL EVERY 6 HOURS PRN
Status: DISCONTINUED | OUTPATIENT
Start: 2021-12-30 | End: 2022-01-06 | Stop reason: HOSPADM

## 2021-12-30 RX ORDER — TRAMADOL HYDROCHLORIDE 50 MG/1
100 TABLET ORAL EVERY 6 HOURS PRN
Qty: 30 TABLET | Refills: 0 | Status: SHIPPED | OUTPATIENT
Start: 2021-12-30 | End: 2022-01-02

## 2021-12-30 RX ORDER — LEVOTHYROXINE SODIUM 0.07 MG/1
75 TABLET ORAL DAILY
Status: CANCELLED | OUTPATIENT
Start: 2021-12-30

## 2021-12-30 RX ORDER — LISINOPRIL AND HYDROCHLOROTHIAZIDE 20; 12.5 MG/1; MG/1
1 TABLET ORAL DAILY
Qty: 30 TABLET | Refills: 3 | Status: SHIPPED | OUTPATIENT
Start: 2021-12-31 | End: 2022-03-03 | Stop reason: SDUPTHER

## 2021-12-30 RX ORDER — LEVOTHYROXINE SODIUM 0.07 MG/1
75 TABLET ORAL DAILY
Status: DISCONTINUED | OUTPATIENT
Start: 2021-12-30 | End: 2021-12-30 | Stop reason: HOSPADM

## 2021-12-30 RX ORDER — LISINOPRIL AND HYDROCHLOROTHIAZIDE 20; 12.5 MG/1; MG/1
1 TABLET ORAL DAILY
Status: DISCONTINUED | OUTPATIENT
Start: 2021-12-30 | End: 2021-12-30 | Stop reason: HOSPADM

## 2021-12-30 RX ORDER — LEVOTHYROXINE SODIUM 0.07 MG/1
75 TABLET ORAL DAILY
Status: DISCONTINUED | OUTPATIENT
Start: 2021-12-30 | End: 2022-01-06 | Stop reason: HOSPADM

## 2021-12-30 RX ORDER — ONDANSETRON 4 MG/1
8 TABLET, FILM COATED ORAL 3 TIMES DAILY PRN
Status: DISCONTINUED | OUTPATIENT
Start: 2021-12-30 | End: 2022-01-05

## 2021-12-30 RX ORDER — CLONAZEPAM 0.5 MG/1
1 TABLET ORAL 2 TIMES DAILY PRN
Status: DISCONTINUED | OUTPATIENT
Start: 2021-12-30 | End: 2022-01-03

## 2021-12-30 RX ADMIN — ASPIRIN 81 MG: 81 TABLET, CHEWABLE ORAL at 08:43

## 2021-12-30 RX ADMIN — ONDANSETRON HYDROCHLORIDE 4 MG: 2 INJECTION, SOLUTION INTRAMUSCULAR; INTRAVENOUS at 08:42

## 2021-12-30 RX ADMIN — TRAMADOL HYDROCHLORIDE 50 MG: 50 TABLET ORAL at 08:42

## 2021-12-30 RX ADMIN — CLONAZEPAM 1 MG: 0.5 TABLET ORAL at 18:59

## 2021-12-30 RX ADMIN — LEVOTHYROXINE SODIUM 75 MCG: 75 TABLET ORAL at 11:15

## 2021-12-30 RX ADMIN — PROMETHAZINE HYDROCHLORIDE 6.25 MG: 25 INJECTION INTRAMUSCULAR; INTRAVENOUS at 15:19

## 2021-12-30 RX ADMIN — TRAMADOL HYDROCHLORIDE 100 MG: 50 TABLET ORAL at 17:39

## 2021-12-30 RX ADMIN — PROMETHAZINE HYDROCHLORIDE 6.25 MG: 25 INJECTION INTRAMUSCULAR; INTRAVENOUS at 04:55

## 2021-12-30 RX ADMIN — LISINOPRIL AND HYDROCHLOROTHIAZIDE 1 TABLET: 12.5; 2 TABLET ORAL at 11:15

## 2021-12-30 ASSESSMENT — PAIN SCALES - GENERAL
PAINLEVEL_OUTOF10: 8
PAINLEVEL_OUTOF10: 10

## 2021-12-30 ASSESSMENT — PAIN DESCRIPTION - LOCATION: LOCATION: ANKLE;WRIST

## 2021-12-30 ASSESSMENT — PAIN DESCRIPTION - ORIENTATION: ORIENTATION: LEFT

## 2021-12-30 NOTE — CARE COORDINATION
12/30/21 1413   Discharge Lalo Duarte 86 Family Members;Friends/Neighbors   Current Services Prior To Admission Durable Medical Equipment; Oxygen Therapy   DME Oxygen Therapy (Comment)   Potential Assistance Needed Durable Medical Equipment   Potential Assistance Purchasing Medications No   DME   (per therapy recs)   Type of Home Care Services None   Patient expects to be discharged to: Apartment  (after rehab stay)   Expected Discharge Date 12/30/21   Follow Up Appointment: Best Day/Time    (any)       Lives alone. Has home O2 and CPAP ( [facility]). DME per therapy recs. Independent at baseline. Actively working.

## 2021-12-30 NOTE — PROGRESS NOTES
Physical Therapy    Facility/Department: [facility] CHILDREN MED SURG  Initial Assessment    NAME: Griselda Donna  : 1945  MRN: [id number]    Date of Service: 2021    Discharge Recommendations:  Continue to assess pending progress        Assessment   Body structures, Functions, Activity limitations: Decreased high-level IADLs;Decreased functional mobility ; Decreased endurance  Assessment: s/p fall with left forearm/wrist fracture; functional decline; activity intolerance  Treatment Diagnosis: s/p fall with left forearm/wrist fracture  Specific instructions for Next Treatment: 3-4 days  Prognosis: Good  Decision Making: Low Complexity  REQUIRES PT FOLLOW UP: Yes  Activity Tolerance  Activity Tolerance: Patient limited by endurance;Treatment limited secondary to medical complications (free text)       Patient Diagnosis(es): The primary encounter diagnosis was Precordial pain. A diagnosis of Closed obtvmjniyyoty-jymacgxe-uxutua-triquetral-perilunate fracture dislocation of left wrist, initial encounter was also pertinent to this visit. has a past medical history of Bleeds easily ([address] DVT (deep venous thrombosis) ([address] Headache(784.0), Hypertension, Hypothyroid, MI, old, Moderate episode of recurrent major depressive disorder ([address] Pneumonia, Stomach ulcer, and Thyroid disease. has a past surgical history that includes Cholecystectomy; Liver surgery; Appendectomy; and Upper gastrointestinal endoscopy.     Restrictions  Restrictions/Precautions  Restrictions/Precautions: General Precautions,Fall Risk  Required Braces or Orthoses?: Yes  Vision/Hearing  Vision: Impaired  Vision Exceptions: Wears glasses for distance  Hearing: Within functional limits     Subjective  General  Chart Reviewed: Yes  Patient assessed for rehabilitation services?: Yes  Response To Previous Treatment: Not applicable  Family / Caregiver Present: No  Referring Practitioner: Leighann Dupree MD  Subjective  Subjective: Patient agreeabl to tx; normally is independent and active, works 3 jobs; had a fall = left wrist/forearm fracture, left ankle sprain; has had nausea/vomiting since hospitalization; took a pain pill about 2 hours ago for left wrist pain  Pain Screening  Patient Currently in Pain: Denies  Vital Signs  Patient Currently in Pain: Denies       Orientation  Orientation  Overall Orientation Status: Within Normal Limits  Social/Functional History  Social/Functional History  Lives With: Alone  Type of Home: Apartment  Home Layout: One level  Home Access: Level entry  Bathroom Shower/Tub: Tub/Shower unit  Bathroom Toilet: Standard  Bathroom Equipment: Shower chair  Bathroom Accessibility: Accessible  Home Equipment: Oxygen  ADL Assistance: Independent  Homemaking Assistance: Independent  Homemaking Responsibilities: Yes  Ambulation Assistance: Independent  Transfer Assistance: Independent  Active : Yes  Type of occupation: 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[employer], [address]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[address], and apartment management  Cognition        Objective     Observation/Palpation  Posture: Fair  Observation: patient lying in bed, NAD; splint on left forearm    AROM RLE (degrees)  RLE AROM: WFL  AROM LLE (degrees)  LLE AROM : WFL  Strength RLE  Strength RLE: WFL  Comment: MMG's grossly 4+/5  Strength LLE  Strength LLE: WFL  Comment: MMG's grossly 4+/5  Strength RUE  Strength RUE: WFL  Strength LUE  Comment: See OT  Tone RLE  RLE Tone: Normotonic  Tone LLE  LLE Tone: Normotonic  Motor Control  Gross Motor?: WFL  Sensation  Overall Sensation Status: WFL  Bed mobility  Rolling to Right: Supervision;Stand by assistance  Supine to Sit: Supervision;Stand by assistance  Sit to Supine: Stand by assistance;Contact guard assistance  Transfers  Sit to Stand: Stand by assistance  Stand to sit: Stand by assistance  Ambulation  Ambulation?: Yes  Ambulation 1  Assistance: Contact guard assistance  Distance: Patient stood only, became dizzy and nauseous, returned to bed

## 2021-12-30 NOTE — FLOWSHEET NOTE
12/29/21 2121   Assessment   Charting Type Shift assessment   Neurological   Neuro (WDL) X   Level of Consciousness Alert (0)   Orientation Level Oriented to place;Oriented to time;Oriented to person;Disoriented to situation   Cognition Follows commands   Adriana Coma Scale   Eye Opening 4   Best Verbal Response 5   Best Motor Response 6   Adriana Coma Scale Score 15   HEENT   HEENT (WDL) X   Right Eye Impaired vision   Left Eye Impaired vision   Voice Normal   Teeth Dentures upper   Respiratory   Respiratory (WDL) WDL   Respiratory Pattern Regular   Respiratory Depth Normal   Respiratory Quality/Effort Unlabored   Chest Assessment Chest expansion symmetrical;Trachea midline   L Breath Sounds Clear   R Breath Sounds Clear   Cardiac   Cardiac (WDL) WDL   Cardiac Regularity Regular   Heart Sounds S1, S2   Cardiac Monitor   Telemetry Monitor On No   Gastrointestinal   Abdominal (WDL) X   Abdomen Inspection Rounded   Tenderness Soft   RUQ Bowel Sounds Active   LUQ Bowel Sounds Active   RLQ Bowel Sounds Active   LLQ Bowel Sounds Active   GI Symptoms Nausea;Vomiting   Relieved By Antiemetic   Nausea Precipitating Factors Movement   Emesis Appearance Yellow   Peripheral Vascular   Peripheral Vascular (WDL) WDL   Edema None   RLE Neurovascular Assessment   Capillary Refill Less than/equal to 3 seconds   Color Pink   Temperature Warm   R Pedal Pulse +2   LLE Neurovascular Assessment   Capillary Refill Less than/equal to 3 seconds   Color Pink   Temperature Warm   L Pedal Pulse +2   Skin Color/Condition   Skin Color/Condition (WDL) WDL   Skin Integrity   Skin Integrity (WDL) X   Skin Integrity Abrasion   Location rt knee   Musculoskeletal   Musculoskeletal (WDL) X   RUE Full movement   LUE Splint   RL Extremity Weakness   LL Extremity Weakness   Musculoskeletal Details   L Wrist Immobilizer   L Ankle Other (Comment)  (pain)   Genitourinary   Genitourinary (WDL) WDL   Flank Tenderness No   Suprapubic Tenderness No   Dysuria No   Anus/Rectum   Anus/Rectum (WDL) WDL   Psychosocial   Psychosocial (WDL) WDL

## 2021-12-30 NOTE — PROGRESS NOTES
Physical Therapy  Facility/Department: [facility] MED SURG  Daily Treatment Note  NAME: Alexus Spicer  : 1945  MRN: [id number]    Date of Service: 2021    Discharge Recommendations:  Continue to assess pending progress      Assessment   Assessment: Patient was seen for BID/PT cotreatment with OT. Patient completed bed mobility tasks with SBA/supervision. Stood at bedside with CGA and ambulated to and from the bathroom with hemiwalker and CGA of 2. Patient required assistance with clothing management due to L wrist splint. Patient transferred back to bed after toileting. REQUIRES PT FOLLOW UP: Yes  Activity Tolerance  Activity Tolerance: Patient Tolerated treatment well     Patient Diagnosis(es): The primary encounter diagnosis was Precordial pain. A diagnosis of Closed wqragfnzmscaz-xpvbdkob-tsczek-triquetral-perilunate fracture dislocation of left wrist, initial encounter was also pertinent to this visit. has a past medical history of Bleeds easily ([address] DVT (deep venous thrombosis) ([address] Headache(784.0), Hypertension, Hypothyroid, MI, old, Moderate episode of recurrent major depressive disorder ([address] Pneumonia, Stomach ulcer, and Thyroid disease. has a past surgical history that includes Cholecystectomy; Liver surgery; Appendectomy; and Upper gastrointestinal endoscopy. Restrictions  Restrictions/Precautions  Restrictions/Precautions: General Precautions,Fall Risk  Required Braces or Orthoses?: Yes  Subjective   General  Chart Reviewed: Yes  Response To Previous Treatment: Patient with no complaints from previous session. Family / Caregiver Present: No  Subjective  Subjective: Patient states she would like to try to get up to the bathroom. Pain Screening  Patient Currently in Pain: Yes  Pain Assessment  Pain Location: Ankle; Wrist  Pain Orientation: Left  Vital Signs  Patient Currently in Pain: Yes       Objective   Bed mobility  Rolling to Right: Supervision;Stand by assistance  Supine to Sit: Supervision;Stand by assistance  Sit to Supine: Stand by assistance;Contact guard assistance  Scooting: Supervision  Transfers  Sit to Stand: Stand by assistance  Stand to sit: Stand by assistance  Ambulation  Ambulation?: Yes  Ambulation 1  Surface: level tile  Device: Hemiwalker  Assistance: Contact guard assistance;2 Person assistance  Distance: to and from the bathroom     Balance  Posture: Good  Sitting - Static: Good  Sitting - Dynamic: Good  Standing - Static: Fair;+  Standing - Dynamic: Fair      Goals  Long term goals  Time Frame for Long term goals : 3-4 day  Long term goal 1: Achieve bed mobility and basic transfers with modified independence. Long term goal 2: Ambulate 150 feet with modified independence-supervision, with no loss of balance or pattern deviation. Plan    Plan  Times per week: 3-4 days  Plan weeks: 3-4 days  Specific instructions for Next Treatment: 3-4 days  Current Treatment Recommendations: [place] Roof Re-education,Home Exercise Program,Safety Education & Training,Functional Mobility Training,Transfer Training,Gait Training  Safety Devices  Type of devices: Left in bed,Call light within reach     Therapy Time   Individual Concurrent Group Co-treatment   Time In 4842         Time Out 0936         Minutes 19              This note serves as D/C summary if patient is discharged prior to next visit.   Tootie Duran, PTA

## 2021-12-30 NOTE — H&P
Hospital Medicine  History and Physical    Patient:  Devi Rodriguez  MRN: [id number]    CHIEF COMPLAINT:  Fall with left ankle and wrist pain    History Obtained From:  patient  PCP: Gerard Gill MD    HISTORY OF PRESENT ILLNESS:   The patient is a 78 y.o. female who presents with a fall and resultant wrist pain found to be a wrist fracture. Pt apparently had chest pain when the discussion of discharge from ER was brought up she had chest pain. The Pt was admitted here but unfortunately we do not have cards oor Ortho so she will be placed in a swing bed and will attempt to get her in with Ortho. She has dizziness and nausea so we will work around that as well. At baseline the Pt is very active, works 3 jobs, 70 hrs a week. Past Medical History:      Diagnosis Date    Bleeds easily ([facility] 12/31/2018    DVT (deep venous thrombosis) (HCC)     Headache(784.0)     Hypertension     Hypothyroid 5/20/2015    MI, old     Moderate episode of recurrent major depressive disorder ([facility] 10/27/2018    Pneumonia     Stomach ulcer     Thyroid disease        Past Surgical History:      Procedure Laterality Date    APPENDECTOMY      CHOLECYSTECTOMY      LIVER SURGERY      UPPER GASTROINTESTINAL ENDOSCOPY         Medications Prior to Admission:    Prior to Admission medications    Medication Sig Start Date End Date Taking?  Authorizing Provider   lisinopril-hydroCHLOROthiazide (PRINZIDE;ZESTORETIC) 20-12.5 MG per tablet Take 1 tablet by mouth daily   Yes Historical Provider, MD   albuterol (PROVENTIL) (5 MG/ML) 0.5% nebulizer solution Take 1 mL by nebulization 4 times daily as needed for Wheezing 12/1/21  Yes TATYANA Acuna CNP   Budeson-Glycopyrrol-Formoterol (BREZTRI AEROSPHERE) 160-9-4.8 MCG/ACT AERO Inhale 2 puffs into the lungs daily 11/29/21  Yes TATYANA Acuna CNP   vitamin D (ERGOCALCIFEROL) 1.25 MG (44460 UT) CAPS capsule Take 1 capsule by mouth once a week 11/18/21 Yes Joseph Adams MD   albuterol sulfate  (90 Base) MCG/ACT inhaler Inhale 2 puffs into the lungs every 6 hours as needed  10/27/21  Yes Historical Provider, MD   clonazePAM (KLONOPIN) 2 MG tablet TAKE ONE TABLET BY MOUTH ONCE NIGHTLY AS NEEDED FOR INSOMNIA 11/17/21 12/29/21 Yes Joseph Adams MD   ondansetron (ZOFRAN) 4 MG tablet Take 2 tablets by mouth 3 times daily as needed for Nausea or Vomiting  Patient taking differently: Take 4 mg by mouth 3 times daily as needed for Nausea or Vomiting  5/24/21  Yes Rowdy Denny   levothyroxine (SYNTHROID) 75 MCG tablet Take 1 tablet by mouth Daily 3/30/21  Yes TATYANA Bartlett CNP   nystatin (MYCOSTATIN) 947399 UNIT/GM cream Apply topically 2 times daily. Yes Historical Provider, MD   aspirin 81 MG EC tablet Take 81 mg by mouth daily   Yes Historical Provider, MD   nitroGLYCERIN (NITROSTAT) 0.4 MG SL tablet Place 1 tablet under the tongue every 5 minutes as needed for Chest pain 7/8/20  Yes TATYANA Bartlett CNP       Allergies: Iv dye [iodides], Azithromycin, Isosorbide mononitrate [isosorbide nitrate], Augmentin [amoxicillin-pot clavulanate], Codeine, Influenza vaccines, Mucinex [guaifenesin er], Nyquil severe cold-flu [phenyleph-doxylamine-dm-apap], and Reglan [metoclopramide]    Social History:   TOBACCO:   reports that she has never smoked. She has never used smokeless tobacco.  ETOH:   reports no history of alcohol use. OCCUPATION:  Employed    Family History:   History reviewed. No pertinent family history. REVIEW OF SYSTEMS:  Negative except for Wrist pain, nausea.     Physical Exam:    Vitals: BP (!) 156/75   Pulse 80   Temp 99 °F (37.2 °C) (Axillary)   Resp 18   Ht 5' 3\" (1.6 m)   Wt 178 lb (80.7 kg)   SpO2 93%   BMI 31.53 kg/m²   General appearance: alert, appears stated age and cooperative  Skin: Skin color, texture, turgor normal. No rashes or lesions  HEENT: Head: Normocephalic, no lesions, without obvious abnormality. Neck: no adenopathy, no carotid bruit, no JVD, supple, symmetrical, trachea midline and thyroid not enlarged, symmetric, no tenderness/mass/nodules  Lungs: clear to auscultation bilaterally  Heart: regular rate and rhythm, S1, S2 normal, no murmur, click, rub or gallop  Abdomen: soft, non-tender; bowel sounds normal; no masses,  no organomegaly  Extremities: extremities normal, atraumatic, no cyanosis or edema and L arm in a splint   Neurologic: Mental status: Alert, oriented, thought content appropriate    CBC:   Recent Labs     12/28/21  1736   WBC 6.2   HGB 13.1        BMP:    Recent Labs     12/28/21 1736      K 4.1      CO2 25   BUN 14   CREATININE 0.7   GLUCOSE 105     Hepatic:   Recent Labs     12/28/21 1736   AST 16   ALT 12   BILITOT 0.3   ALKPHOS 98     Troponin:   Recent Labs     12/28/21 1736 12/29/21  2223   TROPONINI <0.30 <0.30     BNP: No results for input(s): BNP in the last 72 hours. Lipids: No results for input(s): CHOL, HDL in the last 72 hours. Invalid input(s): LDLCALCU  ABGs:   Lab Results   Component Value Date    PHART 7.435 11/27/2021    PO2ART 56.7 11/27/2021    SKP9WCF 45.3 11/27/2021     INR: No results for input(s): INR in the last 72 hours. -----------------------------------------------------------------  PA/lat CXR: Clear  EKG:     Assessment and Plan   1. Fall with wrist pain - We will have to work on Ortho eval as an outpt as hospital beds are not available due to pandemic. 2. Ankle pain - From fall  3. Fall - Mechanical fall, no LOC, no head strike nor ataxia. If N/V persists will CT brain. 4. Chest pain - Trops negative  5.  DVT Proph - LMWH    Patient Active Problem List   Diagnosis Code    Hypothyroidism E03.9    Chest pain R07.9    Elevated serum creatinine R79.89    Headache R51.9    Chronic nausea R11.0    Hypertensive urgency I16.0    Pre-syncope R55    BPV (benign positional vertigo) H81.10    Acute cystitis without hematuria N30.00    GERD (gastroesophageal reflux disease) K21.9    Epigastric abdominal pain R10.13    Nocturnal hypoxia G47.34    Nausea vomiting and diarrhea R11.2, R19.7    Essential hypertension I10    Lung nodule R91.1    Acute pancreatitis K85.90    Hematochezia K92.1    Irritable bowel syndrome with diarrhea K58.0    Stable angina (HCC) I20.8    Fibrocystic breast disease (FCBD) N60.19    Grief F43.21    Esophageal dysphagia R13.19    Breast pain, left N64.4    Muscle spasm M62.838    Esophageal stricture K22.2    Breast density R92.2    Scar painful R52, L90.5    Anxiety F41.9    Moderate episode of recurrent major depressive disorder (HCC) F33.1    Panic attacks F41.0    Liver lesion K76.9    Fatty pancreas K86.89    Abnormal CT of the abdomen R93.5    Chronic bronchitis (HCC) J42    Gastroparesis K31.84    Hemangioma D18.00    Abnormal findings on diagnostic imaging of liver R93.2    Thoracic back pain M54.6    Weakness present R53.1    B12 deficiency E53.8    DVT, lower extremity, recurrent (HCC) I82.409    History of esophageal stricture Z87.19    Allergic reaction T78.40XA    Wrist fracture, closed, left, initial encounter S62.102A       David Trevino MD, MD  [address] [facility]    Diagnosis Date Noted    Wrist fracture, closed, left, initial encounter [S62.102A] 12/29/2021

## 2021-12-30 NOTE — PROGRESS NOTES
Occupational Therapy  Facility/Department: [address] [place] [address] MED SURG  Daily Treatment Note  NAME: Tj Gary  : 1945  MRN: [id number]    Date of Service: 2021    Discharge Recommendations:  Continue to assess pending progress       Assessment   Performance deficits / Impairments: Decreased balance;Decreased functional mobility ; Decreased ADL status; Decreased endurance;Decreased strength;Decreased high-level IADLs  Assessment: Pt agreeable to BID session with Co tx with PTA. pt requested to toilet. Pt come to sit at EOB with SBA requiring increased timing to complete task. Pt required cues to not push/pull with LUE. Pt come to stand at EOB with CGA and ambulated to bathroom with HHA x2 with unsteady gait. Pt transferred to toilet with CGA<>MIN A. Pt required min assist to don LB clothing seated on toilet. Pt required CGA to complete toileting. Pt with LOB requiring min assist to correct in standing 2x. Pt provided with straight cane with poor ability to maintain balance. Pt provided with marzena walker and demo improved stability. Pt ambulated to bed ~10 feet with CGA x2. Pt assisted to supine and left with call light in reach. Prognosis: Good  Decision Making: Low Complexity  REQUIRES OT FOLLOW UP: Yes  Activity Tolerance  Activity Tolerance: Limited by nausea         Patient Diagnosis(es): The primary encounter diagnosis was Precordial pain. A diagnosis of Closed ecrbwnkvdypif-bykadrib-rkkzmi-triquetral-perilunate fracture dislocation of left wrist, initial encounter was also pertinent to this visit. has a past medical history of Bleeds easily ([address] DVT (deep venous thrombosis) ([address] Headache(784.0), Hypertension, Hypothyroid, MI, old, Moderate episode of recurrent major depressive disorder ([address] Pneumonia, Stomach ulcer, and Thyroid disease. has a past surgical history that includes Cholecystectomy; Liver surgery;  Appendectomy; and Upper gastrointestinal endoscopy. Restrictions  Restrictions/Precautions  Restrictions/Precautions: General Precautions,Fall Risk  Required Braces or Orthoses?: Yes  Subjective   General  Chart Reviewed: Yes  Patient assessed for rehabilitation services?: Yes  Family / Caregiver Present: No  Referring Practitioner: Matias Fitch MD  Diagnosis: LUE Wrist fracture s/p fall  Subjective  Subjective: Pt states she had an isolated fall outside and landed on wrist. Pt agreeable to OT services. Vital Signs  Patient Currently in Pain: Denies   Orientation  Orientation  Overall Orientation Status: Within Functional Limits  Objective    ADL  LE Dressing: Minimal assistance  Toileting: Contact guard assistance  Additional Comments: unable to assess ADL's        Balance  Sitting Balance: Independent  Standing Balance: Minimal assistance  Functional Mobility  Functional - Mobility Device: Hemiwalker  Activity: To/from bathroom  Assist Level: Contact guard assistance (x2)  Functional Mobility Comments: Unsteady  Bed mobility  Rolling to Right: Supervision;Stand by assistance  Supine to Sit: Supervision;Stand by assistance  Sit to Supine: Stand by assistance;Contact guard assistance  Transfers  Stand Pivot Transfers: Contact guard assistance;2 Person assistance  Sit to stand: Contact guard assistance  Transfer Comments: unsteady upon come to stand                       Cognition  Overall Cognitive Status: WFL                       LUE AROM (degrees)  LUE AROM : WFL  RUE AROM (degrees)  RUE AROM : WFL                 Plan   Plan  Times per week: 3-5  Times per day: Daily  Plan weeks: 1-2  Current Treatment Recommendations: Strengthening,Balance Training,Functional Mobility Training,Endurance Training,Self-Care / ADL,Patient/Caregiver Education & Training,Safety Education & Training    Goals  Short term goals  Time Frame for Short term goals: 1 week  Short term goal 1: Pt to complete LB dressing with MOD I.   Short term goal 2: Pt to complete toileting with MOD I. Short term goal 3: Pt to complete hygiene/grooming standing at sink with no LOB with MOD I. Short term goal 4: pt to complete Sponge bathing with MOD I. Short term goal 5: Pt to tolerate static standing x5 minutes with no LOB. Therapy Time   Individual Concurrent Group Co-treatment   Time In 5537         Time Out 6473         Minutes 19              This note serves as a DC summary in the event of pt discharge.      Mireille Greenwood, OTR/L

## 2021-12-30 NOTE — PROGRESS NOTES
Pt with c/o chest pain. EKG obtained showing normal sinus rhythm. On call provider notified, see new orders. Pt placed on telemetry monitoring. Pt appears in no acute distress at this time. Will continue to monitor Pt.

## 2021-12-30 NOTE — PROGRESS NOTES
Occupational Therapy   Occupational Therapy Initial Assessment  Date: 2021   Patient Name: Maria Vásquez  MRN: [id number]     : 1945    Date of Service: 2021    Discharge Recommendations:  Continue to assess pending progress       Assessment   Performance deficits / Impairments: Decreased balance;Decreased functional mobility ; Decreased ADL status; Decreased endurance;Decreased strength;Decreased high-level IADLs  Assessment: Pt agreeable to OT services. Pt reports she has been nauseous since admission. Pt states at baseline she is independent with all ADL's and IADL's and works three jobs. Pt states she had an isolated fall. Pt come to sit at EOB. Pt tolerated sitting at EOB without LOB. Upon attempt to come to stand pt unsteady and became increasingly nauseous and began dry heaving. Pt requested to return to bed. Pt assisted to bed and left with call light in reach and HOB elevated. Pt will benefit from skilled OT services while IP. Prognosis: Good  Decision Making: Low Complexity  REQUIRES OT FOLLOW UP: Yes  Activity Tolerance  Activity Tolerance: Limited by nausea           Patient Diagnosis(es): The primary encounter diagnosis was Precordial pain. A diagnosis of Closed favlmycftrzrs-oehduddh-xpqrby-triquetral-perilunate fracture dislocation of left wrist, initial encounter was also pertinent to this visit. has a past medical history of Bleeds easily ([address] DVT (deep venous thrombosis) ([address] Headache(784.0), Hypertension, Hypothyroid, MI, old, Moderate episode of recurrent major depressive disorder ([address] Pneumonia, Stomach ulcer, and Thyroid disease. has a past surgical history that includes Cholecystectomy; Liver surgery; Appendectomy; and Upper gastrointestinal endoscopy.            Restrictions  Restrictions/Precautions  Restrictions/Precautions: General Precautions,Fall Risk  Required Braces or Orthoses?: Yes    Subjective   General  Chart Reviewed: Yes  Patient assessed for rehabilitation services?: Yes  Family / Caregiver Present: No  Referring Practitioner: Maureen Sandoval MD  Diagnosis: LUE Wrist fracture s/p fall  Subjective  Subjective: Pt states she had an isolated fall outside and landed on wrist. Pt agreeable to OT services. Patient Currently in Pain: Yes  Pain Assessment  Pain Location: Ankle; Wrist  Pain Orientation: Left  Vital Signs  Patient Currently in Pain: Yes  Social/Functional History  Social/Functional History  Lives With: Alone  Type of Home: Apartment  Home Layout: One level  Home Access: Level entry  Bathroom Shower/Tub: Tub/Shower unit  Bathroom Toilet: Standard  Bathroom Equipment: Shower chair  Bathroom Accessibility: Accessible  Home Equipment: Oxygen  ADL Assistance: Independent  Homemaking Assistance: Independent  Homemaking Responsibilities: Yes  Ambulation Assistance: Independent  Transfer Assistance: Independent  Active : Yes  Type of occupation: 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[employer], [employer], and apartment management       Objective   Vision: Impaired  Vision Exceptions: Wears glasses for distance  Hearing: Within functional limits    Orientation  Overall Orientation Status: Within Functional Limits  Observation/Palpation  Posture: Fair  Observation: Patient presents awake in bed, splint on L forearm, NAD  Balance  Sitting Balance: Independent  Standing Balance: Minimal assistance  Functional Mobility  Functional Mobility Comments: unable to assess  ADL  Additional Comments: unable to assess ADL's  Tone RUE  RUE Tone: Normotonic  Tone LUE  LUE Tone: Normotonic     Bed mobility  Rolling to Right: Supervision;Stand by assistance  Supine to Sit: Supervision;Stand by assistance  Sit to Supine: Stand by assistance;Contact guard assistance  Transfers  Sit to stand: Contact guard assistance  Transfer Comments: unsteady upon come to stand     Cognition  Overall Cognitive Status: WFL        Sensation  Overall Sensation Status: WFL        LUE AROM (degrees)  LUE AROM : WFL  RUE AROM (degrees)  RUE AROM : WFL  LUE Strength  LUE Strength Comment: Deferred secondary to fx  RUE Strength  R Shoulder Flex: 4+/5  R Elbow Flex: 4+/5  R Elbow Ext: 4+/5  R Hand General: 4+/5                   Plan   Plan  Times per week: 3-5  Times per day: Daily  Plan weeks: 1-2  Current Treatment Recommendations: Strengthening,Balance Training,Functional Mobility Training,Endurance Training,Self-Care / ADL,Patient/Caregiver Education & Training,Safety Education & Training      Goals  Short term goals  Time Frame for Short term goals: 1 week  Short term goal 1: Pt to complete LB dressing with MOD I. Short term goal 2: Pt to complete toileting with MOD I. Short term goal 3: Pt to complete hygiene/grooming standing at sink with no LOB with MOD I. Short term goal 4: pt to complete Sponge bathing with MOD I. Short term goal 5: Pt to tolerate static standing x5 minutes with no LOB. Therapy Time   Individual Concurrent Group Co-treatment   Time In 9098         Time Out 5925         Minutes 19              This note serves as a DC summary in the event of pt discharge.      Mireille Greenwood, OTR/L

## 2021-12-30 NOTE — DISCHARGE SUMMARY
Discharge Summary    Date:12/30/2021        Patient Name:Gwendolyn Greenberg     Date of Birth:[birth year]     Age:76 y.o. Admit Date:12/28/2021   Admission Condition:good   Discharged Condition:good  Discharge Date: 12/30/21     Discharge Diagnoses   Active Problems:    Wrist fracture, closed, left, initial encounter  Resolved Problems:    * No resolved hospital problems. [facility] Stay   Narrative of Hospital Course: See H and P, brought in to hospital for swing bed placement. Consultants:   None    Time Spent on Discharge:  30 minutes were spent in patient examination, evaluation, counseling as well as medication reconciliation, prescriptions for required medications, discharge plan and follow up. Surgeries/Procedures Performed:   None    Treatments:   PT/OT    Significant Diagnostic Studies:   Recent Labs:  CBC:   Lab Results   Component Value Date    WBC 6.2 12/28/2021    RBC 4.14 12/28/2021    HGB 13.1 12/28/2021    HCT 40.1 12/28/2021    MCV 96.9 12/28/2021    MCH 31.6 12/28/2021    MCHC 32.7 12/28/2021    RDW 13.2 12/28/2021     12/28/2021     CMP:    Lab Results   Component Value Date    GLUCOSE 105 12/28/2021     12/28/2021    K 4.1 12/28/2021    K 3.8 11/27/2021     12/28/2021    CO2 25 12/28/2021    BUN 14 12/28/2021    CREATININE 0.7 12/28/2021    ANIONGAP 10 12/28/2021    ALKPHOS 98 12/28/2021    ALT 12 12/28/2021    AST 16 12/28/2021    BILITOT 0.3 12/28/2021    LABALBU 4.1 12/28/2021    LABGLOM >60 12/28/2021    GFRAA >59 12/28/2021    PROT 6.8 12/28/2021    CALCIUM 8.7 12/28/2021       Radiology Last 7 Days:  XR WRIST LEFT (MIN 3 VIEWS)    Result Date: 12/28/2021  Acute triquetral fracture, along the dorsal aspect of the carpal bones. Localized soft tissue swelling. Radiocarpal joint space narrowing. No definite fracture at the wrist.    XR ANKLE LEFT (MIN 3 VIEWS)    Result Date: 12/28/2021  No acute findings.     XR CHEST PORTABLE    Result Date: 12/28/2021  No acute cardiopulmonary findings. No significant change from the prior study. Discharge Plan   Disposition: Swing bed    Provider Follow-Up:   No follow-up provider specified. Hospital/Incidental Findings Requiring Follow-Up:  Wrist fracture, we do not have Ortho until mid-January    Patient Instructions   Diet: cardiac diet    Activity: activity as tolerated    Other Instructions:       Discharge Medications         Medication List      START taking these medications    traMADol 50 MG tablet  Commonly known as: ULTRAM  Take 2 tablets by mouth every 6 hours as needed for Pain for up to 3 days.         CHANGE how you take these medications    ondansetron 4 MG tablet  Commonly known as: ZOFRAN  Take 2 tablets by mouth 3 times daily as needed for Nausea or Vomiting  What changed: how much to take        CONTINUE taking these medications    * albuterol sulfate  (90 Base) MCG/ACT inhaler     * albuterol (5 MG/ML) 0.5% nebulizer solution  Commonly known as: PROVENTIL  Take 1 mL by nebulization 4 times daily as needed for Wheezing     aspirin 81 MG EC tablet     Breztri Aerosphere 160-9-4.8 MCG/ACT Aero  Generic drug: Budeson-Glycopyrrol-Formoterol  Inhale 2 puffs into the lungs daily     clonazePAM 2 MG tablet  Commonly known as: KLONOPIN  TAKE ONE TABLET BY MOUTH ONCE NIGHTLY AS NEEDED FOR INSOMNIA     levothyroxine 75 MCG tablet  Commonly known as: SYNTHROID  Take 1 tablet by mouth Daily     lisinopril-hydroCHLOROthiazide 20-12.5 MG per tablet  Commonly known as: PRINZIDE;ZESTORETIC  Take 1 tablet by mouth daily  Start taking on: December 31, 2021     nitroGLYCERIN 0.4 MG SL tablet  Commonly known as: Nitrostat  Place 1 tablet under the tongue every 5 minutes as needed for Chest pain     nystatin 906924 UNIT/GM cream  Commonly known as: MYCOSTATIN     vitamin D 1.25 MG (56115 UT) Caps capsule  Commonly known as: ERGOCALCIFEROL  Take 1 capsule by mouth once a week         * This list has 2 medication(s) that are the same as other medications prescribed for you. Read the directions carefully, and ask your doctor or other care provider to review them with you.                Where to Get Your Medications      You can get these medications from any pharmacy    Bring a paper prescription for each of these medications  · lisinopril-hydroCHLOROthiazide 20-12.5 MG per tablet  · traMADol 50 MG tablet         Electronically signed by David Trevino MD on 12/30/21 at 3:39 PM EST

## 2021-12-30 NOTE — ACP (ADVANCE CARE PLANNING)
Advance Care Planning     General Advance Care Planning (ACP) Conversation    Date of Conversation: 12/30/2021  Conducted with: Patient with Decision Making Capacity during acute stay     Healthcare Decision Maker:    Primary Decision Maker: Ayo Jenkins - Brother/Sister - [phone]    Supplemental (Other) Decision Maker: Jeanie Magaña - Other - [phone]  Click here to complete [address] including selection of the Healthcare Decision Maker Relationship (ie \"Primary\"). Today we documented Decision Maker(s) consistent with Legal Next of Kin hierarchy. Content/Action Overview:  Has NO ACP documents/care preferences - information provided, considering goals and options  Reviewed DNR/DNI and patient elects Full Code (Attempt Resuscitation)  *Pt does not want long term life support.       Length of Voluntary ACP Conversation in minutes:  <16 minutes (Non-Billable)    Bora Woodard RN

## 2021-12-31 ENCOUNTER — APPOINTMENT (OUTPATIENT)
Dept: CT IMAGING | Facility: HOSPITAL | Age: 76
DRG: 561 | End: 2021-12-31
Attending: INTERNAL MEDICINE
Payer: MEDICARE

## 2021-12-31 PROCEDURE — 6370000000 HC RX 637 (ALT 250 FOR IP): Performed by: PHYSICIAN ASSISTANT

## 2021-12-31 PROCEDURE — 6360000002 HC RX W HCPCS: Performed by: PHYSICIAN ASSISTANT

## 2021-12-31 PROCEDURE — 99219 PR INITIAL OBSERVATION CARE/DAY 50 MINUTES: CPT | Performed by: INTERNAL MEDICINE

## 2021-12-31 PROCEDURE — 2700000000 HC OXYGEN THERAPY PER DAY

## 2021-12-31 PROCEDURE — 97165 OT EVAL LOW COMPLEX 30 MIN: CPT

## 2021-12-31 PROCEDURE — 1200000002 HC SEMI PRIVATE SWING BED

## 2021-12-31 PROCEDURE — 70450 CT HEAD/BRAIN W/O DYE: CPT

## 2021-12-31 PROCEDURE — 6360000002 HC RX W HCPCS: Performed by: INTERNAL MEDICINE

## 2021-12-31 PROCEDURE — 6370000000 HC RX 637 (ALT 250 FOR IP): Performed by: INTERNAL MEDICINE

## 2021-12-31 RX ORDER — POLYETHYLENE GLYCOL 3350 17 G/17G
17 POWDER, FOR SOLUTION ORAL DAILY PRN
Status: DISCONTINUED | OUTPATIENT
Start: 2021-12-31 | End: 2022-01-06 | Stop reason: HOSPADM

## 2021-12-31 RX ORDER — ONDANSETRON 4 MG/1
4 TABLET, ORALLY DISINTEGRATING ORAL EVERY 8 HOURS PRN
Status: DISCONTINUED | OUTPATIENT
Start: 2021-12-31 | End: 2022-01-06 | Stop reason: HOSPADM

## 2021-12-31 RX ORDER — ONDANSETRON 2 MG/ML
4 INJECTION INTRAMUSCULAR; INTRAVENOUS EVERY 6 HOURS PRN
Status: DISCONTINUED | OUTPATIENT
Start: 2021-12-31 | End: 2022-01-06 | Stop reason: HOSPADM

## 2021-12-31 RX ORDER — ACETAMINOPHEN 325 MG/1
650 TABLET ORAL EVERY 6 HOURS PRN
Status: DISCONTINUED | OUTPATIENT
Start: 2021-12-31 | End: 2022-01-06 | Stop reason: HOSPADM

## 2021-12-31 RX ORDER — PROMETHAZINE HYDROCHLORIDE 25 MG/ML
6.25 INJECTION, SOLUTION INTRAMUSCULAR; INTRAVENOUS EVERY 6 HOURS PRN
Status: DISCONTINUED | OUTPATIENT
Start: 2021-12-31 | End: 2022-01-06 | Stop reason: HOSPADM

## 2021-12-31 RX ORDER — ACETAMINOPHEN 650 MG/1
650 SUPPOSITORY RECTAL EVERY 6 HOURS PRN
Status: DISCONTINUED | OUTPATIENT
Start: 2021-12-31 | End: 2022-01-06 | Stop reason: HOSPADM

## 2021-12-31 RX ADMIN — CLONAZEPAM 1 MG: 0.5 TABLET ORAL at 20:46

## 2021-12-31 RX ADMIN — ASPIRIN 81 MG: 81 TABLET, COATED ORAL at 08:09

## 2021-12-31 RX ADMIN — ENOXAPARIN SODIUM 40 MG: 100 INJECTION SUBCUTANEOUS at 11:03

## 2021-12-31 RX ADMIN — PROMETHAZINE HYDROCHLORIDE 6.25 MG: 25 INJECTION INTRAMUSCULAR; INTRAVENOUS at 08:18

## 2021-12-31 RX ADMIN — ERGOCALCIFEROL 50000 UNITS: 1.25 CAPSULE ORAL at 08:10

## 2021-12-31 RX ADMIN — ONDANSETRON HYDROCHLORIDE 4 MG: 2 INJECTION, SOLUTION INTRAMUSCULAR; INTRAVENOUS at 12:30

## 2021-12-31 RX ADMIN — LEVOTHYROXINE SODIUM 75 MCG: 75 TABLET ORAL at 06:11

## 2021-12-31 RX ADMIN — ACETAMINOPHEN 650 MG: 325 TABLET, FILM COATED ORAL at 20:46

## 2021-12-31 ASSESSMENT — PAIN SCALES - GENERAL
PAINLEVEL_OUTOF10: 3
PAINLEVEL_OUTOF10: 0

## 2021-12-31 NOTE — FLOWSHEET NOTE
12/31/21 0835   Assessment   Charting Type Shift assessment   Neurological   Neuro (WDL) X   Level of Consciousness Alert (0)   Orientation Level Oriented to place;Oriented to time;Oriented to person;Disoriented to situation   Cognition Follows commands   Adriana Coma Scale   Eye Opening 4   Best Verbal Response 5   Best Motor Response 6   Adriana Coma Scale Score 15   HEENT   HEENT (WDL) X   Right Eye Impaired vision   Left Eye Impaired vision   Voice Normal   Teeth Dentures upper   Respiratory   Respiratory (WDL) WDL   Respiratory Pattern Regular   Respiratory Depth Normal   Respiratory Quality/Effort Unlabored   Chest Assessment Chest expansion symmetrical;Trachea midline   L Breath Sounds Clear   R Breath Sounds Clear   Cardiac   Cardiac (WDL) WDL   Cardiac Regularity Regular   Heart Sounds S1, S2   Cardiac Monitor   Telemetry Monitor On Yes   Gastrointestinal   Abdominal (WDL) X   RUQ Bowel Sounds Active   LUQ Bowel Sounds Active   RLQ Bowel Sounds Active   LLQ Bowel Sounds Active   Abdomen Inspection Rounded   GI Symptoms Nausea;Vomiting   Relieved By Antiemetic   Nausea Precipitating Factors Movement   Tenderness Soft   Emesis Appearance Yellow   Peripheral Vascular   Peripheral Vascular (WDL) WDL   Edema None   RLE Neurovascular Assessment   Capillary Refill Less than/equal to 3 seconds   Color Pink   Temperature Warm   R Pedal Pulse +2   LLE Neurovascular Assessment   Capillary Refill Less than/equal to 3 seconds   Color Pink   Temperature Warm   L Pedal Pulse +2   Skin Color/Condition   Skin Color/Condition (WDL) WDL   Skin Integrity   Skin Integrity (WDL) X   Skin Integrity Abrasion   Location rt knee   Preventative Dressing No   Musculoskeletal   Musculoskeletal (WDL) X   RUE Full movement   LUE Splint   RL Extremity Weakness   LL Extremity Weakness   Musculoskeletal Details   L Wrist Immobilizer   L Ankle Other (Comment)  (pain)   Genitourinary   Genitourinary (WDL) WDL   Flank Tenderness No

## 2021-12-31 NOTE — H&P
puffs into the lungs every 6 hours as needed  10/27/21   Historical Provider, MD   clonazePAM (KLONOPIN) 2 MG tablet TAKE ONE TABLET BY MOUTH ONCE NIGHTLY AS NEEDED FOR INSOMNIA 11/17/21 12/29/21  Aleshia Pride MD   ondansetron (ZOFRAN) 4 MG tablet Take 2 tablets by mouth 3 times daily as needed for Nausea or Vomiting  Patient taking differently: Take 4 mg by mouth 3 times daily as needed for Nausea or Vomiting  5/24/21   JUSTINE Burkett   levothyroxine (SYNTHROID) 75 MCG tablet Take 1 tablet by mouth Daily 3/30/21   Sade Rosa APRN - CNP   nystatin (MYCOSTATIN) [id number] UNIT/GM cream Apply topically 2 times daily. Historical Provider, MD   aspirin 81 MG EC tablet Take 81 mg by mouth daily    Historical Provider, MD   nitroGLYCERIN (NITROSTAT) 0.4 MG SL tablet Place 1 tablet under the tongue every 5 minutes as needed for Chest pain 7/8/20   Sade Screws, APRN - CNP        Allergies   Iv dye [iodides], Azithromycin, Isosorbide mononitrate [isosorbide nitrate], Augmentin [amoxicillin-pot clavulanate], Codeine, Influenza vaccines, Mucinex [guaifenesin er], Nyquil severe cold-flu [phenyleph-doxylamine-dm-apap], and Reglan [metoclopramide]    Social History     Social History     Tobacco History     Smoking Status  Never Smoker    Smokeless Tobacco Use  Never Used          Alcohol History     Alcohol Use Status  No          Drug Use     Drug Use Status  No          Sexual Activity     Sexually Active  Not Asked                Family History   History reviewed. No pertinent family history. Review of Systems   Review of Systems   Constitutional: Negative. HENT: Negative. Respiratory: Negative. Cardiovascular: Negative. Physical Exam   /65   Pulse 72   Temp 98.4 °F (36.9 °C)   Resp 18   SpO2 (!) 89%     Physical Exam    Labs    No results found for this or any previous visit (from the past 24 hour(s)). Imaging/Diagnostics Last 24 Hours   No results found.     Assessment Hospital Problems           Last Modified POA    Declining functional status 12/30/2021 Yes          Plan   1. Mechanical fall w wrist injury - Ortho eval when able, control N/V first, check CT brain to rule out bleed or mass, PT/OT eval for gate disturbances of other comprimise. 2. Baseline function - Pt very capable, works three jobs and cares for herself. 3. Hx of DVT - LMWH, remove if any sign of intracranial abnl. 4. Anx/panic D/O - Home meds. Consultations Ordered:  IP CONSULT TO CASE MANAGEMENT    Electronically signed by Meri Rinaldi MD on 12/31/21 at 2:11 PM EST      ADDENDUM to 12/23/21 admission CT of the head was neg for bleedd, no acute findings.

## 2021-12-31 NOTE — PROGRESS NOTES
Occupational Therapy  Swing-Bed Initial Assessment  Date: 2021   Patient Name: Darvin Garcia  MRN: [id number]     : 1945    Date of Service: 2021  Discharge Recommendations:  Continue to assess pending progress    Assessment  Performance deficits / Impairments: Decreased balance;Decreased functional mobility ; Decreased ADL status; Decreased endurance;Decreased strength;Decreased high-level IADLs  Decision Making: Low Complexity  REQUIRES OT FOLLOW UP: Yes  Activity Tolerance: Limited by nausea     Prognosis: Good    Pt seen for skilled OT SWB evaluation & interventions. Pt reports recent episode of emesis from dizziness & nausea; unable to move OOB. OT provided pt education about weekend program expectations to prevent functional decline & promote activity tolerance. Pt reports RLE edema; OT provided education about elevation of RLE & R arm for edema control; pt assisted w positioning in supine w R side elevated. Pt is a good candidate to benefit from skilled OT services while in hospital to promote increased endurance, activity tolerance, & independence w ADLs. Patient Diagnosis(es): There were no encounter diagnoses. has a past medical history of Bleeds easily ([address] DVT (deep venous thrombosis) ([address] Headache(784.0), Hypertension, Hypothyroid, MI, old, Moderate episode of recurrent major depressive disorder ([address] Pneumonia, Stomach ulcer, and Thyroid disease. has a past surgical history that includes Cholecystectomy; Liver surgery; Appendectomy; and Upper gastrointestinal endoscopy.      Restrictions  Restrictions/Precautions: General Precautions,Fall Risk  Required Braces or Orthoses?: Yes    Subjective  Chart Reviewed: Yes  Patient assessed for rehabilitation services?: Yes  Family / Caregiver Present: No  Referring Practitioner: Tete Gonzalez MD  Diagnosis: LUE Wrist fracture s/p fall  Subjective: Pt states she had an isolated fall outside and landed on wrist. Pt agreeable to OT services. Patient Currently in Pain: Yes  Pain Location: Ankle; Wrist  Pain Orientation: Left  Patient Currently in Pain: Yes    Social/Functional History  Lives With: Alone  Type of Home: Apartment  Home Layout: One level  Home Access: Level entry  Bathroom Shower/Tub: Tub/Shower unit  Bathroom Toilet: Standard  Bathroom Equipment: Shower chair  Bathroom Accessibility: Accessible  Home Equipment: Oxygen  ADL Assistance: Independent  Homemaking Assistance: Independent  Homemaking Responsibilities: Yes  Ambulation Assistance: Independent  Transfer Assistance: Independent  Active : Yes  Type of occupation: 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[employer], [employer], & [employer]    Objective  Vision: Impaired: Wears glasses for distance  Hearing: Within functional limits    Overall Orientation Status: Within Functional Limits  Posture: Fair  Observation: Patient presents awake in bed, splint on L forearm, NAD    Sitting Balance: Independent  Functional Mobility: unable to assess    Bed mobility: Supervision;Stand by assistance  Supine to Sit: Supervision;Stand by assistance  Sit to Supine: Stand by assistance;Contact guard assistance         LUE AROM : WFL  RUE AROM : WFL  LUE Strength Comment: Deferred secondary to fx  RUE Strength  R Shoulder Flex: 4+/5  R Elbow Flex: 4+/5  R Elbow Ext: 4+/5  R Hand General: 4+/5     Plan  Times per week: 3-5  Times per day: Daily  Plan weeks: 1-2  Current Treatment Recommendations: Strengthening,Balance Training,Functional Mobility Training,Endurance Training,Self-Care / ADL,Patient/Caregiver Education & Training,Safety Education & Training    Goals  Short term goals  Time Frame for Short term goals: 1 week  Short term goal 1: Pt to complete LB dressing with MOD I. Short term goal 2: Pt to complete toileting with MOD I. Short term goal 3: Pt to complete hygiene/grooming standing at sink with no LOB with MOD I.   Short term goal 4: pt to complete Sponge bathing with MOD I.  Short term goal 5: Pt to tolerate static standing x5 minutes with no LOB. Therapy Time   Individual   Time In 104   Time Out 121   Minutes 17     This note serves as a DC summary in the event of pt discharge.    Alexis Paul OTR/L

## 2021-12-31 NOTE — FLOWSHEET NOTE
12/30/21 2030   Assessment   Charting Type Shift assessment   Neurological   Neuro (WDL) X   Level of Consciousness Alert (0)   Orientation Level Oriented to place;Oriented to time;Oriented to person;Disoriented to situation   Cognition Follows commands   Adriana Coma Scale   Eye Opening 4   Best Verbal Response 5   Best Motor Response 6   Adriana Coma Scale Score 15   HEENT   HEENT (WDL) X   Right Eye Impaired vision   Left Eye Impaired vision   Voice Normal   Teeth Dentures upper   Respiratory   Respiratory (WDL) WDL   Respiratory Pattern Regular   Respiratory Depth Normal   Respiratory Quality/Effort Unlabored   Chest Assessment Chest expansion symmetrical;Trachea midline   L Breath Sounds Clear   R Breath Sounds Clear   Cardiac   Cardiac (WDL) WDL   Cardiac Regularity Regular   Heart Sounds S1, S2   Cardiac Monitor   Telemetry Monitor On Yes   Gastrointestinal   Abdominal (WDL) X   RUQ Bowel Sounds Active   LUQ Bowel Sounds Active   RLQ Bowel Sounds Active   LLQ Bowel Sounds Active   Abdomen Inspection Rounded   GI Symptoms Nausea;Vomiting   Relieved By Antiemetic   Nausea Precipitating Factors Movement   Tenderness Soft   Emesis Appearance Yellow   Peripheral Vascular   Peripheral Vascular (WDL) WDL   Edema None   RLE Neurovascular Assessment   Capillary Refill Less than/equal to 3 seconds   Color Pink   Temperature Warm   R Pedal Pulse +2   LLE Neurovascular Assessment   Capillary Refill Less than/equal to 3 seconds   Color Pink   Temperature Warm   L Pedal Pulse +2   Skin Color/Condition   Skin Color/Condition (WDL) WDL   Skin Integrity   Skin Integrity (WDL) X   Skin Integrity Abrasion   Location Right Knee    Musculoskeletal   Musculoskeletal (WDL) X   RUE Full movement   LUE Splint   RL Extremity Weakness   LL Extremity Weakness   Musculoskeletal Details   L Wrist Immobilizer   L Ankle Other (Comment)  (pain)   Genitourinary   Genitourinary (WDL) WDL   Flank Tenderness No   Suprapubic Tenderness No Dysuria No   Anus/Rectum   Anus/Rectum (WDL) WDL   Psychosocial   Psychosocial (WDL) WDL

## 2022-01-01 LAB — TROPONIN: <0.3 NG/ML

## 2022-01-01 PROCEDURE — 93005 ELECTROCARDIOGRAM TRACING: CPT

## 2022-01-01 PROCEDURE — 1200000002 HC SEMI PRIVATE SWING BED

## 2022-01-01 PROCEDURE — 94761 N-INVAS EAR/PLS OXIMETRY MLT: CPT

## 2022-01-01 PROCEDURE — 84484 ASSAY OF TROPONIN QUANT: CPT

## 2022-01-01 PROCEDURE — 2700000000 HC OXYGEN THERAPY PER DAY

## 2022-01-01 PROCEDURE — 6370000000 HC RX 637 (ALT 250 FOR IP): Performed by: INTERNAL MEDICINE

## 2022-01-01 PROCEDURE — 36415 COLL VENOUS BLD VENIPUNCTURE: CPT

## 2022-01-01 PROCEDURE — 6360000002 HC RX W HCPCS: Performed by: PHYSICIAN ASSISTANT

## 2022-01-01 RX ADMIN — TRAMADOL HYDROCHLORIDE 100 MG: 50 TABLET ORAL at 20:04

## 2022-01-01 RX ADMIN — ASPIRIN 81 MG: 81 TABLET, COATED ORAL at 08:44

## 2022-01-01 RX ADMIN — CLONAZEPAM 1 MG: 0.5 TABLET ORAL at 18:20

## 2022-01-01 RX ADMIN — Medication 0.4 MG: at 21:15

## 2022-01-01 RX ADMIN — ONDANSETRON HYDROCHLORIDE 4 MG: 2 INJECTION, SOLUTION INTRAMUSCULAR; INTRAVENOUS at 08:54

## 2022-01-01 RX ADMIN — LEVOTHYROXINE SODIUM 75 MCG: 75 TABLET ORAL at 06:16

## 2022-01-01 RX ADMIN — ENOXAPARIN SODIUM 40 MG: 100 INJECTION SUBCUTANEOUS at 08:44

## 2022-01-01 ASSESSMENT — PAIN SCALES - GENERAL: PAINLEVEL_OUTOF10: 8

## 2022-01-01 ASSESSMENT — ENCOUNTER SYMPTOMS: RESPIRATORY NEGATIVE: 1

## 2022-01-01 NOTE — FLOWSHEET NOTE
01/01/22 0910   Assessment   Charting Type Shift assessment   Neurological   Neuro (WDL) X   Level of Consciousness Alert (0)   Orientation Level Oriented to place;Oriented to time;Oriented to person;Disoriented to situation   Cognition Follows commands   Adriana Coma Scale   Eye Opening 4   Best Verbal Response 5   Best Motor Response 6   [place] Coma Scale Score 15   HEENT   HEENT (WDL) X   Right Eye Impaired vision   Left Eye Impaired vision   Voice Normal   Teeth Dentures upper   Respiratory   Respiratory (WDL) WDL   Respiratory Pattern Regular   Respiratory Depth Normal   Respiratory Quality/Effort Unlabored   Chest Assessment Chest expansion symmetrical;Trachea midline   L Breath Sounds Clear   R Breath Sounds Clear   Cardiac   Cardiac (WDL) WDL   Cardiac Regularity Regular   Heart Sounds S1, S2   Cardiac Monitor   Telemetry Monitor On Yes   Gastrointestinal   Abdominal (WDL) X   RUQ Bowel Sounds Active   LUQ Bowel Sounds Active   RLQ Bowel Sounds Active   LLQ Bowel Sounds Active   Abdomen Inspection Rounded   Tenderness Soft   Peripheral Vascular   Peripheral Vascular (WDL) WDL   Edema None   RLE Neurovascular Assessment   Capillary Refill Less than/equal to 3 seconds   Color Pink   Temperature Warm   R Pedal Pulse +2   LLE Neurovascular Assessment   Capillary Refill Less than/equal to 3 seconds   Color Pink   Temperature Warm   L Pedal Pulse +2   Skin Color/Condition   Skin Color/Condition (WDL) WDL   Skin Integrity   Skin Integrity (WDL) X   Skin Integrity Abrasion   Location rt knee   Preventative Dressing No   Musculoskeletal   Musculoskeletal (WDL) X   RUE Full movement   LUE Splint   RL Extremity Weakness   LL Extremity Weakness   Musculoskeletal Details   L Wrist Immobilizer   Genitourinary   Genitourinary (WDL) WDL   Flank Tenderness No   Suprapubic Tenderness No   Dysuria No   Anus/Rectum   Anus/Rectum (WDL) WDL   Psychosocial   Psychosocial (WDL) WDL   Ambulated to bathroom with marzena-walker without difficulty. Had medium soft formed BM. Called staff and stated she was faint. Accompanied back to bed in wheel chair.   IV zofran given for nausea after breakfast.  At 75% of her meal.

## 2022-01-01 NOTE — FLOWSHEET NOTE
12/31/21 2245   Assessment   Charting Type Shift assessment   Neurological   Neuro (WDL) X   Level of Consciousness Alert (0)   Orientation Level Oriented to place;Oriented to time;Oriented to person;Disoriented to situation   Cognition Follows commands   [place] Coma Scale   Eye Opening 4   Best Verbal Response 5   Best Motor Response 6   [place] Coma Scale Score 15   HEENT   HEENT (WDL) X   Right Eye Impaired vision   Left Eye Impaired vision   Voice Normal   Teeth Dentures upper   Respiratory   Respiratory (WDL) WDL   Respiratory Pattern Regular   Respiratory Depth Normal   Respiratory Quality/Effort Unlabored   Chest Assessment Chest expansion symmetrical;Trachea midline   L Breath Sounds Clear   R Breath Sounds Clear   Cardiac   Cardiac (WDL) WDL   Cardiac Regularity Regular   Heart Sounds S1, S2   Rhythm Interpretation   Pulse 67   Cardiac Monitor   Telemetry Monitor On Yes   Gastrointestinal   Abdominal (WDL) X   RUQ Bowel Sounds Active   LUQ Bowel Sounds Active   RLQ Bowel Sounds Active   LLQ Bowel Sounds Active   Abdomen Inspection Rounded   Last BM (including prior to admit) 12/30/21   Tenderness Soft   Peripheral Vascular   Peripheral Vascular (WDL) WDL   Edema None   RLE Neurovascular Assessment   Capillary Refill Less than/equal to 3 seconds   Color Pink   Temperature Warm   R Pedal Pulse +2   LLE Neurovascular Assessment   Capillary Refill Less than/equal to 3 seconds   Color Pink   Temperature Warm   L Pedal Pulse +2   Skin Color/Condition   Skin Color/Condition (WDL) WDL   Skin Integrity   Skin Integrity (WDL) X   Skin Integrity Abrasion   Location rt knee   Musculoskeletal   Musculoskeletal (WDL) X   RUE Full movement   LUE Splint   RL Extremity Weakness   LL Extremity Weakness   Musculoskeletal Details   L Wrist Immobilizer   Genitourinary   Genitourinary (WDL) WDL   Flank Tenderness No   Suprapubic Tenderness No   Dysuria No   Anus/Rectum   Anus/Rectum (WDL) WDL   Psychosocial   Psychosocial (WDL) WDL

## 2022-01-02 PROCEDURE — 6370000000 HC RX 637 (ALT 250 FOR IP): Performed by: PHYSICIAN ASSISTANT

## 2022-01-02 PROCEDURE — 6360000002 HC RX W HCPCS: Performed by: PHYSICIAN ASSISTANT

## 2022-01-02 PROCEDURE — 1200000002 HC SEMI PRIVATE SWING BED

## 2022-01-02 PROCEDURE — 94761 N-INVAS EAR/PLS OXIMETRY MLT: CPT

## 2022-01-02 PROCEDURE — 2700000000 HC OXYGEN THERAPY PER DAY

## 2022-01-02 PROCEDURE — 6370000000 HC RX 637 (ALT 250 FOR IP): Performed by: INTERNAL MEDICINE

## 2022-01-02 RX ADMIN — ACETAMINOPHEN 650 MG: 325 TABLET, FILM COATED ORAL at 08:15

## 2022-01-02 RX ADMIN — LEVOTHYROXINE SODIUM 75 MCG: 75 TABLET ORAL at 05:49

## 2022-01-02 RX ADMIN — TRAMADOL HYDROCHLORIDE 100 MG: 50 TABLET ORAL at 05:52

## 2022-01-02 RX ADMIN — ASPIRIN 81 MG: 81 TABLET, COATED ORAL at 08:15

## 2022-01-02 RX ADMIN — CLONAZEPAM 1 MG: 0.5 TABLET ORAL at 08:15

## 2022-01-02 RX ADMIN — ENOXAPARIN SODIUM 40 MG: 100 INJECTION SUBCUTANEOUS at 08:15

## 2022-01-02 RX ADMIN — ONDANSETRON HYDROCHLORIDE 4 MG: 2 INJECTION, SOLUTION INTRAMUSCULAR; INTRAVENOUS at 08:15

## 2022-01-02 ASSESSMENT — PAIN SCALES - GENERAL
PAINLEVEL_OUTOF10: 8
PAINLEVEL_OUTOF10: 10
PAINLEVEL_OUTOF10: 3

## 2022-01-02 NOTE — FLOWSHEET NOTE
01/02/22 0823   Assessment   Charting Type Shift assessment   Neurological   Neuro (WDL) X   Level of Consciousness Alert (0)   Orientation Level Oriented X4   Cognition Follows commands   [place] Coma Scale   Eye Opening 4   Best Verbal Response 5   Best Motor Response 6   [place] Coma Scale Score 15   HEENT   HEENT (WDL) X   Right Eye Impaired vision   Left Eye Impaired vision   Voice Normal   Teeth Dentures upper   Respiratory   Respiratory (WDL) WDL   Respiratory Pattern Regular   Respiratory Depth Normal   Respiratory Quality/Effort Unlabored   Chest Assessment Chest expansion symmetrical;Trachea midline   L Breath Sounds Clear   R Breath Sounds Clear   Cardiac   Cardiac (WDL) WDL   Cardiac Regularity Regular   Heart Sounds S1, S2   Cardiac Monitor   Telemetry Monitor On Yes   Telemetry Audible Yes   Telemetry Alarms Set Yes   Telemetry Box Number [id number]   Gastrointestinal   Abdominal (WDL) X   RUQ Bowel Sounds Active   LUQ Bowel Sounds Active   RLQ Bowel Sounds Active   LLQ Bowel Sounds Active   Abdomen Inspection Rounded   GI Symptoms Nausea;Vomiting   Relieved By Antiemetic   Nausea Precipitating Factors Movement   Peripheral Vascular   Peripheral Vascular (WDL) WDL   Edema None   RLE Neurovascular Assessment   Capillary Refill Less than/equal to 3 seconds   Color Pink   Temperature Warm   LLE Neurovascular Assessment   Capillary Refill Less than/equal to 3 seconds   Color Pink   Temperature Warm   Skin Color/Condition   Skin Color/Condition (WDL) WDL   Skin Integrity   Skin Integrity (WDL) X   Skin Integrity Abrasion   Location rt knee    Musculoskeletal   Musculoskeletal (WDL) X   RUE Full movement   LUE Splint   RL Extremity Weakness   LL Extremity Weakness   Musculoskeletal Details   L Wrist Immobilizer   Genitourinary   Genitourinary (WDL) WDL   Flank Tenderness No   Suprapubic Tenderness No   Dysuria No   Psychosocial   Psychosocial (WDL) WDL   Pt awake in bed. Pt alert and oriented.  Pt appears in no acute distress. Pt currently on 3 L NC. Pt currently on telemetry monitoring showing NSR. Pt lung sounds clear. Pt encouraged to cough and deep breathe. Pt states terrible HA and nausea, see eMAR for intervention. Pt call bell and bedside table within reach. Will continue to monitor pt.

## 2022-01-02 NOTE — PROGRESS NOTES
Pt complaining of chest pain, not relieved after one nitro SL, ekg ordered and troponin stat, md notified

## 2022-01-02 NOTE — FLOWSHEET NOTE
01/01/22 7455   Assessment   Charting Type Shift assessment   Neurological   Neuro (WDL) X   Level of Consciousness Alert (0)   Orientation Level Oriented to place;Oriented to time;Oriented to person;Disoriented to situation   Cognition Follows commands   [place] Coma Scale   Eye Opening 4   Best Verbal Response 5   Best Motor Response 6   [place] Coma Scale Score 15   HEENT   HEENT (WDL) X   Right Eye Impaired vision   Left Eye Impaired vision   Voice Normal   Teeth Dentures upper   Respiratory   Respiratory (WDL) WDL   Respiratory Pattern Regular   Respiratory Depth Normal   Respiratory Quality/Effort Unlabored   Chest Assessment Chest expansion symmetrical;Trachea midline   L Breath Sounds Clear   R Breath Sounds Clear   Cardiac   Cardiac (WDL) WDL   Cardiac Regularity Regular   Heart Sounds S1, S2   Rhythm Interpretation   Pulse 78   Cardiac Monitor   Telemetry Monitor On Yes   Gastrointestinal   Abdominal (WDL) X   RUQ Bowel Sounds Active   LUQ Bowel Sounds Active   RLQ Bowel Sounds Active   LLQ Bowel Sounds Active   Abdomen Inspection Rounded   GI Symptoms Nausea;Vomiting   Relieved By Antiemetic   Nausea Precipitating Factors Movement   Tenderness Soft   Emesis Appearance Yellow   Peripheral Vascular   Peripheral Vascular (WDL) WDL   Edema None   RLE Neurovascular Assessment   Capillary Refill Less than/equal to 3 seconds   Color Pink   Temperature Warm   R Pedal Pulse +2   LLE Neurovascular Assessment   Capillary Refill Less than/equal to 3 seconds   Color Pink   Temperature Warm   L Pedal Pulse +2   Skin Color/Condition   Skin Color/Condition (WDL) WDL   Skin Integrity   Skin Integrity (WDL) X   Skin Integrity Abrasion   Location rt knee   Preventative Dressing No   Musculoskeletal   Musculoskeletal (WDL) X   RUE Full movement   LUE Splint   RL Extremity Weakness   LL Extremity Weakness   Musculoskeletal Details   L Wrist Immobilizer   Genitourinary   Genitourinary (WDL) WDL   Flank Tenderness No Suprapubic Tenderness No   Dysuria No   Psychosocial   Psychosocial (WDL) WDL

## 2022-01-03 ENCOUNTER — APPOINTMENT (OUTPATIENT)
Dept: GENERAL RADIOLOGY | Facility: HOSPITAL | Age: 77
DRG: 561 | End: 2022-01-03
Attending: INTERNAL MEDICINE
Payer: MEDICARE

## 2022-01-03 LAB
A/G RATIO: 1.7 (ref 0.8–2)
ALBUMIN SERPL-MCNC: 3.8 G/DL (ref 3.4–4.8)
ALP BLD-CCNC: 77 U/L (ref 25–100)
ALT SERPL-CCNC: 31 U/L (ref 4–36)
ANION GAP SERPL CALCULATED.3IONS-SCNC: 7 MMOL/L (ref 3–16)
AST SERPL-CCNC: 29 U/L (ref 8–33)
BILIRUB SERPL-MCNC: 0.3 MG/DL (ref 0.3–1.2)
BUN BLDV-MCNC: 11 MG/DL (ref 6–20)
CALCIUM SERPL-MCNC: 8.8 MG/DL (ref 8.5–10.5)
CHLORIDE BLD-SCNC: 101 MMOL/L (ref 98–107)
CO2: 37 MMOL/L (ref 20–30)
CREAT SERPL-MCNC: 0.6 MG/DL (ref 0.4–1.2)
GFR AFRICAN AMERICAN: >59
GFR NON-AFRICAN AMERICAN: >60
GLOBULIN: 2.2 G/DL
GLUCOSE BLD-MCNC: 104 MG/DL (ref 74–106)
HCT VFR BLD CALC: 42.5 % (ref 37–47)
HEMOGLOBIN: 12.9 G/DL (ref 11.5–16.5)
MCH RBC QN AUTO: 31.4 PG (ref 27–32)
MCHC RBC AUTO-ENTMCNC: 30.4 G/DL (ref 31–35)
MCV RBC AUTO: 103.4 FL (ref 80–100)
PDW BLD-RTO: 12.7 % (ref 11–16)
PLATELET # BLD: 217 K/UL (ref 150–400)
PMV BLD AUTO: 9.8 FL (ref 6–10)
POTASSIUM REFLEX MAGNESIUM: 4.5 MMOL/L (ref 3.4–5.1)
RBC # BLD: 4.11 M/UL (ref 3.8–5.8)
SODIUM BLD-SCNC: 145 MMOL/L (ref 136–145)
TOTAL PROTEIN: 6 G/DL (ref 6.4–8.3)
WBC # BLD: 6.2 K/UL (ref 4–11)

## 2022-01-03 PROCEDURE — 6370000000 HC RX 637 (ALT 250 FOR IP): Performed by: INTERNAL MEDICINE

## 2022-01-03 PROCEDURE — 80053 COMPREHEN METABOLIC PANEL: CPT

## 2022-01-03 PROCEDURE — 1200000002 HC SEMI PRIVATE SWING BED

## 2022-01-03 PROCEDURE — 71045 X-RAY EXAM CHEST 1 VIEW: CPT

## 2022-01-03 PROCEDURE — 85027 COMPLETE CBC AUTOMATED: CPT

## 2022-01-03 PROCEDURE — 97802 MEDICAL NUTRITION INDIV IN: CPT

## 2022-01-03 PROCEDURE — 2580000003 HC RX 258: Performed by: INTERNAL MEDICINE

## 2022-01-03 PROCEDURE — 2700000000 HC OXYGEN THERAPY PER DAY

## 2022-01-03 PROCEDURE — 6360000002 HC RX W HCPCS: Performed by: PHYSICIAN ASSISTANT

## 2022-01-03 PROCEDURE — 97161 PT EVAL LOW COMPLEX 20 MIN: CPT

## 2022-01-03 PROCEDURE — 36415 COLL VENOUS BLD VENIPUNCTURE: CPT

## 2022-01-03 RX ORDER — SODIUM CHLORIDE 9 MG/ML
INJECTION, SOLUTION INTRAVENOUS ONCE
Status: COMPLETED | OUTPATIENT
Start: 2022-01-03 | End: 2022-01-03

## 2022-01-03 RX ORDER — SUCRALFATE 1 G/1
1 TABLET ORAL
Status: DISCONTINUED | OUTPATIENT
Start: 2022-01-03 | End: 2022-01-06 | Stop reason: HOSPADM

## 2022-01-03 RX ORDER — PANTOPRAZOLE SODIUM 40 MG/1
40 TABLET, DELAYED RELEASE ORAL
Status: DISCONTINUED | OUTPATIENT
Start: 2022-01-03 | End: 2022-01-06 | Stop reason: HOSPADM

## 2022-01-03 RX ORDER — CLONAZEPAM 0.5 MG/1
0.5 TABLET ORAL 2 TIMES DAILY PRN
Status: DISCONTINUED | OUTPATIENT
Start: 2022-01-03 | End: 2022-01-06 | Stop reason: HOSPADM

## 2022-01-03 RX ADMIN — ENOXAPARIN SODIUM 40 MG: 100 INJECTION SUBCUTANEOUS at 09:04

## 2022-01-03 RX ADMIN — CLONAZEPAM 1 MG: 0.5 TABLET ORAL at 12:55

## 2022-01-03 RX ADMIN — SUCRALFATE 1 G: 1 TABLET ORAL at 21:00

## 2022-01-03 RX ADMIN — LEVOTHYROXINE SODIUM 75 MCG: 75 TABLET ORAL at 05:34

## 2022-01-03 RX ADMIN — PANTOPRAZOLE SODIUM 40 MG: 40 TABLET, DELAYED RELEASE ORAL at 17:57

## 2022-01-03 RX ADMIN — SUCRALFATE 1 G: 1 TABLET ORAL at 17:57

## 2022-01-03 RX ADMIN — SODIUM CHLORIDE: 9 INJECTION, SOLUTION INTRAVENOUS at 17:57

## 2022-01-03 RX ADMIN — ONDANSETRON HYDROCHLORIDE 8 MG: 4 TABLET, FILM COATED ORAL at 12:55

## 2022-01-03 RX ADMIN — ASPIRIN 81 MG: 81 TABLET, COATED ORAL at 09:04

## 2022-01-03 ASSESSMENT — PAIN SCALES - GENERAL
PAINLEVEL_OUTOF10: 0
PAINLEVEL_OUTOF10: 0

## 2022-01-03 NOTE — CARE COORDINATION
PT: 2nd attempt to see patient for consult, currently sound asleep; 1st attempt this morning, patient declined to participate.   Electronically signed by Belton Nyhan, PT on 1/3/2022 at 12:02 PM

## 2022-01-03 NOTE — PROGRESS NOTES
Physical Therapy    Facility/Department: [address] [place] [address] MED SURG  Initial Assessment    NAME: Alexus Spicer  : 1945  MRN: [id number]    Date of Service: 1/3/2022    Discharge Recommendations:  Continue to assess pending progress        Assessment   Body structures, Functions, Activity limitations: Decreased high-level IADLs;Decreased functional mobility ; Decreased endurance  Assessment: s/p fall with left forearm/wrist fracture;functional decline, unsteady gait  Treatment Diagnosis: s/p fall with left forearm/wrist fracture;functional decline, unsteady gait  Prognosis: Good  Decision Making: Low Complexity  REQUIRES PT FOLLOW UP: Yes  Activity Tolerance  Activity Tolerance: Patient Tolerated treatment well; Other  Activity Tolerance: limited by nausea       Patient Diagnosis(es): There were no encounter diagnoses. has a past medical history of Bleeds easily ([address] DVT (deep venous thrombosis) ([address] Headache(784.0), Hypertension, Hypothyroid, MI, old, Moderate episode of recurrent major depressive disorder ([address] Pneumonia, Stomach ulcer, and Thyroid disease. has a past surgical history that includes Cholecystectomy; Liver surgery; Appendectomy; and Upper gastrointestinal endoscopy.     Restrictions  Restrictions/Precautions  Restrictions/Precautions: General Precautions,Fall Risk  Required Braces or Orthoses?: Yes  Vision/Hearing  Vision: Impaired  Vision Exceptions: Wears glasses for distance  Hearing: Within functional limits     Subjective  General  Chart Reviewed: Yes  Patient assessed for rehabilitation services?: Yes  Response To Previous Treatment: Not applicable  Family / Caregiver Present: No  Referring Practitioner: Robb Nguyen MD  Diagnosis: Functional decline, N/V  Follows Commands: Within Functional Limits  Subjective  Subjective: Patient agreeable to consult; admitted to Swing Bed following an acute care stay; suffered a fall, resulting in left wrist/forearm fracture; has also had nausea, vomiting, unsteady gait; is normally independent and active  Pain Screening  Patient Currently in Pain: Denies  Vital Signs  Patient Currently in Pain: Denies       Orientation  Orientation  Overall Orientation Status: Within Normal Limits  Social/Functional History  Social/Functional History  Lives With: Alone  Type of Home: Apartment  Home Layout: One level  Home Access: Level entry  Bathroom Shower/Tub: Tub/Shower unit  Bathroom Toilet: Standard  Bathroom Equipment: Shower chair  Bathroom Accessibility: Accessible  Home Equipment: Oxygen  ADL Assistance: Independent  Homemaking Assistance: Independent  Homemaking Responsibilities: Yes  Ambulation Assistance: Independent  Transfer Assistance: Independent  Active : Yes  Type of occupation: 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[employer], [address]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[employer], and apartment management  Cognition        Objective     Observation/Palpation  Posture: Fair  Observation: Patient presents awake in bed, splint on L forearm, NAD    AROM RLE (degrees)  RLE AROM: WFL  AROM LLE (degrees)  LLE AROM : WFL  AROM RUE (degrees)  RUE AROM : WFL  AROM LUE (degrees)  LUE AROM : WFL  Strength RLE  Strength RLE: WFL  Comment: MMG's grossly 4+/5  Strength LLE  Strength LLE: WFL  Comment: MMG's grossly 4+/5  Strength RUE  Strength RUE: WFL  Strength LUE  Comment: See OT  Tone RLE  RLE Tone: Normotonic  Tone LLE  LLE Tone: Normotonic  Motor Control  Gross Motor?: WFL  Sensation  Overall Sensation Status: WFL  Bed mobility  Rolling to Right: Modified independent  Supine to Sit: Supervision  Sit to Supine: Supervision  Scooting: Supervision  Transfers  Sit to Stand: Stand by assistance  Stand to sit: Stand by assistance        Balance  Posture: Good  Sitting - Static: Good  Sitting - Dynamic: Good  Standing - Static: Fair;+  Standing - Dynamic: [address]  Times per week: 4-5 x week  Plan weeks: 1 week  Current Treatment Recommendations: [employer] Re-education,Home Exercise Program,Safety Education & Training,Functional Mobility Training,Transfer Training,Gait Training               Goals  Long term goals  Time Frame for Long term goals : 1 week  Long term goal 1: Achieve bed mobility and basic transfers with modified independence. Long term goal 2: Ambulate 150 feet with modified independence-supervision wit hcane or hem-walker, with no loss of balance or pattern deviation.        Therapy Time   Individual Concurrent Group Co-treatment   Time In           Time Out           Minutes                   Choco Arenas, PT

## 2022-01-03 NOTE — CONSULTS
Comprehensive Nutrition Assessment    Type and Reason for Visit:  Initial,Consult (new swing)    Nutrition Recommendations/Plan: Recommend to continue with current diet and add ONS BID. Encourage intakes of meals and ONS as appropiate. Nutrition Assessment:  Pt admitted with declining functional status and low-moderate risk for ongoing nutritional compromise related to intakes and needs. Will add ONS BID and monitor intakes. Malnutrition Assessment:  Malnutrition Status:  No malnutrition    Context:  Acute Illness     Findings of the 6 clinical characteristics of malnutrition:  Energy Intake:  Unable to assess  Weight Loss:  No significant weight loss     Body Fat Loss:  No significant body fat loss     Muscle Mass Loss:  Unable to assess    Fluid Accumulation:  No significant fluid accumulation     Strength:       Estimated Daily Nutrient Needs:  Energy (kcal):  8702-9362 (15-17 kcal/kg cbw); Weight Used for Energy Requirements:  Current     Protein (g):  63-68 (1.2-1.3); Weight Used for Protein Requirements:  Ideal        Fluid (ml/day):  3411-2209; Method Used for Fluid Requirements:  1 ml/kcal      Nutrition Related Findings:  Pt presents with class 1 obesity and has a non-significant weight loss in past month. Pt has wrist fracture and is sleeping at time of review. No edema reported. PMH: DVT, hypertension. Has vit D ordered. Wounds:  None       Current Nutrition Therapies:    ADULT DIET; Regular    Anthropometric Measures:  · Height: 5' 3\" (160 cm)  · Current Body Weight: 178 lb (80.7 kg)   · Admission Body Weight:      · Usual Body Weight: 185 lb (83.9 kg) (185-187lb)     · Ideal Body Weight: 115 lbs; % Ideal Body Weight 154.8 %   · BMI: 31.5  · Adjusted Body Weight:  ; No Adjustment   · Adjusted BMI:      · BMI Categories: Obese Class 1 (BMI 30.0-34. 9)       Nutrition Diagnosis:   · Predicted inadequate energy intake related to increase demand for energy/nutrients as evidenced by weight loss      Nutrition Interventions:   Food and/or Nutrient Delivery:  Continue Current Diet,Start Oral Nutrition Supplement  Nutrition Education/Counseling:      Coordination of Nutrition Care:       Goals:          Nutrition Monitoring and Evaluation:   Behavioral-Environmental Outcomes:      Food/Nutrient Intake Outcomes:     Physical Signs/Symptoms Outcomes:        Discharge Planning:          Electronically signed by Arletta Aase, MS, RD, LD on 1/3/22 at 3:09 PM EST

## 2022-01-03 NOTE — CARE COORDINATION
Pt sleeping in room upon attempt for OT services. Pt difficult to wake. Once awake pt declined to participate in OT services at this time. Pt reports she is not feeling well. Will attempt on 1/4 barring any complications/concerns.

## 2022-01-03 NOTE — FLOWSHEET NOTE
01/02/22 2106   Assessment   Charting Type Shift assessment   Neurological   Neuro (WDL) X   Level of Consciousness Alert (0)   Orientation Level Oriented X4   Cognition Follows commands   [place] Coma Scale   Eye Opening 4   Best Verbal Response 5   Best Motor Response 6   [place] Coma Scale Score 15   HEENT   HEENT (WDL) X   Right Eye Impaired vision   Left Eye Impaired vision   Voice Normal   Teeth Dentures upper   Respiratory   Respiratory (WDL) WDL   Respiratory Pattern Regular   Respiratory Depth Normal   Respiratory Quality/Effort Unlabored   Chest Assessment Chest expansion symmetrical;Trachea midline   L Breath Sounds Clear   R Breath Sounds Clear   Cardiac   Cardiac (WDL) WDL   Cardiac Regularity Regular   Heart Sounds S1, S2   Rhythm Interpretation   Pulse 78   Cardiac Monitor   Telemetry Monitor On Yes   Telemetry Audible Yes   Telemetry Alarms Set Yes   Telemetry Box Number [id number]   Gastrointestinal   Abdominal (WDL) X   RUQ Bowel Sounds Active   LUQ Bowel Sounds Active   RLQ Bowel Sounds Active   LLQ Bowel Sounds Active   Abdomen Inspection Rounded   Tenderness Soft   Peripheral Vascular   Peripheral Vascular (WDL) WDL   Edema None   RLE Neurovascular Assessment   Capillary Refill Less than/equal to 3 seconds   Color Pink   Temperature Warm   R Pedal Pulse +2   LLE Neurovascular Assessment   Capillary Refill Less than/equal to 3 seconds   Color Pink   Temperature Warm   L Pedal Pulse +2   Skin Color/Condition   Skin Color/Condition (WDL) WDL   Skin Integrity   Skin Integrity (WDL) X   Skin Integrity Abrasion   Location rt knee   Preventative Dressing No   Musculoskeletal   Musculoskeletal (WDL) X   RUE Full movement   LUE Splint   RL Extremity Weakness   LL Extremity Weakness   Musculoskeletal Details   L Wrist Immobilizer   Genitourinary   Genitourinary (WDL) WDL   Flank Tenderness No   Suprapubic Tenderness No   Dysuria No   Psychosocial   Psychosocial (WDL) WDL

## 2022-01-04 PROCEDURE — 97535 SELF CARE MNGMENT TRAINING: CPT

## 2022-01-04 PROCEDURE — 97530 THERAPEUTIC ACTIVITIES: CPT

## 2022-01-04 PROCEDURE — 2700000000 HC OXYGEN THERAPY PER DAY

## 2022-01-04 PROCEDURE — 6370000000 HC RX 637 (ALT 250 FOR IP): Performed by: INTERNAL MEDICINE

## 2022-01-04 PROCEDURE — 6360000002 HC RX W HCPCS: Performed by: INTERNAL MEDICINE

## 2022-01-04 PROCEDURE — 1200000002 HC SEMI PRIVATE SWING BED

## 2022-01-04 PROCEDURE — 6370000000 HC RX 637 (ALT 250 FOR IP): Performed by: NURSE PRACTITIONER

## 2022-01-04 RX ADMIN — SUCRALFATE 1 G: 1 TABLET ORAL at 20:28

## 2022-01-04 RX ADMIN — PANTOPRAZOLE SODIUM 40 MG: 40 TABLET, DELAYED RELEASE ORAL at 18:37

## 2022-01-04 RX ADMIN — PANTOPRAZOLE SODIUM 40 MG: 40 TABLET, DELAYED RELEASE ORAL at 06:42

## 2022-01-04 RX ADMIN — CLONAZEPAM 0.5 MG: 0.5 TABLET ORAL at 09:20

## 2022-01-04 RX ADMIN — ENOXAPARIN SODIUM 40 MG: 100 INJECTION SUBCUTANEOUS at 09:19

## 2022-01-04 RX ADMIN — PROMETHAZINE HYDROCHLORIDE 6.25 MG: 25 INJECTION INTRAMUSCULAR; INTRAVENOUS at 02:17

## 2022-01-04 RX ADMIN — SUCRALFATE 1 G: 1 TABLET ORAL at 18:39

## 2022-01-04 RX ADMIN — LEVOTHYROXINE SODIUM 75 MCG: 75 TABLET ORAL at 06:41

## 2022-01-04 RX ADMIN — TRAMADOL HYDROCHLORIDE 100 MG: 50 TABLET ORAL at 00:40

## 2022-01-04 RX ADMIN — TRAMADOL HYDROCHLORIDE 100 MG: 50 TABLET ORAL at 20:28

## 2022-01-04 RX ADMIN — SUCRALFATE 1 G: 1 TABLET ORAL at 06:42

## 2022-01-04 RX ADMIN — ASPIRIN 81 MG: 81 TABLET, COATED ORAL at 09:19

## 2022-01-04 ASSESSMENT — PAIN SCALES - GENERAL
PAINLEVEL_OUTOF10: 8
PAINLEVEL_OUTOF10: 6
PAINLEVEL_OUTOF10: 0

## 2022-01-04 NOTE — PROGRESS NOTES
Physical Therapy  Facility/Department: [facility] MED SURG  Daily Treatment Note  NAME: Alexus Spicer  : 1945  MRN: [id number]    Date of Service: 2022    Discharge Recommendations:  Continue to assess pending progress      Assessment   Assessment: Cotreated with OT. Patient very sleepy and lethargic. Required encouragement and cues to sit EOB. Patient transitioned supine to sit with CGA. She had a tendency to keep her eyes closed and lean backward while sitting EOB. She became agitated and wanted to lie back down. States she hasn't had any sleep and it's too early to be doing anything. Patient has been sleeping most of the day today and yesterday. REQUIRES PT FOLLOW UP: Yes  Activity Tolerance  Activity Tolerance: Patient limited by fatigue;Treatment limited secondary to agitation     Patient Diagnosis(es): There were no encounter diagnoses. has a past medical history of Bleeds easily ([address] DVT (deep venous thrombosis) ([address] Headache(784.0), Hypertension, Hypothyroid, MI, old, Moderate episode of recurrent major depressive disorder ([address] Pneumonia, Stomach ulcer, and Thyroid disease. has a past surgical history that includes Cholecystectomy; Liver surgery; Appendectomy; and Upper gastrointestinal endoscopy. Restrictions  Restrictions/Precautions  Restrictions/Precautions: General Precautions,Fall Risk  Required Braces or Orthoses?: Yes  Subjective   General  Chart Reviewed: Yes  Response To Previous Treatment: Patient with no complaints from previous session. Family / Caregiver Present: No  Subjective  Subjective: Patient reports not sleeping well last night and thinks it's too early now at 11am to be doing anything.   Pain Screening  Patient Currently in Pain: Yes  Vital Signs  Patient Currently in Pain: Yes       Objective   Bed mobility  Rolling to Right: Contact guard assistance  Supine to Sit: Contact guard assistance  Sit to Supine: Contact guard assistance  Scooting: Contact guard assistance        Balance  Posture: Fair  Sitting - Static: Fair  Sitting - Dynamic: Fair;-      Goals  Long term goals  Time Frame for Long term goals : 1 week  Long term goal 1: Achieve bed mobility and basic transfers with modified independence. Long term goal 2: Ambulate 150 feet with modified independence-supervision wit hcane or hem-walker, with no loss of balance or pattern deviation. Plan    Plan  Times per week: 4-5 x week  Plan weeks: 1 week  Current Treatment Recommendations: [employer] Re-education,Home Exercise Program,Safety Education & Training,Functional Mobility Training,Transfer Training,Gait Training  Safety Devices  Type of devices: Left in bed,Call light within reach     Therapy Time   Individual Concurrent Group Co-treatment   Time In 1104         Time Out 1113         Minutes 9              This note serves as D/C summary if patient is discharged prior to next visit.   Lesa Garcia, PTA

## 2022-01-04 NOTE — PROGRESS NOTES
Occupational Therapy  Facility/Department: [address] [place] [address] MED SURG  Daily Treatment Note  NAME: Darvin Garcia  : 1945  MRN: [id number]    Date of Service: 2022    Discharge Recommendations:   continue to assess pending progress       Assessment   Assessment: Pt agreeable to OT services. Pt requesting to toilet upon entry. Pt come to sit at EOB with SBA. Pt come to stand and ambulated to bathroom using marzena walker with CGA unsteady gait. Pt required CGA for toileting with cues for safety. Pt returned to bed upon completion of toileting. Pt declined to sit upright in chair or complete further activity. Pt educated that she needed to participate in therapy in ordert to regain independence with ADL's and mobility in order to safely DC home. Pt reports her daughter from [address] [place] [address] is taking her home Friday and she is staying with her. Pt states she has felt lethargic/nauseous since admission and has not been able to participate. Pt concerns discussed with case management. Pt left in bed with call light in reach. Patient Diagnosis(es): There were no encounter diagnoses. has a past medical history of Bleeds easily ([address] DVT (deep venous thrombosis) ([address] Headache(784.0), Hypertension, Hypothyroid, MI, old, Moderate episode of recurrent major depressive disorder ([address] Pneumonia, Stomach ulcer, and Thyroid disease. has a past surgical history that includes Cholecystectomy; Liver surgery; Appendectomy; and Upper gastrointestinal endoscopy.     Restrictions  Restrictions/Precautions  Restrictions/Precautions: General Precautions,Fall Risk  Required Braces or Orthoses?: Yes  Subjective   General  Patient assessed for rehabilitation services?: Yes      Orientation     Objective    ADL  Toileting: Contact guard assistance           Bed mobility  Supine to Sit: Stand by assistance  Sit to Supine: Stand by assistance  Scooting: Stand by assistance    Therapy Time   Individual Concurrent Group Co-treatment   Time In 0145         Time Out 0200         Minutes 15              This note serves as a DC summary in the event of pt discharge.      Mireille Newton, OTR/L

## 2022-01-04 NOTE — FLOWSHEET NOTE
01/04/22 0800   Assessment   Charting Type Shift assessment   Neurological   Neuro (WDL) X   Level of Consciousness Alert (0)   Orientation Level Oriented X4   Cognition Follows commands; Appropriate judgement; Appropriate safety awareness   Language Clear   R Hand  Strong   L Hand  Strong   [place] Coma Scale   Eye Opening 4   Best Verbal Response 5   Best Motor Response 6   [place] Coma Scale Score 15   HEENT   HEENT (WDL) X   Right Eye Impaired vision   Left Eye Impaired vision   Voice Normal   Teeth Dentures upper   Respiratory   Respiratory (WDL) WDL   Respiratory Pattern Regular   Respiratory Depth Normal   Respiratory Quality/Effort Unlabored   Chest Assessment Chest expansion symmetrical;Trachea midline   L Breath Sounds Clear   R Breath Sounds Clear   Cardiac   Cardiac (WDL) WDL   Cardiac Regularity Regular   Heart Sounds S1, S2   Cardiac Monitor   Telemetry Monitor On Yes   Telemetry Audible Yes   Telemetry Alarms Set Yes   Telemetry Box Number [id number]   Gastrointestinal   Abdominal (WDL) X   RUQ Bowel Sounds Active   LUQ Bowel Sounds Active   RLQ Bowel Sounds Active   LLQ Bowel Sounds Active   Abdomen Inspection Rounded   Tenderness Soft   Peripheral Vascular   Peripheral Vascular (WDL) WDL   Edema None   RLE Neurovascular Assessment   Capillary Refill Less than/equal to 3 seconds   Color Pink   Temperature Warm   R Pedal Pulse +2   LLE Neurovascular Assessment   Capillary Refill Less than/equal to 3 seconds   Color Pink   Temperature Warm   L Pedal Pulse +2   Skin Color/Condition   Skin Color/Condition (WDL) WDL   Skin Integrity   Skin Integrity (WDL) X   Skin Integrity Abrasion   Location rt knee   Musculoskeletal   Musculoskeletal (WDL) X   RUE Full movement   LUE Splint   RL Extremity Weakness   LL Extremity Weakness   Musculoskeletal Details   L Wrist Immobilizer   Genitourinary   Genitourinary (WDL) WDL   Flank Tenderness No   Suprapubic Tenderness No   Dysuria No   Anus/Rectum Anus/Rectum (WDL) WDL   Psychosocial   Psychosocial (WDL) WDL

## 2022-01-04 NOTE — PROGRESS NOTES
Physical Therapy  Facility/Department: [facility] MED SURG  Daily Treatment Note  NAME: Sarah Burger  : 1945  MRN: [id number]    Date of Service: 2022    Discharge Recommendations:  Continue to assess pending progress      Assessment   Assessment: Patient was seen for BID/PT cotreatment with OT. Patient more awake this afternoon but thinks her medicine is what is making her sleep so much. She transitioned supine to sit with SBA and stood from bedside. Ambulated with hemiwalker and CGA of 2 to and from the bathroom requiring cues for safety awareness as she was impulsive with getting to and from the bathroom. Patient declined sitting up in a chair and returned to bed after toileting. REQUIRES PT FOLLOW UP: Yes  Activity Tolerance  Activity Tolerance: Patient Tolerated treatment well;Patient limited by endurance     Patient Diagnosis(es): There were no encounter diagnoses. has a past medical history of Bleeds easily ([address] DVT (deep venous thrombosis) ([address] Headache(784.0), Hypertension, Hypothyroid, MI, old, Moderate episode of recurrent major depressive disorder ([address] Pneumonia, Stomach ulcer, and Thyroid disease. has a past surgical history that includes Cholecystectomy; Liver surgery; Appendectomy; and Upper gastrointestinal endoscopy. Restrictions  Restrictions/Precautions  Restrictions/Precautions: General Precautions,Fall Risk  Required Braces or Orthoses?: Yes  Subjective   General  Chart Reviewed: Yes  Response To Previous Treatment: Patient with no complaints from previous session. Family / Caregiver Present: No  Subjective  Subjective: Patient states she thinks her medicine is making her sleep so much. States she feels like she's \"out of this world. \"  Pain Screening  Patient Currently in Pain: Yes  Vital Signs  Patient Currently in Pain: Yes       Objective   Bed mobility  Rolling to Right: Stand by assistance  Supine to Sit: Stand by assistance  Sit to Supine: Stand by assistance  Scooting: Stand by assistance  Transfers  Sit to Stand: Stand by assistance  Stand to sit: Stand by assistance  Ambulation  Ambulation?: Yes  Ambulation 1  Surface: level tile  Device: Hemiwalker  Assistance: Contact guard assistance;2 Person assistance  Distance: to and from the bathroom     Balance  Posture: Fair  Sitting - Static: Good  Sitting - Dynamic: Good;-  Standing - Static: Fair;+      Goals  Long term goals  Time Frame for Long term goals : 1 week  Long term goal 1: Achieve bed mobility and basic transfers with modified independence. Long term goal 2: Ambulate 150 feet with modified independence-supervision wit hcane or hem-walker, with no loss of balance or pattern deviation. Plan    Plan  Times per week: 4-5 x week  Plan weeks: 1 week  Current Treatment Recommendations: Alexis Jaramillo Re-education,Home Exercise Program,Safety Education & Training,Functional Mobility Training,Transfer Training,Gait Training  Safety Devices  Type of devices: Left in bed,Call light within reach     Therapy Time   Individual Concurrent Group Co-treatment   Time In 1702         Time Out 1400         Minutes 15              This note serves as D/C summary if patient is discharged prior to next visit.   Libby Armstrong, PTA

## 2022-01-04 NOTE — PROGRESS NOTES
Occupational Therapy  Facility/Department: [address] [place] [address] MED SURG  Daily Treatment Note  NAME: Darrell Duenas  : 1945  MRN: [id number]    Date of Service: 2022    Discharge Recommendations:   Continue to assess        Assessment   Assessment: Pt sleeping upon entry. Pt awakened and encouraged to attempt to participate in therapy services. Pt come to sit a tEOB with CGA. Pt unsteady upon coming to sit requiring cues to maintain static sitting balance. Pt became agitated and stated she did not want to attempt mobility. \"I didn't go to sleep til 4 am and I have all this medication in me\" Pt transferred to supine. Pt educated in benefit of participating in therapy services in order to regain function however, pt continued to refuse becoming increasingly upset. Pt left in bed with call light in reach. Patient Diagnosis(es): There were no encounter diagnoses. has a past medical history of Bleeds easily ([address] DVT (deep venous thrombosis) ([address] Headache(784.0), Hypertension, Hypothyroid, MI, old, Moderate episode of recurrent major depressive disorder ([address] Pneumonia, Stomach ulcer, and Thyroid disease. has a past surgical history that includes Cholecystectomy; Liver surgery; Appendectomy; and Upper gastrointestinal endoscopy. Restrictions  Restrictions/Precautions  Restrictions/Precautions: General Precautions,Fall Risk  Required Braces or Orthoses?: Yes  Subjective   General  Patient assessed for rehabilitation services?: Yes      Orientation     Objective       Bed mobility  Supine to Sit: Contact guard assistance  Sit to Supine: Contact guard assistance  Scooting: Contact guard assistance     Therapy Time   Individual Concurrent Group Co-treatment   Time In 1104         Time Out 1113         Minutes 9              This note serves as a DC summary in the event of pt discharge.      Mireille Newton OTR/L

## 2022-01-04 NOTE — PLAN OF CARE
Problem: Infection:  Goal: Will remain free from infection  Description: Will remain free from infection  Outcome: Met This Shift     Problem: Safety:  Goal: Free from accidental physical injury  Description: Free from accidental physical injury  Outcome: Met This Shift  Goal: Free from intentional harm  Description: Free from intentional harm  Outcome: Met This Shift     Problem: Daily Care:  Goal: Daily care needs are met  Description: Daily care needs are met  Outcome: Met This Shift     Problem: Pain:  Goal: Patient's pain/discomfort is manageable  Description: Patient's pain/discomfort is manageable  Outcome: Met This Shift  Goal: Pain level will decrease  Description: Pain level will decrease  Outcome: Met This Shift  Goal: Control of acute pain  Description: Control of acute pain  Outcome: Met This Shift  Goal: Control of chronic pain  Description: Control of chronic pain  Outcome: Met This Shift     Problem: Falls - Risk of:  Goal: Will remain free from falls  Description: Will remain free from falls  Outcome: Met This Shift  Goal: Absence of physical injury  Description: Absence of physical injury  Outcome: Met This Shift     Problem: Skin Integrity:  Goal: Skin integrity will stabilize  Description: Skin integrity will stabilize  Outcome: Ongoing     Problem: Discharge Planning:  Goal: Patients continuum of care needs are met  Description: Patients continuum of care needs are met  Outcome: Ongoing

## 2022-01-04 NOTE — FLOWSHEET NOTE
01/03/22 2109   Assessment   Charting Type Shift assessment   Neurological   Neuro (WDL) X   Level of Consciousness Alert (0)   Orientation Level Oriented X4   Cognition Follows commands   Adriana Coma Scale   Eye Opening 4   Best Verbal Response 5   Best Motor Response 6   Kim Coma Scale Score 15   HEENT   HEENT (WDL) X   Right Eye Impaired vision   Left Eye Impaired vision   Voice Normal   Teeth Dentures upper   Respiratory   Respiratory (WDL) WDL   Respiratory Pattern Regular   Respiratory Depth Normal   Respiratory Quality/Effort Unlabored   Chest Assessment Chest expansion symmetrical;Trachea midline   L Breath Sounds Clear   R Breath Sounds Clear   Cardiac   Cardiac (WDL) WDL   Cardiac Regularity Regular   Heart Sounds S1, S2   Cardiac Monitor   Telemetry Monitor On Yes   Telemetry Audible Yes   Telemetry Alarms Set Yes   Telemetry Box Number [id number]   Gastrointestinal   Abdominal (WDL) X   RUQ Bowel Sounds Active   LUQ Bowel Sounds Active   RLQ Bowel Sounds Active   LLQ Bowel Sounds Active   Abdomen Inspection Rounded   GI Symptoms Nausea;Vomiting   Relieved By Antiemetic   Nausea Precipitating Factors Movement   Tenderness Soft   Peripheral Vascular   Peripheral Vascular (WDL) WDL   Edema None   RLE Neurovascular Assessment   Capillary Refill Less than/equal to 3 seconds   Color Pink   Temperature Warm   R Pedal Pulse +2   LLE Neurovascular Assessment   Capillary Refill Less than/equal to 3 seconds   Color Pink   Temperature Warm   L Pedal Pulse +2   Skin Color/Condition   Skin Color/Condition (WDL) WDL   Skin Integrity   Skin Integrity (WDL) X   Skin Integrity Abrasion   Location right knee   Preventative Dressing No   Musculoskeletal   Musculoskeletal (WDL) X   RUE Full movement   LUE Splint   RL Extremity Weakness   LL Extremity Weakness   Musculoskeletal Details   L Wrist Immobilizer   Genitourinary   Genitourinary (WDL) WDL   Flank Tenderness No   Suprapubic Tenderness No   Dysuria No Anus/Rectum   Anus/Rectum (WDL) WDL   Psychosocial   Psychosocial (WDL) WDL

## 2022-01-05 VITALS
HEART RATE: 64 BPM | BODY MASS INDEX: 33.77 KG/M2 | SYSTOLIC BLOOD PRESSURE: 127 MMHG | RESPIRATION RATE: 16 BRPM | DIASTOLIC BLOOD PRESSURE: 63 MMHG | TEMPERATURE: 97.9 F | OXYGEN SATURATION: 92 % | HEIGHT: 63 IN | WEIGHT: 190.6 LBS

## 2022-01-05 PROCEDURE — 97530 THERAPEUTIC ACTIVITIES: CPT

## 2022-01-05 PROCEDURE — 6370000000 HC RX 637 (ALT 250 FOR IP): Performed by: INTERNAL MEDICINE

## 2022-01-05 PROCEDURE — 6360000002 HC RX W HCPCS: Performed by: INTERNAL MEDICINE

## 2022-01-05 PROCEDURE — 94618 PULMONARY STRESS TESTING: CPT

## 2022-01-05 PROCEDURE — 97116 GAIT TRAINING THERAPY: CPT

## 2022-01-05 PROCEDURE — 6370000000 HC RX 637 (ALT 250 FOR IP): Performed by: NURSE PRACTITIONER

## 2022-01-05 PROCEDURE — 2700000000 HC OXYGEN THERAPY PER DAY

## 2022-01-05 PROCEDURE — 2580000003 HC RX 258: Performed by: NURSE PRACTITIONER

## 2022-01-05 PROCEDURE — 6360000002 HC RX W HCPCS: Performed by: NURSE PRACTITIONER

## 2022-01-05 PROCEDURE — 97535 SELF CARE MNGMENT TRAINING: CPT

## 2022-01-05 RX ORDER — SUCRALFATE 1 G/1
1 TABLET ORAL
Qty: 120 TABLET | Refills: 0 | Status: CANCELLED | OUTPATIENT
Start: 2022-01-05 | End: 2022-02-04

## 2022-01-05 RX ORDER — PANTOPRAZOLE SODIUM 40 MG/1
40 TABLET, DELAYED RELEASE ORAL DAILY
Qty: 30 TABLET | Refills: 0 | Status: CANCELLED | OUTPATIENT
Start: 2022-01-05

## 2022-01-05 RX ORDER — METHYLPREDNISOLONE SODIUM SUCCINATE 40 MG/ML
40 INJECTION, POWDER, LYOPHILIZED, FOR SOLUTION INTRAMUSCULAR; INTRAVENOUS ONCE
Status: COMPLETED | OUTPATIENT
Start: 2022-01-05 | End: 2022-01-05

## 2022-01-05 RX ADMIN — SUCRALFATE 1 G: 1 TABLET ORAL at 17:40

## 2022-01-05 RX ADMIN — ACETAMINOPHEN 650 MG: 650 SUPPOSITORY RECTAL at 02:17

## 2022-01-05 RX ADMIN — ONDANSETRON HYDROCHLORIDE 4 MG: 2 INJECTION, SOLUTION INTRAMUSCULAR; INTRAVENOUS at 08:44

## 2022-01-05 RX ADMIN — ASPIRIN 81 MG: 81 TABLET, COATED ORAL at 08:44

## 2022-01-05 RX ADMIN — PANTOPRAZOLE SODIUM 40 MG: 40 TABLET, DELAYED RELEASE ORAL at 06:01

## 2022-01-05 RX ADMIN — METHYLPREDNISOLONE SODIUM SUCCINATE 40 MG: 40 INJECTION, POWDER, FOR SOLUTION INTRAMUSCULAR; INTRAVENOUS at 15:12

## 2022-01-05 RX ADMIN — SUCRALFATE 1 G: 1 TABLET ORAL at 12:16

## 2022-01-05 RX ADMIN — PANTOPRAZOLE SODIUM 40 MG: 40 TABLET, DELAYED RELEASE ORAL at 17:40

## 2022-01-05 RX ADMIN — SUCRALFATE 1 G: 1 TABLET ORAL at 06:01

## 2022-01-05 RX ADMIN — ENOXAPARIN SODIUM 40 MG: 100 INJECTION SUBCUTANEOUS at 08:43

## 2022-01-05 RX ADMIN — CLONAZEPAM 0.5 MG: 0.5 TABLET ORAL at 08:44

## 2022-01-05 RX ADMIN — CEFTRIAXONE 1000 MG: 1 INJECTION, POWDER, FOR SOLUTION INTRAMUSCULAR; INTRAVENOUS at 15:17

## 2022-01-05 RX ADMIN — LEVOTHYROXINE SODIUM 75 MCG: 75 TABLET ORAL at 06:01

## 2022-01-05 ASSESSMENT — PAIN SCALES - GENERAL
PAINLEVEL_OUTOF10: 0
PAINLEVEL_OUTOF10: 0
PAINLEVEL_OUTOF10: 5

## 2022-01-05 NOTE — PROGRESS NOTES
RT Six Minute Walk Test    Data Measured Before the Walk  Heart Rate:  81  Blood Pressure:    O2 Saturation:  92  O2 Device:  NC  O2 Flow Rate:  3  O2 FIO2:    Vimal Dyspnea Rate:  1  Vimal Fatigue Scale:  1    Data Measured During the Walk  Heart Rate:  83  Blood Pressure:    O2 Flow Rate:  3  O2 Saturation:  87  O2 Flow Rate:  4  O2 Saturation:  91  O2 Flow Rate:  4  O2 Saturation:  94  O2 Flow Rate:  4  O2 Saturation:  94  O2 Flow Rate:   4O2 Saturation:  94  O2 Flow Rate:  4  O2 Saturation:  94  Symptoms:    Any Problems While Exercising:  No  Vimal Dyspnea Rate:  2  Vimal Fatigue Scale:      Data Measured Immediately After Walk  Did Patient Stop or Pause Before 6 Minutes:  Yes  Why Patient Stopped or Paused:  She became dizzy  Predicted Distance: Total Distance Walked:  150 feet  % Predicted:    Heart Rate:  85  Blood Pressure:    O2 Flow Rate:4    O2 Saturation:  94  Vimal Dyspnea Rate:  2  Vimal Fatigue Scale:  2    Data Measured 5 Minutes After Walk  Heart Rate:   82 Blood Pressure:    O2 Saturation:  93  Comments:      Additional Comments: Patient walked assisted by OT and PT, she requires 4lpm 02 upon exertion and at rest requires 3lpm 02.     Electronically signed by Chantell Carter RCP on 1/5/2022 at 1:25 PM

## 2022-01-05 NOTE — PROGRESS NOTES
Occupational Therapy  Facility/Department: [address] [place] [address] MED SURG  Daily Treatment Note  NAME: Luis Rod  : 1945  MRN: [id number]    Date of Service: 2022    Discharge Recommendations:      Home with assist/supervision, [facility] referral, Cane    Assessment   Assessment: Pt agreeable to OT services. Co tx with PTA. Pt sat at EOB upon entry with SBA. 02 removed to room air. Pt reported feeling dizzy 02 sats 85%. Pt placed back on 02 and 02 sats increased >90%. Pt required assist with clasping bra. OT suggested to pt to try pull over sports bras for ease of task at this time. Pt donned shirt and pants with CGA using reacher for LB dressing. Pt toileted with CGA mildly unsteady with transfer. Pt did not use cane properly and required cues for safety. Pt returned to seated position at EOB and began staring down and not responding verbally. Pt provided with tactile stimulation and abruptly said, \"I'm fine I am just nauseated\". Pt concerned about finding her car keys and driving home. Therapists discussed safety concerns with pt and need for 02. Pt replied, \"I drove myself here and I can drive myself back. I don't need that 02 I know how to work that. \" Pt ambulated in hallway ~75 feet with cane on2 L 02 sat 88% at rest. Pt required seated rest break. Pt returned to room x75 feet using cane. Pt continues to reply, \"I am going home if I have to sign myself out and becoming agitated upon attempts to educate. Pt educated that if she were to return home she will need 02 and she will need someone there to assist. Pt left lying in bed with call light in reach. Patient Diagnosis(es): Diagnoses of Panic attack and Insomnia, unspecified type were pertinent to this visit.       has a past medical history of Bleeds easily ([address] DVT (deep venous thrombosis) ([address] Headache(784.0), Hypertension, Hypothyroid, MI, old, Moderate episode of recurrent major depressive disorder ([address] Pneumonia, Stomach ulcer, and Thyroid disease. has a past surgical history that includes Cholecystectomy; Liver surgery; Appendectomy; and Upper gastrointestinal endoscopy. Restrictions  Restrictions/Precautions  Restrictions/Precautions: General Precautions,Fall Risk  Required Braces or Orthoses?: Yes  Subjective   General  Patient assessed for rehabilitation services?: Yes      Orientation     Objective    ADL  UE Dressing: Contact guard assistance (min assist with bra.)  LE Dressing: Contact guard assistance  Toileting: Contact guard assistance        Functional Mobility  Functional - Mobility Device: Cane  Activity: Other; To/from bathroom  Assist Level: Contact guard assistance  Functional Mobility Comments: 75 feet x2 unsteady  Bed mobility  Supine to Sit: Stand by assistance  Sit to Supine: Stand by assistance  Scooting: Stand by assistance  Transfers  Stand Pivot Transfers: Contact guard assistance  Sit to stand: Contact guard assistance         Plan   Plan  Times per week: 3-5  Times per day: Daily  Plan weeks: 1-2  Current Treatment Recommendations: Strengthening,Balance Training,Functional Mobility Training,Endurance Training,Self-Care / ADL,Patient/Caregiver Education & Training,Safety Education & Training       Goals  Short term goals  Time Frame for Short term goals: 1 week  Short term goal 1: Pt to complete LB dressing with MOD I. Short term goal 2: Pt to complete toileting with MOD I. Short term goal 3: Pt to complete hygiene/grooming standing at sink with no LOB with MOD I. Short term goal 4: pt to complete Sponge bathing with MOD I. Short term goal 5: Pt to tolerate static standing x5 minutes with no LOB. Therapy Time   Individual Concurrent Group Co-treatment   Time In 7847         Time Out 1247         Minutes 35              This note serves as a DC summary in the event of pt discharge.      Mireille Newton OTR/L

## 2022-01-05 NOTE — CARE COORDINATION
Pt is insisting on going home. Pt reports her daughter from [address] [place] [address] is going to take her home with her. Therapy has not cleared her for home at this time. CM spoke with [facility] at [facility] and confirmed pt has [facility] ordered continuous at home. CM will update new orders if needed after walk. [facility] will deliver tank to bedside prior to DC. Addendum:  Pt has agreed to stay for continued therapy and medical management. CM will monitor closely and assess for DME needs prior to DC.

## 2022-01-05 NOTE — PROGRESS NOTES
Physical Therapy  Facility/Department: [facility] MED SURG  Daily Treatment Note  NAME: Darrell Duenas  : 1945  MRN: [id number]    Date of Service: 2022    Discharge Recommendations:  Continue to assess pending progress      Assessment   Assessment: Cotreated with OT. Patient sat EOB with some assistance for donning clothing. States she is going home if she has to sign herself out. Patient states she uses oxygen only at night and knows how to use it. Educated patient that she has required O2 in the hospital and will need to use it during the day as well. Patient ambulated on room air to the bathroom carrying hemiwalker. Upon return to the bed patient's SpO2 was 85% and rebounded above 90% once nasal cannula was reapplied. Patient had an episode of looking downward for several seconds and not responding to therapists when spoken to. With tactile stimulation patient looked up and stated, \"I'm fine, I am just nauseated. \"  Patient stood from bedside with SBA and ambulated 75 feet with cane, 2LO2, and CGA with w/c follow. SpO2 was 88%. Once SpO2 was above 90% patient ambulated 75 feet with cane back to her room. Patient had O2 desaturation again and became agitated while being educated on safety awareness use of O2. Patient fixated on \"getting out of here\" and finding her keys. REQUIRES PT FOLLOW UP: Yes  Activity Tolerance  Activity Tolerance: Patient limited by endurance;Treatment limited secondary to agitation (O2 desaturations)     Patient Diagnosis(es): Diagnoses of Panic attack and Insomnia, unspecified type were pertinent to this visit. has a past medical history of Bleeds easily ([address] DVT (deep venous thrombosis) ([address] Headache(784.0), Hypertension, Hypothyroid, MI, old, Moderate episode of recurrent major depressive disorder ([address] Pneumonia, Stomach ulcer, and Thyroid disease. has a past surgical history that includes Cholecystectomy; Liver surgery;  Appendectomy; and Upper gastrointestinal endoscopy. Restrictions  Restrictions/Precautions  Restrictions/Precautions: General Precautions,Fall Risk  Required Braces or Orthoses?: Yes  Subjective   General  Chart Reviewed: Yes  Response To Previous Treatment: Patient with no complaints from previous session. Family / Caregiver Present: No  Subjective  Subjective: Patient states she's going home today if she has to sign herself out. Pain Screening  Patient Currently in Pain: Denies  Vital Signs  Patient Currently in Pain: Denies       Objective   Bed mobility  Supine to Sit: Stand by assistance  Sit to Supine: Stand by assistance  Scooting: Stand by assistance  Transfers  Sit to Stand: Stand by assistance  Stand to sit: Stand by assistance  Ambulation  Ambulation?: Yes  Ambulation 1  Surface: level tile  Device: Single point cane  Other Apparatus: O2  Assistance: Contact guard assistance;2 Person assistance  Distance: to and from the bathroom carrying hemiwalker and then 75 feet x 2 with cane     Balance  Posture: Fair  Sitting - Static: Good  Sitting - Dynamic: Good;-  Standing - Static: Fair;+  Standing - Dynamic: Fair      Goals  Long term goals  Time Frame for Long term goals : 1 week  Long term goal 1: Achieve bed mobility and basic transfers with modified independence. Long term goal 2: Ambulate 150 feet with modified independence-supervision wit hcane or hem-walker, with no loss of balance or pattern deviation.     Plan    Plan  Times per week: 4-5 x week  Plan weeks: 1 week  Current Treatment Recommendations: Jonathan Gerardo Re-education,Home Exercise Program,Safety Education & Training,Functional Mobility Training,Transfer Training,Gait Training  Safety Devices  Type of devices: Left in bed,Call light within reach     Therapy Time   Individual Concurrent Group Co-treatment   Time In 4778         Time Out 4314         Minutes 32              This note serves as D/C summary if patient is discharged prior to next visit.   Isabel Anand, PTA

## 2022-01-05 NOTE — FLOWSHEET NOTE
01/04/22 2343   Assessment   Charting Type Shift assessment   Neurological   Neuro (WDL) X   Level of Consciousness Alert (0)   Orientation Level Oriented X4   Cognition Follows commands; Appropriate judgement; Appropriate safety awareness   Language Clear   R Hand  Strong   L Hand  Strong   NIHSS Stroke Scale Assessed No   Adriana Coma Scale   Eye Opening 4   Best Verbal Response 5   Best Motor Response 6   [place] Coma Scale Score 15   HEENT   HEENT (WDL) X   Right Eye Impaired vision   Left Eye Impaired vision   Voice Normal   Teeth Dentures upper   Respiratory   Respiratory (WDL) WDL   Respiratory Pattern Regular   Respiratory Depth Normal   Respiratory Quality/Effort Unlabored   Chest Assessment Chest expansion symmetrical;Trachea midline   L Breath Sounds Clear   R Breath Sounds Clear   Cardiac   Cardiac (WDL) WDL   Cardiac Regularity Regular   Heart Sounds S1, S2   Cardiac Monitor   Telemetry Monitor On Yes   Telemetry Audible Yes   Telemetry Alarms Set Yes   Telemetry Box Number [id number]   Gastrointestinal   Abdominal (WDL) X   RUQ Bowel Sounds Active   LUQ Bowel Sounds Active   RLQ Bowel Sounds Active   LLQ Bowel Sounds Active   Abdomen Inspection Rounded   Peripheral Vascular   Peripheral Vascular (WDL) WDL   Edema None   RLE Neurovascular Assessment   Capillary Refill Less than/equal to 3 seconds   Color Pink   Temperature Warm   R Pedal Pulse +2   LLE Neurovascular Assessment   Capillary Refill Less than/equal to 3 seconds   Color Pink   Temperature Warm   L Pedal Pulse +2   Skin Color/Condition   Skin Color/Condition (WDL) WDL   Skin Integrity   Skin Integrity (WDL) X   Skin Integrity Abrasion   Location rt knee   Preventative Dressing No   Musculoskeletal   Musculoskeletal (WDL) X   RUE Full movement   LUE Splint   RL Extremity Weakness   LL Extremity Weakness   Musculoskeletal Details   L Wrist Immobilizer   Genitourinary   Genitourinary (WDL) WDL   Psychosocial   Psychosocial (WDL) WDL

## 2022-01-06 PROBLEM — J44.9 COPD (CHRONIC OBSTRUCTIVE PULMONARY DISEASE) (HCC): Status: ACTIVE | Noted: 2022-01-06

## 2022-01-06 NOTE — PROGRESS NOTES
Patient states she would like to leave AMA, she is worried about her daughter. Educated patient as to why she should stay, patient declined to stay. Money and ring retrieved from [employer]. Money counted and witnessed by this RN and Tiffany Rodriguez. Patient signed AMA forms. Patient taken to pov by w/c without incident.

## 2022-01-06 NOTE — PROGRESS NOTES
Spoke to pt. She states that she has no ride to come get her IV removed or to her appointment with Dr. Humberto Camacho. Says she will not be here to get IV removed today.

## 2022-01-07 NOTE — DISCHARGE SUMMARY
Discharge Summary      Patient ID: Baker Heys      Patient's PCP: Dimitri Yun MD    Admit Date: 12/30/2021     Discharge Date: 1/5/2022     Admitting Provider: Luz Liriano MD    Discharging Provider: TATYANA Rosas CNP     Reason for this admission:   Declining functional status    Discharge Diagnoses: Active Hospital Problems    Diagnosis Date Noted    Declining functional status [R53.81] 12/30/2021    Wrist fracture, closed, left, initial encounter [S62.102A] 12/29/2021    Chronic nausea [R11.0] 05/21/2015    Hypothyroidism [E03.9] 05/20/2015       Procedures:  none    Consults:   IP CONSULT TO CASE MANAGEMENT  IP CONSULT TO DIETITIAN      Briefly:   78year old female with PMH nausea, hypothyroid, HTN, vit d def, COPD reported wears 2L every now and then at home who was admitted for left wrist fracture, declining functional status. Pt received PT/OT, treated for COPD exacerbation. Had ortho appt 1/6. Left ama 1/5 night. Still requiring oxygen and generalized weakness and thus left ama. Hospital Course: Active Hospital Problems    Diagnosis Date Noted    COPD (chronic obstructive pulmonary disease) ([facility] [address] [J44.9]  -IV rocephin, steroid given 1/4 for CXR  Increased left basilar parenchymal opacities may represent atelectasis or pneumonia. Requiring 2-4L NC.  -1/5 6 min walk \"Patient walked assisted by OT and PT, she requires 4lpm 02 upon exertion and at rest requires 3lpm 02.\"  -Encouraged to stay with oxygen requirement but refused. 01/06/2022    Declining functional status [R53.81]  Managed all aspects of care from chronic to acute. Received  PT/OT. Monitored lab work. -left ama   12/30/2021    Wrist fracture, closed, left, initial encounter [R22.102A]  -Ortho appt 1/6 and pt left ama 1/5 night. 12/29/2021    Essential hypertension [I10]  Stable. Cont home meds.     05/02/2017    Chronic nausea [R11.0]  Cont home meds   05/21/2015    Hypothyroidism [E03.9]  Cont home meds. 05/20/2015         Disposition: AMA  Discharged Condition: Stable    Vital Signs  Temp: 97.9 °F (36.6 °C)  Pulse: 64  Resp: 16  BP: 127/63  SpO2: 92 %  O2 Device: Nasal cannula  O2 Flow Rate (L/min): 3 L/min    Vital signs reviewed in electronic chart. Physical exam  Left ama. Did not examine. Labs: For convenience and continuity at follow-up the following most recent labs are provided:    CBC:   Lab Results   Component Value Date    WBC 6.2 01/03/2022    HGB 12.9 01/03/2022    HCT 42.5 01/03/2022     01/03/2022       RENAL:   Lab Results   Component Value Date     01/03/2022    K 4.5 01/03/2022     01/03/2022    CO2 37 01/03/2022    BUN 11 01/03/2022    CREATININE 0.6 01/03/2022     XR CHEST PORTABLE   Final Result      1. Increased left basilar parenchymal opacities may represent atelectasis or pneumonia         CT HEAD WO CONTRAST   Final Result      1. No acute intracranial abnormality. 2.  Mild chronic small vessel ischemic white matter disease. 3.  Minimal right maxillary sinus disease. 4.  Chiari I malformation.           Discharge Medications:     Discharge Medication List as of 1/5/2022 10:58 PM           Details   lisinopril-hydroCHLOROthiazide (PRINZIDE;ZESTORETIC) 20-12.5 MG per tablet Take 1 tablet by mouth daily, Disp-30 tablet, R-3Normal      albuterol (PROVENTIL) (5 MG/ML) 0.5% nebulizer solution Take 1 mL by nebulization 4 times daily as needed for Wheezing, Disp-120 each, R-3Normal      Budeson-Glycopyrrol-Formoterol (BREZTRI AEROSPHERE) 160-9-4.8 MCG/ACT AERO Inhale 2 puffs into the lungs daily, Disp-1 each, R-1Sample      vitamin D (ERGOCALCIFEROL) 1.25 MG (23280 UT) CAPS capsule Take 1 capsule by mouth once a week, Disp-12 capsule, R-1Normal      albuterol sulfate  (90 Base) MCG/ACT inhaler Inhale 2 puffs into the lungs every 6 hours as needed Historical Med      clonazePAM (KLONOPIN) 2 MG tablet TAKE ONE TABLET BY MOUTH ONCE NIGHTLY AS NEEDED FOR INSOMNIA, Disp-30 tablet, R-2Normal      ondansetron (ZOFRAN) 4 MG tablet Take 2 tablets by mouth 3 times daily as needed for Nausea or Vomiting, Disp-15 tablet, R-0Normal      levothyroxine (SYNTHROID) 75 MCG tablet Take 1 tablet by mouth Daily, Disp-90 tablet, R-3Normal      nystatin (MYCOSTATIN) 317888 UNIT/GM cream Apply topically 2 times daily. , Historical Med      aspirin 81 MG EC tablet Take 81 mg by mouth dailyHistorical Med      nitroGLYCERIN (NITROSTAT) 0.4 MG SL tablet Place 1 tablet under the tongue every 5 minutes as needed for Chest pain, Disp-25 tablet, R-3Normal              Patient was seen and examined by Dr. Tabby Dotson and plan of care reviewed. Signed:  Electronically signed by TATYANA Morel CNP on 1/6/2022 at 7:22 PM       Thank you Beckie Brock MD for the opportunity to be involved in this patient's care. If you have any questions or concerns please feel free to contact me at [phone].

## 2022-02-01 ENCOUNTER — OFFICE VISIT (OUTPATIENT)
Dept: FAMILY MEDICINE CLINIC | Age: 77
End: 2022-02-01
Payer: MEDICARE

## 2022-02-01 DIAGNOSIS — R19.7 NAUSEA VOMITING AND DIARRHEA: ICD-10-CM

## 2022-02-01 DIAGNOSIS — R11.2 NAUSEA VOMITING AND DIARRHEA: ICD-10-CM

## 2022-02-01 DIAGNOSIS — R06.02 SOB (SHORTNESS OF BREATH): ICD-10-CM

## 2022-02-01 DIAGNOSIS — R19.7 DIARRHEA, UNSPECIFIED TYPE: Primary | ICD-10-CM

## 2022-02-01 LAB
Lab: ABNORMAL
QC PASS/FAIL: ABNORMAL
SARS-COV-2 RDRP RESP QL NAA+PROBE: POSITIVE

## 2022-02-01 PROCEDURE — G8427 DOCREV CUR MEDS BY ELIG CLIN: HCPCS | Performed by: NURSE PRACTITIONER

## 2022-02-01 PROCEDURE — 99213 OFFICE O/P EST LOW 20 MIN: CPT | Performed by: NURSE PRACTITIONER

## 2022-02-01 PROCEDURE — 1036F TOBACCO NON-USER: CPT | Performed by: NURSE PRACTITIONER

## 2022-02-01 PROCEDURE — 1090F PRES/ABSN URINE INCON ASSESS: CPT | Performed by: NURSE PRACTITIONER

## 2022-02-01 PROCEDURE — 4040F PNEUMOC VAC/ADMIN/RCVD: CPT | Performed by: NURSE PRACTITIONER

## 2022-02-01 PROCEDURE — G8484 FLU IMMUNIZE NO ADMIN: HCPCS | Performed by: NURSE PRACTITIONER

## 2022-02-01 PROCEDURE — 3017F COLORECTAL CA SCREEN DOC REV: CPT | Performed by: NURSE PRACTITIONER

## 2022-02-01 PROCEDURE — 96372 THER/PROPH/DIAG INJ SC/IM: CPT | Performed by: NURSE PRACTITIONER

## 2022-02-01 PROCEDURE — G8417 CALC BMI ABV UP PARAM F/U: HCPCS | Performed by: NURSE PRACTITIONER

## 2022-02-01 PROCEDURE — 87635 SARS-COV-2 COVID-19 AMP PRB: CPT | Performed by: NURSE PRACTITIONER

## 2022-02-01 PROCEDURE — 1111F DSCHRG MED/CURRENT MED MERGE: CPT | Performed by: NURSE PRACTITIONER

## 2022-02-01 PROCEDURE — 1123F ACP DISCUSS/DSCN MKR DOCD: CPT | Performed by: NURSE PRACTITIONER

## 2022-02-01 PROCEDURE — G8399 PT W/DXA RESULTS DOCUMENT: HCPCS | Performed by: NURSE PRACTITIONER

## 2022-02-01 RX ORDER — LOPERAMIDE HYDROCHLORIDE 2 MG/1
2 CAPSULE ORAL 2 TIMES DAILY PRN
Qty: 20 CAPSULE | Refills: 0 | Status: SHIPPED | OUTPATIENT
Start: 2022-02-01 | End: 2022-02-11

## 2022-02-01 RX ORDER — ONDANSETRON 4 MG/1
8 TABLET, FILM COATED ORAL 3 TIMES DAILY PRN
Qty: 15 TABLET | Refills: 0 | Status: SHIPPED | OUTPATIENT
Start: 2022-02-01 | End: 2022-02-14 | Stop reason: SDUPTHER

## 2022-02-01 RX ORDER — DEXAMETHASONE SODIUM PHOSPHATE 10 MG/ML
10 INJECTION INTRAMUSCULAR; INTRAVENOUS ONCE
Status: COMPLETED | OUTPATIENT
Start: 2022-02-01 | End: 2022-02-01

## 2022-02-01 RX ADMIN — DEXAMETHASONE SODIUM PHOSPHATE 10 MG: 10 INJECTION INTRAMUSCULAR; INTRAVENOUS at 15:11

## 2022-02-01 ASSESSMENT — ENCOUNTER SYMPTOMS
SINUS PRESSURE: 1
DIARRHEA: 1
NAUSEA: 1

## 2022-02-01 NOTE — PROGRESS NOTES
Viral Walker 78 y.o. presents today for   Chief Complaint   Patient presents with    Diarrhea     x3 days    Fever    Nasal Congestion        HPI:  Viral Walker states for the past five days she has had nasal congestion, nausea, a fever and diarrhea for the past three days that have caused her to be incontinent. No family history on file. Social History     Socioeconomic History    Marital status:      Spouse name: Not on file    Number of children: Not on file    Years of education: Not on file    Highest education level: Not on file   Occupational History    Not on file   Tobacco Use    Smoking status: Never Smoker    Smokeless tobacco: Never Used   Vaping Use    Vaping Use: Never used   Substance and Sexual Activity    Alcohol use: No    Drug use: No    Sexual activity: Not on file   Other Topics Concern    Not on file   Social History Narrative    Not on file     Social Determinants of Health     Financial Resource Strain: Low Risk     Difficulty of Paying Living Expenses: Not very hard   Food Insecurity: No Food Insecurity    Worried About Running Out of Food in the Last Year: Never true    Araseli of Food in the Last Year: Never true   Transportation Needs:     Lack of Transportation (Medical): Not on file    Lack of Transportation (Non-Medical):  Not on file   Physical Activity:     Days of Exercise per Week: Not on file    Minutes of Exercise per Session: Not on file   Stress:     Feeling of Stress : Not on file   Social Connections:     Frequency of Communication with Friends and Family: Not on file    Frequency of Social Gatherings with Friends and Family: Not on file    Attends Jain Services: Not on file    Active Member of Clubs or Organizations: Not on file    Attends Club or Organization Meetings: Not on file    Marital Status: Not on file   Intimate Partner Violence:     Fear of Current or Ex-Partner: Not on file    Emotionally Abused: Not on file    Physically Abused: Not on file    Sexually Abused: Not on file   Housing Stability:     Unable to Pay for Housing in the Last Year: Not on file    Number of [place] in the Last Year: Not on file    Unstable Housing in the Last Year: Not on file        Past Surgical History:   Procedure Laterality Date    APPENDECTOMY      CHOLECYSTECTOMY      LIVER SURGERY      UPPER GASTROINTESTINAL ENDOSCOPY          Past Medical History:   Diagnosis Date    Bleeds easily ([facility] 12/31/2018    COPD (chronic obstructive pulmonary disease) ([facility] 1/6/2022    DVT (deep venous thrombosis) ([facility]     Headache(784.0)     Hypertension     Hypothyroid 5/20/2015    MI, old     Moderate episode of recurrent major depressive disorder ([facility] 10/27/2018    Pneumonia     Stomach ulcer     Thyroid disease         Current Outpatient Medications   Medication Sig Dispense Refill    loperamide (RA ANTI-DIARRHEAL) 2 MG capsule Take 1 capsule by mouth 2 times daily as needed for Diarrhea 20 capsule 0    ondansetron (ZOFRAN) 4 MG tablet Take 2 tablets by mouth 3 times daily as needed for Nausea or Vomiting 15 tablet 0    lisinopril-hydroCHLOROthiazide (PRINZIDE;ZESTORETIC) 20-12.5 MG per tablet Take 1 tablet by mouth daily 30 tablet 3    albuterol (PROVENTIL) (5 MG/ML) 0.5% nebulizer solution Take 1 mL by nebulization 4 times daily as needed for Wheezing 120 each 3    Budeson-Glycopyrrol-Formoterol (BREZTRI AEROSPHERE) 160-9-4.8 MCG/ACT AERO Inhale 2 puffs into the lungs daily 1 each 1    vitamin D (ERGOCALCIFEROL) 1.25 MG (45024 UT) CAPS capsule Take 1 capsule by mouth once a week 12 capsule 1    albuterol sulfate  (90 Base) MCG/ACT inhaler Inhale 2 puffs into the lungs every 6 hours as needed       clonazePAM (KLONOPIN) 2 MG tablet TAKE ONE TABLET BY MOUTH ONCE NIGHTLY AS NEEDED FOR INSOMNIA 30 tablet 2    levothyroxine (SYNTHROID) 75 MCG tablet Take 1 tablet by mouth Daily 90 tablet 3    nystatin (MYCOSTATIN) 527697 UNIT/GM cream Apply topically 2 times daily.  aspirin 81 MG EC tablet Take 81 mg by mouth daily      nitroGLYCERIN (NITROSTAT) 0.4 MG SL tablet Place 1 tablet under the tongue every 5 minutes as needed for Chest pain 25 tablet 3     No current facility-administered medications for this visit. Review of Systems   Constitutional: Positive for fatigue and fever. HENT: Positive for congestion and sinus pressure. Gastrointestinal: Positive for diarrhea and nausea. All other systems reviewed and are negative. There were no vitals taken for this visit. Physical Exam  Constitutional:       Appearance: Normal appearance. HENT:      Head: Normocephalic and atraumatic. Right Ear: Tympanic membrane, ear canal and external ear normal.      Left Ear: Tympanic membrane, ear canal and external ear normal.      Nose: Nose normal.      Mouth/Throat:      Mouth: Mucous membranes are moist.      Pharynx: Oropharynx is clear. Eyes:      Extraocular Movements: Extraocular movements intact. Conjunctiva/sclera: Conjunctivae normal.      Pupils: Pupils are equal, round, and reactive to light. Cardiovascular:      Rate and Rhythm: Normal rate and regular rhythm. Pulses: Normal pulses. Heart sounds: Normal heart sounds. Pulmonary:      Effort: Pulmonary effort is normal.      Breath sounds: Normal breath sounds. Abdominal:      General: Bowel sounds are normal.      Palpations: Abdomen is soft. Musculoskeletal:         General: Normal range of motion. Cervical back: Normal range of motion and neck supple. Skin:     General: Skin is warm and dry. Capillary Refill: Capillary refill takes less than 2 seconds. Neurological:      General: No focal deficit present. Mental Status: She is alert and oriented to person, place, and time. Psychiatric:         Mood and Affect: Mood normal.         Behavior: Behavior normal.          ASSESSMENT/PLAN    1. Diarrhea, unspecified type  COVID positive  Advised pt to take medication as directed and to f/u if s/s persist or worsen. She is agreeable to poc. COVID education provided to pt. - POCT COVID-19 Rapid, NAAT  - loperamide (RA ANTI-DIARRHEAL) 2 MG capsule; Take 1 capsule by mouth 2 times daily as needed for Diarrhea  Dispense: 20 capsule; Refill: 0    2. SOB (shortness of breath)  Advised pt to f/u if s/s persist or worsen.  She is agreeable to poc.  - dexamethasone (DECADRON) injection 10 mg             TATYANA Cook - CNP

## 2022-02-01 NOTE — PROGRESS NOTES
Patient here today for sick visit only. Health Maintenance not reviewed during this visit. Administrations This Visit     dexamethasone (DECADRON) injection 10 mg     Admin Date  02/01/2022  15:11 Action  Given Dose  10 mg Route  IntraMUSCular Site  Dorsogluteal Left Administered By  Obdulio Godoy RN    Ordering Provider: TATYANA Carcamo CNP    NDC: 99609-397-74    Lot#: [id number]    : [employer] [facility]    Patient Supplied?: No              Patient tolerated injection well. Patient advised to wait 20 minutes in the office following the injection. No signs/symptoms of reaction noted after 20 minutes.

## 2022-02-01 NOTE — TELEPHONE ENCOUNTER
----- Message from Kristie Mayo sent at 1/31/2022  5:08 PM EST -----  Subject: Refill Request    QUESTIONS  Name of Medication? ondansetron (ZOFRAN) 4 MG tablet  Patient-reported dosage and instructions? 1-2 tablets as needed   How many days do you have left? 0  Preferred Pharmacy? [address] phone number (if available)? [phone]  ---------------------------------------------------------------------------  --------------  CALL BACK INFO  What is the best way for the office to contact you? OK to leave message on   voicemail  Preferred Call Back Phone Number?  [phone]

## 2022-02-01 NOTE — TELEPHONE ENCOUNTER
----- Message from Faye Chadwick sent at 1/31/2022  5:08 PM EST -----  Subject: Refill Request    QUESTIONS  Name of Medication? ondansetron (ZOFRAN) 4 MG tablet  Patient-reported dosage and instructions? 1-2 tablets as needed   How many days do you have left? 0  Preferred Pharmacy? [address] phone number (if available)? [phone]  ---------------------------------------------------------------------------  --------------  CALL BACK INFO  What is the best way for the office to contact you? OK to leave message on   voicemail  Preferred Call Back Phone Number?  [phone]

## 2022-02-07 ENCOUNTER — TELEPHONE (OUTPATIENT)
Dept: FAMILY MEDICINE CLINIC | Age: 77
End: 2022-02-07

## 2022-02-07 RX ORDER — PROMETHAZINE HYDROCHLORIDE 12.5 MG/1
12.5 TABLET ORAL EVERY 6 HOURS PRN
Qty: 30 TABLET | Refills: 0 | Status: SHIPPED | OUTPATIENT
Start: 2022-02-07 | End: 2022-02-14

## 2022-02-07 NOTE — TELEPHONE ENCOUNTER
Patient called and stated that she is still vomiting. She had a refill on 2/1/22 for zofran but she stated that she has already taking them. She stated that she can't take the dissolvable kind.

## 2022-02-14 ENCOUNTER — TELEPHONE (OUTPATIENT)
Dept: FAMILY MEDICINE CLINIC | Age: 77
End: 2022-02-14

## 2022-02-14 DIAGNOSIS — E03.9 HYPOTHYROIDISM, UNSPECIFIED TYPE: ICD-10-CM

## 2022-02-14 DIAGNOSIS — R11.2 NAUSEA VOMITING AND DIARRHEA: ICD-10-CM

## 2022-02-14 DIAGNOSIS — R19.7 NAUSEA VOMITING AND DIARRHEA: ICD-10-CM

## 2022-02-14 RX ORDER — ONDANSETRON 4 MG/1
8 TABLET, FILM COATED ORAL 3 TIMES DAILY PRN
Qty: 15 TABLET | Refills: 0 | Status: SHIPPED | OUTPATIENT
Start: 2022-02-14 | End: 2022-07-13

## 2022-02-14 RX ORDER — LEVOTHYROXINE SODIUM 0.07 MG/1
75 TABLET ORAL DAILY
Qty: 90 TABLET | Refills: 3 | Status: SHIPPED | OUTPATIENT
Start: 2022-02-14 | End: 2022-09-07 | Stop reason: SDUPTHER

## 2022-02-14 NOTE — TELEPHONE ENCOUNTER
Patient called and stated that she went out of town this weekend and left her thyroid medication and zofran there. She was wondering if we can send those in again?

## 2022-02-15 ENCOUNTER — TELEPHONE (OUTPATIENT)
Dept: FAMILY MEDICINE CLINIC | Age: 77
End: 2022-02-15

## 2022-02-15 NOTE — TELEPHONE ENCOUNTER
----- Message from Jazz Castillo sent at 2/15/2022  9:22 AM EST -----  Subject: Message to Provider    QUESTIONS  Information for Provider? Pt had a  today had to go. Would like a   call back to R/s for next wed.   ---------------------------------------------------------------------------  --------------  CALL BACK INFO  What is the best way for the office to contact you? OK to leave message on   voicemail  Preferred Call Back Phone Number? [phone]  ---------------------------------------------------------------------------  --------------  SCRIPT ANSWERS  Relationship to Patient?  Self

## 2022-02-15 NOTE — TELEPHONE ENCOUNTER
----- Message from Eduardo Rebolledo sent at 2/15/2022  9:22 AM EST -----  Subject: Message to Provider    QUESTIONS  Information for Provider? Pt had a  today had to go. Would like a   call back to R/s for next wed.   ---------------------------------------------------------------------------  --------------  CALL BACK INFO  What is the best way for the office to contact you? OK to leave message on   voicemail  Preferred Call Back Phone Number? [phone]  ---------------------------------------------------------------------------  --------------  SCRIPT ANSWERS  Relationship to Patient?  Self

## 2022-02-23 ENCOUNTER — HOSPITAL ENCOUNTER (OUTPATIENT)
Dept: MAMMOGRAPHY | Facility: HOSPITAL | Age: 77
Discharge: HOME | End: 2022-02-23
Payer: MEDICARE

## 2022-02-23 VITALS — WEIGHT: 190 LBS | BODY MASS INDEX: 33.66 KG/M2

## 2022-02-23 DIAGNOSIS — Z12.31 BREAST CANCER SCREENING BY MAMMOGRAM: ICD-10-CM

## 2022-02-23 PROCEDURE — 77063 BREAST TOMOSYNTHESIS BI: CPT

## 2022-02-23 RX ORDER — NYSTATIN 100000 U/G
CREAM TOPICAL
Qty: 1 EACH | Refills: 0 | Status: SHIPPED | OUTPATIENT
Start: 2022-02-23 | End: 2022-04-01 | Stop reason: SDUPTHER

## 2022-03-03 RX ORDER — LISINOPRIL AND HYDROCHLOROTHIAZIDE 20; 12.5 MG/1; MG/1
1 TABLET ORAL DAILY
Qty: 90 TABLET | Refills: 3 | Status: SHIPPED | OUTPATIENT
Start: 2022-03-03 | End: 2022-09-07 | Stop reason: SDUPTHER

## 2022-03-21 ENCOUNTER — HOSPITAL ENCOUNTER (OUTPATIENT)
Facility: HOSPITAL | Age: 77
Discharge: HOME | End: 2022-03-21
Payer: MEDICARE

## 2022-03-21 PROCEDURE — 87086 URINE CULTURE/COLONY COUNT: CPT

## 2022-03-23 LAB — URINE CULTURE, ROUTINE: NORMAL

## 2022-03-31 DIAGNOSIS — L98.9 SKIN SORE: ICD-10-CM

## 2022-03-31 RX ORDER — MUPIROCIN CALCIUM 20 MG/G
CREAM TOPICAL
Qty: 15 G | Refills: 2 | Status: SHIPPED | OUTPATIENT
Start: 2022-03-31 | End: 2022-04-30

## 2022-03-31 NOTE — TELEPHONE ENCOUNTER
Patient called, requested refill.        Next Office Visit Date:  Future Appointments   Date Time Provider Malena Diallo   4/6/2022  3:15 PM TATYANA Shaikh - CNP [facility] Noah LUNA please review via [address]

## 2022-04-01 RX ORDER — NYSTATIN 100000 U/G
CREAM TOPICAL
Qty: 1 EACH | Refills: 0 | Status: SHIPPED | OUTPATIENT
Start: 2022-04-01 | End: 2022-06-08 | Stop reason: SDUPTHER

## 2022-06-08 ENCOUNTER — OFFICE VISIT (OUTPATIENT)
Dept: FAMILY MEDICINE CLINIC | Age: 77
End: 2022-06-08
Payer: MEDICARE

## 2022-06-08 VITALS
OXYGEN SATURATION: 95 % | DIASTOLIC BLOOD PRESSURE: 82 MMHG | HEIGHT: 63 IN | BODY MASS INDEX: 34.76 KG/M2 | WEIGHT: 196.2 LBS | RESPIRATION RATE: 18 BRPM | TEMPERATURE: 97.7 F | SYSTOLIC BLOOD PRESSURE: 150 MMHG | HEART RATE: 75 BPM

## 2022-06-08 DIAGNOSIS — L30.9 DERMATITIS: ICD-10-CM

## 2022-06-08 DIAGNOSIS — L98.9 SKIN LESION: Primary | ICD-10-CM

## 2022-06-08 PROCEDURE — G8399 PT W/DXA RESULTS DOCUMENT: HCPCS | Performed by: NURSE PRACTITIONER

## 2022-06-08 PROCEDURE — G8417 CALC BMI ABV UP PARAM F/U: HCPCS | Performed by: NURSE PRACTITIONER

## 2022-06-08 PROCEDURE — 96372 THER/PROPH/DIAG INJ SC/IM: CPT | Performed by: NURSE PRACTITIONER

## 2022-06-08 PROCEDURE — 1123F ACP DISCUSS/DSCN MKR DOCD: CPT | Performed by: NURSE PRACTITIONER

## 2022-06-08 PROCEDURE — 99213 OFFICE O/P EST LOW 20 MIN: CPT | Performed by: NURSE PRACTITIONER

## 2022-06-08 PROCEDURE — 1090F PRES/ABSN URINE INCON ASSESS: CPT | Performed by: NURSE PRACTITIONER

## 2022-06-08 PROCEDURE — 3017F COLORECTAL CA SCREEN DOC REV: CPT | Performed by: NURSE PRACTITIONER

## 2022-06-08 PROCEDURE — G8427 DOCREV CUR MEDS BY ELIG CLIN: HCPCS | Performed by: NURSE PRACTITIONER

## 2022-06-08 PROCEDURE — 1036F TOBACCO NON-USER: CPT | Performed by: NURSE PRACTITIONER

## 2022-06-08 RX ORDER — TRAZODONE HYDROCHLORIDE 50 MG/1
TABLET ORAL
COMMUNITY
Start: 2022-03-21 | End: 2022-09-07 | Stop reason: SDUPTHER

## 2022-06-08 RX ORDER — NYSTATIN 100000 [USP'U]/G
POWDER TOPICAL
COMMUNITY
Start: 2022-05-09 | End: 2022-06-08 | Stop reason: SDUPTHER

## 2022-06-08 RX ORDER — METHYLPREDNISOLONE SODIUM SUCCINATE 500 MG/8ML
500 INJECTION INTRAMUSCULAR; INTRAVENOUS ONCE
Qty: 1 EACH | Refills: 0 | Status: SHIPPED
Start: 2022-06-08 | End: 2022-06-08 | Stop reason: CLARIF

## 2022-06-08 RX ORDER — FLUCONAZOLE 150 MG/1
TABLET ORAL
COMMUNITY
Start: 2022-05-09 | End: 2022-07-06 | Stop reason: ALTCHOICE

## 2022-06-08 RX ORDER — METHYLPREDNISOLONE SODIUM SUCCINATE 125 MG/2ML
125 INJECTION, POWDER, LYOPHILIZED, FOR SOLUTION INTRAMUSCULAR; INTRAVENOUS ONCE
Status: SHIPPED | OUTPATIENT
Start: 2022-06-08

## 2022-06-08 RX ORDER — METHYLPREDNISOLONE SODIUM SUCCINATE 125 MG/2ML
125 INJECTION, POWDER, LYOPHILIZED, FOR SOLUTION INTRAMUSCULAR; INTRAVENOUS ONCE
Status: COMPLETED | OUTPATIENT
Start: 2022-06-08 | End: 2022-06-08

## 2022-06-08 RX ORDER — NYSTATIN 100000 U/G
CREAM TOPICAL
Qty: 1 EACH | Refills: 1 | Status: SHIPPED | OUTPATIENT
Start: 2022-06-08 | End: 2022-07-01 | Stop reason: SDUPTHER

## 2022-06-08 RX ADMIN — METHYLPREDNISOLONE SODIUM SUCCINATE 125 MG: 125 INJECTION, POWDER, LYOPHILIZED, FOR SOLUTION INTRAMUSCULAR; INTRAVENOUS at 14:22

## 2022-06-08 ASSESSMENT — PATIENT HEALTH QUESTIONNAIRE - PHQ9
SUM OF ALL RESPONSES TO PHQ QUESTIONS 1-9: 0
2. FEELING DOWN, DEPRESSED OR HOPELESS: 0
SUM OF ALL RESPONSES TO PHQ QUESTIONS 1-9: 0
5. POOR APPETITE OR OVEREATING: 0
1. LITTLE INTEREST OR PLEASURE IN DOING THINGS: 0
6. FEELING BAD ABOUT YOURSELF - OR THAT YOU ARE A FAILURE OR HAVE LET YOURSELF OR YOUR FAMILY DOWN: 0
7. TROUBLE CONCENTRATING ON THINGS, SUCH AS READING THE NEWSPAPER OR WATCHING TELEVISION: 0
3. TROUBLE FALLING OR STAYING ASLEEP: 0
10. IF YOU CHECKED OFF ANY PROBLEMS, HOW DIFFICULT HAVE THESE PROBLEMS MADE IT FOR YOU TO DO YOUR WORK, TAKE CARE OF THINGS AT HOME, OR GET ALONG WITH OTHER PEOPLE: 0
4. FEELING TIRED OR HAVING LITTLE ENERGY: 0
8. MOVING OR SPEAKING SO SLOWLY THAT OTHER PEOPLE COULD HAVE NOTICED. OR THE OPPOSITE, BEING SO FIGETY OR RESTLESS THAT YOU HAVE BEEN MOVING AROUND A LOT MORE THAN USUAL: 0
SUM OF ALL RESPONSES TO PHQ9 QUESTIONS 1 & 2: 0
SUM OF ALL RESPONSES TO PHQ QUESTIONS 1-9: 0
SUM OF ALL RESPONSES TO PHQ QUESTIONS 1-9: 0
9. THOUGHTS THAT YOU WOULD BE BETTER OFF DEAD, OR OF HURTING YOURSELF: 0

## 2022-06-08 NOTE — PROGRESS NOTES
This is a 78 y.o. female who presents with   Chief Complaint   Patient presents with    Other     backside is rough, itching under both underarms    Cyst     backside       SUBJECTIVE:     Post covid  By month   Complaint of itching under arms at night. And on back side top of buttocks  Are of dry scaling skin, and 2 area of hardness that is rraised. Current Outpatient Medications   Medication Sig Dispense Refill    fluconazole (DIFLUCAN) 150 MG tablet       mupirocin (BACTROBAN) 2 % ointment       NYAMYC 725178 UNIT/GM powder       traZODone (DESYREL) 50 MG tablet       nystatin (MYCOSTATIN) 927056 UNIT/GM cream Apply topically 2 times daily. 1 each 0    lisinopril-hydroCHLOROthiazide (PRINZIDE;ZESTORETIC) 20-12.5 MG per tablet Take 1 tablet by mouth daily 90 tablet 3    levothyroxine (SYNTHROID) 75 MCG tablet Take 1 tablet by mouth Daily 90 tablet 3    ondansetron (ZOFRAN) 4 MG tablet Take 2 tablets by mouth 3 times daily as needed for Nausea or Vomiting 15 tablet 0    vitamin D (ERGOCALCIFEROL) 1.25 MG (05627 UT) CAPS capsule Take 1 capsule by mouth once a week 12 capsule 1    albuterol sulfate  (90 Base) MCG/ACT inhaler Inhale 2 puffs into the lungs every 6 hours as needed       aspirin 81 MG EC tablet Take 81 mg by mouth daily      nitroGLYCERIN (NITROSTAT) 0.4 MG SL tablet Place 1 tablet under the tongue every 5 minutes as needed for Chest pain 25 tablet 3    albuterol (PROVENTIL) (5 MG/ML) 0.5% nebulizer solution Take 1 mL by nebulization 4 times daily as needed for Wheezing (Patient not taking: Reported on 6/8/2022) 120 each 3    Budeson-Glycopyrrol-Formoterol (BREZTRI AEROSPHERE) 160-9-4.8 MCG/ACT AERO Inhale 2 puffs into the lungs daily (Patient not taking: Reported on 6/8/2022) 1 each 1    clonazePAM (KLONOPIN) 2 MG tablet TAKE ONE TABLET BY MOUTH ONCE NIGHTLY AS NEEDED FOR INSOMNIA 30 tablet 2     No current facility-administered medications for this visit. Allergies   Allergen Reactions    Iv Dye [Iodides] Hives, Shortness Of Breath and Other (See Comments)     Pt also passed out      Azithromycin Rash    Isosorbide Mononitrate [Isosorbide Nitrate]      Dizzy, nausea    Augmentin [Amoxicillin-Pot Clavulanate]     Codeine Hives    Influenza Vaccines      Patient couldn't remember the reaction att.  Mucinex [Guaifenesin Er]      Severe nausea    Nyquil Severe Cold-Flu [Phenyleph-Doxylamine-Dm-Apap]     Reglan [Metoclopramide] Itching     welps       Review of Systems   Skin: Positive for rash. All other systems reviewed and are negative. OBJECTIVE:     Temp 97.7 °F (36.5 °C) (Infrared)   Resp 18   Ht 5' 3\" (1.6 m)   Wt 196 lb 3.2 oz (89 kg)   LMP 01/01/1986 (Approximate)   BMI 34.76 kg/m²      Physical Exam  Constitutional:       General: She is not in acute distress. Appearance: Normal appearance. She is obese. She is not ill-appearing, toxic-appearing or diaphoretic. Skin:            Comments: Underarms look normal,  Some redness from her scratching,  No sign of infection. buttocks  Top mid left cheek near center line . A 4 cm by 3 cm area of dry scaly skin, with one re=e=aised area   Neurological:      Mental Status: She is alert. Psychiatric:      Comments: Very talkative,  Rushed speech          No orders of the defined types were placed in this encounter. No orders of the defined types were placed in this encounter. ASSESSMENT/PLAN  1. Manic type behavior   But a nice person clear thought pattern,    rash that is hard to see, and itches under arms. To stop  deodorant for 2 weeks use 1% hydrocortisone cream bid   2. 2 changing skin lesion top of buttock and one slightly left . With dry scaly rash ,   To use hydrocortisone cream bid , and referral to derm for possible biopsy. No follow-ups on file.

## 2022-06-08 NOTE — PROGRESS NOTES
Chief Complaint   Patient presents with    Other     backside is rough, itching under both underarms    Cyst     backside       Have you seen any other physician or provider since your last visit yes - [facility]    Have you had any other diagnostic tests since your last visit? no    Have you changed or stopped any medications since your last visit? yes - see list         Administrations This Visit     methylPREDNISolone sodium (SOLU-MEDROL) injection 125 mg     Admin Date  06/08/2022  14:22 Action  Given Dose  125 mg Route  IntraMUSCular Site  Dorsogluteal Left Administered By  [employer] [place], [place]    Ordering Provider: TATYANA Moreno CNP    NDC: 7191-4653-08    Lot#: [id number]    : [address]. Patient Supplied?: No              Patient tolerated injection well. Patient advised to wait 20 minutes in the office following the injection. No signs/symptoms of reaction noted after 20 minutes.

## 2022-06-20 ENCOUNTER — OFFICE VISIT (OUTPATIENT)
Dept: FAMILY MEDICINE CLINIC | Age: 77
End: 2022-06-20
Payer: MEDICARE

## 2022-06-20 VITALS — DIASTOLIC BLOOD PRESSURE: 70 MMHG | SYSTOLIC BLOOD PRESSURE: 108 MMHG

## 2022-06-20 DIAGNOSIS — J44.9 CHRONIC OBSTRUCTIVE PULMONARY DISEASE, UNSPECIFIED COPD TYPE (HCC): ICD-10-CM

## 2022-06-20 DIAGNOSIS — R11.2 NAUSEA AND VOMITING, INTRACTABILITY OF VOMITING NOT SPECIFIED, UNSPECIFIED VOMITING TYPE: ICD-10-CM

## 2022-06-20 DIAGNOSIS — Z11.52 ENCOUNTER FOR SCREENING FOR COVID-19: Primary | ICD-10-CM

## 2022-06-20 DIAGNOSIS — I20.8 STABLE ANGINA (HCC): ICD-10-CM

## 2022-06-20 DIAGNOSIS — R09.81 NASAL CONGESTION: ICD-10-CM

## 2022-06-20 PROCEDURE — 1090F PRES/ABSN URINE INCON ASSESS: CPT | Performed by: PHYSICIAN ASSISTANT

## 2022-06-20 PROCEDURE — G8427 DOCREV CUR MEDS BY ELIG CLIN: HCPCS | Performed by: PHYSICIAN ASSISTANT

## 2022-06-20 PROCEDURE — 87635 SARS-COV-2 COVID-19 AMP PRB: CPT | Performed by: PHYSICIAN ASSISTANT

## 2022-06-20 PROCEDURE — G8417 CALC BMI ABV UP PARAM F/U: HCPCS | Performed by: PHYSICIAN ASSISTANT

## 2022-06-20 PROCEDURE — 1036F TOBACCO NON-USER: CPT | Performed by: PHYSICIAN ASSISTANT

## 2022-06-20 PROCEDURE — 99213 OFFICE O/P EST LOW 20 MIN: CPT | Performed by: PHYSICIAN ASSISTANT

## 2022-06-20 PROCEDURE — 1123F ACP DISCUSS/DSCN MKR DOCD: CPT | Performed by: PHYSICIAN ASSISTANT

## 2022-06-20 PROCEDURE — 3023F SPIROM DOC REV: CPT | Performed by: PHYSICIAN ASSISTANT

## 2022-06-20 PROCEDURE — G8399 PT W/DXA RESULTS DOCUMENT: HCPCS | Performed by: PHYSICIAN ASSISTANT

## 2022-06-20 PROCEDURE — 3017F COLORECTAL CA SCREEN DOC REV: CPT | Performed by: PHYSICIAN ASSISTANT

## 2022-06-20 RX ORDER — ONDANSETRON 4 MG/1
4 TABLET, FILM COATED ORAL 3 TIMES DAILY PRN
Qty: 15 TABLET | Refills: 0 | Status: SHIPPED | OUTPATIENT
Start: 2022-06-20 | End: 2022-07-13 | Stop reason: SDUPTHER

## 2022-06-20 RX ORDER — CETIRIZINE HYDROCHLORIDE 10 MG/1
10 TABLET ORAL DAILY
Qty: 30 TABLET | Refills: 0 | Status: SHIPPED | OUTPATIENT
Start: 2022-06-20 | End: 2022-07-20

## 2022-06-20 ASSESSMENT — ENCOUNTER SYMPTOMS
RHINORRHEA: 1
VOMITING: 1
NAUSEA: 1
DIARRHEA: 1
COUGH: 1

## 2022-06-20 NOTE — PROGRESS NOTES
SUBJECTIVE:    Patient ID: Lisa Bradshaw is a 78 y. o.female. Chief Complaint   Patient presents with    Fever     X2 days    Diarrhea     X3 days    Nausea & Vomiting    Sinusitis    Congestion       HPI:    Pt here with c/o nasal congestion, rhinorrhea, NVD, fever of 103 for the last 3 days. She also endorses CP, SOB. She is afraid she may have covid again. Patient's medications, allergies, past medical, surgical, social and family histories were reviewed and updated as appropriate in electronic medical record. Current Outpatient Medications on File Prior to Visit   Medication Sig Dispense Refill    fluconazole (DIFLUCAN) 150 MG tablet       mupirocin (BACTROBAN) 2 % ointment       traZODone (DESYREL) 50 MG tablet       nystatin (MYCOSTATIN) 441942 UNIT/GM cream Apply topically 2 times daily.  1 each 1    lisinopril-hydroCHLOROthiazide (PRINZIDE;ZESTORETIC) 20-12.5 MG per tablet Take 1 tablet by mouth daily 90 tablet 3    levothyroxine (SYNTHROID) 75 MCG tablet Take 1 tablet by mouth Daily 90 tablet 3    ondansetron (ZOFRAN) 4 MG tablet Take 2 tablets by mouth 3 times daily as needed for Nausea or Vomiting 15 tablet 0    albuterol (PROVENTIL) (5 MG/ML) 0.5% nebulizer solution Take 1 mL by nebulization 4 times daily as needed for Wheezing (Patient not taking: Reported on 6/8/2022) 120 each 3    Budeson-Glycopyrrol-Formoterol (BREZTRI AEROSPHERE) 160-9-4.8 MCG/ACT AERO Inhale 2 puffs into the lungs daily (Patient not taking: Reported on 6/8/2022) 1 each 1    vitamin D (ERGOCALCIFEROL) 1.25 MG (06544 UT) CAPS capsule Take 1 capsule by mouth once a week 12 capsule 1    albuterol sulfate  (90 Base) MCG/ACT inhaler Inhale 2 puffs into the lungs every 6 hours as needed       clonazePAM (KLONOPIN) 2 MG tablet TAKE ONE TABLET BY MOUTH ONCE NIGHTLY AS NEEDED FOR INSOMNIA 30 tablet 2    aspirin 81 MG EC tablet Take 81 mg by mouth daily      nitroGLYCERIN (NITROSTAT) 0.4 MG SL tablet Place 1 tablet under the tongue every 5 minutes as needed for Chest pain 25 tablet 3     Current Facility-Administered Medications on File Prior to Visit   Medication Dose Route Frequency Provider Last Rate Last Admin    methylPREDNISolone sodium (SOLU-MEDROL) injection 125 mg  125 mg IntraVENous Once TATYANA Redding - CNP            Review of Systems   Constitutional: Positive for fever. HENT: Positive for congestion and rhinorrhea. Respiratory: Positive for cough. Cardiovascular: Negative. Gastrointestinal: Positive for diarrhea, nausea and vomiting. Skin: Negative. Neurological: Negative. Psychiatric/Behavioral: Negative. Past Medical History:   Diagnosis Date    Bleeds easily ([facility] 12/31/2018    COPD (chronic obstructive pulmonary disease) ([facility] 1/6/2022    [facility]     Headache(784.0)     Hypertension     Hypothyroid 5/20/2015    MI, old     Moderate episode of recurrent major depressive disorder ([facility] 10/27/2018    Pneumonia     Stomach ulcer     Thyroid disease      Past Surgical History:   Procedure Laterality Date    APPENDECTOMY      BREAST BIOPSY Bilateral     CHOLECYSTECTOMY      LIVER SURGERY      UPPER GASTROINTESTINAL ENDOSCOPY       No family history on file. Social History     Tobacco Use   Smoking Status Never Smoker   Smokeless Tobacco Never Used       OBJECTIVE:   Wt Readings from Last 3 Encounters:   06/08/22 196 lb 3.2 oz (89 kg)   02/23/22 190 lb (86.2 kg)   01/05/22 190 lb 9.6 oz (86.5 kg)     BP Readings from Last 3 Encounters:   06/08/22 (!) 150/82   01/05/22 127/63   12/30/21 (!) 156/75       LMP 01/01/1986 (Approximate)    Physical Exam  Vitals reviewed. Constitutional:       General: She is not in acute distress. Appearance: Normal appearance. She is normal weight. She is not ill-appearing or toxic-appearing. HENT:      Head: Normocephalic and atraumatic.       Mouth/Throat:      Mouth: Mucous membranes are moist.      Pharynx: Oropharynx is clear. Eyes:      Conjunctiva/sclera: Conjunctivae normal.      Pupils: Pupils are equal, round, and reactive to light. Cardiovascular:      Rate and Rhythm: Normal rate and regular rhythm. Heart sounds: Normal heart sounds. No murmur heard. No gallop. Pulmonary:      Effort: Pulmonary effort is normal.      Breath sounds: Normal breath sounds. No wheezing, rhonchi or rales. Skin:     General: Skin is warm and dry. Neurological:      Mental Status: She is alert and oriented to person, place, and time. Psychiatric:         Mood and Affect: Mood normal.         Behavior: Behavior normal.         Thought Content: Thought content normal.         Judgment: Judgment normal.         Lab Results   Component Value Date     01/03/2022    K 4.5 01/03/2022     01/03/2022    CO2 37 01/03/2022    GLUCOSE 104 01/03/2022    BUN 11 01/03/2022    CREATININE 0.6 01/03/2022    CALCIUM 8.8 01/03/2022    PROT 6.0 01/03/2022    LABALBU 3.8 01/03/2022    BILITOT 0.3 01/03/2022    ALT 31 01/03/2022    AST 29 01/03/2022     Hemoglobin A1C (%)   Date Value   09/16/2020 6.1 (H)     Microscopic Examination (no units)   Date Value   05/04/2021 YES     LDL Calculated (mg/dL)   Date Value   11/17/2021 117       Lab Results   Component Value Date    WBC 6.2 01/03/2022    NEUTROABS 4.2 12/28/2021    HGB 12.9 01/03/2022    HCT 42.5 01/03/2022    .4 01/03/2022     01/03/2022     Lab Results   Component Value Date    TSH 0.28 12/30/2021       ASSESSMENT/PLAN:     1. Encounter for screening for COVID-19  - POCT COVID-19 Rapid, NAAT    2. Nasal congestion  - she likely has some type of URI or covid. She likely has covid. Explained if no improvement rtc for re-evaluation and retesting for covid. She agrees to go to the hospital for any worsening. 3. Chronic obstructive pulmonary disease, unspecified COPD type ([address]    4. Stable angina ([address]    5.  Nausea and vomiting, intractability of vomiting not specified, unspecified vomiting type      No orders of the defined types were placed in this encounter.        Electronically signed by JUSTINE Ledesma on 6/20/2022 at 3:15 PM

## 2022-06-28 ENCOUNTER — CARE COORDINATION (OUTPATIENT)
Dept: CARE COORDINATION | Age: 77
End: 2022-06-28

## 2022-06-30 NOTE — CARE COORDINATION
I attempted to follow up with Clifford Silveira after reading her discharge summary. I had to leave a message with contact information but let her know I would call again. I told her in the message that it appears her medications didn't change as much as maybe a brand name or generic name change that maybe she didn't recognize. I didn't see anything about a nebulizer machine. It does talk about her oxygen dropping at night or with exertion. I let her know that she definitely needs to wear the oxygen at night until she can follow up with Dr Cole Ruiz and get a sleep study done. I will call her again.

## 2022-06-30 NOTE — CARE COORDINATION
Bonnie [facility] Transitions Initial Follow Up Call    Call within 2 business days of discharge: Yes     Patient: Al Barry Patient : 1945 MRN: [id number]    Last Discharge [facility]       Complaint Diagnosis Description Type Department Provider    21  Panic attack . .. Admission (Discharged) [address] [place] [address] [place] Kristy Larios MD   [facility] 22       RARS: Readmission Risk Score: 10.9 ( )       Spoke with: Attempted HFU call, unsuccessful. Needs HFU appointment as well.      Discharge department/facility: [facility]    Non-face-to-face services provided:  Scheduled appointment with Max    Follow Up  Future Appointments   Date Time Provider Malena Diallo   2022  9:30 AM Georges Dejesus MD [facility] Daisey Alonso   2022 11:15 AM Georges Dejesus MD [address]       Elisa Cottrell RN

## 2022-06-30 NOTE — CARE COORDINATION
Bonnie 45 Transitions Initial Follow Up Call    Call within 2 business days of discharge: Yes     Patient: Sylvia Lopez Patient : 1945 MRN: [id number]    Last Discharge [facility]       Complaint Diagnosis Description Type Department Provider    21  Panic attack . .. Admission (Discharged) [address] [place] Cinthya Jones MD          RARS: Readmission Risk Score: 10.9 ( )       Spoke with: Fransisca Rainey tells me that she just doesn't feel good. She doesn't like wearing oxygen and states she doesn't understand why she has too. She didn't understand what medicine she was to be taking and tells me that she called the clinic earlier and was told just to continue with what Dr Andrew Menendez had prescribed before she was in the hospital.  She denies any pain or nausea at this time. She also mentioned that she thought she was going to get a nebulizer machine. I let her know that I am waiting on the discharge summary and will let her know answers as soon as I obtain it.      Discharge department/facility: [facility]    Non-face-to-face services provided:  Scheduled appointment with PCP-Luisana    Follow Up  Future Appointments   Date Time Provider Malena Diallo   2022  9:30 AM Carter Ramirez MD [facility] Gretel Amass   2022 11:15 AM Carter Ramirez MD [address]       Randall Cardona RN

## 2022-07-01 DIAGNOSIS — G47.00 INSOMNIA, UNSPECIFIED TYPE: ICD-10-CM

## 2022-07-01 DIAGNOSIS — F41.0 PANIC ATTACK: ICD-10-CM

## 2022-07-01 RX ORDER — NYSTATIN 100000 U/G
CREAM TOPICAL
Qty: 1 EACH | Refills: 1 | Status: SHIPPED | OUTPATIENT
Start: 2022-07-01 | End: 2022-07-22

## 2022-07-01 RX ORDER — CLONAZEPAM 2 MG/1
TABLET ORAL
Qty: 30 TABLET | Refills: 2 | Status: SHIPPED | OUTPATIENT
Start: 2022-07-01 | End: 2022-09-07 | Stop reason: SDUPTHER

## 2022-07-01 NOTE — CARE COORDINATION
Genesis Briscoe returned my call. We discussed her discharge medications. Her only concern is that she was not prescribed breathing treatments. This is not mentioned in her discharge summary at all. She states that she is find to wait until she has fu with Dr Petty Torres next week. She also gets samples of her anoro inhaler but has enough to take until her [facility] appointment. She confirms that she is using her oxygen at night.

## 2022-07-01 NOTE — TELEPHONE ENCOUNTER
Patient called, requested refill.        Next Office Visit Date:  Future Appointments   Date Time Provider Malena Imani   7/6/2022  9:30 AM Narcisa Garcia MD [address]   7/20/2022 11:15 AM Narcisa Garcia MD [address] please review via PDMP

## 2022-07-01 NOTE — CARE COORDINATION
Yosi Her is asking for a refill of clonazepam and nystatin. She tells me that she had a couple of clonazepam left that she has used since she got home. I told her we could send this request to Dr Mildrde Duarte. She verbalized understanding.

## 2022-07-06 ENCOUNTER — OFFICE VISIT (OUTPATIENT)
Dept: FAMILY MEDICINE CLINIC | Age: 77
End: 2022-07-06
Payer: MEDICARE

## 2022-07-06 VITALS
HEART RATE: 85 BPM | SYSTOLIC BLOOD PRESSURE: 120 MMHG | RESPIRATION RATE: 18 BRPM | WEIGHT: 191 LBS | TEMPERATURE: 97 F | HEIGHT: 63 IN | DIASTOLIC BLOOD PRESSURE: 80 MMHG | BODY MASS INDEX: 33.84 KG/M2 | OXYGEN SATURATION: 95 %

## 2022-07-06 DIAGNOSIS — G47.00 INSOMNIA, UNSPECIFIED TYPE: ICD-10-CM

## 2022-07-06 DIAGNOSIS — R19.7 DIARRHEA, UNSPECIFIED TYPE: Primary | ICD-10-CM

## 2022-07-06 DIAGNOSIS — F41.0 PANIC ATTACK: ICD-10-CM

## 2022-07-06 PROCEDURE — G8427 DOCREV CUR MEDS BY ELIG CLIN: HCPCS | Performed by: FAMILY MEDICINE

## 2022-07-06 PROCEDURE — 1090F PRES/ABSN URINE INCON ASSESS: CPT | Performed by: FAMILY MEDICINE

## 2022-07-06 PROCEDURE — G8417 CALC BMI ABV UP PARAM F/U: HCPCS | Performed by: FAMILY MEDICINE

## 2022-07-06 PROCEDURE — 99214 OFFICE O/P EST MOD 30 MIN: CPT | Performed by: FAMILY MEDICINE

## 2022-07-06 PROCEDURE — 1036F TOBACCO NON-USER: CPT | Performed by: FAMILY MEDICINE

## 2022-07-06 PROCEDURE — 3017F COLORECTAL CA SCREEN DOC REV: CPT | Performed by: FAMILY MEDICINE

## 2022-07-06 PROCEDURE — G8399 PT W/DXA RESULTS DOCUMENT: HCPCS | Performed by: FAMILY MEDICINE

## 2022-07-06 PROCEDURE — 1123F ACP DISCUSS/DSCN MKR DOCD: CPT | Performed by: FAMILY MEDICINE

## 2022-07-06 RX ORDER — ALBUTEROL SULFATE 2.5 MG/3ML
2.5 SOLUTION RESPIRATORY (INHALATION) EVERY 4 HOURS PRN
Qty: 25 EACH | Refills: 2 | Status: SHIPPED | OUTPATIENT
Start: 2022-07-06

## 2022-07-06 RX ORDER — METRONIDAZOLE 500 MG/1
500 TABLET ORAL 3 TIMES DAILY
Qty: 30 TABLET | Refills: 0 | Status: SHIPPED | OUTPATIENT
Start: 2022-07-06 | End: 2022-07-14

## 2022-07-06 RX ORDER — ERGOCALCIFEROL 1.25 MG/1
50000 CAPSULE ORAL WEEKLY
Qty: 12 CAPSULE | Refills: 1 | Status: CANCELLED | OUTPATIENT
Start: 2022-07-06

## 2022-07-06 RX ORDER — DIPHENOXYLATE HYDROCHLORIDE AND ATROPINE SULFATE 2.5; .025 MG/1; MG/1
1 TABLET ORAL 4 TIMES DAILY PRN
Qty: 40 TABLET | Refills: 0 | Status: SHIPPED | OUTPATIENT
Start: 2022-07-06 | End: 2022-07-16

## 2022-07-06 ASSESSMENT — ENCOUNTER SYMPTOMS
DIARRHEA: 1
VOMITING: 1
NAUSEA: 1
COUGH: 1
RHINORRHEA: 1

## 2022-07-06 NOTE — PROGRESS NOTES
Chief Complaint   Patient presents with    Diarrhea     Hosp f/u       Have you seen any other physician or provider since your last visit yes - [address] [facility]      Have you had any other diagnostic tests since your last visit?  yes - X-Rays CT Scan    Have you changed or stopped any medications since your last visit? no

## 2022-07-06 NOTE — PROGRESS NOTES
Samples of this drug were given to the patient, quantity 3, Lot Number [id number] Of [facility]

## 2022-07-06 NOTE — PROGRESS NOTES
in acute distress. Appearance: Normal appearance. She is normal weight. She is not ill-appearing or toxic-appearing. HENT:      Head: Normocephalic and atraumatic. Mouth/Throat:      Mouth: Mucous membranes are moist.      Pharynx: Oropharynx is clear. Eyes:      Conjunctiva/sclera: Conjunctivae normal.      Pupils: Pupils are equal, round, and reactive to light. Cardiovascular:      Rate and Rhythm: Normal rate and regular rhythm. Heart sounds: Normal heart sounds. No murmur heard. No gallop. Pulmonary:      Effort: Pulmonary effort is normal.      Breath sounds: Normal breath sounds. No wheezing, rhonchi or rales. Skin:     General: Skin is warm and dry. Neurological:      Mental Status: She is alert and oriented to person, place, and time. Psychiatric:         Mood and Affect: Mood normal.         Behavior: Behavior normal.         Thought Content: Thought content normal.         Judgment: Judgment normal.        No results found for requested labs within last 30 days. Hemoglobin A1C (%)   Date Value   2020 6.1 (H)     Microscopic Examination (no units)   Date Value   2021 YES     LDL Calculated (mg/dL)   Date Value   2021 117       Lab Results   Component Value Date/Time    WBC 6.2 2022 07:29 AM    NEUTROABS 4.2 2021 05:36 PM    HGB 12.9 2022 07:29 AM    HCT 42.5 2022 07:29 AM    .4 2022 07:29 AM     2022 07:29 AM     Lab Results   Component Value Date    TSH 0.28 2021       ASSESSMENT/PLAN    Diagnosis Orders   1. Diarrhea, unspecified type  diphenoxylate-atropine (DIPHENATOL) 2.5-0.025 MG per tablet      2. Panic attack        3.  Insomnia, unspecified type            Orders Placed This Encounter   Medications    Nebulizers (AIRIAL COMPACT MINI NEBULIZER) MISC     Si Device by Does not apply route every 4-6 hours as needed (sob, cough, wheezing)     Dispense:  1 each     Refill:  0    albuterol (PROVENTIL) (2.5 MG/3ML) 0.083% nebulizer solution     Sig: Take 3 mLs by nebulization every 4 hours as needed for Wheezing     Dispense:  25 each     Refill:  2    diphenoxylate-atropine (DIPHENATOL) 2.5-0.025 MG per tablet     Sig: Take 1 tablet by mouth 4 times daily as needed for Diarrhea for up to 10 days. Dispense:  40 tablet     Refill:  0    DISCONTD: metroNIDAZOLE (FLAGYL) 500 MG tablet     Sig: Take 1 tablet by mouth 3 times daily for 10 days     Dispense:  30 tablet     Refill:  0        Medications Discontinued During This Encounter   Medication Reason    fluconazole (DIFLUCAN) 150 MG tablet Therapy completed       Controlled Substances Monitoring:      Please note: This chart was generated using Dragon dictation software. Although every effort was made to ensure the accuracy of this automated transcription, some errors in transcription may have occurred.

## 2022-07-11 ENCOUNTER — TELEPHONE (OUTPATIENT)
Dept: FAMILY MEDICINE CLINIC | Age: 77
End: 2022-07-11

## 2022-07-11 NOTE — TELEPHONE ENCOUNTER
----- Message from [facility] sent at 7/11/2022 11:37 AM EDT -----  Subject: Message to Provider    QUESTIONS  Information for Provider? Aruna Douglas from [facility] is calling to see   if office has received the fax that was sent over. Requesting call back. [phone]  ---------------------------------------------------------------------------  --------------  Rolanda Mcdonald [employer]  [phone]; OK to leave message on voicemail  ---------------------------------------------------------------------------  --------------  SCRIPT ANSWERS  Relationship to Patient? Third Party  Third Party Type? Other  Other Third Party Type?  [facility]  Representative Name? Rosa Rodríguez

## 2022-07-13 ENCOUNTER — TELEPHONE (OUTPATIENT)
Dept: FAMILY MEDICINE CLINIC | Age: 77
End: 2022-07-13

## 2022-07-13 RX ORDER — ONDANSETRON 4 MG/1
4 TABLET, FILM COATED ORAL 3 TIMES DAILY PRN
Qty: 15 TABLET | Refills: 0 | Status: SHIPPED | OUTPATIENT
Start: 2022-07-13 | End: 2022-07-22

## 2022-07-13 NOTE — TELEPHONE ENCOUNTER
Patient called, requested refill.        Next Office Visit Date:  Future Appointments   Date Time Provider Malena Diallo   7/20/2022 11:15 AM MD [address] please review via [address]

## 2022-07-14 RX ORDER — CIPROFLOXACIN 500 MG/1
500 TABLET, FILM COATED ORAL 2 TIMES DAILY
Qty: 14 TABLET | Refills: 0 | Status: SHIPPED | OUTPATIENT
Start: 2022-07-14 | End: 2022-07-21

## 2022-07-14 NOTE — PROGRESS NOTES
SUBJECTIVE:    Patient ID: Chidi Heart is a 78 y.o. female. No chief complaint on file. HPI:    Patient's medications,allergies, past medical, surgical, social and family histories were reviewed and updated as appropriate. .  Current Outpatient Medications on File Prior to Visit   Medication Sig Dispense Refill    ondansetron (ZOFRAN) 4 MG tablet Take 1 tablet by mouth 3 times daily as needed for Nausea or Vomiting 15 tablet 0    Nebulizers (AIRIAL COMPACT MINI NEBULIZER) MISC 1 Device by Does not apply route every 4-6 hours as needed (sob, cough, wheezing) 1 each 0    albuterol (PROVENTIL) (2.5 MG/3ML) 0.083% nebulizer solution Take 3 mLs by nebulization every 4 hours as needed for Wheezing 25 each 2    diphenoxylate-atropine (DIPHENATOL) 2.5-0.025 MG per tablet Take 1 tablet by mouth 4 times daily as needed for Diarrhea for up to 10 days. 40 tablet 0    metroNIDAZOLE (FLAGYL) 500 MG tablet Take 1 tablet by mouth 3 times daily for 10 days 30 tablet 0    clonazePAM (KLONOPIN) 2 MG tablet TAKE ONE TABLET BY MOUTH ONCE NIGHTLY AS NEEDED FOR INSOMNIA 30 tablet 2    nystatin (MYCOSTATIN) 939507 UNIT/GM cream Apply topically 2 times daily.  1 each 1    cetirizine (ZYRTEC) 10 MG tablet Take 1 tablet by mouth daily (Patient not taking: Reported on 7/6/2022) 30 tablet 0    mupirocin (BACTROBAN) 2 % ointment  (Patient not taking: Reported on 7/6/2022)      traZODone (DESYREL) 50 MG tablet       lisinopril-hydroCHLOROthiazide (PRINZIDE;ZESTORETIC) 20-12.5 MG per tablet Take 1 tablet by mouth daily 90 tablet 3    levothyroxine (SYNTHROID) 75 MCG tablet Take 1 tablet by mouth Daily 90 tablet 3    albuterol (PROVENTIL) (5 MG/ML) 0.5% nebulizer solution Take 1 mL by nebulization 4 times daily as needed for Wheezing 120 each 3    Budeson-Glycopyrrol-Formoterol (BREZTRI AEROSPHERE) 160-9-4.8 MCG/ACT AERO Inhale 2 puffs into the lungs daily (Patient not taking: Reported on 7/6/2022) 1 each 1    vitamin

## 2022-07-14 NOTE — TELEPHONE ENCOUNTER
Tried contacting patient, no answer. Left voicemail asking patient to contact office. Will continue to attempt to contact patient.

## 2022-07-14 NOTE — TELEPHONE ENCOUNTER
Patient called and stated that she hasn't taken any antibiotics in the last 8 days. She stated that the antibiotic that you gave her last week broke her out in hives and she could not take it.

## 2022-07-22 ENCOUNTER — OFFICE VISIT (OUTPATIENT)
Dept: FAMILY MEDICINE CLINIC | Age: 77
End: 2022-07-22
Payer: MEDICARE

## 2022-07-22 VITALS
OXYGEN SATURATION: 95 % | WEIGHT: 189.6 LBS | HEART RATE: 86 BPM | DIASTOLIC BLOOD PRESSURE: 96 MMHG | TEMPERATURE: 98.5 F | HEIGHT: 63 IN | SYSTOLIC BLOOD PRESSURE: 164 MMHG | RESPIRATION RATE: 18 BRPM | BODY MASS INDEX: 33.59 KG/M2

## 2022-07-22 DIAGNOSIS — B36.9 FUNGAL INFECTION OF SKIN: Primary | ICD-10-CM

## 2022-07-22 PROCEDURE — 99214 OFFICE O/P EST MOD 30 MIN: CPT | Performed by: NURSE PRACTITIONER

## 2022-07-22 PROCEDURE — G8399 PT W/DXA RESULTS DOCUMENT: HCPCS | Performed by: NURSE PRACTITIONER

## 2022-07-22 PROCEDURE — 1090F PRES/ABSN URINE INCON ASSESS: CPT | Performed by: NURSE PRACTITIONER

## 2022-07-22 PROCEDURE — G8427 DOCREV CUR MEDS BY ELIG CLIN: HCPCS | Performed by: NURSE PRACTITIONER

## 2022-07-22 PROCEDURE — G8417 CALC BMI ABV UP PARAM F/U: HCPCS | Performed by: NURSE PRACTITIONER

## 2022-07-22 PROCEDURE — 1036F TOBACCO NON-USER: CPT | Performed by: NURSE PRACTITIONER

## 2022-07-22 PROCEDURE — 1123F ACP DISCUSS/DSCN MKR DOCD: CPT | Performed by: NURSE PRACTITIONER

## 2022-07-22 PROCEDURE — 3017F COLORECTAL CA SCREEN DOC REV: CPT | Performed by: NURSE PRACTITIONER

## 2022-07-22 PROCEDURE — 96372 THER/PROPH/DIAG INJ SC/IM: CPT | Performed by: NURSE PRACTITIONER

## 2022-07-22 RX ORDER — FLUCONAZOLE 150 MG/1
150 TABLET ORAL DAILY
Qty: 7 TABLET | Refills: 0 | Status: SHIPPED | OUTPATIENT
Start: 2022-07-22 | End: 2022-07-29

## 2022-07-22 RX ORDER — METHYLPREDNISOLONE SODIUM SUCCINATE 125 MG/2ML
125 INJECTION, POWDER, LYOPHILIZED, FOR SOLUTION INTRAMUSCULAR; INTRAVENOUS ONCE
Status: COMPLETED | OUTPATIENT
Start: 2022-07-22 | End: 2022-07-22

## 2022-07-22 RX ORDER — ISOSORBIDE MONONITRATE 60 MG/1
TABLET, EXTENDED RELEASE ORAL
COMMUNITY
Start: 2022-06-27 | End: 2022-09-07 | Stop reason: SDUPTHER

## 2022-07-22 RX ORDER — NYSTATIN 100000 U/G
CREAM TOPICAL
Qty: 1 EACH | Refills: 1 | Status: SHIPPED | OUTPATIENT
Start: 2022-07-22

## 2022-07-22 RX ADMIN — METHYLPREDNISOLONE SODIUM SUCCINATE 125 MG: 125 INJECTION, POWDER, LYOPHILIZED, FOR SOLUTION INTRAMUSCULAR; INTRAVENOUS at 11:16

## 2022-07-22 NOTE — PATIENT INSTRUCTIONS
The medication list included in this document is our record of what you are currently taking, including any changes that were made at today's visit. If you find any differences when compared to your medications at home, or have any questions that were not answered at your visit, please contact the office. We are committed to providing you with the best care possible. In order to help us achieve these goals please remember to bring all medications, herbal products, and over the counter supplements with you to each visit. If your provider has ordered testing for you, please be sure to follow up with our office if you have not received results within 7 days after the testing took place. *If you receive a survey after visiting one of our offices, please take time to share your experience concerning your physician office visit. These surveys are confidential and no health information about you is shared. We are eager to improve for you and we are counting on your feedback to help make that happen. Keep a list of your medicines with you. List all of the prescription medicines, nonprescription medicines, supplements, natural remedies, and vitamins that you take. Tell your healthcare providers who treat you about all of the products you are taking. Your provider can provide you with a form to keep track of them. Just ask. Follow the directions that come with your medicine, including information about food or alcohol. Make sure you know how and when to take your medicine. Do not take more or less than you are supposed to take. Keep all medicines out of the reach of children. Store medicines according to the directions on the label. Monitor yourself. Learn to know how your body reacts to your new medicine and keep track of how it makes you feel before attempting (If your provider has allowed you to do so) to drive or go to work.    Seek emergency medical attention if you think you have used too much of this medicine. An overdose of any prescription medicine can be fatal. Overdose symptoms may include extreme drowsiness, muscle weakness, confusion, cold and clammy skin, pinpoint pupils, shallow breathing, slow heart rate, fainting, or coma. Don't share prescription medicines with others, even when they seem to have the same symptoms. What may be good for you may be harmful to others. If you are no longer taking a prescribed medication and you have pills left please take your pills out of their original containers. Mix crushed pills with an undesirable substance, such as cat litter or used coffee grounds. Put the mixture into a disposable container with a lid, such as an empty margarine tub, or into a sealable bag. Cover up or remove any of your personal information on the empty containers by covering it with black permanent marker or duct tape. Place the sealed container with the mixture, and the empty drug containers, in the trash. If you use a medication that is in the form of a patch, dispose of used patches by folding them in half so that the sticky sides meet, and then flushing them down a toilet. They should not be placed in the household trash where children or pets can find them. If you have any questions, ask your provider or pharmacist for more information. Be sure to keep all appointments for provider visits or tests.

## 2022-07-22 NOTE — PROGRESS NOTES
Chief Complaint   Patient presents with    Rash     3 months itches     Administrations This Visit       methylPREDNISolone sodium (SOLU-MEDROL) injection 125 mg       Admin Date  07/22/2022  11:16 Action  Given Dose  125 mg Route  IntraMUSCular Site  Dorsogluteal Right Administered By  [employer] [place]    Ordering Provider: TATYANA Lacy CNP    NDC: 4965-4890-13    Lot#: [id number]    : George Rodríguez. Patient Supplied?: No                  Patient tolerated injection well. Patient advised to wait 20 minutes in the office following the injection. No signs/symptoms of reaction noted after 20 minutes.

## 2022-07-22 NOTE — PROGRESS NOTES
Earl Olsen 78 y.o. presents today for   Chief Complaint   Patient presents with    Rash     3 months itches        HPI:  Earl Olsen states she is itching today and has been for 3 months. She has tried antifungal cream. She was given some pills for itching and she slept walked. History reviewed. No pertinent family history.      Social History     Socioeconomic History    Marital status:      Spouse name: Not on file    Number of children: Not on file    Years of education: Not on file    Highest education level: Not on file   Occupational History    Not on file   Tobacco Use    Smoking status: Never    Smokeless tobacco: Never   Vaping Use    Vaping Use: Never used   Substance and Sexual Activity    Alcohol use: No    Drug use: No    Sexual activity: Not on file   Other Topics Concern    Not on file   Social History Narrative    Not on file     Social Determinants of Health     Financial Resource Strain: Low Risk     Difficulty of Paying Living Expenses: Not very hard   Food Insecurity: No Food Insecurity    Worried About Running Out of Food in the Last Year: Never true    Ran Out of Food in the Last Year: Never true   Transportation Needs: Not on file   Physical Activity: Not on file   Stress: Not on file   Social Connections: Not on file   Intimate Partner Violence: Not on file   Housing Stability: Not on file        Past Surgical History:   Procedure Laterality Date    APPENDECTOMY      BREAST BIOPSY Bilateral     CHOLECYSTECTOMY      LIVER SURGERY      UPPER GASTROINTESTINAL ENDOSCOPY          Past Medical History:   Diagnosis Date    Bleeds easily ([address] 12/31/2018    COPD (chronic obstructive pulmonary disease) ([facility] 1/6/2022    DVT (deep venous thrombosis) ([facility] [facility]     Headache(784.0)     Hypertension     Hypothyroid 5/20/2015    MI, old     Moderate episode of recurrent major depressive disorder ([facility] 10/27/2018    Pneumonia     Stomach ulcer     Thyroid disease         Current Outpatient Medications   Medication Sig Dispense Refill    isosorbide mononitrate (IMDUR) 60 MG extended release tablet       fluconazole (DIFLUCAN) 150 MG tablet Take 1 tablet by mouth in the morning for 7 days. 7 tablet 0    nystatin (MYCOSTATIN) 608679 UNIT/GM cream Apply topically 2 times daily. 1 each 1    Nebulizers (AIRIAL COMPACT MINI NEBULIZER) MISC 1 Device by Does not apply route every 4-6 hours as needed (sob, cough, wheezing) 1 each 0    albuterol (PROVENTIL) (2.5 MG/3ML) 0.083% nebulizer solution Take 3 mLs by nebulization every 4 hours as needed for Wheezing 25 each 2    clonazePAM (KLONOPIN) 2 MG tablet TAKE ONE TABLET BY MOUTH ONCE NIGHTLY AS NEEDED FOR INSOMNIA 30 tablet 2    traZODone (DESYREL) 50 MG tablet       lisinopril-hydroCHLOROthiazide (PRINZIDE;ZESTORETIC) 20-12.5 MG per tablet Take 1 tablet by mouth daily 90 tablet 3    levothyroxine (SYNTHROID) 75 MCG tablet Take 1 tablet by mouth Daily 90 tablet 3    albuterol (PROVENTIL) (5 MG/ML) 0.5% nebulizer solution Take 1 mL by nebulization 4 times daily as needed for Wheezing 120 each 3    Budeson-Glycopyrrol-Formoterol (BREZTRI AEROSPHERE) 160-9-4.8 MCG/ACT AERO Inhale 2 puffs into the lungs daily 1 each 1    vitamin D (ERGOCALCIFEROL) 1.25 MG (27823 UT) CAPS capsule Take 1 capsule by mouth once a week 12 capsule 1    albuterol sulfate  (90 Base) MCG/ACT inhaler Inhale 2 puffs into the lungs every 6 hours as needed      aspirin 81 MG EC tablet Take 81 mg by mouth in the morning. nitroGLYCERIN (NITROSTAT) 0.4 MG SL tablet Place 1 tablet under the tongue every 5 minutes as needed for Chest pain (Patient not taking: No sig reported) 25 tablet 3     Current Facility-Administered Medications   Medication Dose Route Frequency Provider Last Rate Last Admin    methylPREDNISolone sodium (SOLU-MEDROL) injection 125 mg  125 mg IntraVENous Once Veto Perea, APRN - CNP            Review of Systems   Skin:  Positive for pallor. Itching on stomach, arms and under arms   All other systems reviewed and are negative. BP (!) 164/96   Pulse 86   Temp 98.5 °F (36.9 °C)   Resp 18   Ht 5' 3\" (1.6 m)   Wt 189 lb 9.6 oz (86 kg)   LMP 01/01/1986 (Approximate)   SpO2 95% Comment: ra  BMI 33.59 kg/m²      Physical Exam  Vitals and nursing note reviewed. Constitutional:       Appearance: Normal appearance. HENT:      Head: Normocephalic and atraumatic. Right Ear: Tympanic membrane, ear canal and external ear normal.      Left Ear: Tympanic membrane, ear canal and external ear normal.      Nose: Nose normal.      Mouth/Throat:      Mouth: Mucous membranes are moist.      Pharynx: Oropharynx is clear. Eyes:      Extraocular Movements: Extraocular movements intact. Conjunctiva/sclera: Conjunctivae normal.      Pupils: Pupils are equal, round, and reactive to light. Cardiovascular:      Rate and Rhythm: Normal rate and regular rhythm. Pulses: Normal pulses. Heart sounds: Normal heart sounds. Pulmonary:      Effort: Pulmonary effort is normal.      Breath sounds: Normal breath sounds. Abdominal:      General: Bowel sounds are normal.      Palpations: Abdomen is soft. Musculoskeletal:         General: Normal range of motion. Cervical back: Normal range of motion and neck supple. Skin:     General: Skin is warm and dry. Capillary Refill: Capillary refill takes less than 2 seconds. Comments: Pt states she is itching under bilateral axilla and under abdomen, no rash evident today. Slight erythema in axilla   Neurological:      General: No focal deficit present. Mental Status: She is alert and oriented to person, place, and time. Psychiatric:         Mood and Affect: Mood normal.         Behavior: Behavior normal.        ASSESSMENT/PLAN    1. Fungal infection of skin  Pt to take medication as directed and to f/u if s/s persist or worsen. She is agreeable.    - fluconazole (DIFLUCAN) 150 MG tablet; Take 1 tablet by mouth in the morning for 7 days. Dispense: 7 tablet; Refill: 0  - nystatin (MYCOSTATIN) 624061 UNIT/GM cream; Apply topically 2 times daily. Dispense: 1 each;  Refill: 1  - methylPREDNISolone sodium (SOLU-MEDROL) injection 125 mg           Cherri Mcmillan, APRN - CNP

## 2022-08-10 ENCOUNTER — OFFICE VISIT (OUTPATIENT)
Dept: FAMILY MEDICINE CLINIC | Age: 77
End: 2022-08-10
Payer: MEDICARE

## 2022-08-10 VITALS
OXYGEN SATURATION: 93 % | BODY MASS INDEX: 31.36 KG/M2 | SYSTOLIC BLOOD PRESSURE: 138 MMHG | HEIGHT: 63 IN | DIASTOLIC BLOOD PRESSURE: 70 MMHG | TEMPERATURE: 98.4 F | RESPIRATION RATE: 18 BRPM | HEART RATE: 73 BPM | WEIGHT: 177 LBS

## 2022-08-10 DIAGNOSIS — R11.2 INTRACTABLE VOMITING WITH NAUSEA, UNSPECIFIED VOMITING TYPE: ICD-10-CM

## 2022-08-10 DIAGNOSIS — R19.7 DIARRHEA, UNSPECIFIED TYPE: Primary | ICD-10-CM

## 2022-08-10 LAB
Lab: NORMAL
QC PASS/FAIL: NORMAL
SARS-COV-2 RDRP RESP QL NAA+PROBE: NEGATIVE

## 2022-08-10 PROCEDURE — 1036F TOBACCO NON-USER: CPT | Performed by: NURSE PRACTITIONER

## 2022-08-10 PROCEDURE — G8417 CALC BMI ABV UP PARAM F/U: HCPCS | Performed by: NURSE PRACTITIONER

## 2022-08-10 PROCEDURE — 1123F ACP DISCUSS/DSCN MKR DOCD: CPT | Performed by: NURSE PRACTITIONER

## 2022-08-10 PROCEDURE — G8427 DOCREV CUR MEDS BY ELIG CLIN: HCPCS | Performed by: NURSE PRACTITIONER

## 2022-08-10 PROCEDURE — 3017F COLORECTAL CA SCREEN DOC REV: CPT | Performed by: NURSE PRACTITIONER

## 2022-08-10 PROCEDURE — 99213 OFFICE O/P EST LOW 20 MIN: CPT | Performed by: NURSE PRACTITIONER

## 2022-08-10 PROCEDURE — G8399 PT W/DXA RESULTS DOCUMENT: HCPCS | Performed by: NURSE PRACTITIONER

## 2022-08-10 PROCEDURE — 1090F PRES/ABSN URINE INCON ASSESS: CPT | Performed by: NURSE PRACTITIONER

## 2022-08-10 PROCEDURE — 87635 SARS-COV-2 COVID-19 AMP PRB: CPT | Performed by: NURSE PRACTITIONER

## 2022-08-10 RX ORDER — LOPERAMIDE HYDROCHLORIDE 2 MG/1
2 CAPSULE ORAL 4 TIMES DAILY PRN
Qty: 40 CAPSULE | Status: CANCELLED | OUTPATIENT
Start: 2022-08-10 | End: 2022-08-20

## 2022-08-10 RX ORDER — ONDANSETRON 4 MG/1
4 TABLET, ORALLY DISINTEGRATING ORAL 3 TIMES DAILY PRN
Qty: 21 TABLET | Refills: 0 | Status: SHIPPED | OUTPATIENT
Start: 2022-08-10 | End: 2022-09-07 | Stop reason: SDUPTHER

## 2022-08-10 RX ORDER — LOPERAMIDE HYDROCHLORIDE 2 MG/1
2 CAPSULE ORAL 4 TIMES DAILY PRN
Qty: 40 CAPSULE | Refills: 0 | Status: SHIPPED | OUTPATIENT
Start: 2022-08-10 | End: 2022-08-20

## 2022-08-10 ASSESSMENT — ENCOUNTER SYMPTOMS
VOMITING: 1
NAUSEA: 1

## 2022-08-10 NOTE — PROGRESS NOTES
Chief Complaint   Patient presents with    Diarrhea    Nausea & Vomiting       Have you seen any other physician or provider since your last visit yes - [facility]    Have you had any other diagnostic tests since your last visit?  yes - in records    Have you changed or stopped any medications since your last visit? no
90 tablet 3    levothyroxine (SYNTHROID) 75 MCG tablet Take 1 tablet by mouth Daily 90 tablet 3    albuterol (PROVENTIL) (5 MG/ML) 0.5% nebulizer solution Take 1 mL by nebulization 4 times daily as needed for Wheezing 120 each 3    Budeson-Glycopyrrol-Formoterol (BREZTRI AEROSPHERE) 160-9-4.8 MCG/ACT AERO Inhale 2 puffs into the lungs daily 1 each 1    vitamin D (ERGOCALCIFEROL) 1.25 MG (36769 UT) CAPS capsule Take 1 capsule by mouth once a week 12 capsule 1    albuterol sulfate  (90 Base) MCG/ACT inhaler Inhale 2 puffs into the lungs every 6 hours as needed      aspirin 81 MG EC tablet Take 81 mg by mouth in the morning. nitroGLYCERIN (NITROSTAT) 0.4 MG SL tablet Place 1 tablet under the tongue every 5 minutes as needed for Chest pain 25 tablet 3     Current Facility-Administered Medications   Medication Dose Route Frequency Provider Last Rate Last Admin    methylPREDNISolone sodium (SOLU-MEDROL) injection 125 mg  125 mg IntraVENous Once Donovan Bergman APRN - CNP            Review of Systems   Constitutional:  Positive for fatigue. Gastrointestinal:  Positive for nausea and vomiting. /70   Pulse 73   Temp 98.4 °F (36.9 °C) (Infrared)   Resp 18   Ht 5' 3\" (1.6 m)   Wt 177 lb (80.3 kg)   LMP 01/01/1986 (Approximate)   SpO2 93% Comment: ra  BMI 31.35 kg/m²      Physical Exam  Vitals and nursing note reviewed. Constitutional:       Appearance: Normal appearance. HENT:      Head: Normocephalic and atraumatic. Nose: Nose normal.      Mouth/Throat:      Mouth: Mucous membranes are moist.      Pharynx: Oropharynx is clear. Eyes:      Extraocular Movements: Extraocular movements intact. Conjunctiva/sclera: Conjunctivae normal.      Pupils: Pupils are equal, round, and reactive to light. Cardiovascular:      Rate and Rhythm: Normal rate and regular rhythm. Pulses: Normal pulses. Heart sounds: Normal heart sounds.    Pulmonary:      Effort: Pulmonary effort is

## 2022-08-11 ENCOUNTER — TELEPHONE (OUTPATIENT)
Dept: FAMILY MEDICINE CLINIC | Age: 77
End: 2022-08-11

## 2022-08-11 NOTE — TELEPHONE ENCOUNTER
Patient called and stated that she is still having diarrhea and is wondering if you can send her something else in? She stated that she has lost 20 lbs in the last month.

## 2022-08-12 NOTE — PROGRESS NOTES
Administrations This Visit     cyanocobalamin injection 1,000 mcg     Admin Date  06/23/2021  16:21 Action  Given Dose  1,000 mcg Route  Intramuscular Site  Deltoid Left Administered By  Glennie Snellen, MA    Ordering Provider: TATYANA Morgan CNP    NDC: 55409-272-35    Lot#:     : [address] [place] [address]    Patient Supplied?: No              Patient tolerated injection well. Patient advised to wait 20 minutes in the office following the injection. No signs/symptoms of reaction noted after 20 minutes. Tremfya Counseling: I discussed with the patient the risks of guselkumab including but not limited to immunosuppression, serious infections, and drug reactions.  The patient understands that monitoring is required including a PPD at baseline and must alert us or the primary physician if symptoms of infection or other concerning signs are noted.

## 2022-08-18 NOTE — TELEPHONE ENCOUNTER
Patient stated that she has been back in the hospital and they have referred her to Dr. Josie Chowdhury.

## 2022-08-23 ENCOUNTER — TELEPHONE (OUTPATIENT)
Dept: FAMILY MEDICINE CLINIC | Age: 77
End: 2022-08-23

## 2022-08-23 NOTE — PROGRESS NOTES
SUBJECTIVE:    Patient ID: Suri No is a 78 y.o. female. No chief complaint on file. HPI:    Patient's medications,allergies, past medical, surgical, social and family histories were reviewed and updated as appropriate. .  Current Outpatient Medications on File Prior to Visit   Medication Sig Dispense Refill    ondansetron (ZOFRAN-ODT) 4 MG disintegrating tablet Take 1 tablet by mouth 3 times daily as needed for Nausea or Vomiting 21 tablet 0    isosorbide mononitrate (IMDUR) 60 MG extended release tablet       nystatin (MYCOSTATIN) 384143 UNIT/GM cream Apply topically 2 times daily. 1 each 1    Nebulizers (AIRDiagnovus COMPACT MINI NEBULIZER) MISC 1 Device by Does not apply route every 4-6 hours as needed (sob, cough, wheezing) 1 each 0    albuterol (PROVENTIL) (2.5 MG/3ML) 0.083% nebulizer solution Take 3 mLs by nebulization every 4 hours as needed for Wheezing 25 each 2    clonazePAM (KLONOPIN) 2 MG tablet TAKE ONE TABLET BY MOUTH ONCE NIGHTLY AS NEEDED FOR INSOMNIA 30 tablet 2    traZODone (DESYREL) 50 MG tablet       lisinopril-hydroCHLOROthiazide (PRINZIDE;ZESTORETIC) 20-12.5 MG per tablet Take 1 tablet by mouth daily 90 tablet 3    levothyroxine (SYNTHROID) 75 MCG tablet Take 1 tablet by mouth Daily 90 tablet 3    albuterol (PROVENTIL) (5 MG/ML) 0.5% nebulizer solution Take 1 mL by nebulization 4 times daily as needed for Wheezing 120 each 3    Budeson-Glycopyrrol-Formoterol (BREZTRI AEROSPHERE) 160-9-4.8 MCG/ACT AERO Inhale 2 puffs into the lungs daily 1 each 1    vitamin D (ERGOCALCIFEROL) 1.25 MG (08855 UT) CAPS capsule Take 1 capsule by mouth once a week 12 capsule 1    albuterol sulfate  (90 Base) MCG/ACT inhaler Inhale 2 puffs into the lungs every 6 hours as needed      aspirin 81 MG EC tablet Take 81 mg by mouth in the morning.       nitroGLYCERIN (NITROSTAT) 0.4 MG SL tablet Place 1 tablet under the tongue every 5 minutes as needed for Chest pain 25 tablet 3     Current Facility-Administered Medications on File Prior to Visit   Medication Dose Route Frequency Provider Last Rate Last Admin    methylPREDNISolone sodium (SOLU-MEDROL) injection 125 mg  125 mg IntraVENous Once TATYANA Farley - CNP           Review of Systems    OBJECTIVE:  LMP 01/01/1986 (Approximate)    Physical Exam    No results found for requested labs within last 30 days. Hemoglobin A1C (%)   Date Value   09/16/2020 6.1 (H)     Microscopic Examination (no units)   Date Value   05/04/2021 YES     LDL Calculated (mg/dL)   Date Value   11/17/2021 117           Lab Results   Component Value Date    TSH 0.28 12/30/2021         ASSESSMENT/PLAN:     There are no diagnoses linked to this encounter. There are no discontinued medications.

## 2022-08-23 NOTE — TELEPHONE ENCOUNTER
Patient called and stated that she is having trouble with constipation now. She was wondering if you could send her something in besides miralax? She stated she has an appointment with Dr. Argentina Willard next week.

## 2022-09-07 ENCOUNTER — OFFICE VISIT (OUTPATIENT)
Dept: FAMILY MEDICINE CLINIC | Age: 77
End: 2022-09-07
Payer: MEDICARE

## 2022-09-07 VITALS
HEIGHT: 63 IN | RESPIRATION RATE: 18 BRPM | WEIGHT: 189 LBS | TEMPERATURE: 98.4 F | DIASTOLIC BLOOD PRESSURE: 90 MMHG | SYSTOLIC BLOOD PRESSURE: 210 MMHG | OXYGEN SATURATION: 68 % | HEART RATE: 68 BPM | BODY MASS INDEX: 33.49 KG/M2

## 2022-09-07 DIAGNOSIS — R11.2 INTRACTABLE VOMITING WITH NAUSEA, UNSPECIFIED VOMITING TYPE: ICD-10-CM

## 2022-09-07 DIAGNOSIS — R19.7 DIARRHEA, UNSPECIFIED TYPE: Primary | ICD-10-CM

## 2022-09-07 DIAGNOSIS — G47.00 INSOMNIA, UNSPECIFIED TYPE: ICD-10-CM

## 2022-09-07 DIAGNOSIS — M79.89 CALF SWELLING: ICD-10-CM

## 2022-09-07 DIAGNOSIS — F41.0 PANIC ATTACK: ICD-10-CM

## 2022-09-07 DIAGNOSIS — E03.9 HYPOTHYROIDISM, UNSPECIFIED TYPE: ICD-10-CM

## 2022-09-07 DIAGNOSIS — I10 ESSENTIAL HYPERTENSION: ICD-10-CM

## 2022-09-07 DIAGNOSIS — B36.9 FUNGAL INFECTION OF SKIN: ICD-10-CM

## 2022-09-07 PROCEDURE — G8399 PT W/DXA RESULTS DOCUMENT: HCPCS | Performed by: FAMILY MEDICINE

## 2022-09-07 PROCEDURE — 1123F ACP DISCUSS/DSCN MKR DOCD: CPT | Performed by: FAMILY MEDICINE

## 2022-09-07 PROCEDURE — 1036F TOBACCO NON-USER: CPT | Performed by: FAMILY MEDICINE

## 2022-09-07 PROCEDURE — 1090F PRES/ABSN URINE INCON ASSESS: CPT | Performed by: FAMILY MEDICINE

## 2022-09-07 PROCEDURE — 99214 OFFICE O/P EST MOD 30 MIN: CPT | Performed by: FAMILY MEDICINE

## 2022-09-07 PROCEDURE — G8427 DOCREV CUR MEDS BY ELIG CLIN: HCPCS | Performed by: FAMILY MEDICINE

## 2022-09-07 PROCEDURE — G8417 CALC BMI ABV UP PARAM F/U: HCPCS | Performed by: FAMILY MEDICINE

## 2022-09-07 PROCEDURE — 3017F COLORECTAL CA SCREEN DOC REV: CPT | Performed by: FAMILY MEDICINE

## 2022-09-07 RX ORDER — ISOSORBIDE MONONITRATE 60 MG/1
60 TABLET, EXTENDED RELEASE ORAL DAILY
Qty: 90 TABLET | Refills: 3 | Status: SHIPPED | OUTPATIENT
Start: 2022-09-07

## 2022-09-07 RX ORDER — ONDANSETRON 4 MG/1
4 TABLET, ORALLY DISINTEGRATING ORAL 3 TIMES DAILY PRN
Qty: 21 TABLET | Refills: 0 | Status: SHIPPED | OUTPATIENT
Start: 2022-09-07 | End: 2022-10-12 | Stop reason: SDUPTHER

## 2022-09-07 RX ORDER — LISINOPRIL AND HYDROCHLOROTHIAZIDE 20; 12.5 MG/1; MG/1
1 TABLET ORAL DAILY
Qty: 90 TABLET | Refills: 3 | Status: SHIPPED | OUTPATIENT
Start: 2022-09-07

## 2022-09-07 RX ORDER — DIPHENOXYLATE HYDROCHLORIDE AND ATROPINE SULFATE 2.5; .025 MG/1; MG/1
1 TABLET ORAL 4 TIMES DAILY PRN
Qty: 30 TABLET | Refills: 1 | Status: SHIPPED | OUTPATIENT
Start: 2022-09-07 | End: 2022-09-17

## 2022-09-07 RX ORDER — ONDANSETRON 4 MG/1
TABLET, FILM COATED ORAL
COMMUNITY
Start: 2022-08-10 | End: 2022-09-07

## 2022-09-07 RX ORDER — AMLODIPINE BESYLATE 10 MG/1
10 TABLET ORAL DAILY
Qty: 90 TABLET | Refills: 3 | Status: SHIPPED | OUTPATIENT
Start: 2022-09-07

## 2022-09-07 RX ORDER — ATENOLOL 100 MG/1
TABLET ORAL
COMMUNITY
Start: 2022-07-26 | End: 2022-09-07 | Stop reason: SDUPTHER

## 2022-09-07 RX ORDER — CLONAZEPAM 2 MG/1
TABLET ORAL
Qty: 30 TABLET | Refills: 2 | Status: SHIPPED | OUTPATIENT
Start: 2022-09-07 | End: 2022-10-12 | Stop reason: SDUPTHER

## 2022-09-07 RX ORDER — PREDNISONE 20 MG/1
20 TABLET ORAL DAILY
Qty: 30 TABLET | Refills: 0 | Status: SHIPPED | OUTPATIENT
Start: 2022-09-07 | End: 2022-10-07

## 2022-09-07 RX ORDER — TRAZODONE HYDROCHLORIDE 50 MG/1
100 TABLET ORAL NIGHTLY
Qty: 60 TABLET | Refills: 5 | Status: SHIPPED | OUTPATIENT
Start: 2022-09-07 | End: 2022-10-12

## 2022-09-07 RX ORDER — LEVOTHYROXINE SODIUM 0.07 MG/1
75 TABLET ORAL DAILY
Qty: 90 TABLET | Refills: 3 | Status: SHIPPED | OUTPATIENT
Start: 2022-09-07

## 2022-09-07 RX ORDER — ONDANSETRON 4 MG/1
4 TABLET, FILM COATED ORAL EVERY 8 HOURS PRN
Qty: 30 TABLET | Refills: 2 | Status: SHIPPED | OUTPATIENT
Start: 2022-09-07 | End: 2022-10-12

## 2022-09-07 RX ORDER — AMLODIPINE BESYLATE 10 MG/1
TABLET ORAL
COMMUNITY
Start: 2022-07-26 | End: 2022-09-07 | Stop reason: SDUPTHER

## 2022-09-07 RX ORDER — ATENOLOL 100 MG/1
100 TABLET ORAL 2 TIMES DAILY
Qty: 180 TABLET | Refills: 3 | Status: SHIPPED | OUTPATIENT
Start: 2022-09-07 | End: 2022-10-12

## 2022-09-07 SDOH — ECONOMIC STABILITY: FOOD INSECURITY: WITHIN THE PAST 12 MONTHS, YOU WORRIED THAT YOUR FOOD WOULD RUN OUT BEFORE YOU GOT MONEY TO BUY MORE.: NEVER TRUE

## 2022-09-07 SDOH — ECONOMIC STABILITY: FOOD INSECURITY: WITHIN THE PAST 12 MONTHS, THE FOOD YOU BOUGHT JUST DIDN'T LAST AND YOU DIDN'T HAVE MONEY TO GET MORE.: NEVER TRUE

## 2022-09-07 ASSESSMENT — SOCIAL DETERMINANTS OF HEALTH (SDOH): HOW HARD IS IT FOR YOU TO PAY FOR THE VERY BASICS LIKE FOOD, HOUSING, MEDICAL CARE, AND HEATING?: NOT VERY HARD

## 2022-09-07 ASSESSMENT — ENCOUNTER SYMPTOMS
VOMITING: 1
NAUSEA: 1

## 2022-09-07 NOTE — PROGRESS NOTES
Chief Complaint   Patient presents with    Diarrhea     Been going on for months       Have you seen any other physician or provider since your last visit yes - [facility] [address] [facility]    Have you had any other diagnostic tests since your last visit? yes - see discarge summary    Have you changed or stopped any medications since your last visit?  yes - see list             2

## 2022-09-07 NOTE — PROGRESS NOTES
SUBJECTIVE:    Patient ID: Monica Brown is a 78 y.o. female. Chief Complaint   Patient presents with    Diarrhea     Been going on for months       HPI: office visit  She is in the office today complaining of some significant diarrhea. She says has been going on for several months. She has an appointment to see GI in the next few days. Her blood pressures are up this morning. She says that its not been doing that much at home. She says she has had some shortness of breath though she denies any recent chest pain. She is really weak. She says the diarrhea she thinks is the problem. She says that she is been trying really hard to feel better. She is trying to make sure she eats some. Review of Systems   Constitutional:  Positive for fatigue. Gastrointestinal:  Positive for nausea and vomiting. OBJECTIVE:  BP (!) 210/90   Pulse 68   Temp 98.4 °F (36.9 °C)   Resp 18   Ht 5' 3\" (1.6 m)   Wt 189 lb (85.7 kg)   LMP 01/01/1986 (Approximate)   SpO2 (!) 68%   BMI 33.48 kg/m²    Wt Readings from Last 3 Encounters:   09/07/22 189 lb (85.7 kg)   08/10/22 177 lb (80.3 kg)   07/22/22 189 lb 9.6 oz (86 kg)     BP Readings from Last 3 Encounters:   09/07/22 (!) 210/90   08/10/22 138/70   07/22/22 (!) 164/96      Pulse Readings from Last 3 Encounters:   09/07/22 68   08/10/22 73   07/22/22 86     Body mass index is 33.48 kg/m². Resp Readings from Last 3 Encounters:   09/07/22 18   08/10/22 18   07/22/22 18     Past medical, surgical, family and social history were reviewed and updated with the patient. Physical Exam  Vitals and nursing note reviewed. Constitutional:       Appearance: Normal appearance. HENT:      Head: Normocephalic and atraumatic. Nose: Nose normal.      Mouth/Throat:      Mouth: Mucous membranes are moist.      Pharynx: Oropharynx is clear. Eyes:      Extraocular Movements: Extraocular movements intact.       Conjunctiva/sclera: Conjunctivae normal.      Pupils: Pupils are equal, round, and reactive to light. Cardiovascular:      Rate and Rhythm: Normal rate and regular rhythm. Pulses: Normal pulses. Heart sounds: Normal heart sounds. Pulmonary:      Effort: Pulmonary effort is normal.      Breath sounds: Normal breath sounds. Abdominal:      General: Bowel sounds are normal.      Palpations: Abdomen is soft. Musculoskeletal:         General: Normal range of motion. Cervical back: Normal range of motion and neck supple. Skin:     General: Skin is warm and dry. Capillary Refill: Capillary refill takes less than 2 seconds. Neurological:      General: No focal deficit present. Mental Status: She is alert and oriented to person, place, and time. Psychiatric:         Mood and Affect: Mood normal.         Behavior: Behavior normal.        No results found for requested labs within last 30 days. Hemoglobin A1C (%)   Date Value   09/16/2020 6.1 (H)     Microscopic Examination (no units)   Date Value   05/04/2021 YES     LDL Calculated (mg/dL)   Date Value   11/17/2021 117       Lab Results   Component Value Date/Time    WBC 6.2 01/03/2022 07:29 AM    NEUTROABS 4.2 12/28/2021 05:36 PM    HGB 12.9 01/03/2022 07:29 AM    HCT 42.5 01/03/2022 07:29 AM    .4 01/03/2022 07:29 AM     01/03/2022 07:29 AM     Lab Results   Component Value Date    TSH 0.28 12/30/2021       ASSESSMENT/PLAN    Diagnosis Orders   1. Diarrhea, unspecified type  diphenoxylate-atropine (DIPHENATOL) 2.5-0.025 MG per tablet      2. Intractable vomiting with nausea, unspecified vomiting type  ondansetron (ZOFRAN) 4 MG tablet    ondansetron (ZOFRAN-ODT) 4 MG disintegrating tablet      3. Fungal infection of skin  isosorbide mononitrate (IMDUR) 60 MG extended release tablet      4. Hypothyroidism, unspecified type  levothyroxine (SYNTHROID) 75 MCG tablet      5. Panic attack  clonazePAM (KLONOPIN) 2 MG tablet      6.  Insomnia, unspecified type  traZODone (DESYREL) 50 MG tablet    clonazePAM (KLONOPIN) 2 MG tablet      7. Calf swelling  US DUP LOWER EXTREMITY RIGHT GAYLE      8. Essential hypertension  amLODIPine (NORVASC) 10 MG tablet    atenolol (TENORMIN) 100 MG tablet    lisinopril-hydroCHLOROthiazide (PRINZIDE;ZESTORETIC) 20-12.5 MG per tablet          Orders Placed This Encounter   Medications    ondansetron (ZOFRAN) 4 MG tablet     Sig: Take 1 tablet by mouth every 8 hours as needed for Nausea or Vomiting     Dispense:  30 tablet     Refill:  2    amLODIPine (NORVASC) 10 MG tablet     Sig: Take 1 tablet by mouth daily     Dispense:  90 tablet     Refill:  3    atenolol (TENORMIN) 100 MG tablet     Sig: Take 1 tablet by mouth in the morning and at bedtime     Dispense:  180 tablet     Refill:  3    ondansetron (ZOFRAN-ODT) 4 MG disintegrating tablet     Sig: Take 1 tablet by mouth 3 times daily as needed for Nausea or Vomiting     Dispense:  21 tablet     Refill:  0    isosorbide mononitrate (IMDUR) 60 MG extended release tablet     Sig: Take 1 tablet by mouth daily     Dispense:  90 tablet     Refill:  3    traZODone (DESYREL) 50 MG tablet     Sig: Take 2 tablets by mouth nightly     Dispense:  60 tablet     Refill:  5    lisinopril-hydroCHLOROthiazide (PRINZIDE;ZESTORETIC) 20-12.5 MG per tablet     Sig: Take 1 tablet by mouth daily     Dispense:  90 tablet     Refill:  3    levothyroxine (SYNTHROID) 75 MCG tablet     Sig: Take 1 tablet by mouth Daily     Dispense:  90 tablet     Refill:  3    clonazePAM (KLONOPIN) 2 MG tablet     Sig: TAKE ONE TABLET BY MOUTH ONCE NIGHTLY AS NEEDED FOR INSOMNIA     Dispense:  30 tablet     Refill:  2    predniSONE (DELTASONE) 20 MG tablet     Sig: Take 1 tablet by mouth daily     Dispense:  30 tablet     Refill:  0    diphenoxylate-atropine (DIPHENATOL) 2.5-0.025 MG per tablet     Sig: Take 1 tablet by mouth 4 times daily as needed for Diarrhea for up to 10 days.      Dispense:  30 tablet     Refill:  1        Medications Discontinued During This Encounter   Medication Reason    ondansetron (ZOFRAN) 4 MG tablet LIST CLEANUP    linaCLOtide (LINZESS) 72 MCG CAPS capsule LIST CLEANUP    levothyroxine (SYNTHROID) 75 MCG tablet REORDER    lisinopril-hydroCHLOROthiazide (PRINZIDE;ZESTORETIC) 20-12.5 MG per tablet REORDER    traZODone (DESYREL) 50 MG tablet REORDER    clonazePAM (KLONOPIN) 2 MG tablet REORDER    isosorbide mononitrate (IMDUR) 60 MG extended release tablet REORDER    ondansetron (ZOFRAN-ODT) 4 MG disintegrating tablet REORDER    amLODIPine (NORVASC) 10 MG tablet REORDER    atenolol (TENORMIN) 100 MG tablet REORDER       Controlled Substances Monitoring:      Please note: This chart was generated using Dragon dictation software. Although every effort was made to ensure the accuracy of this automated transcription, some errors in transcription may have occurred.

## 2022-10-04 ENCOUNTER — CARE COORDINATION (OUTPATIENT)
Dept: CARE COORDINATION | Age: 77
End: 2022-10-04

## 2022-10-05 NOTE — CARE COORDINATION
[facility] Care Transitions Initial Follow Up Call    Call within 2 business days of discharge: Yes    Care Transition Nurse contacted the patient by telephone to perform post hospital discharge assessment. Verified name and  with patient as identifiers. Provided introduction to self, and explanation of the Care Transition Nurse role. Patient: Asha Fabian Patient : 1945   MRN: [id number]  Reason for Admission: Diarrhea and syncope   Discharge Date: 22 RARS: Readmission Risk Score: 10.9 ( )      Last Discharge  Street       Date Complaint Diagnosis Description Type Department Provider    21  Panic attack . .. Admission (Discharged) Li Foster MS Stalin Donnelly MD            Was this an external facility discharge? Yes, 10/2/22  Discharge Facility: [address] to be reviewed by the provider   Additional needs identified to be addressed with provider: Yes  medications-changes made in medication  labs-follow up on labs done in hospital and plan for lab at visit   Follow up on diagnostic radiology test done. Method of communication with provider: face to face. Eloise Webb tells me that she is traveling through the [facility] trying to get home from [place]. She states that she continued with diarrhea after her last visit with Dr Aron Castillo and Yola Prado out\" while out of town and wound up in the hospital.  She reports having multiple tests done. Records have been requested and will will talk back when they arrive. I spoke to Dr Aron Castillo after receiving records and discussed the medication changes made as well as diagnoses. Dr Aron Castillo agreed with all medication changes. She asked me to let Eloise Webb know that she needs lab work when she comes in for [facility]. I called Eloise Webb back and we discussed all of the medication changes and the reason for them. Eloise Webb was able to relay back to me the changes and thanked me for explaining them to her.   We discussed that she needs to continue to eat a simple diet avoiding greasy or heavy foods. We discussed her HFU appointment next week. Care Transition Nurse reviewed discharge instructions and medical action plan with patient who verbalized understanding. The patient was given an opportunity to ask questions and does not have any further questions or concerns at this time. Were discharge instructions available to patient? Yes. Reviewed appropriate site of care based on symptoms and resources available to patient including: PCP. The patient agrees to contact the PCP office for questions related to their healthcare. Advance Care Planning:   Does patient have an Advance Directive: not on file. Medication reconciliation was performed with patient, who verbalizes understanding of administration of home medications. Medications reviewed, 1111F entered: yes    Was patient discharged with a pulse oximeter? no    Non-face-to-face services provided:  Scheduled appointment with PCP-Luisana  Obtained and reviewed discharge summary and/or continuity of care documents  Reviewed and followed up on pending diagnostic tests and treatments-will need lab at appointment  Discussed continuing to follow a simple diet avoiding heavy greasy foods. Call office for any changes or issues. Offered patient enrollment in the Remote Patient Monitoring (RPM) program for in-home monitoring: NA.    Care Transitions 24 Hour Call    Do you have all of your prescriptions and are they filled?: Yes  Care Transitions Interventions         Follow Up  Future Appointments   Date Time Provider Malena Diallo   10/12/2022 10:00 AM Jessika Haywood [place] [id number] [facility] Transition Nurse provided contact information. Plan for follow-up call in 5-7 days based on severity of symptoms and risk factors.   Plan for next call: medication management-vandana Richards, RN

## 2022-10-05 NOTE — CARE COORDINATION
[facility] Transitions Initial Follow Up Call    Call within 2 business days of discharge: Yes     Patient: Arnoldo Cornejo Patient : 1945 MRN: [id number]    Last Discharge  Street       Date Complaint Diagnosis Description Type Department Provider    21  Panic attack . .. Admission (Discharged) [address] [place] Katelin Easley, [address]       RARS: Readmission Risk Score: 10.9 ( )       Spoke with: Patient called today to schedule appt stating she had been out of town in [place] and wound up in the hospital.  Unsuccessful call and unable to leave message.      Discharge department/facility: [facility] [place] [facility]    Non-face-to-face services provided:  Scheduled appointment with PCP-Luisana    Follow Up  Future Appointments   Date Time Provider Malena Diallo   10/12/2022 10:00 AM Korey Sparrow MD [address] [place] [address]       Melodie Stack RN

## 2022-10-12 ENCOUNTER — OFFICE VISIT (OUTPATIENT)
Dept: FAMILY MEDICINE CLINIC | Age: 77
End: 2022-10-12

## 2022-10-12 ENCOUNTER — HOSPITAL ENCOUNTER (OUTPATIENT)
Facility: HOSPITAL | Age: 77
Discharge: HOME | End: 2022-10-12
Payer: MEDICARE

## 2022-10-12 VITALS
WEIGHT: 181.8 LBS | SYSTOLIC BLOOD PRESSURE: 168 MMHG | DIASTOLIC BLOOD PRESSURE: 68 MMHG | RESPIRATION RATE: 18 BRPM | HEIGHT: 63 IN | OXYGEN SATURATION: 94 % | TEMPERATURE: 98.1 F | HEART RATE: 83 BPM | BODY MASS INDEX: 32.21 KG/M2

## 2022-10-12 DIAGNOSIS — I10 ESSENTIAL HYPERTENSION: ICD-10-CM

## 2022-10-12 DIAGNOSIS — F41.0 PANIC ATTACK: ICD-10-CM

## 2022-10-12 DIAGNOSIS — K85.90 ACUTE PANCREATITIS, UNSPECIFIED COMPLICATION STATUS, UNSPECIFIED PANCREATITIS TYPE: ICD-10-CM

## 2022-10-12 DIAGNOSIS — J44.9 CHRONIC OBSTRUCTIVE PULMONARY DISEASE, UNSPECIFIED COPD TYPE (HCC): ICD-10-CM

## 2022-10-12 DIAGNOSIS — Z09 HOSPITAL DISCHARGE FOLLOW-UP: Primary | ICD-10-CM

## 2022-10-12 DIAGNOSIS — R11.2 INTRACTABLE VOMITING WITH NAUSEA: ICD-10-CM

## 2022-10-12 DIAGNOSIS — G47.00 INSOMNIA, UNSPECIFIED TYPE: ICD-10-CM

## 2022-10-12 DIAGNOSIS — E53.8 B12 DEFICIENCY: ICD-10-CM

## 2022-10-12 LAB
A/G RATIO: 1.9 (ref 0.8–2)
ALBUMIN SERPL-MCNC: 4.5 G/DL (ref 3.4–4.8)
ALP BLD-CCNC: 101 U/L (ref 25–100)
ALT SERPL-CCNC: 19 U/L (ref 4–36)
AMYLASE: 50 U/L (ref 20–104)
ANION GAP SERPL CALCULATED.3IONS-SCNC: 10 MMOL/L (ref 3–16)
AST SERPL-CCNC: 20 U/L (ref 8–33)
BASOPHILS ABSOLUTE: 0 K/UL (ref 0–0.1)
BASOPHILS RELATIVE PERCENT: 0.6 %
BILIRUB SERPL-MCNC: <0.2 MG/DL (ref 0.3–1.2)
BUN BLDV-MCNC: 16 MG/DL (ref 6–20)
CALCIUM SERPL-MCNC: 9.5 MG/DL (ref 8.5–10.5)
CHLORIDE BLD-SCNC: 102 MMOL/L (ref 98–107)
CHOLESTEROL, TOTAL: 198 MG/DL (ref 0–200)
CO2: 30 MMOL/L (ref 20–30)
CREAT SERPL-MCNC: 0.9 MG/DL (ref 0.4–1.2)
EOSINOPHILS ABSOLUTE: 0.2 K/UL (ref 0–0.4)
EOSINOPHILS RELATIVE PERCENT: 3.1 %
GFR AFRICAN AMERICAN: >59
GFR NON-AFRICAN AMERICAN: >60
GLOBULIN: 2.4 G/DL
GLUCOSE BLD-MCNC: 117 MG/DL (ref 74–106)
HCT VFR BLD CALC: 42.7 % (ref 37–47)
HDLC SERPL-MCNC: 45 MG/DL (ref 40–60)
HEMOGLOBIN: 13.5 G/DL (ref 11.5–16.5)
IMMATURE GRANULOCYTES #: 0 K/UL
IMMATURE GRANULOCYTES %: 0.3 % (ref 0–5)
LDL CHOLESTEROL CALCULATED: 109 MG/DL
LIPASE: 22 U/L (ref 5.6–51.3)
LYMPHOCYTES ABSOLUTE: 1.5 K/UL (ref 1.5–4)
LYMPHOCYTES RELATIVE PERCENT: 23.1 %
MCH RBC QN AUTO: 30.9 PG (ref 27–32)
MCHC RBC AUTO-ENTMCNC: 31.6 G/DL (ref 31–35)
MCV RBC AUTO: 97.7 FL (ref 80–100)
MONOCYTES ABSOLUTE: 0.5 K/UL (ref 0.2–0.8)
MONOCYTES RELATIVE PERCENT: 8 %
NEUTROPHILS ABSOLUTE: 4.1 K/UL (ref 2–7.5)
NEUTROPHILS RELATIVE PERCENT: 64.9 %
PDW BLD-RTO: 12.8 % (ref 11–16)
PLATELET # BLD: 248 K/UL (ref 150–400)
PMV BLD AUTO: 10.9 FL (ref 6–10)
POTASSIUM SERPL-SCNC: 4.5 MMOL/L (ref 3.4–5.1)
RBC # BLD: 4.37 M/UL (ref 3.8–5.8)
SODIUM BLD-SCNC: 142 MMOL/L (ref 136–145)
TOTAL PROTEIN: 6.9 G/DL (ref 6.4–8.3)
TRIGL SERPL-MCNC: 219 MG/DL (ref 0–249)
VLDLC SERPL CALC-MCNC: 44 MG/DL
WBC # BLD: 6.4 K/UL (ref 4–11)

## 2022-10-12 PROCEDURE — 85025 COMPLETE CBC W/AUTO DIFF WBC: CPT

## 2022-10-12 PROCEDURE — 36415 COLL VENOUS BLD VENIPUNCTURE: CPT

## 2022-10-12 PROCEDURE — 80061 LIPID PANEL: CPT

## 2022-10-12 PROCEDURE — 82150 ASSAY OF AMYLASE: CPT

## 2022-10-12 PROCEDURE — 80053 COMPREHEN METABOLIC PANEL: CPT

## 2022-10-12 PROCEDURE — 83690 ASSAY OF LIPASE: CPT

## 2022-10-12 RX ORDER — CYANOCOBALAMIN 1000 UG/ML
1000 INJECTION INTRAMUSCULAR; SUBCUTANEOUS ONCE
Status: COMPLETED | OUTPATIENT
Start: 2022-10-12 | End: 2022-10-12

## 2022-10-12 RX ORDER — LISINOPRIL AND HYDROCHLOROTHIAZIDE 20; 12.5 MG/1; MG/1
1 TABLET ORAL DAILY
Qty: 30 TABLET | Refills: 5 | Status: SHIPPED | OUTPATIENT
Start: 2022-10-12

## 2022-10-12 RX ORDER — ONDANSETRON 4 MG/1
4 TABLET, ORALLY DISINTEGRATING ORAL 3 TIMES DAILY PRN
Qty: 21 TABLET | Refills: 0 | Status: SHIPPED | OUTPATIENT
Start: 2022-10-12

## 2022-10-12 RX ORDER — CLONAZEPAM 2 MG/1
TABLET ORAL
Qty: 30 TABLET | Refills: 2 | Status: SHIPPED | OUTPATIENT
Start: 2022-10-12 | End: 2022-12-19

## 2022-10-12 RX ORDER — ONDANSETRON 4 MG/1
4 TABLET, FILM COATED ORAL EVERY 8 HOURS PRN
Qty: 30 TABLET | Refills: 2 | Status: SHIPPED | OUTPATIENT
Start: 2022-10-12

## 2022-10-12 RX ORDER — ATENOLOL 25 MG/1
25 TABLET ORAL DAILY
Qty: 30 TABLET | Refills: 5 | Status: SHIPPED | OUTPATIENT
Start: 2022-10-12

## 2022-10-12 RX ADMIN — CYANOCOBALAMIN 1000 MCG: 1000 INJECTION INTRAMUSCULAR; SUBCUTANEOUS at 15:27

## 2022-10-12 ASSESSMENT — ENCOUNTER SYMPTOMS
NAUSEA: 1
VOMITING: 1

## 2022-10-12 NOTE — PROGRESS NOTES
Chief Complaint   Patient presents with    Diarrhea     hospital    Nausea       Have you seen any other physician or provider since your last visit yes - [facility]    Have you had any other diagnostic tests since your last visit? yes - see d/c    Have you changed or stopped any medications since your last visit? yes - see d/c     Administrations This Visit       cyanocobalamin injection 1,000 mcg       Admin Date  10/12/2022 Action  Given Dose  1,000 mcg Route  [employer] Administered By  [employer] [facility], [place]                    Patient tolerated injection well. Patient advised to wait 20 minutes in the office following the injection. No signs/symptoms of reaction noted after 20 minutes.

## 2022-10-12 NOTE — PROGRESS NOTES
SUBJECTIVE:    Patient ID: Alia Ramirez is a 78 y.o. female. Chief Complaint   Patient presents with    Diarrhea     hospital    Nausea       HPI: office visit  She is back out of the hospital from the diarrhea. She is feeling quite a bit better. She is completed most all the medicine they had given her. She is not having any significant diarrhea now. She is feeling better. She did have a colonoscopy. They did some biopsies that she was worried about. She still having a little nausea. Pretty much she is back to eating pretty good. She has not had any significant shortness of breath chest pain or other symptoms. Review of Systems   Constitutional:  Positive for fatigue. Gastrointestinal:  Positive for nausea and vomiting. All other systems reviewed and are negative. OBJECTIVE:  BP (!) 168/68   Pulse 83   Temp 98.1 °F (36.7 °C)   Resp 18   Ht 5' 3\" (1.6 m)   Wt 181 lb 12.8 oz (82.5 kg)   LMP 01/01/1986 (Approximate)   SpO2 94% Comment: ra  BMI 32.20 kg/m²    Wt Readings from Last 3 Encounters:   10/12/22 181 lb 12.8 oz (82.5 kg)   09/07/22 189 lb (85.7 kg)   08/10/22 177 lb (80.3 kg)     BP Readings from Last 3 Encounters:   10/12/22 (!) 168/68   09/07/22 (!) 210/90   08/10/22 138/70      Pulse Readings from Last 3 Encounters:   10/12/22 83   09/07/22 68   08/10/22 73     Body mass index is 32.2 kg/m². Resp Readings from Last 3 Encounters:   10/12/22 18   09/07/22 18   08/10/22 18     Past medical, surgical, family and social history were reviewed and updated with the patient. Physical Exam  Vitals and nursing note reviewed. Constitutional:       Appearance: Normal appearance. HENT:      Head: Normocephalic and atraumatic. Nose: Nose normal.      Mouth/Throat:      Mouth: Mucous membranes are moist.      Pharynx: Oropharynx is clear. Eyes:      Extraocular Movements: Extraocular movements intact.       Conjunctiva/sclera: Conjunctivae normal.      Pupils: Pupils are equal, round, and reactive to light. Cardiovascular:      Rate and Rhythm: Normal rate and regular rhythm. Pulses: Normal pulses. Heart sounds: Normal heart sounds. Pulmonary:      Effort: Pulmonary effort is normal.      Breath sounds: Normal breath sounds. Abdominal:      General: Bowel sounds are normal.      Palpations: Abdomen is soft. Musculoskeletal:         General: Normal range of motion. Cervical back: Normal range of motion and neck supple. Skin:     General: Skin is warm and dry. Capillary Refill: Capillary refill takes less than 2 seconds. Neurological:      General: No focal deficit present. Mental Status: She is alert and oriented to person, place, and time.    Psychiatric:         Mood and Affect: Mood normal.         Behavior: Behavior normal.        Results in Past 30 Days  Result Component Current Result Ref Range Previous Result Ref Range   Albumin/Globulin Ratio 1.9 (10/12/2022) 0.8 - 2.0 Not in Time Range    Albumin 4.5 (10/12/2022) 3.4 - 4.8 g/dL Not in Time Range    Alkaline Phosphatase 101 (H) (10/12/2022) 25 - 100 U/L Not in Time Range    ALT 19 (10/12/2022) 4 - 36 U/L Not in Time Range    AST 20 (10/12/2022) 8 - 33 U/L Not in Time Range    BUN 16 (10/12/2022) 6 - 20 mg/dL Not in Time Range    Calcium 9.5 (10/12/2022) 8.5 - 10.5 mg/dL Not in Time Range    Chloride 102 (10/12/2022) 98 - 107 mmol/L Not in Time Range    CO2 30 (10/12/2022) 20 - 30 mmol/L Not in Time Range    Creatinine 0.9 (10/12/2022) 0.4 - 1.2 mg/dL Not in Time Range    GFR  >59 (10/12/2022) >59 Not in Time Range    GFR Non- >60 (10/12/2022) >59 Not in Time Range    Globulin 2.4 (10/12/2022) Not Established g/dL Not in Time Range    Glucose 117 (H) (10/12/2022) 74 - 106 mg/dL Not in Time Range    Potassium 4.5 (10/12/2022) 3.4 - 5.1 mmol/L Not in Time Range    Sodium 142 (10/12/2022) 136 - 145 mmol/L Not in Time Range    Total Bilirubin <0.2 (L) (10/12/2022) 0.3 - 1.2 mg/dL Not in Time Range    Total Protein 6.9 (10/12/2022) 6.4 - 8.3 g/dL Not in Time Range      Hemoglobin A1C (%)   Date Value   09/16/2020 6.1 (H)     Microscopic Examination (no units)   Date Value   05/04/2021 YES     LDL Calculated (mg/dL)   Date Value   10/12/2022 109       Lab Results   Component Value Date/Time    WBC 6.4 10/12/2022 12:18 PM    NEUTROABS 4.1 10/12/2022 12:18 PM    HGB 13.5 10/12/2022 12:18 PM    HCT 42.7 10/12/2022 12:18 PM    MCV 97.7 10/12/2022 12:18 PM     10/12/2022 12:18 PM     Lab Results   Component Value Date    TSH 0.28 12/30/2021       ASSESSMENT/PLAN    Diagnosis Orders   1. Chronic obstructive pulmonary disease, unspecified COPD type ([address]  Encouraged her to stop smoking      2. Intractable vomiting with nausea  ondansetron (ZOFRAN) 4 MG tablet    ondansetron (ZOFRAN-ODT) 4 MG disintegrating tablet      3. Panic attack  clonazePAM (KLONOPIN) 2 MG tablet      4. Insomnia, unspecified type  clonazePAM (KLONOPIN) 2 MG tablet      5. Essential hypertension  Comprehensive Metabolic Panel    CBC    atenolol (TENORMIN) 25 MG tablet    lisinopril-hydroCHLOROthiazide (PRINZIDE;ZESTORETIC) 20-12.5 MG per tablet      6. Acute pancreatitis, unspecified complication status, unspecified pancreatitis type  Amylase    Lipase    LIPID PANEL      7.  B12 deficiency  cyanocobalamin injection 1,000 mcg      8 hospital follow up  Orders Placed This Encounter   Medications    ondansetron (ZOFRAN) 4 MG tablet     Sig: Take 1 tablet by mouth every 8 hours as needed for Nausea or Vomiting     Dispense:  30 tablet     Refill:  2    ondansetron (ZOFRAN-ODT) 4 MG disintegrating tablet     Sig: Take 1 tablet by mouth 3 times daily as needed for Nausea or Vomiting     Dispense:  21 tablet     Refill:  0    clonazePAM (KLONOPIN) 2 MG tablet     Sig: TAKE ONE TABLET BY MOUTH ONCE NIGHTLY AS NEEDED FOR INSOMNIA     Dispense:  30 tablet     Refill:  2    atenolol (TENORMIN) 25 MG tablet     Sig: Take 1 tablet by mouth daily     Dispense:  30 tablet     Refill:  5    lisinopril-hydroCHLOROthiazide (PRINZIDE;ZESTORETIC) 20-12.5 MG per tablet     Sig: Take 1 tablet by mouth daily     Dispense:  30 tablet     Refill:  5    cyanocobalamin injection 1,000 mcg        Medications Discontinued During This Encounter   Medication Reason    Budeson-Glycopyrrol-Formoterol (BREZTRI AEROSPHERE) 160-9-4.8 MCG/ACT AERO LIST CLEANUP    ondansetron (ZOFRAN) 4 MG tablet LIST CLEANUP    traZODone (DESYREL) 50 MG tablet LIST CLEANUP    vitamin D (ERGOCALCIFEROL) 1.25 MG (32019 UT) CAPS capsule LIST CLEANUP    atenolol (TENORMIN) 100 MG tablet     ondansetron (ZOFRAN-ODT) 4 MG disintegrating tablet REORDER    clonazePAM (KLONOPIN) 2 MG tablet REORDER       Controlled Substances Monitoring:      Please note: This chart was generated using Dragon dictation software. Although every effort was made to ensure the accuracy of this automated transcription, some errors in transcription may have occurred.

## 2022-10-13 ENCOUNTER — TELEPHONE (OUTPATIENT)
Dept: FAMILY MEDICINE CLINIC | Age: 77
End: 2022-10-13

## 2022-10-13 NOTE — TELEPHONE ENCOUNTER
Patient was wondering if you could send her some Align probiotics in. She also said that she talked to you about getting an inhaler sample and I was just needing to know what to give her.

## 2022-10-17 RX ORDER — ALUMINUM ZIRCONIUM OCTACHLOROHYDREX GLY 16 G/100G
1 GEL TOPICAL DAILY
Qty: 30 CAPSULE | Refills: 2 | Status: SHIPPED | OUTPATIENT
Start: 2022-10-17 | End: 2022-11-16

## 2022-11-09 ENCOUNTER — OFFICE VISIT (OUTPATIENT)
Dept: FAMILY MEDICINE CLINIC | Age: 77
End: 2022-11-09
Payer: MEDICARE

## 2022-11-09 VITALS
BODY MASS INDEX: 33.31 KG/M2 | OXYGEN SATURATION: 93 % | DIASTOLIC BLOOD PRESSURE: 74 MMHG | TEMPERATURE: 97 F | HEIGHT: 63 IN | RESPIRATION RATE: 18 BRPM | HEART RATE: 89 BPM | WEIGHT: 188 LBS | SYSTOLIC BLOOD PRESSURE: 134 MMHG

## 2022-11-09 DIAGNOSIS — K59.00 CONSTIPATION, UNSPECIFIED CONSTIPATION TYPE: Primary | ICD-10-CM

## 2022-11-09 DIAGNOSIS — R11.2 INTRACTABLE VOMITING WITH NAUSEA: ICD-10-CM

## 2022-11-09 DIAGNOSIS — I20.8 STABLE ANGINA (HCC): ICD-10-CM

## 2022-11-09 DIAGNOSIS — F33.1 MODERATE EPISODE OF RECURRENT MAJOR DEPRESSIVE DISORDER (HCC): ICD-10-CM

## 2022-11-09 DIAGNOSIS — J44.9 CHRONIC OBSTRUCTIVE PULMONARY DISEASE, UNSPECIFIED COPD TYPE (HCC): ICD-10-CM

## 2022-11-09 DIAGNOSIS — G47.00 INSOMNIA, UNSPECIFIED TYPE: ICD-10-CM

## 2022-11-09 DIAGNOSIS — F41.0 PANIC ATTACK: ICD-10-CM

## 2022-11-09 PROCEDURE — G8399 PT W/DXA RESULTS DOCUMENT: HCPCS | Performed by: FAMILY MEDICINE

## 2022-11-09 PROCEDURE — 3023F SPIROM DOC REV: CPT | Performed by: FAMILY MEDICINE

## 2022-11-09 PROCEDURE — G8427 DOCREV CUR MEDS BY ELIG CLIN: HCPCS | Performed by: FAMILY MEDICINE

## 2022-11-09 PROCEDURE — 99214 OFFICE O/P EST MOD 30 MIN: CPT | Performed by: FAMILY MEDICINE

## 2022-11-09 PROCEDURE — G8484 FLU IMMUNIZE NO ADMIN: HCPCS | Performed by: FAMILY MEDICINE

## 2022-11-09 PROCEDURE — 1036F TOBACCO NON-USER: CPT | Performed by: FAMILY MEDICINE

## 2022-11-09 PROCEDURE — 3017F COLORECTAL CA SCREEN DOC REV: CPT | Performed by: FAMILY MEDICINE

## 2022-11-09 PROCEDURE — 3078F DIAST BP <80 MM HG: CPT | Performed by: FAMILY MEDICINE

## 2022-11-09 PROCEDURE — 1090F PRES/ABSN URINE INCON ASSESS: CPT | Performed by: FAMILY MEDICINE

## 2022-11-09 PROCEDURE — 3074F SYST BP LT 130 MM HG: CPT | Performed by: FAMILY MEDICINE

## 2022-11-09 PROCEDURE — 1123F ACP DISCUSS/DSCN MKR DOCD: CPT | Performed by: FAMILY MEDICINE

## 2022-11-09 PROCEDURE — G8417 CALC BMI ABV UP PARAM F/U: HCPCS | Performed by: FAMILY MEDICINE

## 2022-11-09 RX ORDER — ONDANSETRON 4 MG/1
4 TABLET, FILM COATED ORAL EVERY 8 HOURS PRN
Qty: 30 TABLET | Refills: 2 | Status: SHIPPED | OUTPATIENT
Start: 2022-11-09 | End: 2022-11-28 | Stop reason: SDUPTHER

## 2022-11-09 RX ORDER — LUBIPROSTONE 8 UG/1
8 CAPSULE ORAL 2 TIMES DAILY WITH MEALS
Qty: 60 CAPSULE | Refills: 5 | Status: SHIPPED | OUTPATIENT
Start: 2022-11-09

## 2022-11-09 RX ORDER — CLONAZEPAM 2 MG/1
TABLET ORAL
Qty: 30 TABLET | Refills: 2 | Status: SHIPPED | OUTPATIENT
Start: 2022-11-09 | End: 2023-01-16

## 2022-11-09 RX ORDER — ALBUTEROL SULFATE 2.5 MG/3ML
2.5 SOLUTION RESPIRATORY (INHALATION) EVERY 4 HOURS PRN
Qty: 25 EACH | Refills: 2 | Status: SHIPPED | OUTPATIENT
Start: 2022-11-09

## 2022-11-09 RX ORDER — NITROGLYCERIN 0.4 MG/1
0.4 TABLET SUBLINGUAL EVERY 5 MIN PRN
Qty: 25 TABLET | Refills: 3 | Status: SHIPPED | OUTPATIENT
Start: 2022-11-09

## 2022-11-09 RX ORDER — ALUMINUM ZIRCONIUM OCTACHLOROHYDREX GLY 16 G/100G
1 GEL TOPICAL DAILY
Qty: 30 CAPSULE | Refills: 2 | Status: SHIPPED | OUTPATIENT
Start: 2022-11-09 | End: 2022-12-09

## 2022-11-09 RX ORDER — ALBUTEROL SULFATE 90 UG/1
2 AEROSOL, METERED RESPIRATORY (INHALATION) EVERY 6 HOURS PRN
Qty: 18 G | Refills: 3 | Status: SHIPPED | OUTPATIENT
Start: 2022-11-09

## 2022-11-09 ASSESSMENT — ENCOUNTER SYMPTOMS
VOMITING: 1
NAUSEA: 1

## 2022-11-09 NOTE — PROGRESS NOTES
SUBJECTIVE:    Patient ID: Jennifer Welsh is a 78 y.o. female. Chief Complaint   Patient presents with    Hypertension    Diarrhea              HPI: office visit  She has had a couple concerns about her colon issue. She did have a colonoscopy when she was in the hospital.  She is wondering if she still needs to see GI since all that turned out okay. She is no longer having any diarrhea. She is having some intermittent bouts of constipation. She says her blood pressures have been doing pretty good at home. She does not have any chest pain. Her shortness of breath does seem to be stable. She still smoking. She denies any recent falls or injuries. She is not having any significant medication problems that she can tell. She is still quite anxious. She says that medicine does help some. Review of Systems   Constitutional:  Positive for fatigue. Gastrointestinal:  Positive for nausea and vomiting. All other systems reviewed and are negative. OBJECTIVE:  /74   Pulse 89   Temp 97 °F (36.1 °C)   Resp 18   Ht 5' 3\" (1.6 m)   Wt 188 lb (85.3 kg)   LMP 01/01/1986 (Approximate)   SpO2 93% Comment: ra  BMI 33.30 kg/m²    Wt Readings from Last 3 Encounters:   11/09/22 188 lb (85.3 kg)   10/12/22 181 lb 12.8 oz (82.5 kg)   09/07/22 189 lb (85.7 kg)     BP Readings from Last 3 Encounters:   11/09/22 134/74   10/12/22 (!) 168/68   09/07/22 (!) 210/90      Pulse Readings from Last 3 Encounters:   11/09/22 89   10/12/22 83   09/07/22 68     Body mass index is 33.3 kg/m². Resp Readings from Last 3 Encounters:   11/09/22 18   10/12/22 18   09/07/22 18     Past medical, surgical, family and social history were reviewed and updated with the patient. Physical Exam  Vitals and nursing note reviewed. Constitutional:       Appearance: Normal appearance. HENT:      Head: Normocephalic and atraumatic.       Nose: Nose normal.      Mouth/Throat:      Mouth: Mucous membranes are moist.      Pharynx: Oropharynx is clear. Eyes:      Extraocular Movements: Extraocular movements intact. Conjunctiva/sclera: Conjunctivae normal.      Pupils: Pupils are equal, round, and reactive to light. Cardiovascular:      Rate and Rhythm: Normal rate and regular rhythm. Pulses: Normal pulses. Heart sounds: Normal heart sounds. Pulmonary:      Effort: Pulmonary effort is normal.      Breath sounds: Normal breath sounds. Abdominal:      General: Bowel sounds are normal.      Palpations: Abdomen is soft. Musculoskeletal:         General: Normal range of motion. Cervical back: Normal range of motion and neck supple. Skin:     General: Skin is warm and dry. Capillary Refill: Capillary refill takes less than 2 seconds. Neurological:      General: No focal deficit present. Mental Status: She is alert and oriented to person, place, and time. Psychiatric:         Mood and Affect: Mood normal.         Behavior: Behavior normal.        No results found for requested labs within last 30 days. Hemoglobin A1C (%)   Date Value   09/16/2020 6.1 (H)     Microscopic Examination (no units)   Date Value   05/04/2021 YES     LDL Calculated (mg/dL)   Date Value   10/12/2022 109       Lab Results   Component Value Date/Time    WBC 6.4 10/12/2022 12:18 PM    NEUTROABS 4.1 10/12/2022 12:18 PM    HGB 13.5 10/12/2022 12:18 PM    HCT 42.7 10/12/2022 12:18 PM    MCV 97.7 10/12/2022 12:18 PM     10/12/2022 12:18 PM     Lab Results   Component Value Date    TSH 0.28 12/30/2021       ASSESSMENT/PLAN    Diagnosis Orders   1. Constipation, unspecified constipation type  Probiotic Product (ACIDOPHILUS PROBIOTIC) CAPS capsule    lubiprostone (AMITIZA) 8 MCG CAPS capsule      2. Panic attack  clonazePAM (KLONOPIN) 2 MG tablet      3. Insomnia, unspecified type  clonazePAM (KLONOPIN) 2 MG tablet      4. Intractable vomiting with nausea  ondansetron (ZOFRAN) 4 MG tablet      5.  Stable angina (HCC)  nitroGLYCERIN (NITROSTAT) 0.4 MG SL tablet      6. Moderate episode of recurrent major depressive disorder ([facility]        7.  Chronic obstructive pulmonary disease, unspecified COPD type (HCC)  albuterol (PROVENTIL) (2.5 MG/3ML) 0.083% nebulizer solution    albuterol sulfate HFA (PROVENTIL;VENTOLIN;PROAIR) 108 (90 Base) MCG/ACT inhaler          Orders Placed This Encounter   Medications    clonazePAM (KLONOPIN) 2 MG tablet     Sig: TAKE ONE TABLET BY MOUTH ONCE NIGHTLY AS NEEDED FOR INSOMNIA     Dispense:  30 tablet     Refill:  2    ondansetron (ZOFRAN) 4 MG tablet     Sig: Take 1 tablet by mouth every 8 hours as needed for Nausea or Vomiting     Dispense:  30 tablet     Refill:  2    nitroGLYCERIN (NITROSTAT) 0.4 MG SL tablet     Sig: Place 1 tablet under the tongue every 5 minutes as needed for Chest pain     Dispense:  25 tablet     Refill:  3    Probiotic Product (ACIDOPHILUS PROBIOTIC) CAPS capsule     Sig: Take 1 capsule by mouth daily     Dispense:  30 capsule     Refill:  2    albuterol (PROVENTIL) (2.5 MG/3ML) 0.083% nebulizer solution     Sig: Take 3 mLs by nebulization every 4 hours as needed for Wheezing     Dispense:  25 each     Refill:  2    albuterol sulfate HFA (PROVENTIL;VENTOLIN;PROAIR) 108 (90 Base) MCG/ACT inhaler     Sig: Inhale 2 puffs into the lungs every 6 hours as needed for Wheezing or Shortness of Breath     Dispense:  18 g     Refill:  3    lubiprostone (AMITIZA) 8 MCG CAPS capsule     Sig: Take 1 capsule by mouth 2 times daily (with meals)     Dispense:  60 capsule     Refill:  5    triamcinolone (KENALOG) 0.1 % ointment     Sig: Apply topically 2 times daily for 14 days     Dispense:  453.6 g     Refill:  0        Medications Discontinued During This Encounter   Medication Reason    albuterol (PROVENTIL) (5 MG/ML) 0.5% nebulizer solution LIST CLEANUP    isosorbide mononitrate (IMDUR) 60 MG extended release tablet LIST CLEANUP    lisinopril-hydroCHLOROthiazide (PRINZIDE;ZESTORETIC) 20-12.5 MG per tablet LIST CLEANUP    ondansetron (ZOFRAN-ODT) 4 MG disintegrating tablet LIST CLEANUP    nitroGLYCERIN (NITROSTAT) 0.4 MG SL tablet REORDER    albuterol sulfate  (90 Base) MCG/ACT inhaler REORDER    albuterol (PROVENTIL) (2.5 MG/3ML) 0.083% nebulizer solution REORDER    ondansetron (ZOFRAN) 4 MG tablet REORDER    clonazePAM (KLONOPIN) 2 MG tablet REORDER    Probiotic Product (ACIDOPHILUS PROBIOTIC) CAPS capsule REORDER       Controlled Substances Monitoring:      Please note: This chart was generated using Dragon dictation software. Although every effort was made to ensure the accuracy of this automated transcription, some errors in transcription may have occurred.

## 2022-11-09 NOTE — PROGRESS NOTES
Chief Complaint   Patient presents with    Hypertension       Have you seen any other physician or provider since your last visit yes - urgent care- sinus infection    Have you had any other diagnostic tests since your last visit? no    Have you changed or stopped any medications since your last visit? no

## 2022-11-28 ENCOUNTER — OFFICE VISIT (OUTPATIENT)
Dept: FAMILY MEDICINE CLINIC | Age: 77
End: 2022-11-28
Payer: MEDICARE

## 2022-11-28 VITALS
DIASTOLIC BLOOD PRESSURE: 82 MMHG | SYSTOLIC BLOOD PRESSURE: 140 MMHG | TEMPERATURE: 98.6 F | RESPIRATION RATE: 12 BRPM | OXYGEN SATURATION: 93 % | HEART RATE: 80 BPM

## 2022-11-28 DIAGNOSIS — M79.604 PAIN OF RIGHT LOWER EXTREMITY: Primary | ICD-10-CM

## 2022-11-28 DIAGNOSIS — R11.2 INTRACTABLE VOMITING WITH NAUSEA: ICD-10-CM

## 2022-11-28 DIAGNOSIS — R07.9 CHEST PAIN, UNSPECIFIED TYPE: ICD-10-CM

## 2022-11-28 DIAGNOSIS — M79.672 LEFT FOOT PAIN: ICD-10-CM

## 2022-11-28 PROCEDURE — G8399 PT W/DXA RESULTS DOCUMENT: HCPCS | Performed by: NURSE PRACTITIONER

## 2022-11-28 PROCEDURE — 99214 OFFICE O/P EST MOD 30 MIN: CPT | Performed by: NURSE PRACTITIONER

## 2022-11-28 PROCEDURE — 1090F PRES/ABSN URINE INCON ASSESS: CPT | Performed by: NURSE PRACTITIONER

## 2022-11-28 PROCEDURE — 3017F COLORECTAL CA SCREEN DOC REV: CPT | Performed by: NURSE PRACTITIONER

## 2022-11-28 PROCEDURE — 3078F DIAST BP <80 MM HG: CPT | Performed by: NURSE PRACTITIONER

## 2022-11-28 PROCEDURE — G8484 FLU IMMUNIZE NO ADMIN: HCPCS | Performed by: NURSE PRACTITIONER

## 2022-11-28 PROCEDURE — G8417 CALC BMI ABV UP PARAM F/U: HCPCS | Performed by: NURSE PRACTITIONER

## 2022-11-28 PROCEDURE — G8427 DOCREV CUR MEDS BY ELIG CLIN: HCPCS | Performed by: NURSE PRACTITIONER

## 2022-11-28 PROCEDURE — 1036F TOBACCO NON-USER: CPT | Performed by: NURSE PRACTITIONER

## 2022-11-28 PROCEDURE — 1123F ACP DISCUSS/DSCN MKR DOCD: CPT | Performed by: NURSE PRACTITIONER

## 2022-11-28 PROCEDURE — 3074F SYST BP LT 130 MM HG: CPT | Performed by: NURSE PRACTITIONER

## 2022-11-28 RX ORDER — ONDANSETRON 4 MG/1
4 TABLET, FILM COATED ORAL EVERY 8 HOURS PRN
Qty: 30 TABLET | Refills: 2 | Status: SHIPPED | OUTPATIENT
Start: 2022-11-28

## 2022-11-28 ASSESSMENT — ENCOUNTER SYMPTOMS: SHORTNESS OF BREATH: 1

## 2022-11-28 NOTE — PROGRESS NOTES
SUBJECTIVE:    Medina Tinoco is a 78 y.o. female    Patient here today with knot on her c/o right leg, lower medical calf that she noticed last week and she states that she knows it is a \"blood clot\". She also c/o pain in left foot, unable to move toes but can flex ankle this pain started 2 days after she noticed the knot on her right leg. She states she had a blood clot in her left leg about 8 months ago and was since taken off of the eliquis that she was on. She states she does not have history of gout that she is aware of. She states that she has been having chest pain since she noticed the knot on her leg and she has taken \"more nitro in the last week than\" she \"has in the last year\". She states the nitro does relieve pain. She c/o soa but she does wear oxygen at night and it is no worse than her baseline. Her gait has been affected by foot pain but she has a cane that she has been using at home. She states has follow up with dermatology this week and cardiology next week. Chief Complaint   Patient presents with    Leg Swelling        Review of Systems   HENT:  Trouble swallowing: uric. Respiratory:  Positive for shortness of breath. Cardiovascular:  Positive for chest pain and leg swelling (one area on medial right calf). Musculoskeletal:  Positive for arthralgias (right great toe) and gait problem (since pain started in foot, she has been unsteady and using cane at home). All other systems reviewed and are negative. OBJECTIVE:    BP (!) 140/82 (Site: Left Upper Arm, Position: Sitting, Cuff Size: Large Adult)   Pulse 80   Temp 98.6 °F (37 °C) (Temporal)   Resp 12   LMP 01/01/1986 (Approximate)   SpO2 93%    Physical Exam  Vitals and nursing note reviewed. Constitutional:       Appearance: She is normal weight. Cardiovascular:      Rate and Rhythm: Normal rate and regular rhythm. Pulses: Normal pulses. Heart sounds: Normal heart sounds.    Pulmonary:      Effort: Pulmonary effort is normal. No respiratory distress. Breath sounds: Normal breath sounds. No wheezing or rhonchi. Abdominal:      General: Abdomen is flat. Bowel sounds are normal. There is no distension. Tenderness: There is no abdominal tenderness. Skin:     General: Skin is warm and dry. Neurological:      Mental Status: She is alert and oriented to person, place, and time. Gait: Gait abnormal (left foot pain causing slight gait disturbance). Psychiatric:         Mood and Affect: Mood normal.         Behavior: Behavior normal.         Thought Content: Thought content normal.         Judgment: Judgment normal.   While patient was being examined and prepped for EKG, she began shaking and waving her arms and was not speaking. Episode lasted about 10 seconds and once she woke up she was slow to verbalize that she was at  the doctors office. Patient very quickly \"returned\" to baseline mental status and was able to converse normally within 30 seconds. Seh had no pupillary changes, no muscle rigidity, and no loss of consciousness. ASSESSMENT/PLAN:   1. Pain of right lower extremity  -     US DUP LOWER EXTREMITY RIGHT GAYLE; Future  -     EKG 12 Lead - Clinic Performed; Future  2. Left foot pain  -     XR FOOT LEFT (MIN 3 VIEWS); Future  -     Uric Acid; Future  3. Intractable vomiting with nausea  -     ondansetron (ZOFRAN) 4 MG tablet; Take 1 tablet by mouth every 8 hours as needed for Nausea or Vomiting, Disp-30 tablet, R-2Normal  4. Chest pain, unspecified type  -     CBC with Auto Differential; Future  -     Comprehensive Metabolic Panel; Future   Patient refused transfer to ED at this time. Patient fully aware of risks. Patient advised that we will follow up on any abnormal labs/studies. She will have radiology done at [facility].     Spent approximately 37 minutes with this patient. Prior to Visit Medications    Medication Sig Taking?  Authorizing Provider   clonazePAM (KLONOPIN) 2 MG tablet TAKE ONE TABLET BY MOUTH ONCE NIGHTLY AS NEEDED FOR INSOMNIA Yes Moiz Cardenas MD   ondansetron (ZOFRAN) 4 MG tablet Take 1 tablet by mouth every 8 hours as needed for Nausea or Vomiting Yes Moiz Cardenas MD   nitroGLYCERIN (NITROSTAT) 0.4 MG SL tablet Place 1 tablet under the tongue every 5 minutes as needed for Chest pain Yes Moiz Cardenas MD   albuterol (PROVENTIL) (2.5 MG/3ML) 0.083% nebulizer solution Take 3 mLs by nebulization every 4 hours as needed for Wheezing Yes Moiz Cardenas MD   albuterol sulfate HFA (PROVENTIL;VENTOLIN;PROAIR) 108 (90 Base) MCG/ACT inhaler Inhale 2 puffs into the lungs every 6 hours as needed for Wheezing or Shortness of Breath Yes Moiz Cardenas MD   lubiprostone (AMITIZA) 8 MCG CAPS capsule Take 1 capsule by mouth 2 times daily (with meals) Yes Moiz Cardenas MD   atenolol (TENORMIN) 25 MG tablet Take 1 tablet by mouth daily Yes Moiz Cardenas MD   amLODIPine (NORVASC) 10 MG tablet Take 1 tablet by mouth daily Yes Moiz Cardenas MD   lisinopril-hydroCHLOROthiazide (PRINZIDE;ZESTORETIC) 20-12.5 MG per tablet Take 1 tablet by mouth daily Yes Moiz Cardenas MD   levothyroxine (SYNTHROID) 75 MCG tablet Take 1 tablet by mouth Daily Yes Moiz Cardenas MD   nystatin (MYCOSTATIN) 145176 UNIT/GM cream Apply topically 2 times daily. Yes Derek [address], APRN - CNP   Nebulizers (AIRIAL COMPACT MINI NEBULIZER) MISC 1 Device by Does not apply route every 4-6 hours as needed (sob, cough, wheezing) Yes Moiz Cardenas MD   aspirin 81 MG EC tablet Take 81 mg by mouth in the morning.  Yes Historical Provider, MD   Probiotic Product (ACIDOPHILUS PROBIOTIC) CAPS capsule Take 1 capsule by mouth daily  Moiz Cardenas MD

## 2022-11-28 NOTE — PROGRESS NOTES
Chief Complaint   Patient presents with    Leg Swelling       Have you seen any other physician or provider since your last visit no    Have you had any other diagnostic tests since your last visit? no    Have you changed or stopped any medications since your last visit? no     I have recommended that this patient have a sigmoidoscopy but she due to refusal reason: no symptoms  I have discussed the risks and benefits of this examination with her. The patient verbalizes understanding. Provider will be informed of refusal.      Diabetic retinal exam completed this year?  Yes                       * If yes please have patient sign a records release to obtain record to update Health Maintenance                       * If no, please order referral for patient to be scheduled     78year old with complaint of swelling to legs and left foot

## 2022-12-14 ENCOUNTER — OFFICE VISIT (OUTPATIENT)
Dept: FAMILY MEDICINE CLINIC | Age: 77
End: 2022-12-14
Payer: MEDICARE

## 2022-12-14 VITALS
OXYGEN SATURATION: 95 % | BODY MASS INDEX: 33.03 KG/M2 | SYSTOLIC BLOOD PRESSURE: 136 MMHG | HEIGHT: 63 IN | WEIGHT: 186.4 LBS | DIASTOLIC BLOOD PRESSURE: 84 MMHG | RESPIRATION RATE: 12 BRPM | TEMPERATURE: 98.3 F | HEART RATE: 75 BPM

## 2022-12-14 DIAGNOSIS — G47.00 INSOMNIA, UNSPECIFIED TYPE: ICD-10-CM

## 2022-12-14 DIAGNOSIS — R42 DIZZINESS: ICD-10-CM

## 2022-12-14 DIAGNOSIS — K59.00 CONSTIPATION, UNSPECIFIED CONSTIPATION TYPE: ICD-10-CM

## 2022-12-14 DIAGNOSIS — F41.0 PANIC ATTACK: ICD-10-CM

## 2022-12-14 DIAGNOSIS — L30.9 DERMATITIS: ICD-10-CM

## 2022-12-14 DIAGNOSIS — J44.9 CHRONIC OBSTRUCTIVE PULMONARY DISEASE, UNSPECIFIED COPD TYPE (HCC): Primary | ICD-10-CM

## 2022-12-14 DIAGNOSIS — I10 ESSENTIAL HYPERTENSION: ICD-10-CM

## 2022-12-14 PROCEDURE — 3074F SYST BP LT 130 MM HG: CPT | Performed by: FAMILY MEDICINE

## 2022-12-14 PROCEDURE — G8427 DOCREV CUR MEDS BY ELIG CLIN: HCPCS | Performed by: FAMILY MEDICINE

## 2022-12-14 PROCEDURE — G8399 PT W/DXA RESULTS DOCUMENT: HCPCS | Performed by: FAMILY MEDICINE

## 2022-12-14 PROCEDURE — 3017F COLORECTAL CA SCREEN DOC REV: CPT | Performed by: FAMILY MEDICINE

## 2022-12-14 PROCEDURE — 1036F TOBACCO NON-USER: CPT | Performed by: FAMILY MEDICINE

## 2022-12-14 PROCEDURE — 3078F DIAST BP <80 MM HG: CPT | Performed by: FAMILY MEDICINE

## 2022-12-14 PROCEDURE — 1090F PRES/ABSN URINE INCON ASSESS: CPT | Performed by: FAMILY MEDICINE

## 2022-12-14 PROCEDURE — G8484 FLU IMMUNIZE NO ADMIN: HCPCS | Performed by: FAMILY MEDICINE

## 2022-12-14 PROCEDURE — G8417 CALC BMI ABV UP PARAM F/U: HCPCS | Performed by: FAMILY MEDICINE

## 2022-12-14 PROCEDURE — 3023F SPIROM DOC REV: CPT | Performed by: FAMILY MEDICINE

## 2022-12-14 PROCEDURE — 99214 OFFICE O/P EST MOD 30 MIN: CPT | Performed by: FAMILY MEDICINE

## 2022-12-14 PROCEDURE — 1123F ACP DISCUSS/DSCN MKR DOCD: CPT | Performed by: FAMILY MEDICINE

## 2022-12-14 RX ORDER — ALUMINUM ZIRCONIUM OCTACHLOROHYDREX GLY 16 G/100G
1 GEL TOPICAL DAILY
Qty: 30 CAPSULE | Refills: 3 | Status: SHIPPED | OUTPATIENT
Start: 2022-12-14 | End: 2023-01-13

## 2022-12-14 RX ORDER — MECLIZINE HYDROCHLORIDE 25 MG/1
25 TABLET ORAL 3 TIMES DAILY PRN
Qty: 30 TABLET | Refills: 2 | Status: SHIPPED | OUTPATIENT
Start: 2022-12-14 | End: 2022-12-24

## 2022-12-14 ASSESSMENT — ENCOUNTER SYMPTOMS
NAUSEA: 1
VOMITING: 1

## 2022-12-14 NOTE — PROGRESS NOTES
Chief Complaint   Patient presents with    Hypertension       Have you seen any other physician or provider since your last visit no    Have you had any other diagnostic tests since your last visit? no    Have you changed or stopped any medications since your last visit? no     I have recommended that this patient have a sigmoidoscopy but she due to refusal reason: no symptoms  I have discussed the risks and benefits of this examination with her. The patient verbalizes understanding. Provider will be informed of refusal.      Diabetic retinal exam completed this year?  Yes                       * If yes please have patient sign a records release to obtain record to update Health Maintenance                       * If no, please order referral for patient to be scheduled     78year old here to follow up

## 2022-12-14 NOTE — PROGRESS NOTES
SUBJECTIVE:    Patient ID: Arnoldo Cornejo is a 78 y.o. female. Chief Complaint   Patient presents with    Hypertension       HPI: office visit  She is in the office today in follow-up of her hypertension. Her medications seem to be doing pretty well. She still struggling with some anxiety. She has had some itching and skin irritation since she has had COVID. She has had some continued shortness of breath due to her COPD. She says she is still having some sleep issues. She does take some medicine for that at times. She has not had any falls or injuries. She has not had any chest pain. Review of Systems   Constitutional:  Positive for fatigue. Gastrointestinal:  Positive for nausea and vomiting. All other systems reviewed and are negative. OBJECTIVE:  /84 (Site: Left Upper Arm, Position: Sitting, Cuff Size: Large Adult)   Pulse 75   Temp 98.3 °F (36.8 °C) (Temporal)   Resp 12   Ht 5' 3\" (1.6 m)   Wt 186 lb 6.4 oz (84.6 kg)   LMP 01/01/1986 (Approximate)   SpO2 95%   BMI 33.02 kg/m²    Wt Readings from Last 3 Encounters:   12/14/22 186 lb 6.4 oz (84.6 kg)   11/09/22 188 lb (85.3 kg)   10/12/22 181 lb 12.8 oz (82.5 kg)     BP Readings from Last 3 Encounters:   12/14/22 136/84   11/28/22 (!) 140/82   11/09/22 134/74      Pulse Readings from Last 3 Encounters:   12/14/22 75   11/28/22 80   11/09/22 89     Body mass index is 33.02 kg/m². Resp Readings from Last 3 Encounters:   12/14/22 12   11/28/22 12   11/09/22 18     Past medical, surgical, family and social history were reviewed and updated with the patient. Physical Exam  Vitals and nursing note reviewed. Constitutional:       Appearance: Normal appearance. HENT:      Head: Normocephalic and atraumatic. Nose: Nose normal.      Mouth/Throat:      Mouth: Mucous membranes are moist.      Pharynx: Oropharynx is clear. Eyes:      Extraocular Movements: Extraocular movements intact.       Conjunctiva/sclera: Conjunctivae normal.      Pupils: Pupils are equal, round, and reactive to light. Cardiovascular:      Rate and Rhythm: Normal rate and regular rhythm. Pulses: Normal pulses. Heart sounds: Normal heart sounds. Pulmonary:      Effort: Pulmonary effort is normal.      Breath sounds: Normal breath sounds. Abdominal:      General: Bowel sounds are normal.      Palpations: Abdomen is soft. Musculoskeletal:         General: Normal range of motion. Cervical back: Normal range of motion and neck supple. Skin:     General: Skin is warm and dry. Capillary Refill: Capillary refill takes less than 2 seconds. Neurological:      General: No focal deficit present. Mental Status: She is alert and oriented to person, place, and time. Psychiatric:         Mood and Affect: Mood normal.         Behavior: Behavior normal.        No results found for requested labs within last 30 days. Hemoglobin A1C (%)   Date Value   09/16/2020 6.1 (H)     Microscopic Examination (no units)   Date Value   05/04/2021 YES     LDL Calculated (mg/dL)   Date Value   10/12/2022 109       Lab Results   Component Value Date/Time    WBC 6.4 10/12/2022 12:18 PM    NEUTROABS 4.1 10/12/2022 12:18 PM    HGB 13.5 10/12/2022 12:18 PM    HCT 42.7 10/12/2022 12:18 PM    MCV 97.7 10/12/2022 12:18 PM     10/12/2022 12:18 PM     Lab Results   Component Value Date    TSH 0.28 12/30/2021       ASSESSMENT/PLAN    Diagnosis Orders   1. Chronic obstructive pulmonary disease, unspecified COPD type (HCC)  triamcinolone (KENALOG) 0.1 % ointment      2. Dermatitis        3. Panic attack        4. Insomnia, unspecified type        5. Constipation, unspecified constipation type  Probiotic Product (ACIDOPHILUS PROBIOTIC) CAPS capsule      6. Essential hypertension        7.  Dizziness  meclizine (ANTIVERT) 25 MG tablet          Orders Placed This Encounter   Medications    Probiotic Product (ACIDOPHILUS PROBIOTIC) CAPS capsule     Sig: Take 1 capsule by mouth daily     Dispense:  30 capsule     Refill:  3    triamcinolone (KENALOG) 0.1 % ointment     Sig: Apply topically 2 times daily for 14 days     Dispense:  453.6 g     Refill:  0    meclizine (ANTIVERT) 25 MG tablet     Sig: Take 1 tablet by mouth 3 times daily as needed for Dizziness     Dispense:  30 tablet     Refill:  2        Medications Discontinued During This Encounter   Medication Reason    triamcinolone (KENALOG) 0.1 % ointment REORDER       Controlled Substances Monitoring:      Please note: This chart was generated using Dragon dictation software. Although every effort was made to ensure the accuracy of this automated transcription, some errors in transcription may have occurred.

## 2023-01-18 ENCOUNTER — TELEPHONE (OUTPATIENT)
Dept: FAMILY MEDICINE CLINIC | Age: 78
End: 2023-01-18

## 2023-01-18 NOTE — TELEPHONE ENCOUNTER
Patient called and stated that she is experiencing dysuria and is wondering if you could send her in something for a UTI?

## 2023-01-21 RX ORDER — DOXYCYCLINE HYCLATE 100 MG
100 TABLET ORAL 2 TIMES DAILY
Qty: 14 TABLET | Refills: 0 | Status: SHIPPED | OUTPATIENT
Start: 2023-01-21 | End: 2023-01-28

## 2023-01-21 NOTE — PROGRESS NOTES
SUBJECTIVE:    Patient ID: Asael Manning is a 78 y.o. female. No chief complaint on file. HPI:    Patient's medications,allergies, past medical, surgical, social and family histories were reviewed and updated as appropriate. .  Current Outpatient Medications on File Prior to Visit   Medication Sig Dispense Refill    ondansetron (ZOFRAN) 4 MG tablet Take 1 tablet by mouth every 8 hours as needed for Nausea or Vomiting 30 tablet 2    clonazePAM (KLONOPIN) 2 MG tablet TAKE ONE TABLET BY MOUTH ONCE NIGHTLY AS NEEDED FOR INSOMNIA 30 tablet 2    nitroGLYCERIN (NITROSTAT) 0.4 MG SL tablet Place 1 tablet under the tongue every 5 minutes as needed for Chest pain 25 tablet 3    albuterol (PROVENTIL) (2.5 MG/3ML) 0.083% nebulizer solution Take 3 mLs by nebulization every 4 hours as needed for Wheezing 25 each 2    albuterol sulfate HFA (PROVENTIL;VENTOLIN;PROAIR) 108 (90 Base) MCG/ACT inhaler Inhale 2 puffs into the lungs every 6 hours as needed for Wheezing or Shortness of Breath 18 g 3    lubiprostone (AMITIZA) 8 MCG CAPS capsule Take 1 capsule by mouth 2 times daily (with meals) 60 capsule 5    atenolol (TENORMIN) 25 MG tablet Take 1 tablet by mouth daily 30 tablet 5    amLODIPine (NORVASC) 10 MG tablet Take 1 tablet by mouth daily 90 tablet 3    lisinopril-hydroCHLOROthiazide (PRINZIDE;ZESTORETIC) 20-12.5 MG per tablet Take 1 tablet by mouth daily 90 tablet 3    levothyroxine (SYNTHROID) 75 MCG tablet Take 1 tablet by mouth Daily 90 tablet 3    nystatin (MYCOSTATIN) 510308 UNIT/GM cream Apply topically 2 times daily. 1 each 1    Nebulizers (AIRIAL COMPACT MINI NEBULIZER) MISC 1 Device by Does not apply route every 4-6 hours as needed (sob, cough, wheezing) 1 each 0    aspirin 81 MG EC tablet Take 81 mg by mouth in the morning.        Current Facility-Administered Medications on File Prior to Visit   Medication Dose Route Frequency Provider Last Rate Last Admin    methylPREDNISolone sodium (SOLU-MEDROL) injection 125 mg  125 mg IntraVENous Once TATYANA Peterson - CNP           Review of Systems    OBJECTIVE:  LMP 01/01/1986 (Approximate)    Physical Exam    No results found for requested labs within last 30 days. Hemoglobin A1C (%)   Date Value   09/16/2020 6.1 (H)     Microscopic Examination (no units)   Date Value   05/04/2021 YES     LDL Calculated (mg/dL)   Date Value   10/12/2022 109           Lab Results   Component Value Date    TSH 0.28 12/30/2021         ASSESSMENT/PLAN:     There are no diagnoses linked to this encounter. There are no discontinued medications.

## 2023-01-25 ENCOUNTER — HOSPITAL ENCOUNTER (OUTPATIENT)
Facility: HOSPITAL | Age: 78
Discharge: HOME | End: 2023-01-25
Payer: MEDICARE

## 2023-01-25 ENCOUNTER — OFFICE VISIT (OUTPATIENT)
Dept: FAMILY MEDICINE CLINIC | Age: 78
End: 2023-01-25
Payer: MEDICARE

## 2023-01-25 VITALS — DIASTOLIC BLOOD PRESSURE: 80 MMHG | SYSTOLIC BLOOD PRESSURE: 130 MMHG | OXYGEN SATURATION: 94 % | HEART RATE: 73 BPM

## 2023-01-25 DIAGNOSIS — J02.9 SORE THROAT: ICD-10-CM

## 2023-01-25 DIAGNOSIS — M72.0 DUPUYTREN'S CONTRACTURE OF LEFT HAND: ICD-10-CM

## 2023-01-25 DIAGNOSIS — R39.89 SUSPECTED UTI: Primary | ICD-10-CM

## 2023-01-25 DIAGNOSIS — R35.89 POLYURIA: ICD-10-CM

## 2023-01-25 DIAGNOSIS — R09.81 HEAD CONGESTION: ICD-10-CM

## 2023-01-25 DIAGNOSIS — R11.0 NAUSEA: ICD-10-CM

## 2023-01-25 LAB
BILIRUBIN, POC: NORMAL
BLOOD URINE, POC: NORMAL
CLARITY, POC: CLEAR
COLOR, POC: YELLOW
GLUCOSE URINE, POC: NORMAL
INFLUENZA A ANTIBODY: NORMAL
INFLUENZA B ANTIBODY: NORMAL
KETONES, POC: NORMAL
LEUKOCYTE EST, POC: NORMAL
Lab: NORMAL
NITRITE, POC: NORMAL
PH, POC: 6
PROTEIN, POC: NORMAL
QC PASS/FAIL: NORMAL
S PYO AG THROAT QL: NORMAL
SARS-COV-2 RDRP RESP QL NAA+PROBE: NEGATIVE
SPECIFIC GRAVITY, POC: 1.02
UROBILINOGEN, POC: 0.2

## 2023-01-25 PROCEDURE — 3075F SYST BP GE 130 - 139MM HG: CPT | Performed by: NURSE PRACTITIONER

## 2023-01-25 PROCEDURE — 87186 SC STD MICRODIL/AGAR DIL: CPT

## 2023-01-25 PROCEDURE — G8417 CALC BMI ABV UP PARAM F/U: HCPCS | Performed by: NURSE PRACTITIONER

## 2023-01-25 PROCEDURE — G8484 FLU IMMUNIZE NO ADMIN: HCPCS | Performed by: NURSE PRACTITIONER

## 2023-01-25 PROCEDURE — 1123F ACP DISCUSS/DSCN MKR DOCD: CPT | Performed by: NURSE PRACTITIONER

## 2023-01-25 PROCEDURE — 87077 CULTURE AEROBIC IDENTIFY: CPT

## 2023-01-25 PROCEDURE — 87880 STREP A ASSAY W/OPTIC: CPT | Performed by: NURSE PRACTITIONER

## 2023-01-25 PROCEDURE — 1090F PRES/ABSN URINE INCON ASSESS: CPT | Performed by: NURSE PRACTITIONER

## 2023-01-25 PROCEDURE — 87086 URINE CULTURE/COLONY COUNT: CPT

## 2023-01-25 PROCEDURE — 96372 THER/PROPH/DIAG INJ SC/IM: CPT | Performed by: NURSE PRACTITIONER

## 2023-01-25 PROCEDURE — 87804 INFLUENZA ASSAY W/OPTIC: CPT | Performed by: NURSE PRACTITIONER

## 2023-01-25 PROCEDURE — G8427 DOCREV CUR MEDS BY ELIG CLIN: HCPCS | Performed by: NURSE PRACTITIONER

## 2023-01-25 PROCEDURE — 3079F DIAST BP 80-89 MM HG: CPT | Performed by: NURSE PRACTITIONER

## 2023-01-25 PROCEDURE — G8399 PT W/DXA RESULTS DOCUMENT: HCPCS | Performed by: NURSE PRACTITIONER

## 2023-01-25 PROCEDURE — 3017F COLORECTAL CA SCREEN DOC REV: CPT | Performed by: NURSE PRACTITIONER

## 2023-01-25 PROCEDURE — 87635 SARS-COV-2 COVID-19 AMP PRB: CPT | Performed by: NURSE PRACTITIONER

## 2023-01-25 PROCEDURE — 1036F TOBACCO NON-USER: CPT | Performed by: NURSE PRACTITIONER

## 2023-01-25 PROCEDURE — 99214 OFFICE O/P EST MOD 30 MIN: CPT | Performed by: NURSE PRACTITIONER

## 2023-01-25 PROCEDURE — 81002 URINALYSIS NONAUTO W/O SCOPE: CPT | Performed by: NURSE PRACTITIONER

## 2023-01-25 RX ORDER — ONDANSETRON 4 MG/1
8 TABLET, FILM COATED ORAL DAILY PRN
Qty: 30 TABLET | Refills: 0 | Status: SHIPPED | OUTPATIENT
Start: 2023-01-25

## 2023-01-25 RX ORDER — METHYLPREDNISOLONE SODIUM SUCCINATE 125 MG/2ML
60 INJECTION, POWDER, LYOPHILIZED, FOR SOLUTION INTRAMUSCULAR; INTRAVENOUS ONCE
Status: COMPLETED | OUTPATIENT
Start: 2023-01-25 | End: 2023-01-25

## 2023-01-25 RX ORDER — NITROFURANTOIN 25; 75 MG/1; MG/1
100 CAPSULE ORAL 2 TIMES DAILY
Qty: 20 CAPSULE | Refills: 0 | Status: SHIPPED | OUTPATIENT
Start: 2023-01-25 | End: 2023-02-04

## 2023-01-25 RX ADMIN — METHYLPREDNISOLONE SODIUM SUCCINATE 60 MG: 125 INJECTION, POWDER, LYOPHILIZED, FOR SOLUTION INTRAMUSCULAR; INTRAVENOUS at 15:10

## 2023-01-25 ASSESSMENT — ENCOUNTER SYMPTOMS: SORE THROAT: 1

## 2023-01-25 NOTE — PROGRESS NOTES
Tish Paget 78 y.o. presents today for   Chief Complaint   Patient presents with    Pharyngitis    Congestion    Polyuria        HPI:  Tish Paget states she had 103 temp last night and it broke and she had to change her bed sheets because she began to sweat so much. Her arms feel heavy. She says she pees all the time and when she is sick she does that. She pees every 30 minutes. She says it doesn't burn when she pees but it does burn her vagina/labia. Urinary frequency started about a week ago. She also has a sore throat with congestion that started two days ago. Dupuytrens contracture  She noticed some visible tendons in the palm of her left hand that developed several months ago. She is worried about it. She says it doesn't hurt. She is asking for a refill of her Zofran today. History reviewed. No pertinent family history.      Social History     Socioeconomic History    Marital status:      Spouse name: Not on file    Number of children: Not on file    Years of education: Not on file    Highest education level: Not on file   Occupational History    Not on file   Tobacco Use    Smoking status: Never    Smokeless tobacco: Never   Vaping Use    Vaping Use: Never used   Substance and Sexual Activity    Alcohol use: No    Drug use: No    Sexual activity: Not on file   Other Topics Concern    Not on file   Social History Narrative    Not on file     Social Determinants of Health     Financial Resource Strain: Low Risk     Difficulty of Paying Living Expenses: Not very hard   Food Insecurity: No Food Insecurity    Worried About Running Out of Food in the Last Year: Never true    Ran Out of Food in the Last Year: Never true   Transportation Needs: Not on file   Physical Activity: Not on file   Stress: Not on file   Social Connections: Not on file   Intimate Partner Violence: Not on file   Housing Stability: Not on file        Past Surgical History:   Procedure Laterality Date APPENDECTOMY      BREAST BIOPSY Bilateral     CHOLECYSTECTOMY      LIVER SURGERY      UPPER GASTROINTESTINAL ENDOSCOPY          Past Medical History:   Diagnosis Date    Ble[facility] ([facility] 12/31/2018    COPD (chronic obstructive pulmonary disease) ([facility] 1/6/2022    DVT (deep venous thrombosis) (HCC)     Headache(784.0)     Hypertension     Hypothyroid 5/20/2015    MI, old     Moderate episode of recurrent major depressive disorder ([facility] 10/27/2018    Pneumonia     Stomach ulcer     Thyroid disease         Current Outpatient Medications   Medication Sig Dispense Refill    nitrofurantoin, macrocrystal-monohydrate, (MACROBID) 100 MG capsule Take 1 capsule by mouth 2 times daily for 10 days 20 capsule 0    ondansetron (ZOFRAN) 4 MG tablet Take 2 tablets by mouth daily as needed for Nausea or Vomiting 30 tablet 0    doxycycline hyclate (VIBRA-TABS) 100 MG tablet Take 1 tablet by mouth 2 times daily for 7 days 14 tablet 0    ondansetron (ZOFRAN) 4 MG tablet Take 1 tablet by mouth every 8 hours as needed for Nausea or Vomiting 30 tablet 2    clonazePAM (KLONOPIN) 2 MG tablet TAKE ONE TABLET BY MOUTH ONCE NIGHTLY AS NEEDED FOR INSOMNIA 30 tablet 2    nitroGLYCERIN (NITROSTAT) 0.4 MG SL tablet Place 1 tablet under the tongue every 5 minutes as needed for Chest pain 25 tablet 3    albuterol (PROVENTIL) (2.5 MG/3ML) 0.083% nebulizer solution Take 3 mLs by nebulization every 4 hours as needed for Wheezing 25 each 2    albuterol sulfate HFA (PROVENTIL;VENTOLIN;PROAIR) 108 (90 Base) MCG/ACT inhaler Inhale 2 puffs into the lungs every 6 hours as needed for Wheezing or Shortness of Breath 18 g 3    lubiprostone (AMITIZA) 8 MCG CAPS capsule Take 1 capsule by mouth 2 times daily (with meals) 60 capsule 5    atenolol (TENORMIN) 25 MG tablet Take 1 tablet by mouth daily 30 tablet 5    amLODIPine (NORVASC) 10 MG tablet Take 1 tablet by mouth daily 90 tablet 3    lisinopril-hydroCHLOROthiazide (PRINZIDE;ZESTORETIC) 20-12.5 MG per tablet Take 1 tablet by mouth daily 90 tablet 3    levothyroxine (SYNTHROID) 75 MCG tablet Take 1 tablet by mouth Daily 90 tablet 3    nystatin (MYCOSTATIN) 373407 UNIT/GM cream Apply topically 2 times daily. 1 each 1    Nebulizers (AIRIAL COMPACT MINI NEBULIZER) MISC 1 Device by Does not apply route every 4-6 hours as needed (sob, cough, wheezing) 1 each 0    aspirin 81 MG EC tablet Take 81 mg by mouth in the morning. Current Facility-Administered Medications   Medication Dose Route Frequency Provider Last Rate Last Admin    cefTRIAXone (ROCEPHIN) 1,000 mg in lidocaine 1 % 2.86 mL IM Injection  1,000 mg IntraMUSCular Once Connie Blankenship, APRN - DASHAWN        methylPREDNISolone sodium (SOLU-MEDROL) injection 60 mg  60 mg IntraMUSCular Once Connie Blankenship, APRN - DASHAWN        methylPREDNISolone sodium (SOLU-MEDROL) injection 125 mg  125 mg IntraVENous Once Denise Michael APRN - DASHAWN            Review of Systems   Constitutional:  Positive for diaphoresis, fatigue and fever. HENT:  Positive for congestion and sore throat. Endocrine: Positive for polyuria. Genitourinary:  Positive for frequency. All other systems reviewed and are negative. /80   Pulse 73   LMP 01/01/1986 (Approximate)   SpO2 94% Comment: ra     Physical Exam  Vitals and nursing note reviewed. Constitutional:       Appearance: Normal appearance. HENT:      Head: Normocephalic and atraumatic. Right Ear: Tympanic membrane, ear canal and external ear normal.      Left Ear: Tympanic membrane, ear canal and external ear normal.      Nose: Nose normal.      Mouth/Throat:      Mouth: Mucous membranes are moist.      Pharynx: Oropharynx is clear. Eyes:      Extraocular Movements: Extraocular movements intact. Conjunctiva/sclera: Conjunctivae normal.      Pupils: Pupils are equal, round, and reactive to light. Cardiovascular:      Rate and Rhythm: Normal rate and regular rhythm. Pulses: Normal pulses. Heart sounds: Normal heart sounds. Pulmonary:      Effort: Pulmonary effort is normal.      Breath sounds: Normal breath sounds. Abdominal:      General: Bowel sounds are normal.      Palpations: Abdomen is soft. Musculoskeletal:         General: Deformity present. Normal range of motion. Cervical back: Normal range of motion and neck supple. Comments: Dupuytren's contracture of left hand   Skin:     General: Skin is warm and dry. Capillary Refill: Capillary refill takes less than 2 seconds. Neurological:      General: No focal deficit present. Mental Status: She is alert and oriented to person, place, and time. Psychiatric:         Mood and Affect: Mood normal.         Behavior: Behavior normal.        ASSESSMENT/PLAN    1. Suspected UTI  Culture sent, pt to take Macrobid as directed. She should increase water consumption, avoid excessive caffeine. She is agreeable to poc. - Culture, Urine    2. Sore throat  Strep negative  - POCT rapid strep A    3. Head congestion  FLU negative  COVID negative  Pt requested injection of steroid an antibiotic. Administered. - POCT Influenza A/B  - POCT COVID-19 Rapid, NAAT    4. Dupuytren's contracture of left hand  Pt education provided to pt    5. Polyuria  Culture sent, pt to take Macrobid as directed. She should increase water consumption, avoid excessive caffeine. She is agreeable to poc. - POCT Urinalysis no Micro     6. Nausea  Take as directed, she is agreeable to poc.   -zofran 8mg q4-6 hours PRN        TATYANA High - CNP

## 2023-01-25 NOTE — PROGRESS NOTES
Chief Complaint   Patient presents with    Pharyngitis    Congestion    Polyuria     Patient here today for sick visit only. Health Maintenance not reviewed during this visit. Administrations This Visit       cefTRIAXone (ROCEPHIN) 1,000 mg in lidocaine 1 % 2.86 mL IM Injection       Admin Date  01/25/2023  15:11 Action  Given Dose  1,000 mg Route  IntraMUSCular Site  Dorsogluteal Right Administered By  Imelda Crenshaw MA    Ordering Provider: TATYANA Reinoso CNP    NDC: 24988-515-72    Lot#: [id number]    : [facility]    Patient Supplied?: No              methylPREDNISolone sodium (SOLU-MEDROL) injection 60 mg       Admin Date  01/25/2023  15:10 Action  Given Dose  60 mg Route  IntraMUSCular Site  Dorsogluteal Left Administered By  Imelda Crenshaw MA    Ordering Provider: TATYANA Reinoso CNP    NDC: 3616-6382-95    Lot#: [id number]    : [place]. Patient Supplied?: No                  Patient tolerated injection well. Patient advised to wait 20 minutes in the office following the injection. No signs/symptoms of reaction noted after 20 minutes.

## 2023-01-28 LAB
ORGANISM: ABNORMAL
URINE CULTURE, ROUTINE: ABNORMAL
URINE CULTURE, ROUTINE: ABNORMAL

## 2023-02-02 DIAGNOSIS — M79.604 PAIN OF RIGHT LOWER EXTREMITY: ICD-10-CM

## 2023-02-02 PROCEDURE — 93010 ELECTROCARDIOGRAM REPORT: CPT | Performed by: NURSE PRACTITIONER

## 2023-02-24 ENCOUNTER — OFFICE VISIT (OUTPATIENT)
Dept: FAMILY MEDICINE CLINIC | Age: 78
End: 2023-02-24

## 2023-02-24 VITALS
HEIGHT: 63 IN | DIASTOLIC BLOOD PRESSURE: 70 MMHG | WEIGHT: 194 LBS | SYSTOLIC BLOOD PRESSURE: 138 MMHG | OXYGEN SATURATION: 90 % | BODY MASS INDEX: 34.38 KG/M2 | HEART RATE: 85 BPM | TEMPERATURE: 97.9 F | RESPIRATION RATE: 20 BRPM

## 2023-02-24 DIAGNOSIS — R05.1 ACUTE COUGH: ICD-10-CM

## 2023-02-24 DIAGNOSIS — U07.1 LAB TEST POSITIVE FOR DETECTION OF COVID-19 VIRUS: Primary | ICD-10-CM

## 2023-02-24 DIAGNOSIS — Z11.52 ENCOUNTER FOR SCREENING FOR COVID-19: ICD-10-CM

## 2023-02-24 DIAGNOSIS — J02.9 SORE THROAT: ICD-10-CM

## 2023-02-24 LAB
INFLUENZA A ANTIBODY: NORMAL
INFLUENZA B ANTIBODY: NORMAL
Lab: ABNORMAL
QC PASS/FAIL: ABNORMAL
S PYO AG THROAT QL: NORMAL
SARS-COV-2 RDRP RESP QL NAA+PROBE: POSITIVE

## 2023-02-24 RX ORDER — METHYLPREDNISOLONE SODIUM SUCCINATE 125 MG/2ML
125 INJECTION, POWDER, LYOPHILIZED, FOR SOLUTION INTRAMUSCULAR; INTRAVENOUS ONCE
Status: COMPLETED | OUTPATIENT
Start: 2023-02-24 | End: 2023-02-24

## 2023-02-24 RX ORDER — BENZONATATE 200 MG/1
200 CAPSULE ORAL 3 TIMES DAILY PRN
Qty: 30 CAPSULE | Refills: 0 | Status: SHIPPED | OUTPATIENT
Start: 2023-02-24 | End: 2023-03-03

## 2023-02-24 RX ADMIN — METHYLPREDNISOLONE SODIUM SUCCINATE 125 MG: 125 INJECTION, POWDER, LYOPHILIZED, FOR SOLUTION INTRAMUSCULAR; INTRAVENOUS at 11:43

## 2023-02-24 ASSESSMENT — ENCOUNTER SYMPTOMS
COUGH: 1
SINUS PRESSURE: 1
SORE THROAT: 1
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
## 2023-02-24 NOTE — PATIENT INSTRUCTIONS

## 2023-02-24 NOTE — PROGRESS NOTES
Patient here today for sick visit only. Health Maintenance not reviewed during this visit.     Administrations This Visit       methylPREDNISolone sodium (SOLU-MEDROL) injection 125 mg       Admin Date  02/24/2023  11:43 Action  Given Dose  125 mg Route  IntraMUSCular Site  Dorsogluteal Left Administered By  Archana Price MA    Ordering Provider: TATYANA Miller CNP    NDC: 6156-3890-69    Lot#: [id number]    : [employer] [place]    Patient Supplied?: No                  Patient tolerated injection well. Patient advised to wait 20 minutes in the office following the injection. No signs/symptoms of reaction noted after 20 minutes.

## 2023-03-20 DIAGNOSIS — R11.0 NAUSEA: ICD-10-CM

## 2023-03-21 RX ORDER — ONDANSETRON 4 MG/1
TABLET, FILM COATED ORAL
Qty: 30 TABLET | Refills: 0 | Status: SHIPPED | OUTPATIENT
Start: 2023-03-21

## 2023-03-30 ENCOUNTER — HOSPITAL ENCOUNTER (OUTPATIENT)
Facility: HOSPITAL | Age: 78
Discharge: HOME | End: 2023-03-30
Payer: MEDICARE

## 2023-03-30 ENCOUNTER — OFFICE VISIT (OUTPATIENT)
Dept: FAMILY MEDICINE CLINIC | Age: 78
End: 2023-03-30

## 2023-03-30 VITALS
DIASTOLIC BLOOD PRESSURE: 80 MMHG | HEIGHT: 63 IN | HEART RATE: 64 BPM | BODY MASS INDEX: 33.2 KG/M2 | WEIGHT: 187.4 LBS | TEMPERATURE: 97.5 F | SYSTOLIC BLOOD PRESSURE: 160 MMHG | OXYGEN SATURATION: 95 % | RESPIRATION RATE: 18 BRPM

## 2023-03-30 DIAGNOSIS — L30.9 DERMATITIS: ICD-10-CM

## 2023-03-30 DIAGNOSIS — B37.9 YEAST INFECTION: ICD-10-CM

## 2023-03-30 DIAGNOSIS — J06.9 UPPER RESPIRATORY TRACT INFECTION, UNSPECIFIED TYPE: Primary | ICD-10-CM

## 2023-03-30 DIAGNOSIS — I20.8 STABLE ANGINA (HCC): ICD-10-CM

## 2023-03-30 PROCEDURE — 87070 CULTURE OTHR SPECIMN AEROBIC: CPT

## 2023-03-30 PROCEDURE — 87075 CULTR BACTERIA EXCEPT BLOOD: CPT

## 2023-03-30 RX ORDER — AMOXICILLIN 875 MG/1
875 TABLET, COATED ORAL 2 TIMES DAILY
Qty: 20 TABLET | Refills: 0 | Status: SHIPPED | OUTPATIENT
Start: 2023-03-30 | End: 2023-04-09

## 2023-03-30 RX ORDER — CLOTRIMAZOLE AND BETAMETHASONE DIPROPIONATE 10; .64 MG/G; MG/G
CREAM TOPICAL
Qty: 45 G | Refills: 2 | Status: SHIPPED | OUTPATIENT
Start: 2023-03-30

## 2023-03-30 RX ORDER — NITROGLYCERIN 0.4 MG/1
0.4 TABLET SUBLINGUAL EVERY 5 MIN PRN
Qty: 25 TABLET | Refills: 3 | Status: SHIPPED | OUTPATIENT
Start: 2023-03-30

## 2023-03-30 RX ORDER — AMOXICILLIN AND CLAVULANATE POTASSIUM 875; 125 MG/1; MG/1
1 TABLET, FILM COATED ORAL 2 TIMES DAILY
Qty: 14 TABLET | Refills: 0 | Status: SHIPPED
Start: 2023-03-30 | End: 2023-03-30 | Stop reason: SINTOL

## 2023-03-30 RX ORDER — METHYLPREDNISOLONE SODIUM SUCCINATE 125 MG/2ML
125 INJECTION, POWDER, LYOPHILIZED, FOR SOLUTION INTRAMUSCULAR; INTRAVENOUS ONCE
Status: COMPLETED | OUTPATIENT
Start: 2023-03-30 | End: 2023-03-30

## 2023-03-30 RX ORDER — ESTRADIOL 0.1 MG/G
CREAM VAGINAL
Qty: 42.5 G | Refills: 3 | Status: SHIPPED | OUTPATIENT
Start: 2023-03-30

## 2023-03-30 RX ADMIN — METHYLPREDNISOLONE SODIUM SUCCINATE 125 MG: 125 INJECTION, POWDER, LYOPHILIZED, FOR SOLUTION INTRAMUSCULAR; INTRAVENOUS at 15:22

## 2023-03-30 ASSESSMENT — ENCOUNTER SYMPTOMS
COUGH: 1
SORE THROAT: 1
SINUS PAIN: 1

## 2023-03-30 NOTE — PROGRESS NOTES
Administrations This Visit       methylPREDNISolone sodium (SOLU-MEDROL) injection 125 mg       Admin Date  03/30/2023  15:22 Action  Given Dose  125 mg Route  IntraMUSCular Site  Dorsogluteal Right Administered By  Carolyn Cameoj, JOSEPH    Ordering Provider: TATYANA Paulino CNP    NDC: 98487-467-14    Lot#: [id number]    : [facility] Patient Supplied?: No                  Patient tolerated injection well. Patient advised to wait 20 minutes in the office following the injection. No signs/symptoms of reaction noted after 20 minutes.
Patient here today for sick visit only. Health Maintenance not reviewed during this visit.
sounds. Pulmonary:      Effort: Pulmonary effort is normal.      Breath sounds: Normal breath sounds. Abdominal:      General: Bowel sounds are normal.      Palpations: Abdomen is soft. Genitourinary:     General: Normal vulva. Rectum: Normal.      Comments: Labia very dry/excoriated and erythematous, suggestive of yeast infection  Musculoskeletal:         General: Normal range of motion. Cervical back: Normal range of motion and neck supple. Skin:     General: Skin is warm and dry. Capillary Refill: Capillary refill takes less than 2 seconds. Neurological:      General: No focal deficit present. Mental Status: She is alert and oriented to person, place, and time. Psychiatric:         Mood and Affect: Mood normal.         Behavior: Behavior normal.        ASSESSMENT/PLAN    1. Upper respiratory tract infection, unspecified type  Take medication as directed and f/u if s/s persist or worsen. She is agreeable to poc.   - amoxicillin (AMOXIL) 875 MG tablet; Take 1 tablet by mouth 2 times daily for 10 days  Dispense: 20 tablet; Refill: 0    2. Yeast infection  Use medication as directed and avoid baths only showers. She should keep skin clean and dry and f/u in two weeks if s/s persist or worsen. Will perform pap at next visit if still symptomatic. She is agreeable to poc. - Boric Acid Vaginal 600 MG SUPP; Place 1 suppository vaginally daily for 14 days  Dispense: 14 suppository; Refill: 0  - clotrimazole-betamethasone (LOTRISONE) 1-0.05 % cream; Apply topically on vagina and surrounding tissues 2 times daily. Dispense: 45 g; Refill: 2    3. Stable angina (HCC)  Take as directed, she is agreeable to poc.   - nitroGLYCERIN (NITROSTAT) 0.4 MG SL tablet; Place 1 tablet under the tongue every 5 minutes as needed for Chest pain  Dispense: 25 tablet; Refill: 3     4. Dermatitis of left foot.   Wound culture today      TATYANA Bruce - CNP

## 2023-04-02 LAB
BACTERIA SPEC AEROBE CULT: ABNORMAL
BACTERIA SPEC ANAEROBE CULT: ABNORMAL
GRAM STN SPEC: ABNORMAL
ORGANISM: ABNORMAL
ORGANISM: ABNORMAL

## 2023-04-05 LAB
BACTERIA SPEC AEROBE CULT: ABNORMAL
BACTERIA SPEC ANAEROBE CULT: ABNORMAL
GRAM STN SPEC: ABNORMAL
ORGANISM: ABNORMAL

## 2023-04-24 ENCOUNTER — OFFICE VISIT (OUTPATIENT)
Dept: FAMILY MEDICINE CLINIC | Age: 78
End: 2023-04-24
Payer: MEDICARE

## 2023-04-24 VITALS
TEMPERATURE: 97.7 F | OXYGEN SATURATION: 94 % | HEIGHT: 63 IN | HEART RATE: 65 BPM | DIASTOLIC BLOOD PRESSURE: 80 MMHG | SYSTOLIC BLOOD PRESSURE: 130 MMHG | RESPIRATION RATE: 18 BRPM | WEIGHT: 191.2 LBS | BODY MASS INDEX: 33.88 KG/M2

## 2023-04-24 DIAGNOSIS — I10 ESSENTIAL HYPERTENSION: ICD-10-CM

## 2023-04-24 DIAGNOSIS — R10.9 BELLY PAIN: Primary | ICD-10-CM

## 2023-04-24 PROCEDURE — 3017F COLORECTAL CA SCREEN DOC REV: CPT | Performed by: NURSE PRACTITIONER

## 2023-04-24 PROCEDURE — 1123F ACP DISCUSS/DSCN MKR DOCD: CPT | Performed by: NURSE PRACTITIONER

## 2023-04-24 PROCEDURE — G8427 DOCREV CUR MEDS BY ELIG CLIN: HCPCS | Performed by: NURSE PRACTITIONER

## 2023-04-24 PROCEDURE — 99214 OFFICE O/P EST MOD 30 MIN: CPT | Performed by: NURSE PRACTITIONER

## 2023-04-24 PROCEDURE — G8417 CALC BMI ABV UP PARAM F/U: HCPCS | Performed by: NURSE PRACTITIONER

## 2023-04-24 PROCEDURE — 1090F PRES/ABSN URINE INCON ASSESS: CPT | Performed by: NURSE PRACTITIONER

## 2023-04-24 PROCEDURE — 3078F DIAST BP <80 MM HG: CPT | Performed by: NURSE PRACTITIONER

## 2023-04-24 PROCEDURE — 3074F SYST BP LT 130 MM HG: CPT | Performed by: NURSE PRACTITIONER

## 2023-04-24 PROCEDURE — 1036F TOBACCO NON-USER: CPT | Performed by: NURSE PRACTITIONER

## 2023-04-24 PROCEDURE — G8399 PT W/DXA RESULTS DOCUMENT: HCPCS | Performed by: NURSE PRACTITIONER

## 2023-04-24 RX ORDER — ALUMINUM ZIRCONIUM OCTACHLOROHYDREX GLY 16 G/100G
1 GEL TOPICAL DAILY
Qty: 30 CAPSULE | Refills: 2 | Status: SHIPPED | OUTPATIENT
Start: 2023-04-24 | End: 2023-05-24

## 2023-04-24 RX ORDER — VALSARTAN AND HYDROCHLOROTHIAZIDE 320; 25 MG/1; MG/1
1 TABLET, FILM COATED ORAL DAILY
COMMUNITY

## 2023-04-24 RX ORDER — CEFDINIR 300 MG/1
CAPSULE ORAL
COMMUNITY
Start: 2023-04-22 | End: 2023-04-27

## 2023-04-24 RX ORDER — ISOSORBIDE MONONITRATE 30 MG/1
TABLET, EXTENDED RELEASE ORAL
COMMUNITY
Start: 2023-04-12

## 2023-04-24 ASSESSMENT — ENCOUNTER SYMPTOMS: ABDOMINAL PAIN: 1

## 2023-04-24 NOTE — PROGRESS NOTES
Chief Complaint   Patient presents with    Hypertension     F/u    Dizziness       Have you seen any other physician or provider since your last visit yes [facility] ED     Have you had any other diagnostic tests since your last visit?  yes -     Have you changed or stopped any medications since your last visit? no

## 2023-04-24 NOTE — PROGRESS NOTES
Flakita Mcnulty 78 y.o. presents today for   Chief Complaint   Patient presents with    Hypertension     F/u    Dizziness        HPI:  Flakita Mcnulty states she was vomiting and feeling dizzy all last week. She went to [facility] last night and says they ran scans and told her she had a UTI and HTN. Heart doc Dr. Naren Jean changed her meds last week and put her on Valsartan with HCTZ and told her to continue taking her isosorbide and atenolol. She says her BP hasn't been controlled since then. She goes back in 5 weeks. HTN is a chronic issue for her. Today she feels very tired but admits she started taking clonidine yesterday and today. She is complaining of belly pain today saying it could be from her UTI but says it hurts all over. History reviewed. No pertinent family history.      Social History     Socioeconomic History    Marital status:      Spouse name: Not on file    Number of children: Not on file    Years of education: Not on file    Highest education level: Not on file   Occupational History    Not on file   Tobacco Use    Smoking status: Never    Smokeless tobacco: Never   Vaping Use    Vaping Use: Never used   Substance and Sexual Activity    Alcohol use: No    Drug use: No    Sexual activity: Not on file   Other Topics Concern    Not on file   Social History Narrative    Not on file     Social Determinants of Health     Financial Resource Strain: Low Risk     Difficulty of Paying Living Expenses: Not very hard   Food Insecurity: No Food Insecurity    Worried About Running Out of Food in the Last Year: Never true    Ran Out of Food in the Last Year: Never true   Transportation Needs: Not on file   Physical Activity: Not on file   Stress: Not on file   Social Connections: Not on file   Intimate Partner Violence: Not on file   Housing Stability: Not on file        Past Surgical History:   Procedure Laterality Date    APPENDECTOMY      BREAST BIOPSY Bilateral     CHOLECYSTECTOMY

## 2023-04-26 ENCOUNTER — OFFICE VISIT (OUTPATIENT)
Dept: FAMILY MEDICINE CLINIC | Age: 78
End: 2023-04-26
Payer: MEDICARE

## 2023-04-26 VITALS
HEIGHT: 63 IN | TEMPERATURE: 97 F | RESPIRATION RATE: 18 BRPM | OXYGEN SATURATION: 98 % | HEART RATE: 68 BPM | DIASTOLIC BLOOD PRESSURE: 80 MMHG | SYSTOLIC BLOOD PRESSURE: 140 MMHG | BODY MASS INDEX: 33.87 KG/M2

## 2023-04-26 DIAGNOSIS — R11.2 NAUSEA AND VOMITING, UNSPECIFIED VOMITING TYPE: ICD-10-CM

## 2023-04-26 DIAGNOSIS — I10 PRIMARY HYPERTENSION: Primary | ICD-10-CM

## 2023-04-26 PROCEDURE — 1036F TOBACCO NON-USER: CPT | Performed by: NURSE PRACTITIONER

## 2023-04-26 PROCEDURE — G8417 CALC BMI ABV UP PARAM F/U: HCPCS | Performed by: NURSE PRACTITIONER

## 2023-04-26 PROCEDURE — 1123F ACP DISCUSS/DSCN MKR DOCD: CPT | Performed by: NURSE PRACTITIONER

## 2023-04-26 PROCEDURE — 99214 OFFICE O/P EST MOD 30 MIN: CPT | Performed by: NURSE PRACTITIONER

## 2023-04-26 PROCEDURE — 1090F PRES/ABSN URINE INCON ASSESS: CPT | Performed by: NURSE PRACTITIONER

## 2023-04-26 PROCEDURE — 3077F SYST BP >= 140 MM HG: CPT | Performed by: NURSE PRACTITIONER

## 2023-04-26 PROCEDURE — 3017F COLORECTAL CA SCREEN DOC REV: CPT | Performed by: NURSE PRACTITIONER

## 2023-04-26 PROCEDURE — 3079F DIAST BP 80-89 MM HG: CPT | Performed by: NURSE PRACTITIONER

## 2023-04-26 PROCEDURE — G8399 PT W/DXA RESULTS DOCUMENT: HCPCS | Performed by: NURSE PRACTITIONER

## 2023-04-26 PROCEDURE — G8427 DOCREV CUR MEDS BY ELIG CLIN: HCPCS | Performed by: NURSE PRACTITIONER

## 2023-04-26 RX ORDER — CLONIDINE HYDROCHLORIDE 0.1 MG/1
TABLET ORAL
Qty: 60 TABLET | Refills: 3 | Status: SHIPPED | OUTPATIENT
Start: 2023-04-26

## 2023-04-26 RX ORDER — PROMETHAZINE HYDROCHLORIDE 25 MG/1
25 TABLET ORAL 4 TIMES DAILY PRN
Qty: 20 TABLET | Refills: 0 | Status: SHIPPED | OUTPATIENT
Start: 2023-04-26 | End: 2023-05-03

## 2023-04-26 ASSESSMENT — ENCOUNTER SYMPTOMS
NAUSEA: 1
DIARRHEA: 1

## 2023-04-26 NOTE — PROGRESS NOTES
Pharynx: Oropharynx is clear. Eyes:      Extraocular Movements: Extraocular movements intact. Conjunctiva/sclera: Conjunctivae normal.      Pupils: Pupils are equal, round, and reactive to light. Cardiovascular:      Rate and Rhythm: Normal rate and regular rhythm. Pulses: Normal pulses. Heart sounds: Normal heart sounds. Pulmonary:      Effort: Pulmonary effort is normal.      Breath sounds: Normal breath sounds. Abdominal:      General: Bowel sounds are normal.      Palpations: Abdomen is soft. Musculoskeletal:         General: Normal range of motion. Cervical back: Normal range of motion and neck supple. Skin:     General: Skin is warm and dry. Capillary Refill: Capillary refill takes less than 2 seconds. Neurological:      General: No focal deficit present. Mental Status: She is alert and oriented to person, place, and time. Psychiatric:         Mood and Affect: Mood normal.         Behavior: Behavior normal.        ASSESSMENT/PLAN    1. Primary hypertension  Advised patient to take medication as directed and to continue monitoring blood pressure. She is agreeable to plan of care. - cloNIDine (CATAPRES) 0.1 MG tablet; Take one tablet if systolic pressure is 866 or greater or diastolic is 90 or greater. Repeat dose in 15 mins if still elevated. May repeat dose up to 3 times. Dispense: 60 tablet; Refill: 3    2. Nausea and vomiting, unspecified vomiting type  Advised patient to take medication as directed. She should follow-up in clinic tomorrow for reevaluation and possible IV fluids. She is agreeable plan of care. - promethazine (PHENERGAN) 25 MG tablet; Take 1 tablet by mouth 4 times daily as needed for Nausea  Dispense: 20 tablet;  Refill: 0             TATYANA Womack - CNP

## 2023-04-27 ENCOUNTER — OFFICE VISIT (OUTPATIENT)
Dept: FAMILY MEDICINE CLINIC | Age: 78
End: 2023-04-27
Payer: MEDICARE

## 2023-04-27 VITALS
DIASTOLIC BLOOD PRESSURE: 70 MMHG | WEIGHT: 194 LBS | TEMPERATURE: 98.1 F | OXYGEN SATURATION: 92 % | SYSTOLIC BLOOD PRESSURE: 148 MMHG | HEART RATE: 61 BPM | RESPIRATION RATE: 18 BRPM | HEIGHT: 63 IN | BODY MASS INDEX: 34.38 KG/M2

## 2023-04-27 DIAGNOSIS — Z87.440 RECENT URINARY TRACT INFECTION: ICD-10-CM

## 2023-04-27 DIAGNOSIS — E86.0 DEHYDRATION: Primary | ICD-10-CM

## 2023-04-27 LAB
BILIRUBIN, POC: NEGATIVE
BLOOD URINE, POC: NORMAL
CLARITY, POC: CLEAR
COLOR, POC: YELLOW
GLUCOSE URINE, POC: NEGATIVE
KETONES, POC: NEGATIVE
LEUKOCYTE EST, POC: NORMAL
NITRITE, POC: NEGATIVE
PH, POC: 5.5
PROTEIN, POC: NEGATIVE
SPECIFIC GRAVITY, POC: 1.01
UROBILINOGEN, POC: 0.2

## 2023-04-27 PROCEDURE — G8399 PT W/DXA RESULTS DOCUMENT: HCPCS | Performed by: NURSE PRACTITIONER

## 2023-04-27 PROCEDURE — 1090F PRES/ABSN URINE INCON ASSESS: CPT | Performed by: NURSE PRACTITIONER

## 2023-04-27 PROCEDURE — 99215 OFFICE O/P EST HI 40 MIN: CPT | Performed by: NURSE PRACTITIONER

## 2023-04-27 PROCEDURE — 3078F DIAST BP <80 MM HG: CPT | Performed by: NURSE PRACTITIONER

## 2023-04-27 PROCEDURE — 1036F TOBACCO NON-USER: CPT | Performed by: NURSE PRACTITIONER

## 2023-04-27 PROCEDURE — G8417 CALC BMI ABV UP PARAM F/U: HCPCS | Performed by: NURSE PRACTITIONER

## 2023-04-27 PROCEDURE — 1123F ACP DISCUSS/DSCN MKR DOCD: CPT | Performed by: NURSE PRACTITIONER

## 2023-04-27 PROCEDURE — 3077F SYST BP >= 140 MM HG: CPT | Performed by: NURSE PRACTITIONER

## 2023-04-27 PROCEDURE — 3017F COLORECTAL CA SCREEN DOC REV: CPT | Performed by: NURSE PRACTITIONER

## 2023-04-27 PROCEDURE — 81002 URINALYSIS NONAUTO W/O SCOPE: CPT | Performed by: NURSE PRACTITIONER

## 2023-04-27 PROCEDURE — G8427 DOCREV CUR MEDS BY ELIG CLIN: HCPCS | Performed by: NURSE PRACTITIONER

## 2023-04-27 SDOH — ECONOMIC STABILITY: INCOME INSECURITY: HOW HARD IS IT FOR YOU TO PAY FOR THE VERY BASICS LIKE FOOD, HOUSING, MEDICAL CARE, AND HEATING?: NOT VERY HARD

## 2023-04-27 SDOH — ECONOMIC STABILITY: FOOD INSECURITY: WITHIN THE PAST 12 MONTHS, THE FOOD YOU BOUGHT JUST DIDN'T LAST AND YOU DIDN'T HAVE MONEY TO GET MORE.: NEVER TRUE

## 2023-04-27 SDOH — ECONOMIC STABILITY: HOUSING INSECURITY
IN THE LAST 12 MONTHS, WAS THERE A TIME WHEN YOU DID NOT HAVE A STEADY PLACE TO SLEEP OR SLEPT IN A SHELTER (INCLUDING NOW)?: NO

## 2023-04-27 SDOH — ECONOMIC STABILITY: FOOD INSECURITY: WITHIN THE PAST 12 MONTHS, YOU WORRIED THAT YOUR FOOD WOULD RUN OUT BEFORE YOU GOT MONEY TO BUY MORE.: NEVER TRUE

## 2023-04-27 ASSESSMENT — PATIENT HEALTH QUESTIONNAIRE - PHQ9
8. MOVING OR SPEAKING SO SLOWLY THAT OTHER PEOPLE COULD HAVE NOTICED. OR THE OPPOSITE, BEING SO FIGETY OR RESTLESS THAT YOU HAVE BEEN MOVING AROUND A LOT MORE THAN USUAL: 0
SUM OF ALL RESPONSES TO PHQ9 QUESTIONS 1 & 2: 0
7. TROUBLE CONCENTRATING ON THINGS, SUCH AS READING THE NEWSPAPER OR WATCHING TELEVISION: 1
SUM OF ALL RESPONSES TO PHQ QUESTIONS 1-9: 5
SUM OF ALL RESPONSES TO PHQ QUESTIONS 1-9: 5
3. TROUBLE FALLING OR STAYING ASLEEP: 1
5. POOR APPETITE OR OVEREATING: 1
9. THOUGHTS THAT YOU WOULD BE BETTER OFF DEAD, OR OF HURTING YOURSELF: 0
SUM OF ALL RESPONSES TO PHQ QUESTIONS 1-9: 5
2. FEELING DOWN, DEPRESSED OR HOPELESS: 0
SUM OF ALL RESPONSES TO PHQ QUESTIONS 1-9: 5
1. LITTLE INTEREST OR PLEASURE IN DOING THINGS: 0
6. FEELING BAD ABOUT YOURSELF - OR THAT YOU ARE A FAILURE OR HAVE LET YOURSELF OR YOUR FAMILY DOWN: 1
4. FEELING TIRED OR HAVING LITTLE ENERGY: 1

## 2023-04-27 NOTE — PROGRESS NOTES
Chief Complaint   Patient presents with    COPD    Urinary Tract Infection    Hypertension       Have you seen any other physician or provider since your last visit no    Have you had any other diagnostic tests since your last visit? no    Have you changed or stopped any medications since your last visit? no
30 tablet 5    amLODIPine (NORVASC) 10 MG tablet Take 1 tablet by mouth daily 90 tablet 3    levothyroxine (SYNTHROID) 75 MCG tablet Take 1 tablet by mouth Daily 90 tablet 3    nystatin (MYCOSTATIN) 788658 UNIT/GM cream Apply topically 2 times daily. 1 each 1    Nebulizers (AIRIAL COMPACT MINI NEBULIZER) MISC 1 Device by Does not apply route every 4-6 hours as needed (sob, cough, wheezing) 1 each 0    aspirin 81 MG EC tablet Take 1 tablet by mouth daily      promethazine (PHENERGAN) 25 MG tablet Take 1 tablet by mouth 4 times daily as needed for Nausea (Patient not taking: Reported on 4/27/2023) 20 tablet 0    nitroGLYCERIN (NITROSTAT) 0.4 MG SL tablet Place 1 tablet under the tongue every 5 minutes as needed for Chest pain (Patient not taking: Reported on 4/27/2023) 25 tablet 3    ondansetron (ZOFRAN) 4 MG tablet TAKE TWO TABLETS BY MOUTH DAILY AS NEEDED FOR NAUSEA OR VOMITING (Patient not taking: Reported on 4/27/2023) 30 tablet 0     Current Facility-Administered Medications   Medication Dose Route Frequency Provider Last Rate Last Admin    methylPREDNISolone sodium (SOLU-MEDROL) injection 125 mg  125 mg IntraVENous Once Gene Muir, APRN - CNP            Review of Systems     BP (!) 148/70   Pulse 61   Temp 98.1 °F (36.7 °C)   Resp 18   Ht 5' 3\" (1.6 m)   Wt 194 lb (88 kg)   LMP 01/01/1986 (Approximate)   SpO2 92%   BMI 34.37 kg/m²      Physical Exam     ASSESSMENT/PLAN    1. Dehydration  She was given 1 L of fluids with a 22-gauge Angiocath in her right AC. She was provided an IV in 1 attempt and tolerated procedure well. Patient was given 60 mg of Toradol and 25 mg of Phenergan via IV due to nausea and low back pain. She slept for approximately 2 hours on and off and completed her fluids stating she felt much better. She was able to urinate in her urine was stable. She was told to follow-up if her symptoms persisted or worsen. She was agreeable plan of care.     2. Recent urinary tract

## 2023-05-02 ENCOUNTER — OFFICE VISIT (OUTPATIENT)
Dept: FAMILY MEDICINE CLINIC | Age: 78
End: 2023-05-02
Payer: MEDICARE

## 2023-05-02 VITALS
WEIGHT: 193 LBS | BODY MASS INDEX: 34.2 KG/M2 | OXYGEN SATURATION: 94 % | HEART RATE: 48 BPM | HEIGHT: 63 IN | DIASTOLIC BLOOD PRESSURE: 60 MMHG | SYSTOLIC BLOOD PRESSURE: 106 MMHG | RESPIRATION RATE: 18 BRPM | TEMPERATURE: 98.2 F

## 2023-05-02 DIAGNOSIS — R11.2 NAUSEA AND VOMITING, UNSPECIFIED VOMITING TYPE: ICD-10-CM

## 2023-05-02 DIAGNOSIS — R00.1 SYMPTOMATIC BRADYCARDIA: ICD-10-CM

## 2023-05-02 DIAGNOSIS — I10 ESSENTIAL HYPERTENSION: ICD-10-CM

## 2023-05-02 DIAGNOSIS — R42 DIZZINESS: Primary | ICD-10-CM

## 2023-05-02 PROCEDURE — 99214 OFFICE O/P EST MOD 30 MIN: CPT | Performed by: NURSE PRACTITIONER

## 2023-05-02 PROCEDURE — 1090F PRES/ABSN URINE INCON ASSESS: CPT | Performed by: NURSE PRACTITIONER

## 2023-05-02 PROCEDURE — 3074F SYST BP LT 130 MM HG: CPT | Performed by: NURSE PRACTITIONER

## 2023-05-02 PROCEDURE — 1036F TOBACCO NON-USER: CPT | Performed by: NURSE PRACTITIONER

## 2023-05-02 PROCEDURE — 3017F COLORECTAL CA SCREEN DOC REV: CPT | Performed by: NURSE PRACTITIONER

## 2023-05-02 PROCEDURE — G8427 DOCREV CUR MEDS BY ELIG CLIN: HCPCS | Performed by: NURSE PRACTITIONER

## 2023-05-02 PROCEDURE — 1123F ACP DISCUSS/DSCN MKR DOCD: CPT | Performed by: NURSE PRACTITIONER

## 2023-05-02 PROCEDURE — 93010 ELECTROCARDIOGRAM REPORT: CPT | Performed by: NURSE PRACTITIONER

## 2023-05-02 PROCEDURE — G8399 PT W/DXA RESULTS DOCUMENT: HCPCS | Performed by: NURSE PRACTITIONER

## 2023-05-02 PROCEDURE — G8417 CALC BMI ABV UP PARAM F/U: HCPCS | Performed by: NURSE PRACTITIONER

## 2023-05-02 PROCEDURE — 3078F DIAST BP <80 MM HG: CPT | Performed by: NURSE PRACTITIONER

## 2023-05-02 RX ORDER — ONDANSETRON 2 MG/ML
4 INJECTION INTRAMUSCULAR; INTRAVENOUS EVERY 6 HOURS PRN
Status: DISCONTINUED | OUTPATIENT
Start: 2023-05-02 | End: 2023-05-02

## 2023-05-02 RX ORDER — ONDANSETRON 4 MG/1
4 TABLET, FILM COATED ORAL ONCE
Status: COMPLETED | OUTPATIENT
Start: 2023-05-02 | End: 2023-05-02

## 2023-05-02 RX ADMIN — ONDANSETRON 4 MG: 4 TABLET, FILM COATED ORAL at 10:30

## 2023-05-02 ASSESSMENT — PATIENT HEALTH QUESTIONNAIRE - PHQ9
9. THOUGHTS THAT YOU WOULD BE BETTER OFF DEAD, OR OF HURTING YOURSELF: 0
5. POOR APPETITE OR OVEREATING: 0
7. TROUBLE CONCENTRATING ON THINGS, SUCH AS READING THE NEWSPAPER OR WATCHING TELEVISION: 0
SUM OF ALL RESPONSES TO PHQ QUESTIONS 1-9: 0
SUM OF ALL RESPONSES TO PHQ QUESTIONS 1-9: 0
SUM OF ALL RESPONSES TO PHQ9 QUESTIONS 1 & 2: 0
SUM OF ALL RESPONSES TO PHQ QUESTIONS 1-9: 0
4. FEELING TIRED OR HAVING LITTLE ENERGY: 0
3. TROUBLE FALLING OR STAYING ASLEEP: 0
1. LITTLE INTEREST OR PLEASURE IN DOING THINGS: 0
8. MOVING OR SPEAKING SO SLOWLY THAT OTHER PEOPLE COULD HAVE NOTICED. OR THE OPPOSITE, BEING SO FIGETY OR RESTLESS THAT YOU HAVE BEEN MOVING AROUND A LOT MORE THAN USUAL: 0
6. FEELING BAD ABOUT YOURSELF - OR THAT YOU ARE A FAILURE OR HAVE LET YOURSELF OR YOUR FAMILY DOWN: 0
10. IF YOU CHECKED OFF ANY PROBLEMS, HOW DIFFICULT HAVE THESE PROBLEMS MADE IT FOR YOU TO DO YOUR WORK, TAKE CARE OF THINGS AT HOME, OR GET ALONG WITH OTHER PEOPLE: 0
2. FEELING DOWN, DEPRESSED OR HOPELESS: 0
SUM OF ALL RESPONSES TO PHQ QUESTIONS 1-9: 0

## 2023-05-02 ASSESSMENT — ENCOUNTER SYMPTOMS
NAUSEA: 1
COUGH: 0
DIARRHEA: 1
ABDOMINAL PAIN: 1
SHORTNESS OF BREATH: 1
VOMITING: 1

## 2023-05-02 NOTE — PROGRESS NOTES
SUBJECTIVE:    Tony Minor is a 78 y.o. female    Patient here today with c/o n/v/d. She states this has been going on x 2 weeks. She was seen last week and given IVF's. She states that she continues to get sicker as the days go on. She states that she has been taking meds as prescribed. She states that she has diarrhea after eating. Patient is unable to confirm blood pressure medications, she states cardiology took her off of some medications at last visit. She c/o light-headedness, headache, abdominal pain, and states that she did have fever when she presented to clinic at onset of this illness 2 weeks ago. She states that she has had intermittent episodes of chest pain. She denies currently. Dizziness  This is a new problem. The current episode started 1 to 4 weeks ago. The problem occurs constantly. Associated symptoms include abdominal pain, anorexia, chest pain, a fever (at onset of illness, 2 weeks ago), headaches, myalgias, nausea and vomiting. Pertinent negatives include no coughing. Nausea & Vomiting  This is a new problem. The current episode started 1 to 4 weeks ago. The problem occurs intermittently. Associated symptoms include abdominal pain, anorexia, chest pain, a fever (at onset of illness, 2 weeks ago), headaches, myalgias, nausea and vomiting. Pertinent negatives include no coughing. Diarrhea   Associated symptoms include abdominal pain, a fever (at onset of illness, 2 weeks ago), headaches, myalgias and vomiting. Pertinent negatives include no coughing. Chief Complaint   Patient presents with    Dizziness    Nausea & Vomiting     Going on for at least 2 weeks     Diarrhea     Taking Amodium     Bradycardia        Review of Systems   Constitutional:  Positive for appetite change and fever (at onset of illness, 2 weeks ago). Respiratory:  Positive for shortness of breath. Negative for cough. Cardiovascular:  Positive for chest pain and palpitations.    Gastrointestinal:

## 2023-05-02 NOTE — PROGRESS NOTES
Administrations This Visit       ondansetron (ZOFRAN) tablet 4 mg       Admin Date  05/02/2023  10:30 Action  Given Dose  4 mg Route  Oral Site  Other Administered By  Darren Moran RN    Ordering Provider: TATYANA Griffith CNP    NDC: 5678-7444-85    Lot#: [id number]    : [employer]    Patient Supplied?: No    Comments: Oral

## 2023-05-05 ENCOUNTER — CARE COORDINATION (OUTPATIENT)
Dept: CARE COORDINATION | Age: 78
End: 2023-05-05

## 2023-05-11 ENCOUNTER — OFFICE VISIT (OUTPATIENT)
Dept: FAMILY MEDICINE CLINIC | Age: 78
End: 2023-05-11

## 2023-05-11 ENCOUNTER — HOSPITAL ENCOUNTER (OUTPATIENT)
Facility: HOSPITAL | Age: 78
Discharge: HOME | End: 2023-05-11
Payer: MEDICARE

## 2023-05-11 VITALS
OXYGEN SATURATION: 87 % | RESPIRATION RATE: 18 BRPM | HEIGHT: 63 IN | DIASTOLIC BLOOD PRESSURE: 62 MMHG | BODY MASS INDEX: 32.6 KG/M2 | TEMPERATURE: 97.7 F | SYSTOLIC BLOOD PRESSURE: 118 MMHG | WEIGHT: 184 LBS | HEART RATE: 63 BPM

## 2023-05-11 DIAGNOSIS — R19.7 DIARRHEA, UNSPECIFIED TYPE: ICD-10-CM

## 2023-05-11 DIAGNOSIS — R11.2 NAUSEA AND VOMITING, UNSPECIFIED VOMITING TYPE: Primary | ICD-10-CM

## 2023-05-11 DIAGNOSIS — R11.2 NAUSEA AND VOMITING, UNSPECIFIED VOMITING TYPE: ICD-10-CM

## 2023-05-11 LAB
ALBUMIN SERPL-MCNC: 4 G/DL (ref 3.4–4.8)
ALBUMIN/GLOB SERPL: 1.5 {RATIO} (ref 0.8–2)
ALP SERPL-CCNC: 106 U/L (ref 25–100)
ALT SERPL-CCNC: 13 U/L (ref 4–36)
ANION GAP SERPL CALCULATED.3IONS-SCNC: 9 MMOL/L (ref 3–16)
AST SERPL-CCNC: 16 U/L (ref 8–33)
BILIRUB SERPL-MCNC: 0.5 MG/DL (ref 0.3–1.2)
BILIRUBIN, POC: NEGATIVE
BLOOD URINE, POC: NORMAL
BUN SERPL-MCNC: 13 MG/DL (ref 6–20)
CALCIUM SERPL-MCNC: 9 MG/DL (ref 8.5–10.5)
CHLORIDE SERPL-SCNC: 103 MMOL/L (ref 98–107)
CLARITY, POC: NORMAL
CO2 SERPL-SCNC: 31 MMOL/L (ref 20–30)
COLOR, POC: NORMAL
CREAT SERPL-MCNC: 0.8 MG/DL (ref 0.4–1.2)
ERYTHROCYTE [DISTWIDTH] IN BLOOD BY AUTOMATED COUNT: 13 % (ref 11–16)
GFR SERPLBLD CREATININE-BSD FMLA CKD-EPI: >60 ML/MIN/{1.73_M2}
GLOBULIN SER CALC-MCNC: 2.7 G/DL
GLUCOSE SERPL-MCNC: 116 MG/DL (ref 74–106)
GLUCOSE URINE, POC: NEGATIVE
HCT VFR BLD AUTO: 43.6 % (ref 37–47)
HGB BLD-MCNC: 13.8 G/DL (ref 11.5–16.5)
KETONES, POC: NEGATIVE
LEUKOCYTE EST, POC: NORMAL
MCH RBC QN AUTO: 30.9 PG (ref 27–32)
MCHC RBC AUTO-ENTMCNC: 31.7 G/DL (ref 31–35)
MCV RBC AUTO: 97.8 FL (ref 80–100)
NITRITE, POC: NEGATIVE
PH, POC: 6
PLATELET # BLD AUTO: 251 K/UL (ref 150–400)
PMV BLD AUTO: 10.3 FL (ref 6–10)
POTASSIUM SERPL-SCNC: 4.7 MMOL/L (ref 3.4–5.1)
PROT SERPL-MCNC: 6.7 G/DL (ref 6.4–8.3)
PROTEIN, POC: NEGATIVE
RBC # BLD AUTO: 4.46 M/UL (ref 3.8–5.8)
SODIUM SERPL-SCNC: 143 MMOL/L (ref 136–145)
SPECIFIC GRAVITY, POC: >1.03
UROBILINOGEN, POC: 0.2
WBC # BLD AUTO: 12.3 K/UL (ref 4–11)

## 2023-05-11 PROCEDURE — 80053 COMPREHEN METABOLIC PANEL: CPT

## 2023-05-11 PROCEDURE — 36415 COLL VENOUS BLD VENIPUNCTURE: CPT

## 2023-05-11 PROCEDURE — 85027 COMPLETE CBC AUTOMATED: CPT

## 2023-05-11 RX ORDER — SODIUM CHLORIDE 9 MG/ML
INJECTION, SOLUTION INTRAVENOUS ONCE
Status: SHIPPED | OUTPATIENT
Start: 2023-05-11

## 2023-05-11 RX ORDER — VALSARTAN AND HYDROCHLOROTHIAZIDE 320; 25 MG/1; MG/1
1 TABLET, FILM COATED ORAL DAILY
COMMUNITY

## 2023-05-11 RX ORDER — ATORVASTATIN CALCIUM 40 MG/1
TABLET, FILM COATED ORAL
COMMUNITY
Start: 2023-05-04

## 2023-05-12 RX ORDER — ONDANSETRON 4 MG/1
4 TABLET, FILM COATED ORAL
Qty: 30 TABLET | Refills: 0 | Status: SHIPPED | OUTPATIENT
Start: 2023-05-12

## 2023-05-12 RX ORDER — METRONIDAZOLE 500 MG/1
500 TABLET ORAL 3 TIMES DAILY
Qty: 30 TABLET | Refills: 0 | Status: SHIPPED | OUTPATIENT
Start: 2023-05-12 | End: 2023-05-22

## 2023-05-18 DIAGNOSIS — Z12.31 BREAST CANCER SCREENING BY MAMMOGRAM: Primary | ICD-10-CM

## 2023-05-19 ENCOUNTER — OFFICE VISIT (OUTPATIENT)
Dept: FAMILY MEDICINE CLINIC | Age: 78
End: 2023-05-19
Payer: MEDICARE

## 2023-05-19 VITALS
SYSTOLIC BLOOD PRESSURE: 118 MMHG | HEART RATE: 84 BPM | TEMPERATURE: 97.2 F | WEIGHT: 191.6 LBS | OXYGEN SATURATION: 92 % | BODY MASS INDEX: 33.95 KG/M2 | HEIGHT: 63 IN | DIASTOLIC BLOOD PRESSURE: 70 MMHG | RESPIRATION RATE: 16 BRPM

## 2023-05-19 DIAGNOSIS — R19.7 DIARRHEA, UNSPECIFIED TYPE: ICD-10-CM

## 2023-05-19 DIAGNOSIS — R35.0 FREQUENT URINATION: Primary | ICD-10-CM

## 2023-05-19 DIAGNOSIS — I10 ESSENTIAL HYPERTENSION: ICD-10-CM

## 2023-05-19 LAB
BILIRUBIN, POC: NORMAL
BLOOD URINE, POC: NORMAL
CLARITY, POC: NORMAL
COLOR, POC: NORMAL
GLUCOSE URINE, POC: NORMAL
KETONES, POC: NORMAL
LEUKOCYTE EST, POC: NORMAL
NITRITE, POC: NORMAL
PH, POC: 7
PROTEIN, POC: NORMAL
SPECIFIC GRAVITY, POC: 1.02
UROBILINOGEN, POC: 0.2

## 2023-05-19 PROCEDURE — 99214 OFFICE O/P EST MOD 30 MIN: CPT | Performed by: NURSE PRACTITIONER

## 2023-05-19 PROCEDURE — 3078F DIAST BP <80 MM HG: CPT | Performed by: NURSE PRACTITIONER

## 2023-05-19 PROCEDURE — 1036F TOBACCO NON-USER: CPT | Performed by: NURSE PRACTITIONER

## 2023-05-19 PROCEDURE — G8427 DOCREV CUR MEDS BY ELIG CLIN: HCPCS | Performed by: NURSE PRACTITIONER

## 2023-05-19 PROCEDURE — G8399 PT W/DXA RESULTS DOCUMENT: HCPCS | Performed by: NURSE PRACTITIONER

## 2023-05-19 PROCEDURE — 1123F ACP DISCUSS/DSCN MKR DOCD: CPT | Performed by: NURSE PRACTITIONER

## 2023-05-19 PROCEDURE — 1090F PRES/ABSN URINE INCON ASSESS: CPT | Performed by: NURSE PRACTITIONER

## 2023-05-19 PROCEDURE — 3074F SYST BP LT 130 MM HG: CPT | Performed by: NURSE PRACTITIONER

## 2023-05-19 PROCEDURE — G8417 CALC BMI ABV UP PARAM F/U: HCPCS | Performed by: NURSE PRACTITIONER

## 2023-05-19 PROCEDURE — 3017F COLORECTAL CA SCREEN DOC REV: CPT | Performed by: NURSE PRACTITIONER

## 2023-05-19 RX ORDER — AMLODIPINE BESYLATE 10 MG/1
10 TABLET ORAL DAILY
Qty: 90 TABLET | Refills: 3 | Status: SHIPPED | OUTPATIENT
Start: 2023-05-19

## 2023-05-19 RX ORDER — METRONIDAZOLE 500 MG/1
500 TABLET ORAL 3 TIMES DAILY
Qty: 30 TABLET | Refills: 0 | Status: SHIPPED | OUTPATIENT
Start: 2023-05-19 | End: 2023-05-29

## 2023-05-19 NOTE — PROGRESS NOTES
Jacques Stringer 78 y.o. presents today for   Chief Complaint   Patient presents with    Diarrhea     WBC was elevated. . patient was supposed to start on antibiotics but never receive the medication    Nausea & Vomiting    Urinary Frequency        HPI:  Jacques Stringer states yesterday she woke up needing to urinate every 5 minutes. She has no dysuria. Her nausea and diarrhea is better but still having it. She is on medication for OAB and quit taking it while she was sick but started it again yesterday. She was told 4 years ago that her pancreas had a spot on it and says no one ever told her what needed to be done. She thinks illness with diarrhea and nausea is coming from that. This occurs once a year. She says Dr. Hannah Tsai sent her flagyl for her diarrhea and wants to see if she is really allergic to it or not. She is upset because she went to [facility] to get it and it wasn't there. Sent it again for her while she was in office and she called [facility] to see if it was there and they told her she was allergic to it so she told them to cancel it. HTN  She says her BP has been doing really good sine Dr. Hannah Tsai took her off several of her BP medications. Asking for refill of amlodipine today. History reviewed. No pertinent family history.      Social History     Socioeconomic History    Marital status:      Spouse name: Not on file    Number of children: Not on file    Years of education: Not on file    Highest education level: Not on file   Occupational History    Not on file   Tobacco Use    Smoking status: Never    Smokeless tobacco: Never   Vaping Use    Vaping Use: Never used   Substance and Sexual Activity    Alcohol use: No    Drug use: No    Sexual activity: Not on file   Other Topics Concern    Not on file   Social History Narrative    Not on file     Social Determinants of Health     Financial Resource Strain: Low Risk     Difficulty of Paying Living Expenses: Not very hard   Food

## 2023-05-21 ASSESSMENT — ENCOUNTER SYMPTOMS
DIARRHEA: 1
NAUSEA: 1

## 2023-05-31 ENCOUNTER — TELEPHONE (OUTPATIENT)
Dept: FAMILY MEDICINE CLINIC | Age: 78
End: 2023-05-31

## 2023-06-06 ENCOUNTER — OFFICE VISIT (OUTPATIENT)
Dept: FAMILY MEDICINE CLINIC | Age: 78
End: 2023-06-06
Payer: MEDICARE

## 2023-06-06 VITALS
HEART RATE: 85 BPM | BODY MASS INDEX: 33.84 KG/M2 | RESPIRATION RATE: 18 BRPM | OXYGEN SATURATION: 95 % | TEMPERATURE: 97.5 F | SYSTOLIC BLOOD PRESSURE: 120 MMHG | WEIGHT: 191 LBS | HEIGHT: 63 IN | DIASTOLIC BLOOD PRESSURE: 72 MMHG

## 2023-06-06 DIAGNOSIS — I83.813 VARICOSE VEINS OF BOTH LOWER EXTREMITIES WITH PAIN: ICD-10-CM

## 2023-06-06 DIAGNOSIS — L40.0 PLAQUE PSORIASIS: Primary | ICD-10-CM

## 2023-06-06 DIAGNOSIS — E53.8 VITAMIN B 12 DEFICIENCY: ICD-10-CM

## 2023-06-06 PROCEDURE — G8399 PT W/DXA RESULTS DOCUMENT: HCPCS | Performed by: NURSE PRACTITIONER

## 2023-06-06 PROCEDURE — G8427 DOCREV CUR MEDS BY ELIG CLIN: HCPCS | Performed by: NURSE PRACTITIONER

## 2023-06-06 PROCEDURE — 1036F TOBACCO NON-USER: CPT | Performed by: NURSE PRACTITIONER

## 2023-06-06 PROCEDURE — 99214 OFFICE O/P EST MOD 30 MIN: CPT | Performed by: NURSE PRACTITIONER

## 2023-06-06 PROCEDURE — 3074F SYST BP LT 130 MM HG: CPT | Performed by: NURSE PRACTITIONER

## 2023-06-06 PROCEDURE — G8417 CALC BMI ABV UP PARAM F/U: HCPCS | Performed by: NURSE PRACTITIONER

## 2023-06-06 PROCEDURE — 3078F DIAST BP <80 MM HG: CPT | Performed by: NURSE PRACTITIONER

## 2023-06-06 PROCEDURE — 1123F ACP DISCUSS/DSCN MKR DOCD: CPT | Performed by: NURSE PRACTITIONER

## 2023-06-06 PROCEDURE — 1090F PRES/ABSN URINE INCON ASSESS: CPT | Performed by: NURSE PRACTITIONER

## 2023-06-06 RX ORDER — CYANOCOBALAMIN 1000 UG/ML
1000 INJECTION, SOLUTION INTRAMUSCULAR; SUBCUTANEOUS ONCE
Status: COMPLETED | OUTPATIENT
Start: 2023-06-06 | End: 2023-06-07

## 2023-06-06 RX ORDER — CLOBETASOL PROPIONATE 0.5 MG/G
OINTMENT TOPICAL
Qty: 60 G | Refills: 1 | Status: SHIPPED | OUTPATIENT
Start: 2023-06-06

## 2023-06-06 NOTE — PROGRESS NOTES
Chief Complaint   Patient presents with    Leg Pain     From top of knee to thigh goes numb when they go numb toes go black. Both legs but they switch off       Have you seen any other physician or provider since your last visit yes - Cardiologist     Have you had any other diagnostic tests since your last visit? no    Have you changed or stopped any medications since your last visit? no
Rate and Rhythm: Normal rate and regular rhythm. Pulses: Normal pulses. Heart sounds: Normal heart sounds. Pulmonary:      Effort: Pulmonary effort is normal.      Breath sounds: Normal breath sounds. Abdominal:      General: Bowel sounds are normal.      Palpations: Abdomen is soft. Musculoskeletal:         General: Normal range of motion. Cervical back: Normal range of motion and neck supple. Skin:     General: Skin is warm and dry. Capillary Refill: Capillary refill takes less than 2 seconds. Findings: Erythema present. Comments: Plaque psoriasis of left anterior foot/excoriated   Neurological:      General: No focal deficit present. Mental Status: She is alert and oriented to person, place, and time. Psychiatric:         Mood and Affect: Mood normal.         Behavior: Behavior normal.        ASSESSMENT/PLAN    1. Plaque psoriasis  Use medication as directed. Pt see's Dr. Breanna Hassan for dermatology, advised she f/u with him for treatment options. She is agreeable to poc.   - clobetasol (TEMOVATE) 0.05 % ointment; Apply topically 2 times daily. Dispense: 60 g; Refill: 1    2. Varicose veins of both lower extremities with pain  Advised pt the need for further eval from vascular. She is agreeable. - External Referral To Vascular Surgery    3.  Vitamin B 12 deficiency  Administered today  - cyanocobalamin injection 1,000 mcg             TATYANA Townsend - CNP

## 2023-06-07 ENCOUNTER — OFFICE VISIT (OUTPATIENT)
Dept: FAMILY MEDICINE CLINIC | Age: 78
End: 2023-06-07
Payer: MEDICARE

## 2023-06-07 VITALS
SYSTOLIC BLOOD PRESSURE: 118 MMHG | OXYGEN SATURATION: 98 % | BODY MASS INDEX: 33.83 KG/M2 | HEIGHT: 63 IN | HEART RATE: 70 BPM | DIASTOLIC BLOOD PRESSURE: 68 MMHG | RESPIRATION RATE: 18 BRPM

## 2023-06-07 DIAGNOSIS — R20.0 BILATERAL LEG NUMBNESS: ICD-10-CM

## 2023-06-07 DIAGNOSIS — Z00.00 MEDICARE ANNUAL WELLNESS VISIT, SUBSEQUENT: Primary | ICD-10-CM

## 2023-06-07 DIAGNOSIS — R29.898 LEG WEAKNESS, BILATERAL: ICD-10-CM

## 2023-06-07 PROCEDURE — G8427 DOCREV CUR MEDS BY ELIG CLIN: HCPCS | Performed by: NURSE PRACTITIONER

## 2023-06-07 PROCEDURE — 1036F TOBACCO NON-USER: CPT | Performed by: NURSE PRACTITIONER

## 2023-06-07 PROCEDURE — 1090F PRES/ABSN URINE INCON ASSESS: CPT | Performed by: NURSE PRACTITIONER

## 2023-06-07 PROCEDURE — G8417 CALC BMI ABV UP PARAM F/U: HCPCS | Performed by: NURSE PRACTITIONER

## 2023-06-07 PROCEDURE — 99214 OFFICE O/P EST MOD 30 MIN: CPT | Performed by: NURSE PRACTITIONER

## 2023-06-07 PROCEDURE — 3078F DIAST BP <80 MM HG: CPT | Performed by: NURSE PRACTITIONER

## 2023-06-07 PROCEDURE — G0439 PPPS, SUBSEQ VISIT: HCPCS | Performed by: NURSE PRACTITIONER

## 2023-06-07 PROCEDURE — 1123F ACP DISCUSS/DSCN MKR DOCD: CPT | Performed by: NURSE PRACTITIONER

## 2023-06-07 PROCEDURE — 96372 THER/PROPH/DIAG INJ SC/IM: CPT | Performed by: NURSE PRACTITIONER

## 2023-06-07 PROCEDURE — 3074F SYST BP LT 130 MM HG: CPT | Performed by: NURSE PRACTITIONER

## 2023-06-07 PROCEDURE — G8399 PT W/DXA RESULTS DOCUMENT: HCPCS | Performed by: NURSE PRACTITIONER

## 2023-06-07 RX ADMIN — CYANOCOBALAMIN 1000 MCG: 1000 INJECTION, SOLUTION INTRAMUSCULAR; SUBCUTANEOUS at 16:05

## 2023-06-07 ASSESSMENT — PATIENT HEALTH QUESTIONNAIRE - PHQ9
2. FEELING DOWN, DEPRESSED OR HOPELESS: 0
3. TROUBLE FALLING OR STAYING ASLEEP: 0
4. FEELING TIRED OR HAVING LITTLE ENERGY: 0
9. THOUGHTS THAT YOU WOULD BE BETTER OFF DEAD, OR OF HURTING YOURSELF: 0
1. LITTLE INTEREST OR PLEASURE IN DOING THINGS: 0
7. TROUBLE CONCENTRATING ON THINGS, SUCH AS READING THE NEWSPAPER OR WATCHING TELEVISION: 0
5. POOR APPETITE OR OVEREATING: 0
SUM OF ALL RESPONSES TO PHQ QUESTIONS 1-9: 0
SUM OF ALL RESPONSES TO PHQ QUESTIONS 1-9: 0
6. FEELING BAD ABOUT YOURSELF - OR THAT YOU ARE A FAILURE OR HAVE LET YOURSELF OR YOUR FAMILY DOWN: 0
SUM OF ALL RESPONSES TO PHQ QUESTIONS 1-9: 0
8. MOVING OR SPEAKING SO SLOWLY THAT OTHER PEOPLE COULD HAVE NOTICED. OR THE OPPOSITE, BEING SO FIGETY OR RESTLESS THAT YOU HAVE BEEN MOVING AROUND A LOT MORE THAN USUAL: 0
SUM OF ALL RESPONSES TO PHQ9 QUESTIONS 1 & 2: 0
SUM OF ALL RESPONSES TO PHQ QUESTIONS 1-9: 0

## 2023-06-07 ASSESSMENT — LIFESTYLE VARIABLES
HOW MANY STANDARD DRINKS CONTAINING ALCOHOL DO YOU HAVE ON A TYPICAL DAY: PATIENT DOES NOT DRINK
HOW OFTEN DO YOU HAVE A DRINK CONTAINING ALCOHOL: NEVER

## 2023-06-07 NOTE — PROGRESS NOTES
Medicare Annual Wellness Visit    Medina Click is here for Medicare AWV    Assessment & Plan   Medicare annual wellness visit, subsequent  Bilateral leg numbness  -     External Referral To Neurology  Leg weakness, bilateral  -     External Referral To Neurology  Recommendations for Preventive Services Due: see orders and patient instructions/AVS.  Recommended screening schedule for the next 5-10 years is provided to the patient in written form: see Patient Instructions/AVS.     Return in about 3 months (around 9/7/2023). Subjective   The following acute and/or chronic problems were also addressed today:  She is doing good today. Patient's complete Health Risk Assessment and screening values have been reviewed and are found in Flowsheets. The following problems were reviewed today and where indicated follow up appointments were made and/or referrals ordered. Positive Risk Factor Screenings with Interventions:    Fall Risk:  Do you feel unsteady or are you worried about falling? : no  2 or more falls in past year?: (!) yes  Fall with injury in past year?: no     Interventions:    Patient comments: She doesn't fall a lot, only when she gets sick/nauseous. She leaves the light on at night to get to her bathroom. She is putting in a walk in shower with a bar and a chair. Weight and Activity:  Physical Activity: Unknown    Days of Exercise per Week: Not on file    Minutes of Exercise per Session: 30 min           Body mass index is 33.83 kg/m². (!) Abnormal  Obesity Interventions:  Patient comments: She walks every day if it isn't raining. She was going to the gym but her back. She does exercise. Safety:  Do you have either shower bars, grab bars, non-slip mats or non-slip surfaces in your shower or bathtub?: (!) No  Interventions:  Patient comments: Is putting a walk in shower in her home, with a bar and a non slip surface.                       Objective   Vitals:    06/07/23

## 2023-06-07 NOTE — PROGRESS NOTES
Chief Complaint   Patient presents with    Medicare AWV       Administrations This Visit       cyanocobalamin injection 1,000 mcg       Admin Date  06/07/2023  16:05 Action  Given Dose  1,000 mcg Route  IntraMUSCular Site  Deltoid Right Administered By  Magali García MA    Ordering Provider: TATYANA Garnett CNP    NDC: 03646-600-28    : [employer] [facility]    Patient Supplied?: No    Comments: Ordered 6/6/23 pt came in 6/7 to have shot                    Patient tolerated injection well. Patient advised to wait 20 minutes in the office following the injection. No signs/symptoms of reaction noted after 20 minutes.

## 2023-06-07 NOTE — PATIENT INSTRUCTIONS
screenings are paid in full while other may be subject to a deductible, co-insurance, and/or copay. Some of these benefits include a comprehensive review of your medical history including lifestyle, illnesses that may run in your family, and various assessments and screenings as appropriate. After reviewing your medical record and screening and assessments performed today your provider may have ordered immunizations, labs, imaging, and/or referrals for you. A list of these orders (if applicable) as well as your Preventive Care list are included within your After Visit Summary for your review. Other Preventive Recommendations:    A preventive eye exam performed by an eye specialist is recommended every 1-2 years to screen for glaucoma; cataracts, macular degeneration, and other eye disorders. A preventive dental visit is recommended every 6 months. Try to get at least 150 minutes of exercise per week or 10,000 steps per day on a pedometer . Order or download the FREE \"Exercise & Physical Activity: Your Everyday Guide\" from [employer]. Call 9-393.852.3148 or search [employer] online. You need 8696-0075 mg of calcium and 2749-2862 IU of vitamin D per day. It is possible to meet your calcium requirement with diet alone, but a vitamin D supplement is usually necessary to meet this goal.  When exposed to the sun, use a sunscreen that protects against both UVA and UVB radiation with an SPF of 30 or greater. Reapply every 2 to 3 hours or after sweating, drying off with a towel, or swimming. Always wear a seat belt when traveling in a car. Always wear a helmet when riding a bicycle or motorcycle.

## 2023-06-13 ENCOUNTER — TELEPHONE (OUTPATIENT)
Dept: FAMILY MEDICINE CLINIC | Age: 78
End: 2023-06-13

## 2023-06-13 NOTE — TELEPHONE ENCOUNTER
Vanessa Tobias called and stated that her oxygen is back up to 92-93% so she isn't going to go to the hospital. But she did state she went to [facility] to get her medication that you sent her in and they don't have it.

## 2023-07-10 ENCOUNTER — TELEPHONE (OUTPATIENT)
Dept: FAMILY MEDICINE CLINIC | Age: 78
End: 2023-07-10

## 2023-07-10 NOTE — TELEPHONE ENCOUNTER
----- Message from Herby Saint sent at 7/10/2023  4:08 PM EDT -----  Subject: Message to Provider    QUESTIONS  Information for Provider? Attempted to warm transfer patient to nurse   triage due to red flag starting 3:38 PM today. Pt dropped off the line at   3:44 PM. Continued trying to contact practice until it closed at 4:00 PM.   Called patient back to transfer to A/H, but pt stated \"No, don't worry   about it. I'm on my way to urgent care. \" Red flags? bp is \"chad high,\"   answered \"yes\" to question, \"Do you have symptoms that started or got   worse after leaving emergency department? \" States cough is worse, pulse ox   low.  ---------------------------------------------------------------------------  --------------  Blanca ROSA  [phone]; OK to leave message on voicemail  ---------------------------------------------------------------------------  --------------  SCRIPT ANSWERS  Relationship to Patient?  Self

## 2023-07-11 ENCOUNTER — OFFICE VISIT (OUTPATIENT)
Dept: FAMILY MEDICINE CLINIC | Age: 78
End: 2023-07-11
Payer: MEDICARE

## 2023-07-11 VITALS
OXYGEN SATURATION: 93 % | DIASTOLIC BLOOD PRESSURE: 70 MMHG | SYSTOLIC BLOOD PRESSURE: 150 MMHG | RESPIRATION RATE: 18 BRPM | HEART RATE: 80 BPM

## 2023-07-11 DIAGNOSIS — J44.9 CHRONIC OBSTRUCTIVE PULMONARY DISEASE, UNSPECIFIED COPD TYPE (HCC): ICD-10-CM

## 2023-07-11 DIAGNOSIS — J18.9 PNEUMONIA DUE TO INFECTIOUS ORGANISM, UNSPECIFIED LATERALITY, UNSPECIFIED PART OF LUNG: Primary | ICD-10-CM

## 2023-07-11 DIAGNOSIS — R09.02 HYPOXEMIA: ICD-10-CM

## 2023-07-11 PROCEDURE — 3078F DIAST BP <80 MM HG: CPT | Performed by: NURSE PRACTITIONER

## 2023-07-11 PROCEDURE — 3023F SPIROM DOC REV: CPT | Performed by: NURSE PRACTITIONER

## 2023-07-11 PROCEDURE — 1090F PRES/ABSN URINE INCON ASSESS: CPT | Performed by: NURSE PRACTITIONER

## 2023-07-11 PROCEDURE — 99214 OFFICE O/P EST MOD 30 MIN: CPT | Performed by: NURSE PRACTITIONER

## 2023-07-11 PROCEDURE — 1123F ACP DISCUSS/DSCN MKR DOCD: CPT | Performed by: NURSE PRACTITIONER

## 2023-07-11 PROCEDURE — G8427 DOCREV CUR MEDS BY ELIG CLIN: HCPCS | Performed by: NURSE PRACTITIONER

## 2023-07-11 PROCEDURE — 1036F TOBACCO NON-USER: CPT | Performed by: NURSE PRACTITIONER

## 2023-07-11 PROCEDURE — G8417 CALC BMI ABV UP PARAM F/U: HCPCS | Performed by: NURSE PRACTITIONER

## 2023-07-11 PROCEDURE — 3077F SYST BP >= 140 MM HG: CPT | Performed by: NURSE PRACTITIONER

## 2023-07-11 PROCEDURE — 96372 THER/PROPH/DIAG INJ SC/IM: CPT | Performed by: NURSE PRACTITIONER

## 2023-07-11 PROCEDURE — G8399 PT W/DXA RESULTS DOCUMENT: HCPCS | Performed by: NURSE PRACTITIONER

## 2023-07-11 RX ORDER — BENZONATATE 100 MG/1
100 CAPSULE ORAL 2 TIMES DAILY PRN
Qty: 20 CAPSULE | Refills: 0 | Status: SHIPPED | OUTPATIENT
Start: 2023-07-11 | End: 2023-07-21

## 2023-07-11 RX ORDER — ALBUTEROL SULFATE 2.5 MG/3ML
2.5 SOLUTION RESPIRATORY (INHALATION) EVERY 6 HOURS PRN
Qty: 120 EACH | Refills: 3 | Status: SHIPPED | OUTPATIENT
Start: 2023-07-11

## 2023-07-11 RX ORDER — DOXYCYCLINE HYCLATE 100 MG
100 TABLET ORAL 2 TIMES DAILY
Qty: 20 TABLET | Refills: 0 | Status: SHIPPED | OUTPATIENT
Start: 2023-07-11 | End: 2023-07-21

## 2023-07-11 RX ORDER — METHYLPREDNISOLONE SODIUM SUCCINATE 125 MG/2ML
125 INJECTION, POWDER, LYOPHILIZED, FOR SOLUTION INTRAMUSCULAR; INTRAVENOUS ONCE
Status: COMPLETED | OUTPATIENT
Start: 2023-07-11 | End: 2023-07-11

## 2023-07-11 RX ADMIN — METHYLPREDNISOLONE SODIUM SUCCINATE 125 MG: 125 INJECTION, POWDER, LYOPHILIZED, FOR SOLUTION INTRAMUSCULAR; INTRAVENOUS at 11:35

## 2023-07-11 ASSESSMENT — ENCOUNTER SYMPTOMS
SHORTNESS OF BREATH: 1
COUGH: 1

## 2023-07-11 NOTE — PROGRESS NOTES
Administrations This Visit       cefTRIAXone (ROCEPHIN) 1,000 mg in lidocaine 1 % 2.86 mL IM Injection       Admin Date  07/11/2023  11:35 Action  Given Dose  1,000 mg Route  IntraMUSCular Site  Dorsogluteal Right Administered By  Catherine Perez MA    Ordering Provider: TATYANA Amaro CNP    NDC: 78131-998-96    : [facility]    Patient Supplied?: No              methylPREDNISolone sodium succ (SOLU-MEDROL) injection 125 mg       Admin Date  07/11/2023  11:35 Action  Given Dose  125 mg Route  IntraMUSCular Site  Dorsogluteal Left Administered By  Catherine Perez MA    Ordering Provider: TATYANA Amaro CNP    NDC: 12277-107-77    : DR[facility] L[facility]. Patient Supplied?: No                    Patient tolerated injection well. Patient advised to wait 20 minutes in the office following the injection. No signs/symptoms of reaction noted after 20 minutes.
hours as needed for Nausea or Vomiting 30 tablet 0    valsartan-hydroCHLOROthiazide (DIOVAN-HCT) 320-25 MG per tablet Take 1 tablet by mouth daily      atorvastatin (LIPITOR) 40 MG tablet       cloNIDine (CATAPRES) 0.1 MG tablet Take one tablet if systolic pressure is 180 or greater or diastolic is 90 or greater. Repeat dose in 15 mins if still elevated. May repeat dose up to 3 times. 60 tablet 3    mirabegron (MYRBETRIQ) 25 MG TB24 Take 1 tablet by mouth daily      clotrimazole-betamethasone (LOTRISONE) 1-0.05 % cream Apply topically on vagina and surrounding tissues 2 times daily. 45 g 2    estradiol (ESTRACE VAGINAL) 0.1 MG/GM vaginal cream Discard applicator and apply one finger tip of cream to external vagina and tissue twice weekly 42.5 g 3    clonazePAM (KLONOPIN) 2 MG tablet TAKE ONE TABLET BY MOUTH ONCE NIGHTLY AS NEEDED FOR INSOMNIA 30 tablet 2    albuterol sulfate HFA (PROVENTIL;VENTOLIN;PROAIR) 108 (90 Base) MCG/ACT inhaler Inhale 2 puffs into the lungs every 6 hours as needed for Wheezing or Shortness of Breath 18 g 3    levothyroxine (SYNTHROID) 75 MCG tablet Take 1 tablet by mouth Daily 90 tablet 3    aspirin 81 MG EC tablet Take 1 tablet by mouth daily      nitroGLYCERIN (NITROSTAT) 0.4 MG SL tablet Place 1 tablet under the tongue every 5 minutes as needed for Chest pain (Patient not taking: Reported on 4/27/2023) 25 tablet 3    atenolol (TENORMIN) 25 MG tablet Take 1 tablet by mouth daily (Patient not taking: Reported on 6/6/2023) 30 tablet 5     Current Facility-Administered Medications   Medication Dose Route Frequency Provider Last Rate Last Admin    0.9 % sodium chloride infusion   IntraVENous Once Padmaja Wells MD   Stopped at 05/11/23 1300    methylPREDNISolone sodium (SOLU-MEDROL) injection 125 mg  125 mg IntraVENous Once TATYANA Steven - CNP            Review of Systems   Constitutional:  Positive for fatigue. Respiratory:  Positive for cough and shortness of breath.     All

## 2023-07-18 DIAGNOSIS — J44.9 CHRONIC OBSTRUCTIVE PULMONARY DISEASE, UNSPECIFIED COPD TYPE (HCC): ICD-10-CM

## 2023-07-18 DIAGNOSIS — J18.9 PNEUMONIA DUE TO INFECTIOUS ORGANISM, UNSPECIFIED LATERALITY, UNSPECIFIED PART OF LUNG: ICD-10-CM

## 2023-07-18 DIAGNOSIS — R09.02 HYPOXEMIA: ICD-10-CM

## 2023-08-01 DIAGNOSIS — E03.9 HYPOTHYROIDISM, UNSPECIFIED TYPE: ICD-10-CM

## 2023-08-03 RX ORDER — LEVOTHYROXINE SODIUM 0.07 MG/1
TABLET ORAL
Qty: 90 TABLET | Refills: 3 | Status: SHIPPED | OUTPATIENT
Start: 2023-08-03

## 2023-08-11 ENCOUNTER — OFFICE VISIT (OUTPATIENT)
Dept: FAMILY MEDICINE CLINIC | Age: 78
End: 2023-08-11
Payer: MEDICARE

## 2023-08-11 VITALS
BODY MASS INDEX: 33.31 KG/M2 | DIASTOLIC BLOOD PRESSURE: 70 MMHG | OXYGEN SATURATION: 98 % | SYSTOLIC BLOOD PRESSURE: 138 MMHG | HEART RATE: 87 BPM | RESPIRATION RATE: 18 BRPM | HEIGHT: 63 IN | TEMPERATURE: 98.2 F | WEIGHT: 188 LBS

## 2023-08-11 DIAGNOSIS — I83.813 VARICOSE VEINS OF BOTH LOWER EXTREMITIES WITH PAIN: ICD-10-CM

## 2023-08-11 DIAGNOSIS — R20.0 BILATERAL LEG NUMBNESS: ICD-10-CM

## 2023-08-11 DIAGNOSIS — K05.5 ACANTHOMA FISSURATUM: ICD-10-CM

## 2023-08-11 DIAGNOSIS — R29.898 LEG WEAKNESS, BILATERAL: ICD-10-CM

## 2023-08-11 DIAGNOSIS — J34.89 SINUS PAIN: Primary | ICD-10-CM

## 2023-08-11 DIAGNOSIS — F41.0 PANIC ATTACK: ICD-10-CM

## 2023-08-11 PROCEDURE — 3075F SYST BP GE 130 - 139MM HG: CPT | Performed by: NURSE PRACTITIONER

## 2023-08-11 PROCEDURE — 1123F ACP DISCUSS/DSCN MKR DOCD: CPT | Performed by: NURSE PRACTITIONER

## 2023-08-11 PROCEDURE — 1036F TOBACCO NON-USER: CPT | Performed by: NURSE PRACTITIONER

## 2023-08-11 PROCEDURE — 3078F DIAST BP <80 MM HG: CPT | Performed by: NURSE PRACTITIONER

## 2023-08-11 PROCEDURE — 1090F PRES/ABSN URINE INCON ASSESS: CPT | Performed by: NURSE PRACTITIONER

## 2023-08-11 PROCEDURE — 99214 OFFICE O/P EST MOD 30 MIN: CPT | Performed by: NURSE PRACTITIONER

## 2023-08-11 PROCEDURE — G8399 PT W/DXA RESULTS DOCUMENT: HCPCS | Performed by: NURSE PRACTITIONER

## 2023-08-11 PROCEDURE — G8427 DOCREV CUR MEDS BY ELIG CLIN: HCPCS | Performed by: NURSE PRACTITIONER

## 2023-08-11 PROCEDURE — G8417 CALC BMI ABV UP PARAM F/U: HCPCS | Performed by: NURSE PRACTITIONER

## 2023-08-11 RX ORDER — CLONAZEPAM 2 MG/1
TABLET ORAL
Qty: 30 TABLET | Refills: 0 | Status: SHIPPED | OUTPATIENT
Start: 2023-08-11 | End: 2023-08-11

## 2023-08-11 RX ORDER — AMOXICILLIN 875 MG/1
875 TABLET, COATED ORAL 2 TIMES DAILY
Qty: 20 TABLET | Refills: 0 | Status: SHIPPED | OUTPATIENT
Start: 2023-08-11 | End: 2023-08-21

## 2023-08-11 RX ORDER — CLONAZEPAM 2 MG/1
TABLET ORAL
Qty: 30 TABLET | Refills: 0 | Status: SHIPPED | OUTPATIENT
Start: 2023-08-11 | End: 2023-09-11

## 2023-08-11 NOTE — PROGRESS NOTES
Chief Complaint   Patient presents with    Hoarse     Check - up     Eye Drainage     Lt eye        Have you seen any other physician or provider since your last visit no    Have you had any other diagnostic tests since your last visit? no    Have you changed or stopped any medications since your last visit? no
Behavior: Behavior normal.        ASSESSMENT/PLAN    1. Sinus pain  Take medication as directed, f/u if s/s persist or worsen See dentist. She is agreeable to poc.   - amoxicillin (AMOXIL) 875 MG tablet; Take 1 tablet by mouth 2 times daily for 10 days  Dispense: 20 tablet; Refill: 0    2. Panic attack  Take medication as directed, she is agreeable to poc.   - clonazePAM (KLONOPIN) 2 MG tablet; TAKE ONE TABLET BY MOUTH ONCE NIGHTLY AS NEEDED FOR INSOMNIA  Dispense: 30 tablet; Refill: 0    3. Acanthoma fissuratum  Advised pt there is no danger in diagnosis, she says she feels much better knowing that is what it is. 4. Bilateral leg numbness  New referral placed per pt request  - External Referral To Vascular Surgery    5. Leg weakness, bilateral  New referral placed per pt request  - External Referral To Vascular Surgery    6. Varicose veins of both lower extremities with pain  New referral placed per pt request  - External Referral To Vascular Surgery       Controlled substances monitoring: no signs of potential drug abuse or diversion identified.         Demi Peralta, APRN - CNP

## 2023-08-24 ENCOUNTER — OFFICE VISIT (OUTPATIENT)
Dept: FAMILY MEDICINE CLINIC | Age: 78
End: 2023-08-24
Payer: MEDICARE

## 2023-08-24 VITALS — DIASTOLIC BLOOD PRESSURE: 77 MMHG | OXYGEN SATURATION: 93 % | SYSTOLIC BLOOD PRESSURE: 139 MMHG

## 2023-08-24 DIAGNOSIS — R50.9 FEVER, UNSPECIFIED FEVER CAUSE: ICD-10-CM

## 2023-08-24 DIAGNOSIS — R19.7 DIARRHEA, UNSPECIFIED TYPE: Primary | ICD-10-CM

## 2023-08-24 DIAGNOSIS — R11.2 NAUSEA AND VOMITING, UNSPECIFIED VOMITING TYPE: ICD-10-CM

## 2023-08-24 DIAGNOSIS — R42 DIZZINESS: ICD-10-CM

## 2023-08-24 LAB
INFLUENZA A ANTIBODY: NORMAL
INFLUENZA B ANTIBODY: NORMAL
Lab: NORMAL
QC PASS/FAIL: NORMAL
SARS-COV-2 RDRP RESP QL NAA+PROBE: NEGATIVE

## 2023-08-24 PROCEDURE — 1123F ACP DISCUSS/DSCN MKR DOCD: CPT | Performed by: NURSE PRACTITIONER

## 2023-08-24 PROCEDURE — G8399 PT W/DXA RESULTS DOCUMENT: HCPCS | Performed by: NURSE PRACTITIONER

## 2023-08-24 PROCEDURE — 99214 OFFICE O/P EST MOD 30 MIN: CPT | Performed by: NURSE PRACTITIONER

## 2023-08-24 PROCEDURE — 96360 HYDRATION IV INFUSION INIT: CPT | Performed by: NURSE PRACTITIONER

## 2023-08-24 PROCEDURE — 1090F PRES/ABSN URINE INCON ASSESS: CPT | Performed by: NURSE PRACTITIONER

## 2023-08-24 PROCEDURE — 96361 HYDRATE IV INFUSION ADD-ON: CPT | Performed by: NURSE PRACTITIONER

## 2023-08-24 PROCEDURE — G8427 DOCREV CUR MEDS BY ELIG CLIN: HCPCS | Performed by: NURSE PRACTITIONER

## 2023-08-24 PROCEDURE — 1036F TOBACCO NON-USER: CPT | Performed by: NURSE PRACTITIONER

## 2023-08-24 PROCEDURE — 96372 THER/PROPH/DIAG INJ SC/IM: CPT | Performed by: NURSE PRACTITIONER

## 2023-08-24 PROCEDURE — G8417 CALC BMI ABV UP PARAM F/U: HCPCS | Performed by: NURSE PRACTITIONER

## 2023-08-24 PROCEDURE — 3078F DIAST BP <80 MM HG: CPT | Performed by: NURSE PRACTITIONER

## 2023-08-24 PROCEDURE — 3074F SYST BP LT 130 MM HG: CPT | Performed by: NURSE PRACTITIONER

## 2023-08-24 RX ORDER — METHYLPREDNISOLONE SODIUM SUCCINATE 125 MG/2ML
125 INJECTION, POWDER, LYOPHILIZED, FOR SOLUTION INTRAMUSCULAR; INTRAVENOUS ONCE
Status: COMPLETED | OUTPATIENT
Start: 2023-08-24 | End: 2023-08-24

## 2023-08-24 RX ORDER — CEFTRIAXONE 1 G/1
1000 INJECTION, POWDER, FOR SOLUTION INTRAMUSCULAR; INTRAVENOUS ONCE
Status: COMPLETED | OUTPATIENT
Start: 2023-08-24 | End: 2023-08-24

## 2023-08-24 RX ORDER — SODIUM CHLORIDE 9 MG/ML
INJECTION, SOLUTION INTRAVENOUS ONCE
Status: SHIPPED | OUTPATIENT
Start: 2023-08-24

## 2023-08-24 RX ADMIN — CEFTRIAXONE 1000 MG: 1 INJECTION, POWDER, FOR SOLUTION INTRAMUSCULAR; INTRAVENOUS at 16:05

## 2023-08-24 RX ADMIN — METHYLPREDNISOLONE SODIUM SUCCINATE 125 MG: 125 INJECTION, POWDER, LYOPHILIZED, FOR SOLUTION INTRAMUSCULAR; INTRAVENOUS at 16:06

## 2023-08-24 ASSESSMENT — ENCOUNTER SYMPTOMS
DIARRHEA: 1
VOMITING: 1
NAUSEA: 1

## 2023-08-24 NOTE — PROGRESS NOTES
Patient here today for sick visit only. Health Maintenance not reviewed during this visit.
Per Helga Lay MD order 22 G IV inserted Right AC w/o difficulty. IV patent/positive blood return. IV secured with tape and tegaderm. Patient tolerated procedure well. 1140 1 Liter Normal Saline IV Fluids started   1330 2nd bag 1 Liter Normal Saline IV Fluids started     1604 IV Fluids Completed     IV catheter discontinued intact. Site without signs and symptoms of complications. Dressing and pressure applied. Administrations This Visit       cefTRIAXone (ROCEPHIN) injection 1,000 mg       Admin Date  08/24/2023  16:05 Action  Given Dose  1,000 mg Route  IntraMUSCular Site  Dorsogluteal Left Administered By  Lorin Hurley RN    Ordering Provider: TATYANA Sadler CNP    NDC: 30748-746-56    Lot#: [id number]    : [facility]    Patient Supplied?: No              methylPREDNISolone sodium succ (SOLU-MEDROL) injection 125 mg       Admin Date  08/24/2023  16:06 Action  Given Dose  125 mg Route  IntraMUSCular Site  Dorsogluteal Right Administered By  Lorin Hurley RN    Ordering Provider: TATYANA Sadler CNP    NDC: 12992-032-02    Lot#: [id number]    : TONEY Muir Patient Supplied?: No                  Patient tolerated injection well. Patient advised to wait 20 minutes in the office following the injection. No signs/symptoms of reaction noted after 20 minutes.
today administered advised her to follow-up if symptoms persisted or worsened or to go to ER if she has any chest pain extreme dizziness increased nausea vomiting/dehydration or any other emergent symptoms. She is agreeable to plan of care.   - methylPREDNISolone sodium succ (SOLU-MEDROL) injection 125 mg  - cefTRIAXone (ROCEPHIN) injection 1,000 mg             Aroldo July, APRN - CNP

## 2023-09-08 ENCOUNTER — TELEPHONE (OUTPATIENT)
Dept: CARDIAC SURGERY | Facility: CLINIC | Age: 78
End: 2023-09-08

## 2023-09-08 NOTE — TELEPHONE ENCOUNTER
Caller: Noemy Condon    Relationship: Self    Best call back number: [phone] OR WORK -[phone] AFTER 3PM     What is the best time to reach you: ANY    Who are you requesting to speak with (clinical staff, provider,  specific staff member): ANY    Do you know the name of the person who called: PT UNSURE    What was the call regarding: PT RETURNING MISSED CALL FROM YESTERDAY- NO NEW NOTES IN PT CHART- PT THINKING IT COULD BE REGARDING A SOONER APPT- PLEASE CALL BACK TO ADVISE.

## 2023-09-13 ENCOUNTER — OFFICE VISIT (OUTPATIENT)
Dept: FAMILY MEDICINE CLINIC | Age: 78
End: 2023-09-13
Payer: MEDICARE

## 2023-09-13 VITALS — OXYGEN SATURATION: 94 % | HEART RATE: 79 BPM

## 2023-09-13 DIAGNOSIS — J06.9 UPPER RESPIRATORY TRACT INFECTION, UNSPECIFIED TYPE: ICD-10-CM

## 2023-09-13 DIAGNOSIS — N64.59 BLEEDING FROM NIPPLE IN FEMALE: Primary | ICD-10-CM

## 2023-09-13 DIAGNOSIS — E03.9 HYPOTHYROIDISM, UNSPECIFIED TYPE: ICD-10-CM

## 2023-09-13 DIAGNOSIS — F41.0 PANIC ATTACK: ICD-10-CM

## 2023-09-13 DIAGNOSIS — J34.89 SINUS PAIN: ICD-10-CM

## 2023-09-13 PROCEDURE — 1090F PRES/ABSN URINE INCON ASSESS: CPT | Performed by: NURSE PRACTITIONER

## 2023-09-13 PROCEDURE — 1036F TOBACCO NON-USER: CPT | Performed by: NURSE PRACTITIONER

## 2023-09-13 PROCEDURE — G8427 DOCREV CUR MEDS BY ELIG CLIN: HCPCS | Performed by: NURSE PRACTITIONER

## 2023-09-13 PROCEDURE — 99214 OFFICE O/P EST MOD 30 MIN: CPT | Performed by: NURSE PRACTITIONER

## 2023-09-13 PROCEDURE — G8399 PT W/DXA RESULTS DOCUMENT: HCPCS | Performed by: NURSE PRACTITIONER

## 2023-09-13 PROCEDURE — 96372 THER/PROPH/DIAG INJ SC/IM: CPT | Performed by: NURSE PRACTITIONER

## 2023-09-13 PROCEDURE — 1123F ACP DISCUSS/DSCN MKR DOCD: CPT | Performed by: NURSE PRACTITIONER

## 2023-09-13 PROCEDURE — G8417 CALC BMI ABV UP PARAM F/U: HCPCS | Performed by: NURSE PRACTITIONER

## 2023-09-13 RX ORDER — LEVOTHYROXINE SODIUM 0.07 MG/1
75 TABLET ORAL DAILY
Qty: 90 TABLET | Refills: 0 | Status: SHIPPED | OUTPATIENT
Start: 2023-09-13

## 2023-09-13 RX ORDER — AMOXICILLIN 875 MG/1
875 TABLET, COATED ORAL 2 TIMES DAILY
Qty: 20 TABLET | Refills: 0 | Status: SHIPPED | OUTPATIENT
Start: 2023-09-13 | End: 2023-09-23

## 2023-09-13 RX ORDER — CLONAZEPAM 2 MG/1
TABLET ORAL
Qty: 30 TABLET | Refills: 0 | Status: SHIPPED | OUTPATIENT
Start: 2023-09-13 | End: 2023-10-16

## 2023-09-13 RX ORDER — METHYLPREDNISOLONE SODIUM SUCCINATE 125 MG/2ML
125 INJECTION, POWDER, LYOPHILIZED, FOR SOLUTION INTRAMUSCULAR; INTRAVENOUS ONCE
Status: COMPLETED | OUTPATIENT
Start: 2023-09-13 | End: 2023-09-13

## 2023-09-13 RX ORDER — CEFTRIAXONE 1 G/1
1000 INJECTION, POWDER, FOR SOLUTION INTRAMUSCULAR; INTRAVENOUS ONCE
Status: COMPLETED | OUTPATIENT
Start: 2023-09-13 | End: 2023-09-13

## 2023-09-13 RX ORDER — CLONAZEPAM 2 MG/1
TABLET ORAL
Qty: 30 TABLET | Refills: 0 | Status: CANCELLED | OUTPATIENT
Start: 2023-09-13 | End: 2023-10-14

## 2023-09-13 RX ADMIN — CEFTRIAXONE 1000 MG: 1 INJECTION, POWDER, FOR SOLUTION INTRAMUSCULAR; INTRAVENOUS at 12:15

## 2023-09-13 RX ADMIN — METHYLPREDNISOLONE SODIUM SUCCINATE 125 MG: 125 INJECTION, POWDER, LYOPHILIZED, FOR SOLUTION INTRAMUSCULAR; INTRAVENOUS at 12:15

## 2023-09-13 NOTE — PROGRESS NOTES
Administrations This Visit       cefTRIAXone (ROCEPHIN) injection 1,000 mg       Admin Date  09/13/2023  12:15 Action  Given Dose  1,000 mg Route  IntraMUSCular Site  Dorsogluteal Right Administered By  Lolis Roth MA    Ordering Provider: TATYANA Sevilla CNP    NDC: 83320-091-20    : [facility]    Patient Supplied?: No              methylPREDNISolone sodium succ (SOLU-MEDROL) injection 125 mg       Admin Date  09/13/2023  12:15 Action  Given Dose  125 mg Route  IntraMUSCular Site  Dorsogluteal Left Administered By  Mable Orona    Ordering Provider: TATYANA Sevilla CNP    NDC: 40228-359-38    : Lawayne Horse. Patient Supplied?: No                  Patient tolerated injection well. Patient advised to wait 20 minutes in the office following the injection. No signs/symptoms of reaction noted after 20 minutes.
Patient here today for sick visit only. Health Maintenance not reviewed during this visit.
Review of Systems   Constitutional:  Positive for fatigue and fever. HENT:  Positive for congestion and sinus pressure. Respiratory:  Positive for cough. Genitourinary:         Bleeding from left nipple   All other systems reviewed and are negative. Pulse 79   LMP 01/01/1986 (Approximate)   SpO2 94% Comment: 1.5 O2     Physical Exam  Constitutional:       Appearance: Normal appearance. She is ill-appearing. HENT:      Head: Normocephalic and atraumatic. Right Ear: Tympanic membrane, ear canal and external ear normal.      Left Ear: Tympanic membrane, ear canal and external ear normal.      Nose: Congestion present. Mouth/Throat:      Mouth: Mucous membranes are moist.      Pharynx: Oropharynx is clear. Eyes:      General: Scleral icterus present. Extraocular Movements: Extraocular movements intact. Conjunctiva/sclera: Conjunctivae normal.      Pupils: Pupils are equal, round, and reactive to light. Cardiovascular:      Rate and Rhythm: Normal rate and regular rhythm. Pulses: Normal pulses. Heart sounds: Normal heart sounds. Pulmonary:      Effort: Pulmonary effort is normal.      Breath sounds: Normal breath sounds. Chest:      Comments: Did not observe any erythema, bleeding, dimpling or leaking of fluid. Did not palpate because pt states she was tender and not feeling well and wanted to have mammogram  Abdominal:      General: Bowel sounds are normal.      Palpations: Abdomen is soft. Musculoskeletal:         General: Normal range of motion. Cervical back: Normal range of motion and neck supple. Skin:     General: Skin is warm and dry. Capillary Refill: Capillary refill takes less than 2 seconds. Neurological:      General: No focal deficit present. Mental Status: She is alert and oriented to person, place, and time. Psychiatric:         Mood and Affect: Mood normal.         Behavior: Behavior normal.          ASSESSMENT/PLAN    1.

## 2023-09-14 ASSESSMENT — ENCOUNTER SYMPTOMS
SINUS PRESSURE: 1
COUGH: 1

## 2023-10-18 ENCOUNTER — HOSPITAL ENCOUNTER (OUTPATIENT)
Facility: HOSPITAL | Age: 78
Discharge: HOME | End: 2023-10-18
Payer: MEDICARE

## 2023-10-18 ENCOUNTER — OFFICE VISIT (OUTPATIENT)
Dept: FAMILY MEDICINE CLINIC | Age: 78
End: 2023-10-18
Payer: MEDICARE

## 2023-10-18 VITALS
OXYGEN SATURATION: 98 % | RESPIRATION RATE: 18 BRPM | SYSTOLIC BLOOD PRESSURE: 124 MMHG | WEIGHT: 197.6 LBS | BODY MASS INDEX: 35 KG/M2 | TEMPERATURE: 97.2 F | DIASTOLIC BLOOD PRESSURE: 78 MMHG | HEART RATE: 72 BPM

## 2023-10-18 DIAGNOSIS — R60.1 GENERALIZED EDEMA: ICD-10-CM

## 2023-10-18 DIAGNOSIS — F41.0 PANIC ATTACK: ICD-10-CM

## 2023-10-18 DIAGNOSIS — R33.8 ACUTE URINARY RETENTION: Primary | ICD-10-CM

## 2023-10-18 DIAGNOSIS — R11.0 NAUSEA: ICD-10-CM

## 2023-10-18 LAB
BILIRUBIN, POC: NEGATIVE
BLOOD URINE, POC: NORMAL
CLARITY, POC: CLEAR
COLOR, POC: YELLOW
GLUCOSE URINE, POC: NEGATIVE
KETONES, POC: NEGATIVE
LEUKOCYTE EST, POC: NORMAL
NITRITE, POC: NEGATIVE
PH, POC: 6
PROTEIN, POC: NORMAL
SPECIFIC GRAVITY, POC: 1.02
UROBILINOGEN, POC: 0.2

## 2023-10-18 PROCEDURE — 99214 OFFICE O/P EST MOD 30 MIN: CPT | Performed by: NURSE PRACTITIONER

## 2023-10-18 PROCEDURE — 3078F DIAST BP <80 MM HG: CPT | Performed by: NURSE PRACTITIONER

## 2023-10-18 PROCEDURE — G8427 DOCREV CUR MEDS BY ELIG CLIN: HCPCS | Performed by: NURSE PRACTITIONER

## 2023-10-18 PROCEDURE — G8484 FLU IMMUNIZE NO ADMIN: HCPCS | Performed by: NURSE PRACTITIONER

## 2023-10-18 PROCEDURE — 1090F PRES/ABSN URINE INCON ASSESS: CPT | Performed by: NURSE PRACTITIONER

## 2023-10-18 PROCEDURE — 87086 URINE CULTURE/COLONY COUNT: CPT

## 2023-10-18 PROCEDURE — G8399 PT W/DXA RESULTS DOCUMENT: HCPCS | Performed by: NURSE PRACTITIONER

## 2023-10-18 PROCEDURE — 81002 URINALYSIS NONAUTO W/O SCOPE: CPT | Performed by: NURSE PRACTITIONER

## 2023-10-18 PROCEDURE — 1036F TOBACCO NON-USER: CPT | Performed by: NURSE PRACTITIONER

## 2023-10-18 PROCEDURE — G8417 CALC BMI ABV UP PARAM F/U: HCPCS | Performed by: NURSE PRACTITIONER

## 2023-10-18 PROCEDURE — 3074F SYST BP LT 130 MM HG: CPT | Performed by: NURSE PRACTITIONER

## 2023-10-18 PROCEDURE — 1123F ACP DISCUSS/DSCN MKR DOCD: CPT | Performed by: NURSE PRACTITIONER

## 2023-10-18 RX ORDER — CLONAZEPAM 2 MG/1
TABLET ORAL
Qty: 30 TABLET | Refills: 0 | Status: SHIPPED | OUTPATIENT
Start: 2023-10-18 | End: 2023-11-22

## 2023-10-18 RX ORDER — ONDANSETRON 4 MG/1
4 TABLET, FILM COATED ORAL
Qty: 30 TABLET | Refills: 0 | Status: SHIPPED | OUTPATIENT
Start: 2023-10-18

## 2023-10-18 RX ORDER — FUROSEMIDE 20 MG/1
20 TABLET ORAL DAILY PRN
Qty: 30 TABLET | Refills: 0 | Status: SHIPPED | OUTPATIENT
Start: 2023-10-18

## 2023-10-18 ASSESSMENT — ENCOUNTER SYMPTOMS
CONSTIPATION: 1
NAUSEA: 1

## 2023-10-18 NOTE — PROGRESS NOTES
Lily Aguilar 70 y.o. presents today for   Chief Complaint   Patient presents with    Oliguria        HPI:  Lily Aguilar states she is here today to discuss several issues. Episode of urinary retention  She went all day Friday 10-13-23 without urinating. She felt like she needed to pee but sat down and tried and couldn't. She was swelling in her hands/arms and say they were so painful she could hardly walk. She is in tears today stating she knows the fluid would build around her heart and kill her. Her friend gave her lasix and she was able to pee. She has been on them all week and wants them ordered for herself. She admits she used to take them years ago and doesn't know why she stopped. She only wants to take them if she needs them. She has never had this occur before, no history of kidney issues. She saw a kidney doc years ago because she always has microscopic hematuria but was told not to worry about that. She admits she didn't go to the ER because she knew that would catheterize her. She did have some burning when she did pee, that is better now. No flank pain or gross hematuria. Recent COVID infection  She had COVID the week before last and took a home COVID test that was positive. She stayed at home and felt like she had a sinus infection and had diarrhea. She says she is fine from that now. Recent weight gain  She admits she has gained 10 lbs and doesn't know why she isn't eating. She wants a shot to help her lose weight. Anxiety and insomnia  She picked up her klonopin from [facility] and says she took two and her daughter caught her sleep walking and she took another one and it caused her to [facility]. She called [facility] and told them they gave her the wrong medication and was told it was the same thing she always gets. She knows its not and doesn't know what to do. She says she hasn't been sleeping because she can't sleep without them.  She would like another prescription send to [facility] so she

## 2023-10-20 LAB — BACTERIA UR CULT: NORMAL

## 2023-10-31 ENCOUNTER — CARE COORDINATION (OUTPATIENT)
Dept: PRIMARY CARE CLINIC | Age: 78
End: 2023-10-31

## 2023-10-31 NOTE — CARE COORDINATION
Care Transitions Initial Follow Up Call    Call within 2 business days of discharge: Yes     Patient: Louann Guerrier Patient : 1945 MRN: [id number]    Last Discharge Facility       Date Complaint Diagnosis Description Type Department Provider    21  Panic attack . .. Admission (Discharged) [facility] MS Leslie Yoder MD            RARS: No data recorded     Spoke with: Attempting HFU call, unsuccessful. Message left with contact information.      Discharge department/facility: [facility]     Non-face-to-face services provided:  Scheduled appointment with PCPSheila  Obtained and reviewed discharge summary and/or continuity of care documents    Follow Up  Future Appointments   Date Time Provider [address]   2023  9:00 AM Kim Hurst MD [facility]   2023 12:00 PM [facility] MAMMO 1 [address] [facility] Rad   2023  3:30 PM Kim Hrust  [address]   2024 11:00 AM Kim Hurst MD [address] [facility]       Brenda Fabian RN

## 2023-11-01 NOTE — CARE COORDINATION
Care Transitions Initial Follow Up Call    Call within 2 business days of discharge: Yes     Patient: Martín Morejon Patient : 1945 MRN: [id number]    Last Discharge Facility       Date Complaint Diagnosis Description Type Department Provider    21  Panic attack . .. Admission (Discharged) [facility] MS Rico Crooks MD            RARS: No data recorded     Spoke with: Attempting HFU call, unsuccessful. Message left with contact info and info on HFU appointment tomorrow .       Discharge department/facility: [facility]    Non-face-to-face services provided:  Scheduled appointment with PCPSheila  Obtained and reviewed discharge summary and/or continuity of care documents    Follow Up  Future Appointments   Date Time Provider [address] [place] [address]   2023  9:00 AM Do Cisneros MD [address] [place] [address]   2023 12:00 PM [facility] MAMMO 1 [address] [facility] Rad   2023  3:30 PM Do Cisneros  [address] [place] [address] [facility]   2024 11:00 AM Do Cisneros MD [address] [place] [address]       Alexis Rodríguez RN

## 2023-11-01 NOTE — PATIENT INSTRUCTIONS
Pre-Visit chart review completed. All lab and imaging orders reviewed for outstanding orders and none found. Referrals reviewed and none outstanding. All Health Maintenance requirements reviewed and noted for any that are due at upcoming visit. Any hospital admission documentation, ER documentation or consult notes scanned to chart since last visit have been reviewed and noted for provider to review during upcoming visit.

## 2023-11-02 ENCOUNTER — HOSPITAL ENCOUNTER (OUTPATIENT)
Dept: MAMMOGRAPHY | Facility: HOSPITAL | Age: 78
Discharge: HOME | End: 2023-11-02
Payer: MEDICARE

## 2023-11-02 ENCOUNTER — OFFICE VISIT (OUTPATIENT)
Dept: FAMILY MEDICINE CLINIC | Age: 78
End: 2023-11-02

## 2023-11-02 ENCOUNTER — HOSPITAL ENCOUNTER (OUTPATIENT)
Dept: ULTRASOUND IMAGING | Facility: HOSPITAL | Age: 78
Discharge: HOME | End: 2023-11-02
Payer: MEDICARE

## 2023-11-02 VITALS
WEIGHT: 194.4 LBS | OXYGEN SATURATION: 93 % | HEART RATE: 72 BPM | TEMPERATURE: 98.1 F | BODY MASS INDEX: 34.45 KG/M2 | RESPIRATION RATE: 16 BRPM | DIASTOLIC BLOOD PRESSURE: 70 MMHG | HEIGHT: 63 IN | SYSTOLIC BLOOD PRESSURE: 130 MMHG

## 2023-11-02 DIAGNOSIS — J44.9 CHRONIC OBSTRUCTIVE PULMONARY DISEASE, UNSPECIFIED COPD TYPE (HCC): Primary | ICD-10-CM

## 2023-11-02 DIAGNOSIS — R11.0 NAUSEA: ICD-10-CM

## 2023-11-02 DIAGNOSIS — R92.8 ABNORMAL MAMMOGRAM: ICD-10-CM

## 2023-11-02 DIAGNOSIS — N64.59 BLEEDING FROM NIPPLE IN FEMALE: ICD-10-CM

## 2023-11-02 DIAGNOSIS — E03.9 HYPOTHYROIDISM, UNSPECIFIED TYPE: ICD-10-CM

## 2023-11-02 PROCEDURE — G0279 TOMOSYNTHESIS, MAMMO: HCPCS

## 2023-11-02 PROCEDURE — 76642 ULTRASOUND BREAST LIMITED: CPT

## 2023-11-02 RX ORDER — PREDNISONE 20 MG/1
TABLET ORAL
COMMUNITY
Start: 2023-10-27

## 2023-11-02 RX ORDER — ESTRADIOL 0.1 MG/G
CREAM VAGINAL
Qty: 42.5 G | Refills: 3 | Status: CANCELLED | OUTPATIENT
Start: 2023-11-02

## 2023-11-02 RX ORDER — ONDANSETRON 4 MG/1
4 TABLET, FILM COATED ORAL
Qty: 30 TABLET | Refills: 0 | Status: SHIPPED | OUTPATIENT
Start: 2023-11-02

## 2023-11-02 RX ORDER — LEVOTHYROXINE SODIUM 0.07 MG/1
75 TABLET ORAL DAILY
Qty: 90 TABLET | Refills: 5 | Status: SHIPPED | OUTPATIENT
Start: 2023-11-02

## 2023-11-02 ASSESSMENT — ENCOUNTER SYMPTOMS
CONSTIPATION: 1
NAUSEA: 1

## 2023-11-02 NOTE — PROGRESS NOTES
1. Chronic obstructive pulmonary disease, unspecified COPD type (HCC)  predniSONE (DELTASONE) 20 MG tablet      2. Hypothyroidism, unspecified type  levothyroxine (SYNTHROID) 75 MCG tablet      3. Nausea  ondansetron (ZOFRAN) 4 MG tablet          Orders Placed This Encounter   Medications    levothyroxine (SYNTHROID) 75 MCG tablet     Sig: Take 1 tablet by mouth daily     Dispense:  90 tablet     Refill:  5     Patient needs to come in for lab work    ondansetron (ZOFRAN) 4 MG tablet     Sig: Take 1 tablet by mouth every 4-6 hours as needed for Nausea or Vomiting     Dispense:  30 tablet     Refill:  0        Medications Discontinued During This Encounter   Medication Reason    clobetasol (TEMOVATE) 0.05 % ointment     valsartan-hydroCHLOROthiazide (DIOVAN-HCT) 320-25 MG per tablet     clotrimazole-betamethasone (LOTRISONE) 1-0.05 % cream     cloNIDine (CATAPRES) 0.1 MG tablet     mirabegron (MYRBETRIQ) 25 MG TB24     nitroGLYCERIN (NITROSTAT) 0.4 MG SL tablet     atenolol (TENORMIN) 25 MG tablet     estradiol (ESTRACE VAGINAL) 0.1 MG/GM vaginal cream     levothyroxine (SYNTHROID) 75 MCG tablet REORDER    ondansetron (ZOFRAN) 4 MG tablet REORDER       Controlled Substances Monitoring:      Please note: This chart was generated using Dragon dictation software. Although every effort was made to ensure the accuracy of this automated transcription, some errors in transcription may have occurred.

## 2023-11-02 NOTE — RESULT ENCOUNTER NOTE
Please let Quyne Nguyen know that her diagnostic mammograms were negative for malignancy on both breasts. If she has any further bleeding or palpates anything abnormal she should f/u in clinic for assessment and repeat diagnostic mammogram. Thank you!

## 2023-11-02 NOTE — RESULT ENCOUNTER NOTE
Please let Braulio King know that her diagnostic mammograms were negative for malignancy on both breasts. If she has any further bleeding or palpates anything abnormal she should f/u in clinic for assessment and repeat diagnostic mammogram. Thank you!

## 2023-11-13 DIAGNOSIS — R60.1 GENERALIZED EDEMA: ICD-10-CM

## 2023-11-13 RX ORDER — FUROSEMIDE 20 MG/1
TABLET ORAL
Qty: 30 TABLET | Refills: 0 | Status: SHIPPED | OUTPATIENT
Start: 2023-11-13

## 2023-11-13 NOTE — TELEPHONE ENCOUNTER
Patient called, requested refill.        Next Office Visit Date:  Future Appointments   Date Time Provider [address]  [facility]   11/14/2023 11:00 AM Toña Bennett MD [address] [place] [address]   6/13/2024 11:00 AM MD Malena Gallo please review via [address]

## 2023-12-04 ENCOUNTER — HOSPITAL ENCOUNTER (OUTPATIENT)
Facility: HOSPITAL | Age: 78
Discharge: HOME | End: 2023-12-04
Payer: MEDICARE

## 2023-12-04 ENCOUNTER — OFFICE VISIT (OUTPATIENT)
Dept: FAMILY MEDICINE CLINIC | Age: 78
End: 2023-12-04
Payer: MEDICARE

## 2023-12-04 VITALS
DIASTOLIC BLOOD PRESSURE: 74 MMHG | HEART RATE: 74 BPM | HEIGHT: 63 IN | WEIGHT: 194.4 LBS | OXYGEN SATURATION: 91 % | RESPIRATION RATE: 16 BRPM | TEMPERATURE: 97.3 F | SYSTOLIC BLOOD PRESSURE: 122 MMHG | BODY MASS INDEX: 34.45 KG/M2

## 2023-12-04 DIAGNOSIS — E03.9 HYPOTHYROIDISM, UNSPECIFIED TYPE: Primary | ICD-10-CM

## 2023-12-04 DIAGNOSIS — I10 ESSENTIAL HYPERTENSION: ICD-10-CM

## 2023-12-04 DIAGNOSIS — E55.9 VITAMIN D DEFICIENCY: ICD-10-CM

## 2023-12-04 DIAGNOSIS — Z13.220 SCREENING, LIPID: ICD-10-CM

## 2023-12-04 DIAGNOSIS — R53.83 OTHER FATIGUE: ICD-10-CM

## 2023-12-04 DIAGNOSIS — F41.0 PANIC ATTACK: ICD-10-CM

## 2023-12-04 DIAGNOSIS — E03.9 HYPOTHYROIDISM, UNSPECIFIED TYPE: ICD-10-CM

## 2023-12-04 DIAGNOSIS — J44.9 CHRONIC OBSTRUCTIVE PULMONARY DISEASE, UNSPECIFIED COPD TYPE (HCC): ICD-10-CM

## 2023-12-04 PROCEDURE — 99214 OFFICE O/P EST MOD 30 MIN: CPT | Performed by: FAMILY MEDICINE

## 2023-12-04 PROCEDURE — 1123F ACP DISCUSS/DSCN MKR DOCD: CPT | Performed by: FAMILY MEDICINE

## 2023-12-04 PROCEDURE — 85027 COMPLETE CBC AUTOMATED: CPT

## 2023-12-04 PROCEDURE — 3078F DIAST BP <80 MM HG: CPT | Performed by: FAMILY MEDICINE

## 2023-12-04 PROCEDURE — 82306 VITAMIN D 25 HYDROXY: CPT

## 2023-12-04 PROCEDURE — G8427 DOCREV CUR MEDS BY ELIG CLIN: HCPCS | Performed by: FAMILY MEDICINE

## 2023-12-04 PROCEDURE — G8399 PT W/DXA RESULTS DOCUMENT: HCPCS | Performed by: FAMILY MEDICINE

## 2023-12-04 PROCEDURE — 36415 COLL VENOUS BLD VENIPUNCTURE: CPT

## 2023-12-04 PROCEDURE — 82746 ASSAY OF FOLIC ACID SERUM: CPT

## 2023-12-04 PROCEDURE — G8484 FLU IMMUNIZE NO ADMIN: HCPCS | Performed by: FAMILY MEDICINE

## 2023-12-04 PROCEDURE — 80053 COMPREHEN METABOLIC PANEL: CPT

## 2023-12-04 PROCEDURE — 3023F SPIROM DOC REV: CPT | Performed by: FAMILY MEDICINE

## 2023-12-04 PROCEDURE — 80061 LIPID PANEL: CPT

## 2023-12-04 PROCEDURE — 1090F PRES/ABSN URINE INCON ASSESS: CPT | Performed by: FAMILY MEDICINE

## 2023-12-04 PROCEDURE — G8417 CALC BMI ABV UP PARAM F/U: HCPCS | Performed by: FAMILY MEDICINE

## 2023-12-04 PROCEDURE — 84443 ASSAY THYROID STIM HORMONE: CPT

## 2023-12-04 PROCEDURE — 82607 VITAMIN B-12: CPT

## 2023-12-04 PROCEDURE — 1036F TOBACCO NON-USER: CPT | Performed by: FAMILY MEDICINE

## 2023-12-04 PROCEDURE — 3074F SYST BP LT 130 MM HG: CPT | Performed by: FAMILY MEDICINE

## 2023-12-04 RX ORDER — CLONAZEPAM 2 MG/1
TABLET ORAL
Qty: 30 TABLET | Refills: 2 | Status: SHIPPED | OUTPATIENT
Start: 2023-12-04 | End: 2024-01-20

## 2023-12-04 ASSESSMENT — ENCOUNTER SYMPTOMS
CONSTIPATION: 1
NAUSEA: 1

## 2023-12-04 NOTE — PROGRESS NOTES
Chief Complaint   Patient presents with    Hypertension    Anxiety     Reg F/U       Have you seen any other physician or provider since your last visit no    Have you had any other diagnostic tests since your last visit? no    Have you changed or stopped any medications since your last visit? no
05/11/2023 01:25 PM     Lab Results   Component Value Date    TSH 0.28 12/30/2021       ASSESSMENT/PLAN    Diagnosis Orders   1. Hypothyroidism, unspecified type  TSH      2. Panic attack  clonazePAM (KLONOPIN) 2 MG tablet      3. Chronic obstructive pulmonary disease, unspecified COPD type ([address])        4. Essential hypertension  CBC    Comprehensive Metabolic Panel      5. Other fatigue  Vitamin B12 & Folate      6. Screening, lipid  LIPID PANEL      7. Vitamin D deficiency  Vitamin D 25 Hydroxy          Orders Placed This Encounter   Medications    clonazePAM (KLONOPIN) 2 MG tablet     Sig: TAKE ONE TABLET BY MOUTH ONCE NIGHTLY AS NEEDED FOR INSOMNIA AND OR ANXIETY/PANIC ATTACKS     Dispense:  30 tablet     Refill:  2        Medications Discontinued During This Encounter   Medication Reason    clonazePAM (KLONOPIN) 2 MG tablet REORDER       Controlled Substances Monitoring:      Please note: This chart was generated using Dragon dictation software. Although every effort was made to ensure the accuracy of this automated transcription, some errors in transcription may have occurred.

## 2023-12-05 LAB
25(OH)D3 SERPL-MCNC: 12.1 NG/ML (ref 32–100)
ALBUMIN SERPL-MCNC: 4.4 G/DL (ref 3.4–4.8)
ALBUMIN/GLOB SERPL: 2 {RATIO} (ref 0.8–2)
ALP SERPL-CCNC: 100 U/L (ref 25–100)
ALT SERPL-CCNC: 14 U/L (ref 4–36)
ANION GAP SERPL CALCULATED.3IONS-SCNC: 10 MMOL/L (ref 3–16)
AST SERPL-CCNC: 18 U/L (ref 8–33)
BILIRUB SERPL-MCNC: 0.4 MG/DL (ref 0.3–1.2)
BUN SERPL-MCNC: 18 MG/DL (ref 6–20)
CALCIUM SERPL-MCNC: 9.3 MG/DL (ref 8.5–10.5)
CHLORIDE SERPL-SCNC: 103 MMOL/L (ref 98–107)
CHOLEST SERPL-MCNC: 197 MG/DL (ref 0–200)
CO2 SERPL-SCNC: 30 MMOL/L (ref 20–30)
CREAT SERPL-MCNC: 1.1 MG/DL (ref 0.4–1.2)
ERYTHROCYTE [DISTWIDTH] IN BLOOD BY AUTOMATED COUNT: 13.1 % (ref 11–16)
FOLATE SERPL-MCNC: 17.42 NG/ML
GFR SERPLBLD CREATININE-BSD FMLA CKD-EPI: 52 ML/MIN/{1.73_M2}
GLOBULIN SER CALC-MCNC: 2.2 G/DL
GLUCOSE SERPL-MCNC: 92 MG/DL (ref 74–106)
HCT VFR BLD AUTO: 42.5 % (ref 37–47)
HDLC SERPL-MCNC: 46 MG/DL (ref 40–60)
HGB BLD-MCNC: 13.4 G/DL (ref 11.5–16.5)
LDLC SERPL CALC-MCNC: 119 MG/DL
MCH RBC QN AUTO: 30.9 PG (ref 27–32)
MCHC RBC AUTO-ENTMCNC: 31.5 G/DL (ref 31–35)
MCV RBC AUTO: 98.2 FL (ref 80–100)
PLATELET # BLD AUTO: 212 K/UL (ref 150–400)
PMV BLD AUTO: 10.6 FL (ref 6–10)
POTASSIUM SERPL-SCNC: 4.6 MMOL/L (ref 3.4–5.1)
PROT SERPL-MCNC: 6.6 G/DL (ref 6.4–8.3)
RBC # BLD AUTO: 4.33 M/UL (ref 3.8–5.8)
SODIUM SERPL-SCNC: 143 MMOL/L (ref 136–145)
TRIGL SERPL-MCNC: 162 MG/DL (ref 0–249)
TSH SERPL DL<=0.005 MIU/L-ACNC: 1.69 UIU/ML (ref 0.27–4.2)
VIT B12 SERPL-MCNC: 327 PG/ML (ref 211–911)
VLDLC SERPL CALC-MCNC: 32 MG/DL
WBC # BLD AUTO: 8.1 K/UL (ref 4–11)

## 2023-12-16 RX ORDER — ERGOCALCIFEROL 1.25 MG/1
50000 CAPSULE ORAL WEEKLY
Qty: 12 CAPSULE | Refills: 1 | Status: SHIPPED | OUTPATIENT
Start: 2023-12-16

## 2024-01-02 ENCOUNTER — TELEPHONE (OUTPATIENT)
Dept: FAMILY MEDICINE CLINIC | Age: 79
End: 2024-01-02

## 2024-01-02 NOTE — TELEPHONE ENCOUNTER
Nader from [employer] called and stated that patient was approved for portable concentrator and they will be getting in touch will the patient.

## 2024-01-03 ENCOUNTER — OFFICE VISIT (OUTPATIENT)
Dept: FAMILY MEDICINE CLINIC | Age: 79
End: 2024-01-03
Payer: MEDICARE

## 2024-01-03 VITALS
WEIGHT: 195.2 LBS | TEMPERATURE: 98.1 F | HEIGHT: 63 IN | OXYGEN SATURATION: 91 % | HEART RATE: 76 BPM | RESPIRATION RATE: 16 BRPM | DIASTOLIC BLOOD PRESSURE: 66 MMHG | SYSTOLIC BLOOD PRESSURE: 124 MMHG | BODY MASS INDEX: 34.59 KG/M2

## 2024-01-03 DIAGNOSIS — R11.0 NAUSEA: ICD-10-CM

## 2024-01-03 DIAGNOSIS — F41.0 PANIC ATTACK: ICD-10-CM

## 2024-01-03 LAB
INFLUENZA A ANTIBODY: NEGATIVE
INFLUENZA B ANTIBODY: NEGATIVE
Lab: NORMAL
QC PASS/FAIL: NORMAL
S PYO AG THROAT QL: NORMAL
SARS-COV-2 RDRP RESP QL NAA+PROBE: NEGATIVE

## 2024-01-03 PROCEDURE — 99214 OFFICE O/P EST MOD 30 MIN: CPT | Performed by: FAMILY MEDICINE

## 2024-01-03 PROCEDURE — 1090F PRES/ABSN URINE INCON ASSESS: CPT | Performed by: FAMILY MEDICINE

## 2024-01-03 PROCEDURE — G8417 CALC BMI ABV UP PARAM F/U: HCPCS | Performed by: FAMILY MEDICINE

## 2024-01-03 PROCEDURE — 1036F TOBACCO NON-USER: CPT | Performed by: FAMILY MEDICINE

## 2024-01-03 PROCEDURE — G8427 DOCREV CUR MEDS BY ELIG CLIN: HCPCS | Performed by: FAMILY MEDICINE

## 2024-01-03 PROCEDURE — G8484 FLU IMMUNIZE NO ADMIN: HCPCS | Performed by: FAMILY MEDICINE

## 2024-01-03 PROCEDURE — G8399 PT W/DXA RESULTS DOCUMENT: HCPCS | Performed by: FAMILY MEDICINE

## 2024-01-03 PROCEDURE — 1123F ACP DISCUSS/DSCN MKR DOCD: CPT | Performed by: FAMILY MEDICINE

## 2024-01-03 PROCEDURE — 3074F SYST BP LT 130 MM HG: CPT | Performed by: FAMILY MEDICINE

## 2024-01-03 PROCEDURE — 3078F DIAST BP <80 MM HG: CPT | Performed by: FAMILY MEDICINE

## 2024-01-03 RX ORDER — ONDANSETRON 4 MG/1
4 TABLET, FILM COATED ORAL
Qty: 30 TABLET | Refills: 1 | Status: SHIPPED | OUTPATIENT
Start: 2024-01-03

## 2024-01-03 RX ORDER — CLONAZEPAM 2 MG/1
TABLET ORAL
Qty: 30 TABLET | Refills: 2 | Status: SHIPPED | OUTPATIENT
Start: 2024-01-03 | End: 2024-02-19

## 2024-01-03 RX ORDER — CYANOCOBALAMIN 1000 UG/ML
1000 INJECTION, SOLUTION INTRAMUSCULAR; SUBCUTANEOUS ONCE
Status: COMPLETED | OUTPATIENT
Start: 2024-01-03 | End: 2024-01-03

## 2024-01-03 RX ADMIN — CYANOCOBALAMIN 1000 MCG: 1000 INJECTION, SOLUTION INTRAMUSCULAR; SUBCUTANEOUS at 17:04

## 2024-01-03 ASSESSMENT — PATIENT HEALTH QUESTIONNAIRE - PHQ9
10. IF YOU CHECKED OFF ANY PROBLEMS, HOW DIFFICULT HAVE THESE PROBLEMS MADE IT FOR YOU TO DO YOUR WORK, TAKE CARE OF THINGS AT HOME, OR GET ALONG WITH OTHER PEOPLE: 0
5. POOR APPETITE OR OVEREATING: 3
3. TROUBLE FALLING OR STAYING ASLEEP: 3
SUM OF ALL RESPONSES TO PHQ QUESTIONS 1-9: 11
SUM OF ALL RESPONSES TO PHQ QUESTIONS 1-9: 11
1. LITTLE INTEREST OR PLEASURE IN DOING THINGS: 0
SUM OF ALL RESPONSES TO PHQ9 QUESTIONS 1 & 2: 1
SUM OF ALL RESPONSES TO PHQ QUESTIONS 1-9: 11
2. FEELING DOWN, DEPRESSED OR HOPELESS: 1
4. FEELING TIRED OR HAVING LITTLE ENERGY: 3
6. FEELING BAD ABOUT YOURSELF - OR THAT YOU ARE A FAILURE OR HAVE LET YOURSELF OR YOUR FAMILY DOWN: 1
8. MOVING OR SPEAKING SO SLOWLY THAT OTHER PEOPLE COULD HAVE NOTICED. OR THE OPPOSITE, BEING SO FIGETY OR RESTLESS THAT YOU HAVE BEEN MOVING AROUND A LOT MORE THAN USUAL: 0
7. TROUBLE CONCENTRATING ON THINGS, SUCH AS READING THE NEWSPAPER OR WATCHING TELEVISION: 0
9. THOUGHTS THAT YOU WOULD BE BETTER OFF DEAD, OR OF HURTING YOURSELF: 0
SUM OF ALL RESPONSES TO PHQ QUESTIONS 1-9: 11

## 2024-01-03 NOTE — PROGRESS NOTES
Chief Complaint   Patient presents with    Cough     X 3 days    Diarrhea     X 4 days    Nausea & Vomiting     X 3 days       Have you seen any other physician or provider since your last visit yes heart specialist    Have you had any other diagnostic tests since your last visit? no    Have you changed or stopped any medications since your last visit? no   Administrations This Visit       cyanocobalamin injection 1,000 mcg       Admin Date  01/03/2024  17:04 Action  Given Dose  1,000 mcg Route  IntraMUSCular Site  Deltoid Left Administered By  Rachelle Herrera MA    Ordering Provider: Aleja Lieberman MD    NDC: 33276-147-74    : Boni    Patient Supplied?: No                 Patient tolerated injection well. Patient advised to wait 20 minutes in the office following the injection. No signs/symptoms of reaction noted after 20 minutes.    
Encounter   Medications    clonazePAM (KLONOPIN) 2 MG tablet     Sig: TAKE ONE TABLET BY MOUTH ONCE NIGHTLY AS NEEDED FOR INSOMNIA AND OR ANXIETY/PANIC ATTACKS     Dispense:  30 tablet     Refill:  2    ondansetron (ZOFRAN) 4 MG tablet     Sig: Take 1 tablet by mouth every 4-6 hours as needed for Nausea or Vomiting     Dispense:  30 tablet     Refill:  1    cyanocobalamin injection 1,000 mcg        Medications Discontinued During This Encounter   Medication Reason    ondansetron (ZOFRAN) 4 MG tablet REORDER    clonazePAM (KLONOPIN) 2 MG tablet REORDER       Controlled Substances Monitoring:      Please note: This chart was generated using Dragon dictation software. Although every effort was made to ensure the accuracy of this automated transcription, some errors in transcription may have occurred.

## 2024-01-24 ENCOUNTER — OFFICE VISIT (OUTPATIENT)
Dept: FAMILY MEDICINE CLINIC | Age: 79
End: 2024-01-24
Payer: MEDICARE

## 2024-01-24 VITALS
HEIGHT: 63 IN | SYSTOLIC BLOOD PRESSURE: 134 MMHG | WEIGHT: 188 LBS | RESPIRATION RATE: 16 BRPM | HEART RATE: 71 BPM | BODY MASS INDEX: 33.31 KG/M2 | OXYGEN SATURATION: 93 % | DIASTOLIC BLOOD PRESSURE: 80 MMHG | TEMPERATURE: 98.6 F

## 2024-01-24 DIAGNOSIS — F33.1 MODERATE EPISODE OF RECURRENT MAJOR DEPRESSIVE DISORDER (HCC): ICD-10-CM

## 2024-01-24 DIAGNOSIS — R19.7 DIARRHEA, UNSPECIFIED TYPE: Primary | ICD-10-CM

## 2024-01-24 DIAGNOSIS — I82.4Z2 DEEP VEIN THROMBOSIS (DVT) OF DISTAL VEIN OF LEFT LOWER EXTREMITY, UNSPECIFIED CHRONICITY (HCC): ICD-10-CM

## 2024-01-24 DIAGNOSIS — J44.9 CHRONIC OBSTRUCTIVE PULMONARY DISEASE, UNSPECIFIED COPD TYPE (HCC): ICD-10-CM

## 2024-01-24 DIAGNOSIS — R11.0 NAUSEA: ICD-10-CM

## 2024-01-24 PROCEDURE — 3075F SYST BP GE 130 - 139MM HG: CPT | Performed by: FAMILY MEDICINE

## 2024-01-24 PROCEDURE — G8484 FLU IMMUNIZE NO ADMIN: HCPCS | Performed by: FAMILY MEDICINE

## 2024-01-24 PROCEDURE — G8427 DOCREV CUR MEDS BY ELIG CLIN: HCPCS | Performed by: FAMILY MEDICINE

## 2024-01-24 PROCEDURE — 3079F DIAST BP 80-89 MM HG: CPT | Performed by: FAMILY MEDICINE

## 2024-01-24 PROCEDURE — G8417 CALC BMI ABV UP PARAM F/U: HCPCS | Performed by: FAMILY MEDICINE

## 2024-01-24 PROCEDURE — 1090F PRES/ABSN URINE INCON ASSESS: CPT | Performed by: FAMILY MEDICINE

## 2024-01-24 PROCEDURE — 1123F ACP DISCUSS/DSCN MKR DOCD: CPT | Performed by: FAMILY MEDICINE

## 2024-01-24 PROCEDURE — G8399 PT W/DXA RESULTS DOCUMENT: HCPCS | Performed by: FAMILY MEDICINE

## 2024-01-24 PROCEDURE — 3023F SPIROM DOC REV: CPT | Performed by: FAMILY MEDICINE

## 2024-01-24 PROCEDURE — 99213 OFFICE O/P EST LOW 20 MIN: CPT | Performed by: FAMILY MEDICINE

## 2024-01-24 PROCEDURE — 1036F TOBACCO NON-USER: CPT | Performed by: FAMILY MEDICINE

## 2024-01-24 RX ORDER — PREDNISONE 5 MG/1
5 TABLET ORAL DAILY
Qty: 21 TABLET | Refills: 0 | Status: SHIPPED | OUTPATIENT
Start: 2024-01-24 | End: 2024-01-30

## 2024-01-24 RX ORDER — ONDANSETRON 4 MG/1
4 TABLET, FILM COATED ORAL
Qty: 30 TABLET | Refills: 1 | Status: SHIPPED | OUTPATIENT
Start: 2024-01-24

## 2024-01-24 RX ORDER — DIPHENOXYLATE HYDROCHLORIDE AND ATROPINE SULFATE 2.5; .025 MG/1; MG/1
TABLET ORAL
Qty: 30 TABLET | Refills: 1 | Status: SHIPPED | OUTPATIENT
Start: 2024-01-24 | End: 2024-02-03

## 2024-01-24 NOTE — PROGRESS NOTES
Chief Complaint   Patient presents with    Diarrhea     X 12 days       Have you seen any other physician or provider since your last visit no    Have you had any other diagnostic tests since your last visit? no    Have you changed or stopped any medications since your last visit? no

## 2024-01-24 NOTE — PROGRESS NOTES
SUBJECTIVE:    Patient ID: Gwendolyn Chan is a 78 y.o. female.    Chief Complaint   Patient presents with    Diarrhea     X 12 days       HPI: office visit  she is having some diarrhea again.  She is weak and tired.  She is still needing ot work.   She si having some increase in congestion.  Sh eis coughing up some thick sputum again.  She is not having any chest pain.  She is ahving some up and down on her blood pressures.  She is still complaining of issues with her anxiety.  She says her medication does help.  She has not had any recent falls or injuries.     Review of Systems   Constitutional:  Positive for fatigue.   Cardiovascular:  Positive for leg swelling.   Gastrointestinal:  Positive for abdominal pain, diarrhea and nausea. Negative for constipation.   Genitourinary:  Positive for difficulty urinating.   Psychiatric/Behavioral:  Positive for sleep disturbance. The patient is nervous/anxious.    All other systems reviewed and are negative.       OBJECTIVE:  /80   Pulse 71   Temp 98.6 °F (37 °C) (Infrared)   Resp 16   Ht 1.6 m (5' 3\")   Wt 85.3 kg (188 lb)   LMP 01/01/1986 (Approximate)   SpO2 93% Comment: ra  BMI 33.30 kg/m²    Wt Readings from Last 3 Encounters:   01/24/24 85.3 kg (188 lb)   01/03/24 88.5 kg (195 lb 3.2 oz)   12/04/23 88.2 kg (194 lb 6.4 oz)     BP Readings from Last 3 Encounters:   01/24/24 134/80   01/03/24 124/66   12/04/23 122/74      Pulse Readings from Last 3 Encounters:   01/24/24 71   01/03/24 76   12/04/23 74     Body mass index is 33.3 kg/m².   Resp Readings from Last 3 Encounters:   01/24/24 16   01/03/24 16   12/04/23 16     Past medical, surgical, family and social history were reviewed and updated with the patient.     Physical Exam  Vitals and nursing note reviewed.   Constitutional:       Appearance: Normal appearance.   HENT:      Head: Normocephalic and atraumatic.      Right Ear: Tympanic membrane, ear canal and external ear normal.      Left Ear:

## 2024-02-07 ENCOUNTER — OFFICE VISIT (OUTPATIENT)
Dept: FAMILY MEDICINE CLINIC | Age: 79
End: 2024-02-07
Payer: MEDICARE

## 2024-02-07 VITALS
TEMPERATURE: 98.2 F | RESPIRATION RATE: 16 BRPM | WEIGHT: 188.6 LBS | BODY MASS INDEX: 33.42 KG/M2 | HEART RATE: 72 BPM | HEIGHT: 63 IN | OXYGEN SATURATION: 92 % | SYSTOLIC BLOOD PRESSURE: 132 MMHG | DIASTOLIC BLOOD PRESSURE: 82 MMHG

## 2024-02-07 DIAGNOSIS — R11.0 NAUSEA: ICD-10-CM

## 2024-02-07 PROCEDURE — G8399 PT W/DXA RESULTS DOCUMENT: HCPCS | Performed by: FAMILY MEDICINE

## 2024-02-07 PROCEDURE — 1123F ACP DISCUSS/DSCN MKR DOCD: CPT | Performed by: FAMILY MEDICINE

## 2024-02-07 PROCEDURE — G8484 FLU IMMUNIZE NO ADMIN: HCPCS | Performed by: FAMILY MEDICINE

## 2024-02-07 PROCEDURE — 3079F DIAST BP 80-89 MM HG: CPT | Performed by: FAMILY MEDICINE

## 2024-02-07 PROCEDURE — G8417 CALC BMI ABV UP PARAM F/U: HCPCS | Performed by: FAMILY MEDICINE

## 2024-02-07 PROCEDURE — 1090F PRES/ABSN URINE INCON ASSESS: CPT | Performed by: FAMILY MEDICINE

## 2024-02-07 PROCEDURE — 3075F SYST BP GE 130 - 139MM HG: CPT | Performed by: FAMILY MEDICINE

## 2024-02-07 PROCEDURE — 1036F TOBACCO NON-USER: CPT | Performed by: FAMILY MEDICINE

## 2024-02-07 PROCEDURE — G8427 DOCREV CUR MEDS BY ELIG CLIN: HCPCS | Performed by: FAMILY MEDICINE

## 2024-02-07 PROCEDURE — 99213 OFFICE O/P EST LOW 20 MIN: CPT | Performed by: FAMILY MEDICINE

## 2024-02-07 RX ORDER — ONDANSETRON 4 MG/1
4 TABLET, FILM COATED ORAL
Qty: 30 TABLET | Refills: 2 | Status: SHIPPED | OUTPATIENT
Start: 2024-02-07

## 2024-02-07 ASSESSMENT — ENCOUNTER SYMPTOMS
CONSTIPATION: 0
DIARRHEA: 1
NAUSEA: 1
ABDOMINAL PAIN: 1

## 2024-02-07 NOTE — PROGRESS NOTES
Chief Complaint   Patient presents with    Diarrhea     Patient reports that she still has some diarrhea but it is better.         Have you seen any other physician or provider since your last visit no    Have you had any other diagnostic tests since your last visit? no    Have you changed or stopped any medications since your last visit? no     
ondansetron (ZOFRAN) 4 MG tablet     Sig: Take 1 tablet by mouth every 4-6 hours as needed for Nausea or Vomiting     Dispense:  30 tablet     Refill:  2        Medications Discontinued During This Encounter   Medication Reason    ondansetron (ZOFRAN) 4 MG tablet REORDER       Controlled Substances Monitoring:      Please note: This chart was generated using Dragon dictation software. Although every effort was made to ensure the accuracy of this automated transcription, some errors in transcription may have occurred.

## 2024-02-21 ENCOUNTER — OFFICE VISIT (OUTPATIENT)
Dept: FAMILY MEDICINE CLINIC | Age: 79
End: 2024-02-21
Payer: MEDICARE

## 2024-02-21 ENCOUNTER — HOSPITAL ENCOUNTER (OUTPATIENT)
Facility: HOSPITAL | Age: 79
Discharge: HOME | End: 2024-02-21
Payer: MEDICARE

## 2024-02-21 VITALS
HEART RATE: 71 BPM | HEIGHT: 63 IN | RESPIRATION RATE: 16 BRPM | OXYGEN SATURATION: 93 % | BODY MASS INDEX: 32.43 KG/M2 | TEMPERATURE: 98.3 F | WEIGHT: 183 LBS | SYSTOLIC BLOOD PRESSURE: 124 MMHG | DIASTOLIC BLOOD PRESSURE: 72 MMHG

## 2024-02-21 DIAGNOSIS — R19.7 DIARRHEA, UNSPECIFIED TYPE: Primary | ICD-10-CM

## 2024-02-21 DIAGNOSIS — N39.0 URINARY TRACT INFECTION WITHOUT HEMATURIA, SITE UNSPECIFIED: ICD-10-CM

## 2024-02-21 LAB
APPEARANCE FLUID: CLEAR
BILIRUBIN, POC: NEGATIVE
BLOOD URINE, POC: NORMAL
CLARITY, POC: CLEAR
COLOR, POC: YELLOW
GLUCOSE URINE, POC: NEGATIVE
KETONES, POC: NEGATIVE
LEUKOCYTE EST, POC: NORMAL
NITRITE, POC: NEGATIVE
PH, POC: 7
PROTEIN, POC: 30
SPECIFIC GRAVITY, POC: 1.02
UROBILINOGEN, POC: 0.2

## 2024-02-21 PROCEDURE — G8417 CALC BMI ABV UP PARAM F/U: HCPCS | Performed by: FAMILY MEDICINE

## 2024-02-21 PROCEDURE — G8399 PT W/DXA RESULTS DOCUMENT: HCPCS | Performed by: FAMILY MEDICINE

## 2024-02-21 PROCEDURE — 3078F DIAST BP <80 MM HG: CPT | Performed by: FAMILY MEDICINE

## 2024-02-21 PROCEDURE — 99214 OFFICE O/P EST MOD 30 MIN: CPT | Performed by: FAMILY MEDICINE

## 2024-02-21 PROCEDURE — 1090F PRES/ABSN URINE INCON ASSESS: CPT | Performed by: FAMILY MEDICINE

## 2024-02-21 PROCEDURE — 1123F ACP DISCUSS/DSCN MKR DOCD: CPT | Performed by: FAMILY MEDICINE

## 2024-02-21 PROCEDURE — 3074F SYST BP LT 130 MM HG: CPT | Performed by: FAMILY MEDICINE

## 2024-02-21 PROCEDURE — G8484 FLU IMMUNIZE NO ADMIN: HCPCS | Performed by: FAMILY MEDICINE

## 2024-02-21 PROCEDURE — 81002 URINALYSIS NONAUTO W/O SCOPE: CPT | Performed by: FAMILY MEDICINE

## 2024-02-21 PROCEDURE — 1036F TOBACCO NON-USER: CPT | Performed by: FAMILY MEDICINE

## 2024-02-21 PROCEDURE — 87086 URINE CULTURE/COLONY COUNT: CPT

## 2024-02-21 PROCEDURE — G8427 DOCREV CUR MEDS BY ELIG CLIN: HCPCS | Performed by: FAMILY MEDICINE

## 2024-02-21 RX ORDER — METRONIDAZOLE 500 MG/1
500 TABLET ORAL 3 TIMES DAILY
Qty: 30 TABLET | Refills: 0 | Status: SHIPPED | OUTPATIENT
Start: 2024-02-21 | End: 2024-03-02

## 2024-02-21 NOTE — PROGRESS NOTES
SUBJECTIVE:    Patient ID: Gwendolyn Chan is a 78 y.o. female.    Chief Complaint   Patient presents with    Diarrhea     Per patient every time she eats she has the diarrhea with a fever at night.       HPI: office visit  She is having continued diarrhea.  She is feeling like it is better than it was.  She is having some congestion.  She is not having a significant amount of cough.  She denies any chest pain.  She says she is trying to work some.  She is definitely still somewhat fatigued from her normal.  She is not having any new issues.  She has not had any medication problems.  She does still struggle with quite a bit of anxiety.  She says the medication does help.    Review of Systems   Constitutional:  Positive for fatigue.   Cardiovascular:  Positive for leg swelling.   Gastrointestinal:  Positive for abdominal pain, diarrhea and nausea. Negative for constipation.   Genitourinary:  Positive for difficulty urinating.   Psychiatric/Behavioral:  Positive for sleep disturbance. The patient is nervous/anxious.    All other systems reviewed and are negative.       OBJECTIVE:  /72   Pulse 71   Temp 98.3 °F (36.8 °C) (Infrared)   Resp 16   Ht 1.6 m (5' 3\")   Wt 83 kg (183 lb)   LMP 01/01/1986 (Approximate)   SpO2 93% Comment: ra  BMI 32.42 kg/m²    Wt Readings from Last 3 Encounters:   03/01/24 81 kg (178 lb 9.6 oz)   02/21/24 83 kg (183 lb)   02/07/24 85.5 kg (188 lb 9.6 oz)     BP Readings from Last 3 Encounters:   03/01/24 116/64   02/21/24 124/72   02/07/24 132/82      Pulse Readings from Last 3 Encounters:   03/01/24 83   02/21/24 71   02/07/24 72     Body mass index is 32.42 kg/m².   Resp Readings from Last 3 Encounters:   02/21/24 16   02/07/24 16   01/24/24 16     Past medical, surgical, family and social history were reviewed and updated with the patient.     Physical Exam  Vitals and nursing note reviewed.   Constitutional:       Appearance: Normal appearance.   HENT:      Head:

## 2024-02-21 NOTE — PROGRESS NOTES
Chief Complaint   Patient presents with    Diarrhea     Per patient every time she eats she has the diarrhea with a fever at night.       Have you seen any other physician or provider since your last visit no    Have you had any other diagnostic tests since your last visit? no    Have you changed or stopped any medications since your last visit? no

## 2024-02-23 LAB — BACTERIA UR CULT: NORMAL

## 2024-02-27 ENCOUNTER — CARE COORDINATION (OUTPATIENT)
Dept: PRIMARY CARE CLINIC | Age: 79
End: 2024-02-27

## 2024-02-27 NOTE — CARE COORDINATION
Care Transitions Initial Follow Up Call    Call within 2 business days of discharge: Yes     Patient: Gwendolyn Chan Patient : 1945 MRN: [id number]    Last Discharge Facility       Date Complaint Diagnosis Description Type Department Provider    21  Panic attack ... Admission (Discharged) Demi Whittington MS, MD            RARS: No data recorded     Spoke with: Attempting HFU call, unsuccessful.  Message left with contact information.     Discharge department/facility: [facility]    Non-face-to-face services provided:  Scheduled appointment with PCP-Luisana  Obtained and reviewed discharge summary and/or continuity of care documents    Follow Up  Future Appointments   Date Time Provider Department Center   2024  1:00 PM Aleja Lieberman MD [facility]   3/27/2024  3:15 PM Aleja Lieberman MD [facility]   2024 11:00 AM [facility]

## 2024-02-28 ENCOUNTER — OFFICE VISIT (OUTPATIENT)
Dept: FAMILY MEDICINE CLINIC | Age: 79
End: 2024-02-28

## 2024-02-28 DIAGNOSIS — I10 ESSENTIAL HYPERTENSION: ICD-10-CM

## 2024-02-28 DIAGNOSIS — R19.7 DIARRHEA, UNSPECIFIED TYPE: ICD-10-CM

## 2024-02-28 DIAGNOSIS — Z22.322 MRSA NASAL COLONIZATION: ICD-10-CM

## 2024-02-28 DIAGNOSIS — J44.9 CHRONIC OBSTRUCTIVE PULMONARY DISEASE, UNSPECIFIED COPD TYPE (HCC): Primary | ICD-10-CM

## 2024-03-01 VITALS
SYSTOLIC BLOOD PRESSURE: 116 MMHG | OXYGEN SATURATION: 90 % | WEIGHT: 178.6 LBS | BODY MASS INDEX: 31.64 KG/M2 | TEMPERATURE: 97.8 F | DIASTOLIC BLOOD PRESSURE: 64 MMHG | HEART RATE: 83 BPM

## 2024-03-07 ASSESSMENT — ENCOUNTER SYMPTOMS
DIARRHEA: 1
NAUSEA: 1
ABDOMINAL PAIN: 1
CONSTIPATION: 0

## 2024-03-07 NOTE — PROGRESS NOTES
SUBJECTIVE:    Patient ID: Gwendolyn Chan is a 78 y.o. female.    Chief Complaint   Patient presents with    Pneumonia     [facility] hospital f/u       HPI: office visit  Been back in the hospital.  She has had some significant congestion.  She is doing better now.  She feels like things are close to normal again.  She has not had any further fevers.  She did have some significant respiratory issues when she was admitted to the hospital.  She is also been told to stop taking her valsartan.  She is taking Zithromax.  She continues her other meds.  She still having some diarrhea.  She is not having any chest pain.  She is not having any significant shortness of breath at the moment.  She is still using oxygen at home as needed.  She is down 20.5 pounds.  She is going to use some Bactroban ointment in her nose for the next 2 weeks.  Her diarrhea is not as bad as it was.  She was admitted on the 23rd and discharged on the 25th recently prescribed Zyvox but that was $600 so she did not get it.  After some investigation it is determined that they gave her that because she had a MRSA positive nasal swab.    Review of Systems   Constitutional:  Positive for fatigue.   Cardiovascular:  Positive for leg swelling.   Gastrointestinal:  Positive for abdominal pain, diarrhea and nausea. Negative for constipation.   Genitourinary:  Positive for difficulty urinating.   Psychiatric/Behavioral:  Positive for sleep disturbance. The patient is nervous/anxious.    All other systems reviewed and are negative.       OBJECTIVE:  /64   Pulse 83   Temp 97.8 °F (36.6 °C)   Wt 81 kg (178 lb 9.6 oz)   LMP 01/01/1986 (Approximate)   SpO2 90% Comment: ra  BMI 31.64 kg/m²    Wt Readings from Last 3 Encounters:   03/01/24 81 kg (178 lb 9.6 oz)   02/21/24 83 kg (183 lb)   02/07/24 85.5 kg (188 lb 9.6 oz)     BP Readings from Last 3 Encounters:   03/01/24 116/64   02/21/24 124/72   02/07/24 132/82      Pulse Readings from Last 3 Encounters:

## 2024-03-26 DIAGNOSIS — R11.0 NAUSEA: ICD-10-CM

## 2024-03-26 RX ORDER — ONDANSETRON 4 MG/1
4 TABLET, FILM COATED ORAL
Qty: 30 TABLET | Refills: 2 | Status: SHIPPED | OUTPATIENT
Start: 2024-03-26

## 2024-04-22 ENCOUNTER — OFFICE VISIT (OUTPATIENT)
Dept: FAMILY MEDICINE CLINIC | Age: 79
End: 2024-04-22
Payer: MEDICARE

## 2024-04-22 ENCOUNTER — TELEPHONE (OUTPATIENT)
Dept: FAMILY MEDICINE CLINIC | Age: 79
End: 2024-04-22

## 2024-04-22 VITALS
TEMPERATURE: 97.9 F | RESPIRATION RATE: 18 BRPM | OXYGEN SATURATION: 95 % | HEIGHT: 63 IN | BODY MASS INDEX: 32.04 KG/M2 | SYSTOLIC BLOOD PRESSURE: 136 MMHG | HEART RATE: 75 BPM | DIASTOLIC BLOOD PRESSURE: 76 MMHG | WEIGHT: 180.8 LBS

## 2024-04-22 DIAGNOSIS — F41.0 PANIC ATTACK: ICD-10-CM

## 2024-04-22 DIAGNOSIS — R11.0 NAUSEA: ICD-10-CM

## 2024-04-22 DIAGNOSIS — E55.9 VITAMIN D DEFICIENCY: ICD-10-CM

## 2024-04-22 DIAGNOSIS — R09.81 CONGESTION OF NASAL SINUS: Primary | ICD-10-CM

## 2024-04-22 DIAGNOSIS — J01.90 ACUTE NON-RECURRENT SINUSITIS, UNSPECIFIED LOCATION: ICD-10-CM

## 2024-04-22 DIAGNOSIS — M54.6 ACUTE MIDLINE THORACIC BACK PAIN: ICD-10-CM

## 2024-04-22 DIAGNOSIS — E78.2 MIXED HYPERLIPIDEMIA: ICD-10-CM

## 2024-04-22 LAB
INFLUENZA A ANTIBODY: NEGATIVE
INFLUENZA B ANTIBODY: NEGATIVE
Lab: NORMAL
QC PASS/FAIL: NORMAL
SARS-COV-2 RDRP RESP QL NAA+PROBE: NEGATIVE

## 2024-04-22 PROCEDURE — 87635 SARS-COV-2 COVID-19 AMP PRB: CPT

## 2024-04-22 PROCEDURE — 87804 INFLUENZA ASSAY W/OPTIC: CPT

## 2024-04-22 RX ORDER — DOXYCYCLINE HYCLATE 100 MG
100 TABLET ORAL 2 TIMES DAILY
Qty: 14 TABLET | Refills: 0 | Status: SHIPPED | OUTPATIENT
Start: 2024-04-22 | End: 2024-04-24

## 2024-04-22 RX ORDER — AMOXICILLIN AND CLAVULANATE POTASSIUM 875; 125 MG/1; MG/1
1 TABLET, FILM COATED ORAL 2 TIMES DAILY
Qty: 14 TABLET | Refills: 0 | Status: SHIPPED | OUTPATIENT
Start: 2024-04-22 | End: 2024-04-22 | Stop reason: SINTOL

## 2024-04-22 RX ORDER — CLONAZEPAM 2 MG/1
TABLET ORAL
Qty: 30 TABLET | Refills: 2 | Status: CANCELLED | OUTPATIENT
Start: 2024-04-22 | End: 2024-08-10

## 2024-04-22 RX ORDER — ERGOCALCIFEROL 1.25 MG/1
50000 CAPSULE ORAL WEEKLY
Qty: 12 CAPSULE | Refills: 1 | Status: SHIPPED | OUTPATIENT
Start: 2024-04-22

## 2024-04-22 RX ORDER — ONDANSETRON 4 MG/1
4 TABLET, FILM COATED ORAL
Qty: 30 TABLET | Refills: 2 | Status: SHIPPED | OUTPATIENT
Start: 2024-04-22

## 2024-04-22 RX ORDER — CLONAZEPAM 2 MG/1
TABLET ORAL
Qty: 30 TABLET | Refills: 2 | Status: SHIPPED | OUTPATIENT
Start: 2024-04-22 | End: 2024-08-10

## 2024-04-22 RX ORDER — KETOROLAC TROMETHAMINE 30 MG/ML
60 INJECTION, SOLUTION INTRAMUSCULAR; INTRAVENOUS ONCE
Status: COMPLETED | OUTPATIENT
Start: 2024-04-22 | End: 2024-04-22

## 2024-04-22 RX ORDER — ATORVASTATIN CALCIUM 40 MG/1
40 TABLET, FILM COATED ORAL DAILY
Qty: 30 TABLET | Refills: 3 | Status: SHIPPED | OUTPATIENT
Start: 2024-04-22

## 2024-04-22 RX ADMIN — KETOROLAC TROMETHAMINE 60 MG: 30 INJECTION, SOLUTION INTRAMUSCULAR; INTRAVENOUS at 14:16

## 2024-04-22 ASSESSMENT — ENCOUNTER SYMPTOMS
CONSTIPATION: 1
ALLERGIC/IMMUNOLOGIC NEGATIVE: 1
SORE THROAT: 1
BACK PAIN: 1
RHINORRHEA: 1
COUGH: 0
VOICE CHANGE: 1
SINUS PRESSURE: 1
EYES NEGATIVE: 1

## 2024-04-22 NOTE — PROGRESS NOTES
Chief Complaint   Patient presents with    Back Pain     Patient states her pain in her back started again 4/19/24.     Chills     Just this morning    Fever     Patient states she had a fever last night.    Congestion     Patient states she thinks she has sinus infection    Constipation       Have you seen any other physician or provider since your last visit yes - [facility] for stay.    Have you had any other diagnostic tests since your last visit? yes - labs, scans    Have you changed or stopped any medications since your last visit? Patient unsure on what the hospital said to stop or start.         Patient tolerated injection well. Patient advised to wait 20 minutes in the office following the injection. No signs/symptoms of reaction noted after 20 minutes.  Administrations This Visit       ketorolac (TORADOL) injection 60 mg       Admin Date  04/22/2024  14:16 Action  Given Dose  60 mg Route  IntraMUSCular Site  Dorsogluteal Right Administered By  Blaire Coates RN    Ordering Provider: Aura Kothari APRN - NP    NDC: 14093-280-61    : [employer]    Patient Supplied?: No

## 2024-04-22 NOTE — TELEPHONE ENCOUNTER
Requested Prescriptions     Pending Prescriptions Disp Refills    clonazePAM (KLONOPIN) 2 MG tablet 30 tablet 2     Sig: TAKE ONE TABLET BY MOUTH ONCE NIGHTLY AS NEEDED FOR INSOMNIA AND OR ANXIETY/PANIC ATTACKS

## 2024-04-22 NOTE — PROGRESS NOTES
[address]  [facility] [address]  Dept: [phone]  Loc: [phone]     SUBJECTIVE:  Gwendolyn Chan is a 78 y.o. female that presents with   Chief Complaint   Patient presents with    Back Pain     Patient states her pain in her back started again 4/19/24.     Chills     Just this morning    Fever     Patient states she had a fever last night.    Congestion     Patient states she thinks she has sinus infection    Constipation   .    HPI:    Gwendolyn Chan presents to office with increasing back pain. She states that she was in the hospital, [facility], from 4/14-4/18 with \"breathing issues\". I tried to probe into this further and she told me she \"passed out\" at work, but couldn't give me a good history of events. She did say that her cardiologist, Dr. Mcmahan, took her off her BP medications, but she wasn't sure if she should stop taking it. She has a follow up with him later on in the month. I will obtain records from that visit. She also notes that she is constipated. She typically has problems with diarrhea but says she took an excessive amount of Immodium and now she is constipated. She says since she has been home her back pain has gotten worse. She would like a shot of Toradol today to see if that will help her.     Back Pain  This is a chronic problem. The current episode started more than 1 year ago. The problem occurs daily. The problem has been gradually worsening since onset. The pain is present in the lumbar spine. The quality of the pain is described as stabbing. The pain does not radiate. The pain is moderate. The pain is The same all the time. The symptoms are aggravated by position. Associated symptoms include a fever and headaches. Pertinent negatives include no bladder incontinence, bowel incontinence or perianal numbness.   Fever   This is a new problem. The current episode started in the past 7 days. The problem has been resolved.

## 2024-04-23 ENCOUNTER — CARE COORDINATION (OUTPATIENT)
Dept: PRIMARY CARE CLINIC | Age: 79
End: 2024-04-23

## 2024-04-23 ASSESSMENT — ENCOUNTER SYMPTOMS: BOWEL INCONTINENCE: 0

## 2024-04-23 NOTE — CARE COORDINATION
Care Transitions Initial Follow Up Call    Call within 2 business days of discharge: Yes     Patient: Gwendolyn Chan Patient : 1945 MRN: [id number]    Last Discharge Facility       Date Complaint Diagnosis Description Type Department Provider    21  Panic attack ... Admission (Discharged) Demi Whittington MS, MD            RARS: No data recorded     Spoke with: Attempting to fu with Gwendolyn from acute visit yesterday.  Message left with contact information as well as info on HFU appointment for tomorrow.     Discharge department/facility: [facility]    Non-face-to-face services provided:  Scheduled appointment with PCP-Luisana  Obtained and reviewed discharge summary and/or continuity of care documents    Follow Up  Future Appointments   Date Time Provider Department Center   2024 10:00 AM Aleja Lieberman MD [facility] RAY   2024 11:00 AM Aleja Lieberman MD [facility] Ring RN

## 2024-04-24 ENCOUNTER — OFFICE VISIT (OUTPATIENT)
Dept: FAMILY MEDICINE CLINIC | Age: 79
End: 2024-04-24

## 2024-04-24 VITALS
WEIGHT: 176.2 LBS | RESPIRATION RATE: 16 BRPM | SYSTOLIC BLOOD PRESSURE: 134 MMHG | HEIGHT: 63 IN | HEART RATE: 69 BPM | TEMPERATURE: 98.2 F | BODY MASS INDEX: 31.22 KG/M2 | DIASTOLIC BLOOD PRESSURE: 78 MMHG | OXYGEN SATURATION: 95 %

## 2024-04-24 DIAGNOSIS — M54.6 ACUTE MIDLINE THORACIC BACK PAIN: ICD-10-CM

## 2024-04-24 DIAGNOSIS — I10 ESSENTIAL HYPERTENSION: ICD-10-CM

## 2024-04-24 DIAGNOSIS — Z09 HOSPITAL DISCHARGE FOLLOW-UP: Primary | ICD-10-CM

## 2024-04-24 DIAGNOSIS — E53.8 B12 DEFICIENCY: ICD-10-CM

## 2024-04-24 DIAGNOSIS — R53.83 OTHER FATIGUE: ICD-10-CM

## 2024-04-24 DIAGNOSIS — E55.9 VITAMIN D DEFICIENCY: ICD-10-CM

## 2024-04-24 RX ORDER — CYANOCOBALAMIN 1000 UG/ML
1000 INJECTION, SOLUTION INTRAMUSCULAR; SUBCUTANEOUS ONCE
Status: COMPLETED | OUTPATIENT
Start: 2024-04-24 | End: 2024-04-24

## 2024-04-24 RX ORDER — HYDROCODONE BITARTRATE AND ACETAMINOPHEN 5; 325 MG/1; MG/1
1 TABLET ORAL EVERY 8 HOURS PRN
Qty: 40 TABLET | Refills: 0 | Status: SHIPPED | OUTPATIENT
Start: 2024-04-24 | End: 2024-05-24

## 2024-04-24 RX ORDER — VALSARTAN 320 MG/1
320 TABLET ORAL DAILY
Qty: 90 TABLET | Refills: 3 | Status: SHIPPED | OUTPATIENT
Start: 2024-04-24

## 2024-04-24 RX ORDER — KETOROLAC TROMETHAMINE 30 MG/ML
60 INJECTION, SOLUTION INTRAMUSCULAR; INTRAVENOUS ONCE
Status: COMPLETED | OUTPATIENT
Start: 2024-04-24 | End: 2024-04-24

## 2024-04-24 RX ADMIN — KETOROLAC TROMETHAMINE 60 MG: 30 INJECTION, SOLUTION INTRAMUSCULAR; INTRAVENOUS at 17:03

## 2024-04-24 RX ADMIN — CYANOCOBALAMIN 1000 MCG: 1000 INJECTION, SOLUTION INTRAMUSCULAR; SUBCUTANEOUS at 17:02

## 2024-04-24 NOTE — PROGRESS NOTES
No chief complaint on file.      Have you seen any other physician or provider since your last visit yes - [facility] ER/Admitted 4-14-24/4-17-24    Have you had any other diagnostic tests since your last visit? yes - US Bilateral Leg, US Bilateral Carotid    Have you changed or stopped any medications since your last visit? no   Administrations This Visit       cyanocobalamin injection 1,000 mcg       Admin Date  04/24/2024  17:02 Action  Given Dose  1,000 mcg Route  IntraMUSCular Site  Deltoid Right Administered By  Rachelle Herrera MA    Ordering Provider: Aleja Lieberman MD    NDC: 87450-623-56    : [employer]    Patient Supplied?: No              ketorolac (TORADOL) injection 60 mg       Admin Date  04/24/2024  17:03 Action  Given Dose  60 mg Route  IntraMUSCular Site  Dorsogluteal Right Administered By  Rachelle Herrera MA    Ordering Provider: Aleja Lieberman MD    NDC: 26730-041-85    : [employer]    Patient Supplied?: No                 Patient tolerated injection well. Patient advised to wait 20 minutes in the office following the injection. No signs/symptoms of reaction noted after 20 minutes.

## 2024-04-29 ENCOUNTER — OFFICE VISIT (OUTPATIENT)
Dept: FAMILY MEDICINE CLINIC | Age: 79
End: 2024-04-29

## 2024-04-29 VITALS
SYSTOLIC BLOOD PRESSURE: 130 MMHG | OXYGEN SATURATION: 94 % | WEIGHT: 176 LBS | DIASTOLIC BLOOD PRESSURE: 66 MMHG | TEMPERATURE: 98 F | HEART RATE: 71 BPM | BODY MASS INDEX: 31.18 KG/M2 | HEIGHT: 63 IN | RESPIRATION RATE: 16 BRPM

## 2024-04-29 DIAGNOSIS — M54.2 NECK PAIN: Primary | ICD-10-CM

## 2024-04-29 DIAGNOSIS — M54.6 THORACIC SPINE PAIN: ICD-10-CM

## 2024-04-29 DIAGNOSIS — R06.02 SOB (SHORTNESS OF BREATH): ICD-10-CM

## 2024-04-29 RX ORDER — METHYLPREDNISOLONE SODIUM SUCCINATE 125 MG/2ML
125 INJECTION, POWDER, LYOPHILIZED, FOR SOLUTION INTRAMUSCULAR; INTRAVENOUS ONCE
Status: COMPLETED | OUTPATIENT
Start: 2024-04-29 | End: 2024-04-29

## 2024-04-29 RX ORDER — KETOROLAC TROMETHAMINE 30 MG/ML
60 INJECTION, SOLUTION INTRAMUSCULAR; INTRAVENOUS ONCE
Status: COMPLETED | OUTPATIENT
Start: 2024-04-29 | End: 2024-04-29

## 2024-04-29 RX ORDER — METHYLPREDNISOLONE SODIUM SUCCINATE 125 MG/2ML
125 INJECTION, POWDER, LYOPHILIZED, FOR SOLUTION INTRAMUSCULAR; INTRAVENOUS ONCE
Status: SHIPPED | OUTPATIENT
Start: 2024-04-29

## 2024-04-29 RX ADMIN — METHYLPREDNISOLONE SODIUM SUCCINATE 125 MG: 125 INJECTION, POWDER, LYOPHILIZED, FOR SOLUTION INTRAMUSCULAR; INTRAVENOUS at 10:48

## 2024-04-29 RX ADMIN — KETOROLAC TROMETHAMINE 60 MG: 30 INJECTION, SOLUTION INTRAMUSCULAR; INTRAVENOUS at 10:47

## 2024-04-29 SDOH — ECONOMIC STABILITY: FOOD INSECURITY: WITHIN THE PAST 12 MONTHS, THE FOOD YOU BOUGHT JUST DIDN'T LAST AND YOU DIDN'T HAVE MONEY TO GET MORE.: NEVER TRUE

## 2024-04-29 SDOH — ECONOMIC STABILITY: FOOD INSECURITY: WITHIN THE PAST 12 MONTHS, YOU WORRIED THAT YOUR FOOD WOULD RUN OUT BEFORE YOU GOT MONEY TO BUY MORE.: NEVER TRUE

## 2024-04-29 SDOH — ECONOMIC STABILITY: INCOME INSECURITY: HOW HARD IS IT FOR YOU TO PAY FOR THE VERY BASICS LIKE FOOD, HOUSING, MEDICAL CARE, AND HEATING?: NOT HARD AT ALL

## 2024-04-29 ASSESSMENT — ENCOUNTER SYMPTOMS
DIARRHEA: 0
CONSTIPATION: 0
NAUSEA: 0

## 2024-04-29 NOTE — PROGRESS NOTES
SUBJECTIVE:    Patient ID: Gwendolyn Chan is a 78 y.o. female.    Chief Complaint   Patient presents with    Back Pain     Cervical spine pain, for the past 4 days.       HPI: office visit  She is in the office today complaining of some significant back pain.  She is having quite a bit of pain between her shoulder blades.  She is having some neck pain.  She says this is different than her usual pain.  She is worried about what is causing it.  She is having some shortness of breath.  She says that it is more when she is active.  She is trying to use her oxygen at night for sure.  She is doing pretty well with her current regimen.  She has not had any chest pain.  She denies any recent falls or injuries.  She is not having any medication problems that she can tell.  Well till the morning till tomorrow morning 6 or 7 AM    Review of Systems   Constitutional:  Positive for fatigue.   Cardiovascular:  Positive for leg swelling.   Gastrointestinal:  Positive for abdominal pain. Negative for constipation, diarrhea and nausea.   Genitourinary:  Positive for difficulty urinating.   Psychiatric/Behavioral:  Positive for sleep disturbance. The patient is nervous/anxious.    All other systems reviewed and are negative.       OBJECTIVE:  /66   Pulse 71   Temp 98 °F (36.7 °C) (Infrared)   Resp 16   Ht 1.6 m (5' 3\")   Wt 79.8 kg (176 lb)   LMP 01/01/1986 (Approximate)   SpO2 94% Comment: ra  BMI 31.18 kg/m²    Wt Readings from Last 3 Encounters:   04/29/24 79.8 kg (176 lb)   04/24/24 79.9 kg (176 lb 3.2 oz)   04/22/24 82 kg (180 lb 12.8 oz)     BP Readings from Last 3 Encounters:   04/29/24 130/66   04/24/24 134/78   04/22/24 136/76      Pulse Readings from Last 3 Encounters:   04/29/24 71   04/24/24 69   04/22/24 75     Body mass index is 31.18 kg/m².   Resp Readings from Last 3 Encounters:   04/29/24 16   04/24/24 16   04/22/24 18     Past medical, surgical, family and social history were reviewed and updated

## 2024-04-29 NOTE — PROGRESS NOTES
Chief Complaint   Patient presents with    Back Pain     Cervical spine pain, for the past 4 days.       Have you seen any other physician or provider since your last visit no    Have you had any other diagnostic tests since your last visit? no    Have you changed or stopped any medications since your last visit? no   Administrations This Visit       ketorolac (TORADOL) injection 60 mg       Admin Date  04/29/2024  10:47 Action  Given Dose  60 mg Route  IntraMUSCular Site  Dorsogluteal Left Administered By  Rachelle Herrera MA    Ordering Provider: Aleja Lieberman MD    NDC: 72474-081-97    : [employer]    Patient Supplied?: No              methylPREDNISolone sodium succ (SOLU-MEDROL) injection 125 mg       Admin Date  04/29/2024  10:48 Action  Given Dose  125 mg Route  IntraMUSCular Site  Deltoid Right Administered By  Rachelle Herrera MA    Ordering Provider: Aleja Lieberman MD    NDC: 44114-265-19    : Relevant e-solution    Patient Supplied?: No                 Patient tolerated injection well. Patient advised to wait 20 minutes in the office following the injection. No signs/symptoms of reaction noted after 20 minutes.

## 2024-04-30 ENCOUNTER — TELEPHONE (OUTPATIENT)
Dept: FAMILY MEDICINE CLINIC | Age: 79
End: 2024-04-30

## 2024-04-30 RX ORDER — PREDNISONE 5 MG/1
5 TABLET ORAL DAILY
Qty: 21 TABLET | Refills: 0 | Status: SHIPPED | OUTPATIENT
Start: 2024-04-30 | End: 2024-05-06

## 2024-04-30 NOTE — TELEPHONE ENCOUNTER
Gwendolyn called and stated that the steroid shot really helped her back pain. She is wondering if you could send her in some steroid pills?

## 2024-05-02 ENCOUNTER — HOSPITAL ENCOUNTER (OUTPATIENT)
Dept: GENERAL RADIOLOGY | Facility: HOSPITAL | Age: 79
Discharge: HOME | End: 2024-05-02
Payer: MEDICARE

## 2024-05-02 ENCOUNTER — HOSPITAL ENCOUNTER (OUTPATIENT)
Facility: HOSPITAL | Age: 79
Discharge: HOME | End: 2024-05-02
Payer: MEDICARE

## 2024-05-02 DIAGNOSIS — M54.2 NECK PAIN: ICD-10-CM

## 2024-05-02 DIAGNOSIS — M54.6 THORACIC SPINE PAIN: ICD-10-CM

## 2024-05-02 DIAGNOSIS — R06.02 SOB (SHORTNESS OF BREATH): ICD-10-CM

## 2024-05-02 PROCEDURE — 72050 X-RAY EXAM NECK SPINE 4/5VWS: CPT

## 2024-05-02 PROCEDURE — 72070 X-RAY EXAM THORAC SPINE 2VWS: CPT

## 2024-05-02 PROCEDURE — 71046 X-RAY EXAM CHEST 2 VIEWS: CPT

## 2024-05-06 ENCOUNTER — TELEPHONE (OUTPATIENT)
Dept: FAMILY MEDICINE CLINIC | Age: 79
End: 2024-05-06

## 2024-05-06 NOTE — TELEPHONE ENCOUNTER
Patient has called and stated she is hurting really bad it hurts to breath was wanting her test results and to see what you want her to do. She mentioned she may go to ED.

## 2024-05-07 NOTE — PATIENT INSTRUCTIONS
bag.  Cover up or remove any of your personal information on the empty containers by covering it with black permanent marker or duct tape.  Place the sealed container with the mixture, and the empty drug containers, in the trash.   If you use a medication that is in the form of a patch, dispose of used patches by folding them in half so that the sticky sides meet, and then flushing them down a toilet. They should not be placed in the household trash where children or pets can find them.  If you have any questions, ask your provider or pharmacist for more information.   Be sure to keep all appointments for provider visits or tests. Pre-Visit chart review completed. All lab and imaging orders reviewed for outstanding orders and none found. Referrals reviewed and none outstanding. All Health Maintenance requirements reviewed and noted for any that are due at upcoming visit. Any hospital admission documentation, ER documentation or consult notes scanned to chart since last visit have been reviewed and noted for provider to review during upcoming visit.

## 2024-05-08 ENCOUNTER — OFFICE VISIT (OUTPATIENT)
Dept: FAMILY MEDICINE CLINIC | Age: 79
End: 2024-05-08

## 2024-05-08 VITALS
HEART RATE: 68 BPM | TEMPERATURE: 97.9 F | HEIGHT: 63 IN | OXYGEN SATURATION: 94 % | RESPIRATION RATE: 16 BRPM | BODY MASS INDEX: 30.83 KG/M2 | DIASTOLIC BLOOD PRESSURE: 74 MMHG | WEIGHT: 174 LBS | SYSTOLIC BLOOD PRESSURE: 128 MMHG

## 2024-05-08 DIAGNOSIS — M54.6 ACUTE MIDLINE THORACIC BACK PAIN: ICD-10-CM

## 2024-05-08 DIAGNOSIS — M25.512 TRIGGER POINT OF LEFT SHOULDER REGION: Primary | ICD-10-CM

## 2024-05-08 RX ORDER — METHYLPREDNISOLONE ACETATE 40 MG/ML
40 INJECTION, SUSPENSION INTRA-ARTICULAR; INTRALESIONAL; INTRAMUSCULAR; SOFT TISSUE ONCE
Status: COMPLETED | OUTPATIENT
Start: 2024-05-08 | End: 2024-05-08

## 2024-05-08 RX ORDER — METHYLPREDNISOLONE SODIUM SUCCINATE 125 MG/2ML
125 INJECTION, POWDER, LYOPHILIZED, FOR SOLUTION INTRAMUSCULAR; INTRAVENOUS ONCE
Status: COMPLETED | OUTPATIENT
Start: 2024-05-08 | End: 2024-05-08

## 2024-05-08 RX ORDER — KETOROLAC TROMETHAMINE 30 MG/ML
60 INJECTION, SOLUTION INTRAMUSCULAR; INTRAVENOUS ONCE
Status: COMPLETED | OUTPATIENT
Start: 2024-05-08 | End: 2024-05-08

## 2024-05-08 RX ORDER — METHYLPREDNISOLONE SODIUM SUCCINATE 125 MG/2ML
125 INJECTION, POWDER, LYOPHILIZED, FOR SOLUTION INTRAMUSCULAR; INTRAVENOUS ONCE
Status: SHIPPED | OUTPATIENT
Start: 2024-05-08

## 2024-05-08 RX ORDER — HYDROCODONE BITARTRATE AND ACETAMINOPHEN 5; 325 MG/1; MG/1
1 TABLET ORAL EVERY 8 HOURS PRN
Qty: 40 TABLET | Refills: 0 | Status: SHIPPED | OUTPATIENT
Start: 2024-05-08 | End: 2024-06-07

## 2024-05-08 RX ORDER — LIDOCAINE HYDROCHLORIDE 10 MG/ML
1 INJECTION, SOLUTION INFILTRATION; PERINEURAL ONCE
Status: COMPLETED | OUTPATIENT
Start: 2024-05-08 | End: 2024-05-08

## 2024-05-08 RX ADMIN — LIDOCAINE HYDROCHLORIDE 1 ML: 10 INJECTION, SOLUTION INFILTRATION; PERINEURAL at 16:12

## 2024-05-08 RX ADMIN — METHYLPREDNISOLONE ACETATE 40 MG: 40 INJECTION, SUSPENSION INTRA-ARTICULAR; INTRALESIONAL; INTRAMUSCULAR; SOFT TISSUE at 16:13

## 2024-05-08 RX ADMIN — KETOROLAC TROMETHAMINE 60 MG: 30 INJECTION, SOLUTION INTRAMUSCULAR; INTRAVENOUS at 16:09

## 2024-05-08 RX ADMIN — METHYLPREDNISOLONE SODIUM SUCCINATE 125 MG: 125 INJECTION, POWDER, LYOPHILIZED, FOR SOLUTION INTRAMUSCULAR; INTRAVENOUS at 16:11

## 2024-05-08 NOTE — PROGRESS NOTES
Patient tolerated injection well. Patient advised to wait 20 minutes in the office following the injection. No signs/symptoms of reaction noted after 20 minutes.  Administrations This Visit       ketorolac (TORADOL) injection 60 mg       Admin Date  05/08/2024  16:09 Action  Given Dose  60 mg Route  IntraMUSCular Site  Dorsogluteal Right Administered By  Luis Miguel LPN    Ordering Provider: Aleja Lieberman MD    NDC: 12340-273-34    Lot#: [id number]    : [employer]    Patient Supplied?: No              methylPREDNISolone sodium succ (SOLU-MEDROL) injection 125 mg       Admin Date  05/08/2024  16:11 Action  Given Dose  125 mg Route  IntraMUSCular Site  Dorsogluteal Left Administered By  Luis Miguel LPN    Ordering Provider: Aleja Lieberman MD    NDC: 2469-9612-81    Lot#: [id number]    : [employer]    Patient Supplied?: No

## 2024-05-08 NOTE — PROGRESS NOTES
SUBJECTIVE:    Patient ID: Gwendolyn Chan is a 78 y.o. female.    Chief Complaint   Patient presents with    COPD     Reg F/U.  Patient uses her oxygen at night.     Hypertension     Reg F/u    Back Pain     Patient reports that she has had increased back pain.  Patient had recent xray of the thoracic and cervical spine done last week.         HPI: office visit  She is in the office today complaining of some significant pain in her left shoulder blade.  She is having a lot of difficulty with discomfort and not being able to move her arm neck or shoulders.  She is having a lot of pain in that site area of her back.  She is having some shortness of breath.  She is using her oxygen at night.  She is not having any significant cough.  She has not had any high blood pressures.  She denies any recent falls or injuries.  Review of Systems   Constitutional:  Positive for fatigue.   Cardiovascular:  Positive for leg swelling.   Gastrointestinal:  Positive for abdominal pain. Negative for constipation, diarrhea and nausea.   Genitourinary:  Positive for difficulty urinating.   Psychiatric/Behavioral:  Positive for sleep disturbance. The patient is nervous/anxious.    All other systems reviewed and are negative.       OBJECTIVE:  /74   Pulse 68   Temp 97.9 °F (36.6 °C) (Infrared)   Resp 16   Ht 1.6 m (5' 3\")   Wt 78.9 kg (174 lb)   LMP 01/01/1986 (Approximate)   SpO2 94% Comment: ra  BMI 30.82 kg/m²    Wt Readings from Last 3 Encounters:   05/08/24 78.9 kg (174 lb)   04/29/24 79.8 kg (176 lb)   04/24/24 79.9 kg (176 lb 3.2 oz)     BP Readings from Last 3 Encounters:   05/08/24 128/74   04/29/24 130/66   04/24/24 134/78      Pulse Readings from Last 3 Encounters:   05/08/24 68   04/29/24 71   04/24/24 69     Body mass index is 30.82 kg/m².   Resp Readings from Last 3 Encounters:   05/08/24 16   04/29/24 16   04/24/24 16     Past medical, surgical, family and social history were reviewed and updated with the

## 2024-05-08 NOTE — PROGRESS NOTES
No chief complaint on file.      Have you seen any other physician or provider since your last visit yes - Dr. Martir Rivers Cardiologist    Have you had any other diagnostic tests since your last visit? no    Have you changed or stopped any medications since your last visit? no   Administrations This Visit       ketorolac (TORADOL) injection 60 mg       Admin Date  05/08/2024  16:09 Action  Given Dose  60 mg Route  IntraMUSCular Site  Dorsogluteal Right Administered By  Luis Miguel LPN    Ordering Provider: Aleja Lieberman MD    NDC: 66303-148-62    Lot#: [id number]    : [employer]    Patient Supplied?: No                 Patient tolerated injection well. Patient advised to wait 20 minutes in the office following the injection. No signs/symptoms of reaction noted after 20 minutes.

## 2024-05-13 ENCOUNTER — TELEPHONE (OUTPATIENT)
Dept: FAMILY MEDICINE CLINIC | Age: 79
End: 2024-05-13

## 2024-05-13 NOTE — TELEPHONE ENCOUNTER
Patient called and stated she is still having left shoulder pain. She was wondering if there is anything else she could do for it? She stated she has been to the chiropractor.

## 2024-05-15 RX ORDER — TIZANIDINE 2 MG/1
2 TABLET ORAL 3 TIMES DAILY PRN
Qty: 30 TABLET | Refills: 0 | Status: SHIPPED | OUTPATIENT
Start: 2024-05-15

## 2024-05-22 ENCOUNTER — OFFICE VISIT (OUTPATIENT)
Dept: FAMILY MEDICINE CLINIC | Age: 79
End: 2024-05-22
Payer: MEDICARE

## 2024-05-22 VITALS
WEIGHT: 172.6 LBS | OXYGEN SATURATION: 100 % | RESPIRATION RATE: 16 BRPM | BODY MASS INDEX: 30.58 KG/M2 | DIASTOLIC BLOOD PRESSURE: 72 MMHG | HEART RATE: 72 BPM | SYSTOLIC BLOOD PRESSURE: 124 MMHG | TEMPERATURE: 97.2 F | HEIGHT: 63 IN

## 2024-05-22 DIAGNOSIS — M54.6 ACUTE MIDLINE THORACIC BACK PAIN: ICD-10-CM

## 2024-05-22 DIAGNOSIS — R11.0 NAUSEA: ICD-10-CM

## 2024-05-22 DIAGNOSIS — E86.0 DEHYDRATION: Primary | ICD-10-CM

## 2024-05-22 DIAGNOSIS — F41.0 PANIC ATTACK: ICD-10-CM

## 2024-05-22 LAB
APPEARANCE FLUID: CLEAR
BILIRUBIN, POC: NEGATIVE
BLOOD URINE, POC: NEGATIVE
CLARITY, POC: CLEAR
COLOR, POC: YELLOW
GLUCOSE URINE, POC: NEGATIVE
KETONES, POC: NEGATIVE
LEUKOCYTE EST, POC: NEGATIVE
NITRITE, POC: NEGATIVE
PH, POC: 7.5
PROTEIN, POC: NEGATIVE
SPECIFIC GRAVITY, POC: 1.02
UROBILINOGEN, POC: 0.2

## 2024-05-22 PROCEDURE — 1090F PRES/ABSN URINE INCON ASSESS: CPT | Performed by: FAMILY MEDICINE

## 2024-05-22 PROCEDURE — G8427 DOCREV CUR MEDS BY ELIG CLIN: HCPCS | Performed by: FAMILY MEDICINE

## 2024-05-22 PROCEDURE — G8417 CALC BMI ABV UP PARAM F/U: HCPCS | Performed by: FAMILY MEDICINE

## 2024-05-22 PROCEDURE — 1036F TOBACCO NON-USER: CPT | Performed by: FAMILY MEDICINE

## 2024-05-22 PROCEDURE — 3078F DIAST BP <80 MM HG: CPT | Performed by: FAMILY MEDICINE

## 2024-05-22 PROCEDURE — 3074F SYST BP LT 130 MM HG: CPT | Performed by: FAMILY MEDICINE

## 2024-05-22 PROCEDURE — 1123F ACP DISCUSS/DSCN MKR DOCD: CPT | Performed by: FAMILY MEDICINE

## 2024-05-22 PROCEDURE — G8399 PT W/DXA RESULTS DOCUMENT: HCPCS | Performed by: FAMILY MEDICINE

## 2024-05-22 PROCEDURE — 81002 URINALYSIS NONAUTO W/O SCOPE: CPT | Performed by: FAMILY MEDICINE

## 2024-05-22 PROCEDURE — 99214 OFFICE O/P EST MOD 30 MIN: CPT | Performed by: FAMILY MEDICINE

## 2024-05-22 RX ORDER — HYDROCODONE BITARTRATE AND ACETAMINOPHEN 5; 325 MG/1; MG/1
1 TABLET ORAL EVERY 8 HOURS PRN
Qty: 40 TABLET | Refills: 0 | Status: SHIPPED | OUTPATIENT
Start: 2024-05-22 | End: 2024-06-21

## 2024-05-22 RX ORDER — CLONAZEPAM 2 MG/1
TABLET ORAL
Qty: 30 TABLET | Refills: 2 | Status: SHIPPED | OUTPATIENT
Start: 2024-05-22 | End: 2024-09-09

## 2024-05-22 RX ORDER — ONDANSETRON 4 MG/1
4 TABLET, FILM COATED ORAL
Qty: 30 TABLET | Refills: 2 | Status: SHIPPED | OUTPATIENT
Start: 2024-05-22

## 2024-05-22 ASSESSMENT — ENCOUNTER SYMPTOMS
DIARRHEA: 0
ABDOMINAL PAIN: 1
NAUSEA: 0
CONSTIPATION: 0

## 2024-05-22 NOTE — PROGRESS NOTES
No chief complaint on file.      Have you seen any other physician or provider since your last visit yes -   Dr. Zain Jacobo [facility] ER    Have you had any other diagnostic tests since your last visit? yes - Labs    Have you changed or stopped any medications since your last visit? no

## 2024-05-22 NOTE — PROGRESS NOTES
SUBJECTIVE:    Patient ID: Gwendolyn Chan is a 78 y.o. female.    Chief Complaint   Patient presents with    Dehydration     Patient went to the ER on Sunday due to dehydration.         HPI: office visit    The office today in follow-up of been back to the ER for dehydration.  They gave her some IV fluid.  She says she feels like she is better.  She went back to work the next day.  She says she is still struggling with some fatigue.  Her blood pressures are doing well.  She is not having any chest pain.  She has not had any significant increase in shortness of breath.  She is still having some pain in her back and spasm.  Is not quite as bad as it was.  She says that she is doing pretty well with her current medicine.  Review of Systems   Constitutional:  Positive for fatigue.   Cardiovascular:  Positive for leg swelling.   Gastrointestinal:  Positive for abdominal pain. Negative for constipation, diarrhea and nausea.   Genitourinary:  Positive for difficulty urinating.   Psychiatric/Behavioral:  Positive for sleep disturbance. The patient is nervous/anxious.    All other systems reviewed and are negative.       OBJECTIVE:  /72   Pulse 72   Temp 97.2 °F (36.2 °C) (Infrared)   Resp 16   Ht 1.6 m (5' 3\")   Wt 78.3 kg (172 lb 9.6 oz)   LMP 01/01/1986 (Approximate)   SpO2 100% Comment: ra  BMI 30.57 kg/m²    Wt Readings from Last 3 Encounters:   05/22/24 78.3 kg (172 lb 9.6 oz)   05/08/24 78.9 kg (174 lb)   04/29/24 79.8 kg (176 lb)     BP Readings from Last 3 Encounters:   05/22/24 124/72   05/08/24 128/74   04/29/24 130/66      Pulse Readings from Last 3 Encounters:   05/22/24 72   05/08/24 68   04/29/24 71     Body mass index is 30.57 kg/m².   Resp Readings from Last 3 Encounters:   05/22/24 16   05/08/24 16   04/29/24 16     Past medical, surgical, family and social history were reviewed and updated with the patient.     Physical Exam  Vitals and nursing note reviewed.   Constitutional:

## 2024-06-04 ENCOUNTER — TELEPHONE (OUTPATIENT)
Dept: FAMILY MEDICINE CLINIC | Age: 79
End: 2024-06-04

## 2024-06-04 RX ORDER — PREDNISONE 5 MG/1
5 TABLET ORAL DAILY
Qty: 21 TABLET | Refills: 0 | Status: SHIPPED | OUTPATIENT
Start: 2024-06-04 | End: 2024-06-10

## 2024-06-04 RX ORDER — LEVOFLOXACIN 500 MG/1
500 TABLET, FILM COATED ORAL DAILY
Qty: 10 TABLET | Refills: 0 | Status: SHIPPED | OUTPATIENT
Start: 2024-06-04 | End: 2024-06-14

## 2024-06-04 NOTE — TELEPHONE ENCOUNTER
Patient called and stated she has been experiencing cough and congestion. She was wondering if you could send her in something for it?

## 2024-06-11 DIAGNOSIS — E78.2 MIXED HYPERLIPIDEMIA: ICD-10-CM

## 2024-06-11 RX ORDER — ATORVASTATIN CALCIUM 40 MG/1
40 TABLET, FILM COATED ORAL DAILY
Qty: 30 TABLET | Refills: 3 | OUTPATIENT
Start: 2024-06-11

## 2024-06-11 NOTE — TELEPHONE ENCOUNTER
Patient called, requested refill.       Next Office Visit Date:  Future Appointments   Date Time Provider Department Center   6/26/2024  3:30 PM Aleja Lieberman MD [facility]   7/17/2024  3:15 PM Aleja Lieberman MD [facility] please review via PDMP

## 2024-07-10 ENCOUNTER — OFFICE VISIT (OUTPATIENT)
Dept: FAMILY MEDICINE CLINIC | Age: 79
End: 2024-07-10
Payer: MEDICARE

## 2024-07-10 VITALS
SYSTOLIC BLOOD PRESSURE: 136 MMHG | OXYGEN SATURATION: 95 % | HEART RATE: 80 BPM | HEIGHT: 63 IN | TEMPERATURE: 98.1 F | DIASTOLIC BLOOD PRESSURE: 82 MMHG | BODY MASS INDEX: 30.57 KG/M2 | RESPIRATION RATE: 18 BRPM

## 2024-07-10 DIAGNOSIS — J01.90 ACUTE BACTERIAL SINUSITIS: ICD-10-CM

## 2024-07-10 DIAGNOSIS — B96.89 ACUTE BACTERIAL SINUSITIS: ICD-10-CM

## 2024-07-10 DIAGNOSIS — E78.2 MIXED HYPERLIPIDEMIA: ICD-10-CM

## 2024-07-10 DIAGNOSIS — R09.81 CONGESTION OF NASAL SINUS: Primary | ICD-10-CM

## 2024-07-10 PROCEDURE — 87880 STREP A ASSAY W/OPTIC: CPT

## 2024-07-10 PROCEDURE — 87804 INFLUENZA ASSAY W/OPTIC: CPT

## 2024-07-10 PROCEDURE — 87635 SARS-COV-2 COVID-19 AMP PRB: CPT

## 2024-07-10 RX ORDER — AMOXICILLIN 875 MG/1
875 TABLET, COATED ORAL 2 TIMES DAILY
Qty: 20 TABLET | Refills: 0 | Status: SHIPPED | OUTPATIENT
Start: 2024-07-10 | End: 2024-07-20

## 2024-07-10 RX ORDER — METHYLPREDNISOLONE 4 MG/1
TABLET ORAL
Qty: 1 KIT | Refills: 0 | Status: SHIPPED | OUTPATIENT
Start: 2024-07-10 | End: 2024-07-16

## 2024-07-10 RX ORDER — ATORVASTATIN CALCIUM 40 MG/1
40 TABLET, FILM COATED ORAL DAILY
Qty: 30 TABLET | Refills: 3 | OUTPATIENT
Start: 2024-07-10

## 2024-07-10 ASSESSMENT — ENCOUNTER SYMPTOMS
COUGH: 1
SINUS PAIN: 1
NAUSEA: 1
ALLERGIC/IMMUNOLOGIC NEGATIVE: 1
EYES NEGATIVE: 1
SINUS PRESSURE: 1
SORE THROAT: 1

## 2024-07-10 NOTE — PROGRESS NOTES
[address]  [place] [address]  Dept: [phone]  Loc: [phone]     SUBJECTIVE:  Gwendolyn Chan is a 79 y.o. female that presents with   Chief Complaint   Patient presents with    Cough    Head Congestion    Pharyngitis    Nausea & Vomiting   .    HPI:    Gwendolyn is in office with cough and sinus congestion. Symptoms have been present for the past 2-3 days. She notes she is also dizzy at times. She denies any fevers. No vomiting or diarrhea, some nausea. She denies any sick contacts.     Cough  This is a new problem. The current episode started in the past 7 days. The problem has been unchanged. The cough is Non-productive. Associated symptoms include headaches, myalgias, postnasal drip and a sore throat. Nothing aggravates the symptoms. She has tried nothing for the symptoms. Her past medical history is significant for COPD.   Pharyngitis  This is a new problem. The current episode started in the past 7 days. The problem has been unchanged. Associated symptoms include coughing, headaches, myalgias, nausea and a sore throat. Nothing aggravates the symptoms. She has tried nothing for the symptoms.   Nausea & Vomiting  This is a new problem. The current episode started in the past 7 days. The problem has been unchanged. Associated symptoms include coughing, headaches, myalgias, nausea and a sore throat. Nothing aggravates the symptoms. She has tried nothing for the symptoms.       Review of Systems   Constitutional: Negative.    HENT:  Positive for postnasal drip, sinus pressure, sinus pain and sore throat.    Eyes: Negative.    Respiratory:  Positive for cough.    Cardiovascular: Negative.    Gastrointestinal:  Positive for nausea.   Endocrine: Negative.    Genitourinary: Negative.    Musculoskeletal:  Positive for myalgias.   Skin: Negative.    Allergic/Immunologic: Negative.    Neurological:  Positive for headaches.   Hematological: Negative.

## 2024-07-17 ENCOUNTER — TELEPHONE (OUTPATIENT)
Dept: FAMILY MEDICINE CLINIC | Age: 79
End: 2024-07-17

## 2024-07-17 NOTE — TELEPHONE ENCOUNTER
Pt was in office for AWV and complained of chest pain. EKG was performed and pt quit complaining of chest pain. When I was done with the rooming process pt again stated she was having chest pain, Dr Lieberman wanted pt to go to [facility] by ambulance but pt declined and stated she would have her friend take her to the hosp because sh wanted to go to [facility] where her cardiologist is

## 2024-07-24 ENCOUNTER — HOSPITAL ENCOUNTER (OUTPATIENT)
Facility: HOSPITAL | Age: 79
Discharge: HOME | End: 2024-07-24
Payer: MEDICARE

## 2024-07-24 ENCOUNTER — OFFICE VISIT (OUTPATIENT)
Dept: FAMILY MEDICINE CLINIC | Age: 79
End: 2024-07-24
Payer: MEDICARE

## 2024-07-24 VITALS
OXYGEN SATURATION: 95 % | DIASTOLIC BLOOD PRESSURE: 68 MMHG | TEMPERATURE: 97.8 F | WEIGHT: 173.2 LBS | BODY MASS INDEX: 30.69 KG/M2 | SYSTOLIC BLOOD PRESSURE: 128 MMHG | HEART RATE: 80 BPM | HEIGHT: 63 IN | RESPIRATION RATE: 16 BRPM

## 2024-07-24 DIAGNOSIS — E03.9 HYPOTHYROIDISM, UNSPECIFIED TYPE: ICD-10-CM

## 2024-07-24 DIAGNOSIS — E53.8 B12 DEFICIENCY: ICD-10-CM

## 2024-07-24 DIAGNOSIS — B96.89 ACUTE BACTERIAL SINUSITIS: Primary | ICD-10-CM

## 2024-07-24 DIAGNOSIS — I10 ESSENTIAL HYPERTENSION: ICD-10-CM

## 2024-07-24 DIAGNOSIS — J01.90 ACUTE BACTERIAL SINUSITIS: Primary | ICD-10-CM

## 2024-07-24 DIAGNOSIS — R53.83 OTHER FATIGUE: ICD-10-CM

## 2024-07-24 DIAGNOSIS — E55.9 VITAMIN D DEFICIENCY: ICD-10-CM

## 2024-07-24 DIAGNOSIS — R11.0 NAUSEA: ICD-10-CM

## 2024-07-24 DIAGNOSIS — R73.9 ELEVATED BLOOD SUGAR: ICD-10-CM

## 2024-07-24 DIAGNOSIS — J01.01 ACUTE RECURRENT MAXILLARY SINUSITIS: ICD-10-CM

## 2024-07-24 DIAGNOSIS — I20.89 STABLE ANGINA (HCC): ICD-10-CM

## 2024-07-24 DIAGNOSIS — J44.9 CHRONIC OBSTRUCTIVE PULMONARY DISEASE, UNSPECIFIED COPD TYPE (HCC): ICD-10-CM

## 2024-07-24 PROCEDURE — 82607 VITAMIN B-12: CPT

## 2024-07-24 PROCEDURE — 1123F ACP DISCUSS/DSCN MKR DOCD: CPT | Performed by: FAMILY MEDICINE

## 2024-07-24 PROCEDURE — 3078F DIAST BP <80 MM HG: CPT | Performed by: FAMILY MEDICINE

## 2024-07-24 PROCEDURE — 3023F SPIROM DOC REV: CPT | Performed by: FAMILY MEDICINE

## 2024-07-24 PROCEDURE — 82306 VITAMIN D 25 HYDROXY: CPT

## 2024-07-24 PROCEDURE — 82746 ASSAY OF FOLIC ACID SERUM: CPT

## 2024-07-24 PROCEDURE — 85027 COMPLETE CBC AUTOMATED: CPT

## 2024-07-24 PROCEDURE — 1036F TOBACCO NON-USER: CPT | Performed by: FAMILY MEDICINE

## 2024-07-24 PROCEDURE — 83036 HEMOGLOBIN GLYCOSYLATED A1C: CPT

## 2024-07-24 PROCEDURE — 1090F PRES/ABSN URINE INCON ASSESS: CPT | Performed by: FAMILY MEDICINE

## 2024-07-24 PROCEDURE — 3074F SYST BP LT 130 MM HG: CPT | Performed by: FAMILY MEDICINE

## 2024-07-24 PROCEDURE — 80053 COMPREHEN METABOLIC PANEL: CPT

## 2024-07-24 PROCEDURE — 36415 COLL VENOUS BLD VENIPUNCTURE: CPT

## 2024-07-24 PROCEDURE — G8417 CALC BMI ABV UP PARAM F/U: HCPCS | Performed by: FAMILY MEDICINE

## 2024-07-24 PROCEDURE — G8399 PT W/DXA RESULTS DOCUMENT: HCPCS | Performed by: FAMILY MEDICINE

## 2024-07-24 PROCEDURE — G8427 DOCREV CUR MEDS BY ELIG CLIN: HCPCS | Performed by: FAMILY MEDICINE

## 2024-07-24 PROCEDURE — 84443 ASSAY THYROID STIM HORMONE: CPT

## 2024-07-24 PROCEDURE — 99214 OFFICE O/P EST MOD 30 MIN: CPT | Performed by: FAMILY MEDICINE

## 2024-07-24 RX ORDER — AMOXICILLIN 875 MG/1
875 TABLET, COATED ORAL 2 TIMES DAILY
Qty: 20 TABLET | Refills: 0 | Status: SHIPPED | OUTPATIENT
Start: 2024-07-24 | End: 2024-08-03

## 2024-07-24 RX ORDER — ONDANSETRON 4 MG/1
4 TABLET, FILM COATED ORAL
Qty: 30 TABLET | Refills: 2 | Status: SHIPPED | OUTPATIENT
Start: 2024-07-24

## 2024-07-24 NOTE — PROGRESS NOTES
Chief Complaint   Patient presents with    Sinus Problem     X 3 weeks    Hypertension     Reg F/U       Have you seen any other physician or provider since your last visit no    Have you had any other diagnostic tests since your last visit? no    Have you changed or stopped any medications since your last visit? no     
the patient.     Physical Exam  Vitals and nursing note reviewed.   Constitutional:       Appearance: Normal appearance.   HENT:      Head: Normocephalic and atraumatic.      Right Ear: Tympanic membrane, ear canal and external ear normal.      Left Ear: Tympanic membrane, ear canal and external ear normal.      Nose: Nose normal.      Mouth/Throat:      Mouth: Mucous membranes are moist.      Pharynx: Oropharynx is clear.   Eyes:      Extraocular Movements: Extraocular movements intact.      Conjunctiva/sclera: Conjunctivae normal.      Pupils: Pupils are equal, round, and reactive to light.   Cardiovascular:      Rate and Rhythm: Normal rate and regular rhythm.      Pulses: Normal pulses.      Heart sounds: Normal heart sounds.   Pulmonary:      Effort: Pulmonary effort is normal.      Breath sounds: Normal breath sounds.   Abdominal:      General: Bowel sounds are normal.      Palpations: Abdomen is soft.   Musculoskeletal:      Right shoulder: Normal.      Left shoulder: Tenderness present. Decreased range of motion. Decreased strength.      Cervical back: Normal range of motion and neck supple.      Comments: Trigger point noted in the left scapular area   Skin:     General: Skin is warm and dry.      Capillary Refill: Capillary refill takes less than 2 seconds.   Neurological:      General: No focal deficit present.      Mental Status: She is alert and oriented to person, place, and time.   Psychiatric:         Mood and Affect: Mood normal.         Behavior: Behavior normal.          Results in Past 30 Days  Result Component Current Result Ref Range Previous Result Ref Range   Albumin/Globulin Ratio 1.5 (7/24/2024) 0.8 - 2.0 Not in Time Range    Alkaline Phosphatase 104 (H) (7/24/2024) 25 - 100 U/L Not in Time Range    ALT 10 (7/24/2024) 4 - 36 U/L Not in Time Range    AST 17 (7/24/2024) 8 - 33 U/L Not in Time Range    BUN 15 (7/24/2024) 6 - 20 mg/dL Not in Time Range    Calcium 9.2 (7/24/2024) 8.5 - 10.5

## 2024-07-25 LAB
25(OH)D3 SERPL-MCNC: 24.6 NG/ML (ref 32–100)
ALBUMIN SERPL-MCNC: 4.1 G/DL (ref 3.4–4.8)
ALBUMIN/GLOB SERPL: 1.5 {RATIO} (ref 0.8–2)
ALP SERPL-CCNC: 104 U/L (ref 25–100)
ALT SERPL-CCNC: 10 U/L (ref 4–36)
ANION GAP SERPL CALCULATED.3IONS-SCNC: 10 MMOL/L (ref 3–16)
AST SERPL-CCNC: 17 U/L (ref 8–33)
BILIRUB SERPL-MCNC: 0.5 MG/DL (ref 0.3–1.2)
BUN SERPL-MCNC: 15 MG/DL (ref 6–20)
CALCIUM SERPL-MCNC: 9.2 MG/DL (ref 8.5–10.5)
CHLORIDE SERPL-SCNC: 101 MMOL/L (ref 98–107)
CO2 SERPL-SCNC: 30 MMOL/L (ref 20–30)
CREAT SERPL-MCNC: 0.9 MG/DL (ref 0.4–1.2)
ERYTHROCYTE [DISTWIDTH] IN BLOOD BY AUTOMATED COUNT: 12.8 % (ref 11–16)
FOLATE SERPL-MCNC: 12.54 NG/ML
GFR SERPLBLD CREATININE-BSD FMLA CKD-EPI: 66 ML/MIN/{1.73_M2}
GLOBULIN SER CALC-MCNC: 2.8 G/DL
GLUCOSE SERPL-MCNC: 108 MG/DL (ref 74–106)
HBA1C MFR BLD: 6.6 %
HCT VFR BLD AUTO: 40.9 % (ref 37–47)
HGB BLD-MCNC: 13 G/DL (ref 11.5–16.5)
MCH RBC QN AUTO: 30.1 PG (ref 27–32)
MCHC RBC AUTO-ENTMCNC: 31.8 G/DL (ref 31–35)
MCV RBC AUTO: 94.7 FL (ref 80–100)
PLATELET # BLD AUTO: 205 K/UL (ref 150–400)
PMV BLD AUTO: 10.6 FL (ref 6–10)
POTASSIUM SERPL-SCNC: 4.2 MMOL/L (ref 3.4–5.1)
PROT SERPL-MCNC: 6.9 G/DL (ref 6.4–8.3)
RBC # BLD AUTO: 4.32 M/UL (ref 3.8–5.8)
SODIUM SERPL-SCNC: 141 MMOL/L (ref 136–145)
TSH SERPL DL<=0.005 MIU/L-ACNC: 1.04 UIU/ML (ref 0.27–4.2)
VIT B12 SERPL-MCNC: 328 PG/ML (ref 211–911)
WBC # BLD AUTO: 7.1 K/UL (ref 4–11)

## 2024-08-23 DIAGNOSIS — E78.2 MIXED HYPERLIPIDEMIA: ICD-10-CM

## 2024-08-26 RX ORDER — ATORVASTATIN CALCIUM 40 MG/1
40 TABLET, FILM COATED ORAL DAILY
Qty: 30 TABLET | Refills: 3 | Status: SHIPPED | OUTPATIENT
Start: 2024-08-26

## 2024-08-26 NOTE — TELEPHONE ENCOUNTER
Refill request received from pharmacy      Next Office Visit Date:  Future Appointments   Date Time Provider Department Center   9/11/2024  3:30 PM [facility] - Please review via PDMP        Last Office Visit:    7/24/2024

## 2024-09-18 ENCOUNTER — OFFICE VISIT (OUTPATIENT)
Dept: FAMILY MEDICINE CLINIC | Age: 79
End: 2024-09-18
Payer: MEDICARE

## 2024-09-18 VITALS
WEIGHT: 174.2 LBS | OXYGEN SATURATION: 92 % | DIASTOLIC BLOOD PRESSURE: 76 MMHG | RESPIRATION RATE: 16 BRPM | HEART RATE: 80 BPM | TEMPERATURE: 97.8 F | SYSTOLIC BLOOD PRESSURE: 122 MMHG | HEIGHT: 63 IN | BODY MASS INDEX: 30.87 KG/M2

## 2024-09-18 DIAGNOSIS — R11.0 NAUSEA: ICD-10-CM

## 2024-09-18 DIAGNOSIS — F41.0 PANIC ATTACK: ICD-10-CM

## 2024-09-18 DIAGNOSIS — K21.9 GASTROESOPHAGEAL REFLUX DISEASE, UNSPECIFIED WHETHER ESOPHAGITIS PRESENT: ICD-10-CM

## 2024-09-18 DIAGNOSIS — I10 ESSENTIAL HYPERTENSION: Primary | ICD-10-CM

## 2024-09-18 PROCEDURE — 3078F DIAST BP <80 MM HG: CPT | Performed by: FAMILY MEDICINE

## 2024-09-18 PROCEDURE — G8417 CALC BMI ABV UP PARAM F/U: HCPCS | Performed by: FAMILY MEDICINE

## 2024-09-18 PROCEDURE — 1123F ACP DISCUSS/DSCN MKR DOCD: CPT | Performed by: FAMILY MEDICINE

## 2024-09-18 PROCEDURE — 3074F SYST BP LT 130 MM HG: CPT | Performed by: FAMILY MEDICINE

## 2024-09-18 PROCEDURE — 1036F TOBACCO NON-USER: CPT | Performed by: FAMILY MEDICINE

## 2024-09-18 PROCEDURE — G8399 PT W/DXA RESULTS DOCUMENT: HCPCS | Performed by: FAMILY MEDICINE

## 2024-09-18 PROCEDURE — 99214 OFFICE O/P EST MOD 30 MIN: CPT | Performed by: FAMILY MEDICINE

## 2024-09-18 PROCEDURE — 1090F PRES/ABSN URINE INCON ASSESS: CPT | Performed by: FAMILY MEDICINE

## 2024-09-18 PROCEDURE — G8427 DOCREV CUR MEDS BY ELIG CLIN: HCPCS | Performed by: FAMILY MEDICINE

## 2024-09-18 RX ORDER — ONDANSETRON 4 MG/1
4 TABLET, FILM COATED ORAL
Qty: 30 TABLET | Refills: 2 | Status: SHIPPED | OUTPATIENT
Start: 2024-09-18

## 2024-09-18 RX ORDER — CLONAZEPAM 2 MG/1
TABLET ORAL
Qty: 30 TABLET | Refills: 2 | Status: SHIPPED | OUTPATIENT
Start: 2024-09-18 | End: 2025-01-06

## 2024-09-18 RX ORDER — AMLODIPINE BESYLATE 5 MG/1
5 TABLET ORAL DAILY
Qty: 30 TABLET | Refills: 2 | Status: SHIPPED | OUTPATIENT
Start: 2024-09-18

## 2024-09-18 RX ORDER — OMEPRAZOLE 40 MG/1
40 CAPSULE, DELAYED RELEASE ORAL
Qty: 30 CAPSULE | Refills: 5 | Status: SHIPPED | OUTPATIENT
Start: 2024-09-18

## 2024-09-18 ASSESSMENT — ENCOUNTER SYMPTOMS
CONSTIPATION: 0
ABDOMINAL PAIN: 1
DIARRHEA: 0
NAUSEA: 0

## 2024-11-04 ENCOUNTER — APPOINTMENT (OUTPATIENT)
Dept: GENERAL RADIOLOGY | Facility: HOSPITAL | Age: 79
End: 2024-11-04
Attending: HOSPITALIST
Payer: MEDICARE

## 2024-11-04 ENCOUNTER — APPOINTMENT (OUTPATIENT)
Dept: CT IMAGING | Facility: HOSPITAL | Age: 79
End: 2024-11-04
Attending: HOSPITALIST
Payer: MEDICARE

## 2024-11-04 ENCOUNTER — HOSPITAL ENCOUNTER (INPATIENT)
Facility: HOSPITAL | Age: 79
LOS: 1 days | Discharge: HOME | End: 2024-11-05
Attending: HOSPITALIST | Admitting: INTERNAL MEDICINE
Payer: MEDICARE

## 2024-11-04 DIAGNOSIS — R40.20 LOSS OF CONSCIOUSNESS (HCC): Primary | ICD-10-CM

## 2024-11-04 DIAGNOSIS — J44.1 COPD WITH ACUTE EXACERBATION (HCC): ICD-10-CM

## 2024-11-04 DIAGNOSIS — R42 DIZZINESS: ICD-10-CM

## 2024-11-04 DIAGNOSIS — N30.00 ACUTE CYSTITIS WITHOUT HEMATURIA: ICD-10-CM

## 2024-11-04 DIAGNOSIS — R19.7 DIARRHEA, UNSPECIFIED TYPE: ICD-10-CM

## 2024-11-04 DIAGNOSIS — R11.2 NAUSEA VOMITING AND DIARRHEA: ICD-10-CM

## 2024-11-04 DIAGNOSIS — R11.2 NAUSEA AND VOMITING, UNSPECIFIED VOMITING TYPE: ICD-10-CM

## 2024-11-04 DIAGNOSIS — N28.9 ACUTE RENAL INSUFFICIENCY: ICD-10-CM

## 2024-11-04 DIAGNOSIS — E86.0 DEHYDRATION: ICD-10-CM

## 2024-11-04 DIAGNOSIS — R79.89 ELEVATED TROPONIN: ICD-10-CM

## 2024-11-04 DIAGNOSIS — R19.7 NAUSEA VOMITING AND DIARRHEA: ICD-10-CM

## 2024-11-04 PROBLEM — R55 SYNCOPE AND COLLAPSE: Status: ACTIVE | Noted: 2024-11-04

## 2024-11-04 LAB
ALBUMIN SERPL-MCNC: 3.9 G/DL (ref 3.4–4.8)
ALBUMIN/GLOB SERPL: 1.1 {RATIO} (ref 0.8–2)
ALP SERPL-CCNC: 84 U/L (ref 25–100)
ALT SERPL-CCNC: 8 U/L (ref 4–36)
ANION GAP SERPL CALCULATED.3IONS-SCNC: 16 MMOL/L (ref 3–16)
AST SERPL-CCNC: 21 U/L (ref 8–33)
BACTERIA URNS QL MICRO: ABNORMAL /HPF
BASOPHILS # BLD: 0 K/UL (ref 0–0.1)
BASOPHILS NFR BLD: 0.3 %
BILIRUB SERPL-MCNC: 0.6 MG/DL (ref 0.3–1.2)
BILIRUB UR QL STRIP.AUTO: ABNORMAL
BUN SERPL-MCNC: 27 MG/DL (ref 6–20)
CALCIUM SERPL-MCNC: 9.4 MG/DL (ref 8.5–10.5)
CHLORIDE SERPL-SCNC: 96 MMOL/L (ref 98–107)
CLARITY UR: ABNORMAL
CO2 SERPL-SCNC: 29 MMOL/L (ref 20–30)
COLOR UR: YELLOW
CREAT SERPL-MCNC: 1.9 MG/DL (ref 0.4–1.2)
EOSINOPHIL # BLD: 0 K/UL (ref 0–0.4)
EOSINOPHIL NFR BLD: 0.3 %
EPI CELLS #/AREA URNS HPF: ABNORMAL /HPF (ref 0–5)
ERYTHROCYTE [DISTWIDTH] IN BLOOD BY AUTOMATED COUNT: 13.3 % (ref 11–16)
FLUAV AG NPH QL: NEGATIVE
FLUBV AG NPH QL: NEGATIVE
GFR SERPLBLD CREATININE-BSD FMLA CKD-EPI: 27 ML/MIN/{1.73_M2}
GLOBULIN SER CALC-MCNC: 3.6 G/DL
GLUCOSE SERPL-MCNC: 181 MG/DL (ref 74–106)
GLUCOSE UR STRIP.AUTO-MCNC: NEGATIVE MG/DL
HCT VFR BLD AUTO: 44.8 % (ref 37–47)
HGB BLD-MCNC: 14.7 G/DL (ref 11.5–16.5)
HGB UR QL STRIP.AUTO: ABNORMAL
IMM GRANULOCYTES # BLD: 0.1 K/UL
IMM GRANULOCYTES NFR BLD: 0.4 % (ref 0–5)
KETONES UR STRIP.AUTO-MCNC: ABNORMAL MG/DL
LEUKOCYTE ESTERASE UR QL STRIP.AUTO: ABNORMAL
LIPASE SERPL-CCNC: 17 U/L (ref 5.6–51.3)
LYMPHOCYTES # BLD: 1.6 K/UL (ref 1.5–4)
LYMPHOCYTES NFR BLD: 12 %
MAGNESIUM SERPL-MCNC: 2 MG/DL (ref 1.7–2.4)
MCH RBC QN AUTO: 30.4 PG (ref 27–32)
MCHC RBC AUTO-ENTMCNC: 32.8 G/DL (ref 31–35)
MCV RBC AUTO: 92.6 FL (ref 80–100)
MONOCYTES # BLD: 0.8 K/UL (ref 0.2–0.8)
MONOCYTES NFR BLD: 5.9 %
NEUTROPHILS # BLD: 10.7 K/UL (ref 2–7.5)
NEUTS SEG NFR BLD: 81.1 %
NITRITE UR QL STRIP.AUTO: NEGATIVE
NT-PROBNP SERPL-MCNC: ABNORMAL PG/ML (ref 0–1800)
PH UR STRIP.AUTO: 5 [PH] (ref 5–8)
PLATELET # BLD AUTO: 273 K/UL (ref 150–400)
PMV BLD AUTO: 9.4 FL (ref 6–10)
POTASSIUM SERPL-SCNC: 3.2 MMOL/L (ref 3.4–5.1)
PROT SERPL-MCNC: 7.5 G/DL (ref 6.4–8.3)
PROT UR STRIP.AUTO-MCNC: 100 MG/DL
RBC # BLD AUTO: 4.84 M/UL (ref 3.8–5.8)
RBC #/AREA URNS HPF: ABNORMAL /HPF (ref 0–4)
S PYO AG THROAT QL: NEGATIVE
SARS-COV-2 RDRP RESP QL NAA+PROBE: NOT DETECTED
SODIUM SERPL-SCNC: 141 MMOL/L (ref 136–145)
SP GR UR STRIP.AUTO: >=1.03 (ref 1–1.03)
TROPONIN, HIGH SENSITIVITY: 78 NG/L (ref 0–14)
TROPONIN, HIGH SENSITIVITY: 93 NG/L (ref 0–14)
UA COMPLETE W REFLEX CULTURE PNL UR: YES
UA DIPSTICK W REFLEX MICRO PNL UR: YES
URN SPEC COLLECT METH UR: ABNORMAL
UROBILINOGEN UR STRIP-ACNC: 1 E.U./DL
WBC # BLD AUTO: 13.1 K/UL (ref 4–11)
WBC #/AREA URNS HPF: ABNORMAL /HPF (ref 0–5)

## 2024-11-04 PROCEDURE — 87502 INFLUENZA DNA AMP PROBE: CPT

## 2024-11-04 PROCEDURE — 72125 CT NECK SPINE W/O DYE: CPT

## 2024-11-04 PROCEDURE — 6360000002 HC RX W HCPCS: Performed by: HOSPITALIST

## 2024-11-04 PROCEDURE — 87040 BLOOD CULTURE FOR BACTERIA: CPT

## 2024-11-04 PROCEDURE — 36415 COLL VENOUS BLD VENIPUNCTURE: CPT

## 2024-11-04 PROCEDURE — 81001 URINALYSIS AUTO W/SCOPE: CPT

## 2024-11-04 PROCEDURE — 94640 AIRWAY INHALATION TREATMENT: CPT

## 2024-11-04 PROCEDURE — 71045 X-RAY EXAM CHEST 1 VIEW: CPT

## 2024-11-04 PROCEDURE — 83735 ASSAY OF MAGNESIUM: CPT

## 2024-11-04 PROCEDURE — 87804 INFLUENZA ASSAY W/OPTIC: CPT

## 2024-11-04 PROCEDURE — 6370000000 HC RX 637 (ALT 250 FOR IP): Performed by: PHYSICIAN ASSISTANT

## 2024-11-04 PROCEDURE — 87880 STREP A ASSAY W/OPTIC: CPT

## 2024-11-04 PROCEDURE — 96375 TX/PRO/DX INJ NEW DRUG ADDON: CPT

## 2024-11-04 PROCEDURE — 70450 CT HEAD/BRAIN W/O DYE: CPT

## 2024-11-04 PROCEDURE — 85025 COMPLETE CBC W/AUTO DIFF WBC: CPT

## 2024-11-04 PROCEDURE — 99285 EMERGENCY DEPT VISIT HI MDM: CPT

## 2024-11-04 PROCEDURE — 96361 HYDRATE IV INFUSION ADD-ON: CPT

## 2024-11-04 PROCEDURE — 2700000000 HC OXYGEN THERAPY PER DAY

## 2024-11-04 PROCEDURE — 6360000002 HC RX W HCPCS: Performed by: PHYSICIAN ASSISTANT

## 2024-11-04 PROCEDURE — 87635 SARS-COV-2 COVID-19 AMP PRB: CPT

## 2024-11-04 PROCEDURE — 2580000003 HC RX 258: Performed by: PHYSICIAN ASSISTANT

## 2024-11-04 PROCEDURE — 93005 ELECTROCARDIOGRAM TRACING: CPT

## 2024-11-04 PROCEDURE — 74176 CT ABD & PELVIS W/O CONTRAST: CPT

## 2024-11-04 PROCEDURE — 83690 ASSAY OF LIPASE: CPT

## 2024-11-04 PROCEDURE — 80053 COMPREHEN METABOLIC PANEL: CPT

## 2024-11-04 PROCEDURE — 96365 THER/PROPH/DIAG IV INF INIT: CPT

## 2024-11-04 PROCEDURE — 87086 URINE CULTURE/COLONY COUNT: CPT

## 2024-11-04 PROCEDURE — 84484 ASSAY OF TROPONIN QUANT: CPT

## 2024-11-04 PROCEDURE — 2580000003 HC RX 258: Performed by: HOSPITALIST

## 2024-11-04 PROCEDURE — 1200000000 HC SEMI PRIVATE

## 2024-11-04 PROCEDURE — 83880 ASSAY OF NATRIURETIC PEPTIDE: CPT

## 2024-11-04 RX ORDER — ALBUTEROL SULFATE 90 UG/1
2 INHALANT RESPIRATORY (INHALATION) EVERY 6 HOURS PRN
Status: DISCONTINUED | OUTPATIENT
Start: 2024-11-04 | End: 2024-11-05 | Stop reason: HOSPADM

## 2024-11-04 RX ORDER — CLONAZEPAM 0.5 MG/1
2 TABLET ORAL NIGHTLY PRN
Status: DISCONTINUED | OUTPATIENT
Start: 2024-11-04 | End: 2024-11-05 | Stop reason: HOSPADM

## 2024-11-04 RX ORDER — SODIUM CHLORIDE 9 MG/ML
INJECTION, SOLUTION INTRAVENOUS ONCE
Status: COMPLETED | OUTPATIENT
Start: 2024-11-04 | End: 2024-11-04

## 2024-11-04 RX ORDER — ACETAMINOPHEN 325 MG/1
650 TABLET ORAL EVERY 6 HOURS PRN
Status: DISCONTINUED | OUTPATIENT
Start: 2024-11-04 | End: 2024-11-05 | Stop reason: HOSPADM

## 2024-11-04 RX ORDER — PANTOPRAZOLE SODIUM 40 MG/1
40 TABLET, DELAYED RELEASE ORAL
Status: DISCONTINUED | OUTPATIENT
Start: 2024-11-05 | End: 2024-11-05 | Stop reason: HOSPADM

## 2024-11-04 RX ORDER — ATORVASTATIN CALCIUM 40 MG/1
40 TABLET, FILM COATED ORAL DAILY
Status: DISCONTINUED | OUTPATIENT
Start: 2024-11-04 | End: 2024-11-05 | Stop reason: HOSPADM

## 2024-11-04 RX ORDER — POTASSIUM CHLORIDE 1500 MG/1
40 TABLET, EXTENDED RELEASE ORAL ONCE
Status: COMPLETED | OUTPATIENT
Start: 2024-11-04 | End: 2024-11-04

## 2024-11-04 RX ORDER — 0.9 % SODIUM CHLORIDE 0.9 %
1000 INTRAVENOUS SOLUTION INTRAVENOUS ONCE
Status: COMPLETED | OUTPATIENT
Start: 2024-11-04 | End: 2024-11-04

## 2024-11-04 RX ORDER — ONDANSETRON 4 MG/1
4 TABLET, ORALLY DISINTEGRATING ORAL EVERY 8 HOURS PRN
Status: DISCONTINUED | OUTPATIENT
Start: 2024-11-04 | End: 2024-11-05 | Stop reason: HOSPADM

## 2024-11-04 RX ORDER — ALBUTEROL SULFATE 0.83 MG/ML
2.5 SOLUTION RESPIRATORY (INHALATION) EVERY 6 HOURS PRN
Status: DISCONTINUED | OUTPATIENT
Start: 2024-11-04 | End: 2024-11-05 | Stop reason: HOSPADM

## 2024-11-04 RX ORDER — ASPIRIN 81 MG/1
81 TABLET ORAL DAILY
Status: DISCONTINUED | OUTPATIENT
Start: 2024-11-04 | End: 2024-11-05 | Stop reason: HOSPADM

## 2024-11-04 RX ORDER — ACETAMINOPHEN 650 MG/1
650 SUPPOSITORY RECTAL EVERY 6 HOURS PRN
Status: DISCONTINUED | OUTPATIENT
Start: 2024-11-04 | End: 2024-11-05 | Stop reason: HOSPADM

## 2024-11-04 RX ORDER — SODIUM CHLORIDE 9 MG/ML
INJECTION, SOLUTION INTRAVENOUS ONCE
Status: DISCONTINUED | OUTPATIENT
Start: 2024-11-04 | End: 2024-11-04

## 2024-11-04 RX ORDER — ONDANSETRON 2 MG/ML
4 INJECTION INTRAMUSCULAR; INTRAVENOUS EVERY 6 HOURS PRN
Status: DISCONTINUED | OUTPATIENT
Start: 2024-11-04 | End: 2024-11-05 | Stop reason: HOSPADM

## 2024-11-04 RX ORDER — VALSARTAN 80 MG/1
320 TABLET ORAL DAILY
Status: DISCONTINUED | OUTPATIENT
Start: 2024-11-04 | End: 2024-11-05 | Stop reason: HOSPADM

## 2024-11-04 RX ORDER — SODIUM CHLORIDE 9 MG/ML
INJECTION, SOLUTION INTRAVENOUS CONTINUOUS
Status: DISCONTINUED | OUTPATIENT
Start: 2024-11-04 | End: 2024-11-05 | Stop reason: HOSPADM

## 2024-11-04 RX ORDER — AMLODIPINE BESYLATE 5 MG/1
5 TABLET ORAL DAILY
Status: DISCONTINUED | OUTPATIENT
Start: 2024-11-04 | End: 2024-11-05 | Stop reason: HOSPADM

## 2024-11-04 RX ORDER — ONDANSETRON 2 MG/ML
4 INJECTION INTRAMUSCULAR; INTRAVENOUS ONCE
Status: COMPLETED | OUTPATIENT
Start: 2024-11-04 | End: 2024-11-04

## 2024-11-04 RX ORDER — POLYETHYLENE GLYCOL 3350 17 G/17G
17 POWDER, FOR SOLUTION ORAL DAILY PRN
Status: DISCONTINUED | OUTPATIENT
Start: 2024-11-04 | End: 2024-11-05 | Stop reason: HOSPADM

## 2024-11-04 RX ORDER — FUROSEMIDE 20 MG/1
20 TABLET ORAL DAILY
Status: DISCONTINUED | OUTPATIENT
Start: 2024-11-04 | End: 2024-11-05 | Stop reason: HOSPADM

## 2024-11-04 RX ORDER — LEVOTHYROXINE SODIUM 75 UG/1
75 TABLET ORAL DAILY
Status: DISCONTINUED | OUTPATIENT
Start: 2024-11-05 | End: 2024-11-05 | Stop reason: HOSPADM

## 2024-11-04 RX ORDER — ERGOCALCIFEROL 1.25 MG/1
50000 CAPSULE, LIQUID FILLED ORAL WEEKLY
Status: DISCONTINUED | OUTPATIENT
Start: 2024-11-04 | End: 2024-11-05 | Stop reason: HOSPADM

## 2024-11-04 RX ORDER — ENOXAPARIN SODIUM 100 MG/ML
30 INJECTION SUBCUTANEOUS NIGHTLY
Status: DISCONTINUED | OUTPATIENT
Start: 2024-11-04 | End: 2024-11-05 | Stop reason: HOSPADM

## 2024-11-04 RX ADMIN — DICLOFENAC SODIUM 4 G: 10 GEL TOPICAL at 16:38

## 2024-11-04 RX ADMIN — SODIUM CHLORIDE: 9 INJECTION, SOLUTION INTRAVENOUS at 17:59

## 2024-11-04 RX ADMIN — DICLOFENAC SODIUM 4 G: 10 GEL TOPICAL at 20:03

## 2024-11-04 RX ADMIN — ALBUTEROL SULFATE 2.5 MG: 2.5 SOLUTION RESPIRATORY (INHALATION) at 17:18

## 2024-11-04 RX ADMIN — ERGOCALCIFEROL 50000 UNITS: 1.25 CAPSULE ORAL at 16:40

## 2024-11-04 RX ADMIN — POTASSIUM CHLORIDE 40 MEQ: 1500 TABLET, EXTENDED RELEASE ORAL at 16:39

## 2024-11-04 RX ADMIN — ASPIRIN 81 MG: 81 TABLET, COATED ORAL at 16:39

## 2024-11-04 RX ADMIN — ONDANSETRON 4 MG: 2 INJECTION INTRAMUSCULAR; INTRAVENOUS at 12:24

## 2024-11-04 RX ADMIN — CEFTRIAXONE SODIUM 1000 MG: 1 INJECTION, POWDER, FOR SOLUTION INTRAMUSCULAR; INTRAVENOUS at 14:05

## 2024-11-04 RX ADMIN — CLONAZEPAM 2 MG: 0.5 TABLET ORAL at 20:02

## 2024-11-04 RX ADMIN — ENOXAPARIN SODIUM 30 MG: 100 INJECTION SUBCUTANEOUS at 20:02

## 2024-11-04 RX ADMIN — ATORVASTATIN CALCIUM 40 MG: 40 TABLET, FILM COATED ORAL at 16:39

## 2024-11-04 RX ADMIN — SODIUM CHLORIDE: 9 INJECTION, SOLUTION INTRAVENOUS at 16:31

## 2024-11-04 RX ADMIN — SODIUM CHLORIDE 1000 ML: 9 INJECTION, SOLUTION INTRAVENOUS at 12:22

## 2024-11-04 ASSESSMENT — PAIN - FUNCTIONAL ASSESSMENT
PAIN_FUNCTIONAL_ASSESSMENT: 0-10
PAIN_FUNCTIONAL_ASSESSMENT: ACTIVITIES ARE NOT PREVENTED

## 2024-11-04 ASSESSMENT — PAIN DESCRIPTION - ORIENTATION: ORIENTATION: MID

## 2024-11-04 ASSESSMENT — PAIN DESCRIPTION - PAIN TYPE
TYPE: ACUTE PAIN
TYPE: ACUTE PAIN

## 2024-11-04 ASSESSMENT — LIFESTYLE VARIABLES: HOW OFTEN DO YOU HAVE A DRINK CONTAINING ALCOHOL: NEVER

## 2024-11-04 ASSESSMENT — PAIN DESCRIPTION - DESCRIPTORS: DESCRIPTORS: ACHING

## 2024-11-04 ASSESSMENT — PAIN SCALES - GENERAL
PAINLEVEL_OUTOF10: 7
PAINLEVEL_OUTOF10: 7

## 2024-11-04 ASSESSMENT — PAIN DESCRIPTION - LOCATION
LOCATION: ABDOMEN
LOCATION: NECK

## 2024-11-04 NOTE — FLOWSHEET NOTE
11/04/24 1715   Incentive Spirometry Tx   Treatment Effort Initial   Respiratory   Respiratory (WDL) X   Respiratory Interventions Incentive spirometry   Respiratory Pattern Regular   Respiratory Depth Normal   Respiratory Quality/Effort Unlabored   Chest Assessment Chest expansion symmetrical   L Breath Sounds Expiratory Wheezes   R Breath Sounds Expiratory Wheezes   Level of Activity/Mobility 1   Breath Sounds   Breath Sounds Bilateral Expiratory wheezing   Right Upper Lobe Expiratory wheezes   Right Middle Lobe Expiratory wheezes   Right Lower Lobe Expiratory wheezes   Left Upper Lobe Expiratory wheezes   Left Lower Lobe Expiratory wheezes   Cough/Sputum   Cough Productive   Frequency Frequent   Cardiac   Cardiac (WDL) X   Cardiac Regularity Regular   Heart Sounds S1, S2   Cardiac Rhythm Sinus rhythm   Cardiac Monitor   Telemetry Box Number mx [id number]   Alarm Audible Yes   Alarms Set Yes   Electrodes Replaced Yes   Telemetry Monitor Alarm Parameters 50/120     Admit to room 7, came from ER with syncopal episode and PCPs office today.  Productive cough.  Complains of black colored diarrhea 10 times per day, that started 3 days ago.  Placed on c diff precautions and specimen ordered.  Placed on telemetry; HR 65 NSR.  Pt awake, alert.  Dizzy upon standing.  Bed alarm on and pt instructed to call for assistance when out of bed.   IV bolus started.  Wheezing bilaterally.  RT informed.   Oriented to room, unit and plan of care.

## 2024-11-04 NOTE — FLOWSHEET NOTE
11/04/24 1611   Assessment   Charting Type Admission   Psychosocial   Psychosocial (WDL) WDL   Neurological   Neuro (WDL) WDL   Swallow Screening   Is the patient unable to remain alert for testing? No   Is the patient on a modified diet (thickened liquids) due to pre-existing dysphagia? No   Is there presence of existing enteral tube feeding via the stomach or nose? No   Is there presence of head-of-bed restrictions (less than 30 degrees)? No   Is there presence of tracheotomy tube? No   Is the patient ordered nothing-by-mouth status? No   3 oz Water Swallow Screen Pass   [place] Coma Scale   Eye Opening 4   Best Verbal Response 5   Best Motor Response 6   [place] Coma Scale Score 15   NIHSS Stroke Scale   NIHSS Stroke Scale Assessed No   HEENT (Head, Ears, Eyes, Nose, & Throat)   HEENT (WDL) X   Right Eye Impaired vision   Left Eye Impaired vision   Teeth Dentures upper   Respiratory   Respiratory (WDL) X   Cough/Sputum   Cough Congested   Frequency Frequent   Sputum Amount Large   Sputum Color Green;White   Gastrointestinal   Abdominal (WDL) X   Abdomen Inspection Soft   Last BM (including prior to admit) 11/04/24   RUQ Bowel Sounds Active   LUQ Bowel Sounds Active   RLQ Bowel Sounds Active   LLQ Bowel Sounds Active   GI Symptoms Diarrhea   Tenderness Tenderness  (across)   Genitourinary   Genitourinary (WDL) WDL   Peripheral Vascular   Peripheral Vascular (WDL) WDL   Skin Integumentary    Skin Integumentary (WDL) WDL   Musculoskeletal   Musculoskeletal (WDL) X   RL Extremity Weakness;Unsteady   LL Extremity Unsteady;Weakness

## 2024-11-04 NOTE — PLAN OF CARE
Problem: Safety - Adult  Goal: Free from fall injury  Outcome: Progressing     Problem: Gastrointestinal - Adult  Goal: Minimal or absence of nausea and vomiting  Outcome: Progressing

## 2024-11-04 NOTE — PROGRESS NOTES
4 Eyes Skin Assessment     NAME:  Gwendolyn Chan  YOB: 1945  MEDICAL RECORD NUMBER:  [id number]    The patient is being assessed for  Admission    I agree that at least one RN has performed a thorough Head to Toe Skin Assessment on the patient. ALL assessment sites listed below have been assessed.      Areas assessed by both nurses:    Head, Face, Ears, Shoulders, Back, Chest, Arms, Elbows, Hands, Sacrum. Buttock, Coccyx, Ischium, and Legs. Feet and Heels        Does the Patient have a Wound? No noted wound(s)       Isaías Prevention initiated by RN: No  Wound Care Orders initiated by RN: No    Pressure Injury (Stage 3,4, Unstageable, DTI, NWPT, and Complex wounds) if present, place Wound referral order by RN under : No    New Ostomies, if present place, Ostomy referral order under : No     Nurse 1 eSignature: Electronically signed by Krysta Escamilla RN on 11/4/24 at 4:53 PM EST    **SHARE this note so that the co-signing nurse can place an eSignature**    Nurse 2 eSignature: Electronically signed by Fabi Patel RN on 11/4/24 at 4:54 PM EST

## 2024-11-04 NOTE — ED NOTES
Called [facility], stated our ER  was wanting to speak to Dr Mcmahan. Transferred call to Dr Mercado, he is speaking with him now

## 2024-11-04 NOTE — ED TRIAGE NOTES
Patient arrived via ambulance from [facility]. Per report patient had a doctors appointment at 1000 today at [facility] in [facility]. And the patient as unable to register and passed out Spo2 was 70 % on RA and was placed on Oxygen at 3 LNC and sats came back up to 90%. Patient reports she was recently at [facility] and was sent home with a fever or 101.1. Patient alert and oriented X 3 on arrival and C/O mid back neck pain. 8/10 unable to describe the pain only stated it hurts. Patient stated \" I haven't took any of my medications or eaten anything in the past 3 days due to feeling so sick.\"

## 2024-11-04 NOTE — ED PROVIDER NOTES
[facility]  EMERGENCY DEPARTMENT ENCOUNTER        Pt Name: Gwendolyn Chan  MRN: [id number]  Birthdate 1945  Date of evaluation: 11/4/2024  Provider: Montana Mercado DO  PCP: Aleja Lieberman MD  Note Started: 11:43 AM EST 11/4/24    CHIEF COMPLAINT       Chief Complaint   Patient presents with    Loss of Consciousness     Patient arrived via [facility] from [facility] in [facility] From report patient passed out LOC Spo2 77 %.        HISTORY OF PRESENT ILLNESS: 1 or more Elements     History from : Patient    Limitations to history : None    Gwendolyn Chan is a 79 y.o. female who presents to the emergency department for not feeling well think she has pneumonia and had syncopal episode.  Patient states that she has been sick for about a week.  She states that she went to [facility] was discharged home with a fever.  Patient states that she was at her primary care provider's office today for follow-up when she had syncopal episode in the [facility].  She states she is not had anything to eat or drink for the past 3 days except for some Pepsi that she was able to tolerate drinking last night.  She states she has had nausea vomiting and diarrhea for the past 3 days has been keeping her from eating.  She states she is also had cough for the past week that has been productive.  She is also short of breath but she does use 2 L nasal cannula continuously at home at all times.  Patient states she has been on it for about 2 years but she really does not know why she is on oxygen supplemental but she does have history of COPD.  Patient states she does have headaches intermittently since her symptoms started.  She denies any earache.  She states she does have mild sore throat.  Positive for nasal congestion or drainage.  Positive her cough which is productive with green sputum.  Denies loss of taste or smell.  Positive for increased shortness of breath even on her  oxygen.  Denies any chest pain out of ordinary.  Positive for nausea vomiting and diarrhea for the past 3 days.  Positive for abdominal discomfort but she thinks it secondary to the nausea and the vomiting.  Denies dysuria.  Positive for body aches.  Positive for fevers and chills.  Unsure of any sick contacts.  But she states she is vaccinated for COVID but she cannot take the flu shot.  Patient also complaining of some neck discomfort now but she is refusing c-collar because she states she cannot tolerate to wear them.  Past medical history is pertinent for bleeds easily, COPD, DVT, headache, hypertension, hypothyroidism, MI, moderate episode of recurrent major depressive disorder, pneumonia, stomach ulcer and thyroid disease.    Nursing Notes were all reviewed and agreed with or any disagreements were addressed in the HPI.    REVIEW OF SYSTEMS :      Review of Systems    Review of systems reviewed and negative except as in HPI/MDM    PAST MEDICAL HISTORY     Past Medical History:   Diagnosis Date    Bleeds easily ([facility]) 12/31/2018    COPD (chronic obstructive pulmonary disease) ([facility]) 1/6/2022    DVT (deep venous thrombosis) ([facility])     Headache(784.0)     Hypertension     Hypothyroid 5/20/2015    MI, old     Moderate episode of recurrent major depressive disorder ([facility]) 10/27/2018    Pneumonia     Stomach ulcer     Thyroid disease        SURGICAL HISTORY     Past Surgical History:   Procedure Laterality Date    APPENDECTOMY      BREAST BIOPSY Bilateral     CHOLECYSTECTOMY      LIVER SURGERY      UPPER GASTROINTESTINAL ENDOSCOPY         CURRENTMEDICATIONS       Previous Medications    ALBUTEROL (PROVENTIL) (2.5 MG/3ML) 0.083% NEBULIZER SOLUTION    Take 3 mLs by nebulization every 6 hours as needed for Wheezing    ALBUTEROL SULFATE HFA (PROVENTIL;VENTOLIN;PROAIR) 108 (90 BASE) MCG/ACT INHALER    Inhale 2 puffs into the lungs every 6 hours as needed for Wheezing or Shortness of Breath    AMLODIPINE (NORVASC) 5 MG TABLET

## 2024-11-05 VITALS
WEIGHT: 167.5 LBS | BODY MASS INDEX: 29.68 KG/M2 | HEIGHT: 63 IN | DIASTOLIC BLOOD PRESSURE: 69 MMHG | RESPIRATION RATE: 18 BRPM | HEART RATE: 62 BPM | OXYGEN SATURATION: 93 % | TEMPERATURE: 97.8 F | SYSTOLIC BLOOD PRESSURE: 141 MMHG

## 2024-11-05 PROBLEM — N17.9 AKI (ACUTE KIDNEY INJURY) (HCC): Status: ACTIVE | Noted: 2024-11-05

## 2024-11-05 PROBLEM — R79.89 ELEVATED TROPONIN: Status: ACTIVE | Noted: 2024-11-05

## 2024-11-05 PROBLEM — J96.10 CHRONIC RESPIRATORY FAILURE: Status: ACTIVE | Noted: 2024-11-05

## 2024-11-05 PROBLEM — N39.0 UTI (URINARY TRACT INFECTION): Status: ACTIVE | Noted: 2024-11-05

## 2024-11-05 LAB
ALBUMIN SERPL-MCNC: 3.2 G/DL (ref 3.4–4.8)
ALBUMIN/GLOB SERPL: 1.1 {RATIO} (ref 0.8–2)
ALP SERPL-CCNC: 68 U/L (ref 25–100)
ALT SERPL-CCNC: 8 U/L (ref 4–36)
ANION GAP SERPL CALCULATED.3IONS-SCNC: 8 MMOL/L (ref 3–16)
AST SERPL-CCNC: 14 U/L (ref 8–33)
BACTERIA UR CULT: NORMAL
BILIRUB SERPL-MCNC: <0.2 MG/DL (ref 0.3–1.2)
BUN SERPL-MCNC: 28 MG/DL (ref 6–20)
CALCIUM SERPL-MCNC: 8.5 MG/DL (ref 8.5–10.5)
CHLORIDE SERPL-SCNC: 104 MMOL/L (ref 98–107)
CO2 SERPL-SCNC: 29 MMOL/L (ref 20–30)
CREAT SERPL-MCNC: 1.1 MG/DL (ref 0.4–1.2)
ERYTHROCYTE [DISTWIDTH] IN BLOOD BY AUTOMATED COUNT: 13.3 % (ref 11–16)
GFR SERPLBLD CREATININE-BSD FMLA CKD-EPI: 52 ML/MIN/{1.73_M2}
GLOBULIN SER CALC-MCNC: 2.9 G/DL
GLUCOSE SERPL-MCNC: 178 MG/DL (ref 74–106)
HCT VFR BLD AUTO: 34.7 % (ref 37–47)
HGB BLD-MCNC: 11.1 G/DL (ref 11.5–16.5)
MCH RBC QN AUTO: 30 PG (ref 27–32)
MCHC RBC AUTO-ENTMCNC: 32 G/DL (ref 31–35)
MCV RBC AUTO: 93.8 FL (ref 80–100)
PLATELET # BLD AUTO: 205 K/UL (ref 150–400)
PMV BLD AUTO: 9.8 FL (ref 6–10)
POTASSIUM SERPL-SCNC: 4.6 MMOL/L (ref 3.4–5.1)
PROT SERPL-MCNC: 6.1 G/DL (ref 6.4–8.3)
RBC # BLD AUTO: 3.7 M/UL (ref 3.8–5.8)
SODIUM SERPL-SCNC: 141 MMOL/L (ref 136–145)
WBC # BLD AUTO: 7.7 K/UL (ref 4–11)

## 2024-11-05 PROCEDURE — 85027 COMPLETE CBC AUTOMATED: CPT

## 2024-11-05 PROCEDURE — 99235 HOSP IP/OBS SAME DATE MOD 70: CPT | Performed by: INTERNAL MEDICINE

## 2024-11-05 PROCEDURE — 36415 COLL VENOUS BLD VENIPUNCTURE: CPT

## 2024-11-05 PROCEDURE — 2700000000 HC OXYGEN THERAPY PER DAY

## 2024-11-05 PROCEDURE — 6360000002 HC RX W HCPCS

## 2024-11-05 PROCEDURE — 2580000003 HC RX 258: Performed by: PHYSICIAN ASSISTANT

## 2024-11-05 PROCEDURE — 80053 COMPREHEN METABOLIC PANEL: CPT

## 2024-11-05 PROCEDURE — 94761 N-INVAS EAR/PLS OXIMETRY MLT: CPT

## 2024-11-05 PROCEDURE — 97165 OT EVAL LOW COMPLEX 30 MIN: CPT

## 2024-11-05 PROCEDURE — 6370000000 HC RX 637 (ALT 250 FOR IP): Performed by: PHYSICIAN ASSISTANT

## 2024-11-05 PROCEDURE — 6360000002 HC RX W HCPCS: Performed by: PHYSICIAN ASSISTANT

## 2024-11-05 RX ORDER — ISOSORBIDE MONONITRATE 30 MG/1
30 TABLET, EXTENDED RELEASE ORAL DAILY
COMMUNITY
End: 2024-11-12

## 2024-11-05 RX ORDER — DIPHENHYDRAMINE HYDROCHLORIDE 50 MG/ML
INJECTION INTRAMUSCULAR; INTRAVENOUS
Status: COMPLETED
Start: 2024-11-05 | End: 2024-11-05

## 2024-11-05 RX ORDER — DIPHENHYDRAMINE HYDROCHLORIDE 50 MG/ML
25 INJECTION INTRAMUSCULAR; INTRAVENOUS EVERY 6 HOURS PRN
Status: DISCONTINUED | OUTPATIENT
Start: 2024-11-05 | End: 2024-11-05 | Stop reason: HOSPADM

## 2024-11-05 RX ORDER — CEFDINIR 300 MG/1
300 CAPSULE ORAL 2 TIMES DAILY
Qty: 14 CAPSULE | Refills: 0 | Status: SHIPPED
Start: 2024-11-05 | End: 2024-11-12

## 2024-11-05 RX ORDER — MECOBALAMIN 5000 MCG
10 TABLET,DISINTEGRATING ORAL NIGHTLY PRN
Status: DISCONTINUED | OUTPATIENT
Start: 2024-11-05 | End: 2024-11-05 | Stop reason: HOSPADM

## 2024-11-05 RX ORDER — NYSTATIN 100000 [USP'U]/ML
5 SUSPENSION ORAL 4 TIMES DAILY
Status: DISCONTINUED | OUTPATIENT
Start: 2024-11-05 | End: 2024-11-05 | Stop reason: HOSPADM

## 2024-11-05 RX ORDER — NYSTATIN 100000 [USP'U]/ML
5 SUSPENSION ORAL 4 TIMES DAILY
Qty: 100 ML | Refills: 0 | Status: SHIPPED | OUTPATIENT
Start: 2024-11-05 | End: 2024-11-10

## 2024-11-05 RX ADMIN — DIPHENHYDRAMINE HYDROCHLORIDE 25 MG: 50 INJECTION, SOLUTION INTRAMUSCULAR; INTRAVENOUS at 12:08

## 2024-11-05 RX ADMIN — NYSTATIN 500000 UNITS: 100000 SUSPENSION ORAL at 12:02

## 2024-11-05 RX ADMIN — ACETAMINOPHEN 650 MG: 325 TABLET, FILM COATED ORAL at 12:02

## 2024-11-05 RX ADMIN — DICLOFENAC SODIUM 4 G: 10 GEL TOPICAL at 09:03

## 2024-11-05 RX ADMIN — CEFTRIAXONE SODIUM 1000 MG: 1 INJECTION, POWDER, FOR SOLUTION INTRAMUSCULAR; INTRAVENOUS at 09:01

## 2024-11-05 RX ADMIN — ATORVASTATIN CALCIUM 40 MG: 40 TABLET, FILM COATED ORAL at 09:02

## 2024-11-05 RX ADMIN — PANTOPRAZOLE SODIUM 40 MG: 40 TABLET, DELAYED RELEASE ORAL at 05:45

## 2024-11-05 RX ADMIN — SODIUM CHLORIDE: 9 INJECTION, SOLUTION INTRAVENOUS at 08:58

## 2024-11-05 RX ADMIN — ASPIRIN 81 MG: 81 TABLET, COATED ORAL at 09:02

## 2024-11-05 RX ADMIN — DIPHENHYDRAMINE HYDROCHLORIDE 25 MG: 50 INJECTION INTRAMUSCULAR; INTRAVENOUS at 12:08

## 2024-11-05 RX ADMIN — LEVOTHYROXINE SODIUM 75 MCG: 75 TABLET ORAL at 05:45

## 2024-11-05 RX ADMIN — ONDANSETRON 4 MG: 2 INJECTION INTRAMUSCULAR; INTRAVENOUS at 12:02

## 2024-11-05 RX ADMIN — ONDANSETRON 4 MG: 2 INJECTION INTRAMUSCULAR; INTRAVENOUS at 01:42

## 2024-11-05 RX ADMIN — Medication 10 MG: at 01:37

## 2024-11-05 ASSESSMENT — PAIN SCALES - GENERAL: PAINLEVEL_OUTOF10: 0

## 2024-11-05 NOTE — PLAN OF CARE
Problem: Gastrointestinal - Adult  Goal: Maintains adequate nutritional intake  Outcome: Progressing

## 2024-11-05 NOTE — PROGRESS NOTES
Medication Reconciliation completed with fill history from [facility] in [place] and patient interview.   -unable to afford Albuterol Nebulizer Solution and Albuterol Inhaler  -not taking Furosemide 20 mg qd prn  -not taking Tizanidine 2 mg tid prn  -added Isosorbide Mono 30 mg qd

## 2024-11-05 NOTE — H&P
Short Stay Summary      Patient ID: Gwendolyn Chan      Patient's PCP: Aleja Lieberman MD    Admit Date: 11/4/2024     Discharge Date:   11/5/2024    Admitting Physician: Demi Owusu MD    Discharge Physician: JUSTINE Berman     Reason for this admission:   Syncope  Nausea, vomiting, diarrhea  UTI  SHAWANDA    Discharge Diagnoses:     Active Hospital Problems    Diagnosis Date Noted    SHAWANDA (acute kidney injury) (HCC) [N17.9] 11/05/2024    UTI (urinary tract infection) [N39.0] 11/05/2024    Elevated troponin [R79.89] 11/05/2024    Chronic respiratory failure [J96.10] 11/05/2024    Syncope and collapse [R55] 11/04/2024    COPD (chronic obstructive pulmonary disease) (HCC) [J44.9] 01/06/2022    Nausea vomiting and diarrhea [R11.2, R19.7] 05/02/2017    Hypothyroidism [E03.9] 05/20/2015       Procedures:  XR CHEST PORTABLE   Final Result      Minimal scarring in the lung bases.      No acute consolidation.      Normal cardiomediastinal silhouette.      Electronically signed by Cleveland Ledbetter      CT Head WO Contrast   Final Result      1. Atrophy and chronic ischemia without acute intracranial abnormalities.   If there is concern for acute infarct, MRI is considered more sensitive.      Electronically signed by Scooter Graf      CT ABDOMEN PELVIS WO CONTRAST Additional Contrast? None   Final Result      1.  No acute abdominal or pelvic findings.   2.  Unchanged fatty atrophy of the pancreatic head.      Electronically signed by Korey Hubbard      CT CERVICAL SPINE WO CONTRAST   Final Result      No acute fracture or traumatic subluxation.      Mild multilevel degenerative disc disease.         Electronically signed by Korey Hubbard            Consults:   IP CONSULT TO CASE MANAGEMENT  IP CONSULT TO SPIRITUAL SERVICES  PT OT    HISTORY OF PRESENT ILLNESS:   The patient is a 79 y.o. female with PMH of COPD, DVT, headaches, HTN, chronic respiratory failure on 2 LNC continuously, hypothyroidism, CAD status post MI, depression,  Aleja Lieberman MD   levothyroxine (SYNTHROID) 75 MCG tablet Take 1 tablet by mouth daily 11/2/23   Aleja Lieberman MD   albuterol (PROVENTIL) (2.5 MG/3ML) 0.083% nebulizer solution Take 3 mLs by nebulization every 6 hours as needed for Wheezing 7/11/23   Rose Hernandez APRN - CNP   albuterol sulfate HFA (PROVENTIL;VENTOLIN;PROAIR) 108 (90 Base) MCG/ACT inhaler Inhale 2 puffs into the lungs every 6 hours as needed for Wheezing or Shortness of Breath 11/9/22   Aleja Lieberman MD       Vital Signs  Temp: 97.8 °F (36.6 °C)  Pulse: 56  Respirations: 16  BP: 128/69  SpO2: 97 %  O2 Device: Nasal cannula  O2 Flow Rate (L/min): 2 L/min    Vital signs reviewed in electronic chart.    Physical exam  Constitutional:  Well developed, well nourished, elderly female sitting upright in bed in no acute distress.  Eyes:  conjunctiva normal, EOMI.  HENT:  Atraumatic, external ears normal, external nose/nares normal, oropharynx moist, no pharyngeal exudates.   Neck:  Supple. No JVD or thyromegaly.  Respiratory:  No respiratory distress, normal breath sounds, no rales.  Diminished at bases with occasional expiratory wheeze.  Cardiovascular:  Normal rate, normal rhythm, no murmurs, no gallops, no rubs.  GI:  Soft, nondistended, normal bowel sounds, nontender, no organomegaly, no mass.  :  No costovertebral angle tenderness.  Musculoskeletal:  No edema, no tenderness, no obvious deformities. Patient is moving all extremities.   Integument:  Well hydrated, no rash.   Lymphatic:  No cervical or axillary lymphadenopathy noted.   Neurologic:  Alert & oriented x 3,  no focal deficits noted. Strength is equal throughout.  Psychiatric:  Speech and behavior appropriate.      Lab Results   Component Value Date    WBC 7.7 11/05/2024    HGB 11.1 (L) 11/05/2024    HCT 34.7 (L) 11/05/2024    MCV 93.8 11/05/2024     11/05/2024       Lab Results   Component Value Date     11/05/2024    K 4.6 11/05/2024     11/05/2024    CO2 29  Rocephin was also continued for UTI.  Patient's diarrhea resolved prior to obtaining a stool culture for C. difficile and GI pathogens.  Patient has had no further nausea or vomiting and has been tolerating a regular diet.  Patient's blood pressure was 109/56 on arrival.  Home Norvasc and Diovan were held.  Blood pressure currently 128/69.  Diovan has been discontinued on discharge.  Patient encouraged to monitor blood pressure at home and present log to PCP at follow-up in 1 week.  If blood pressure becomes elevated at greater than 130/90, she has been instructed to restart home Norvasc.  Patient also encouraged to continue holding home Lasix until p.o. intake is normal and blood pressure has improved.    Patient states she is feeling much better and is agreeable for discharge home.  To be transition to Omnicef for ongoing treatment of UTI.  Urine culture is pending at time of this dictation.  She will follow-up with Dr. Mcmahan as scheduled in 2 weeks.    Disposition: home    Discharged Condition: Stable    Activity: activity as tolerated  Diet: regular diet  Follow Up: Primary Care Physician in one week and Dr. Mcmahan in 2 weeks as scheduled (patient already has appointment)      Discharge Medications:     Current Discharge Medication List             Details   cefdinir (OMNICEF) 300 MG capsule Take 1 capsule by mouth 2 times daily for 7 days  Qty: 14 capsule, Refills: 0                Details   aspirin 81 MG EC tablet Take 1 tablet by mouth daily      omeprazole (PRILOSEC) 40 MG delayed release capsule Take 1 capsule by mouth every morning (before breakfast)  Qty: 30 capsule, Refills: 5    Associated Diagnoses: Gastroesophageal reflux disease, unspecified whether esophagitis present      amLODIPine (NORVASC) 5 MG tablet  Patient instructed to hold the medication and resume if blood pressure greater than 130/90 Take 1 tablet by mouth daily Take at night, continue the diovan  Qty: 30 tablet, Refills: 2

## 2024-11-05 NOTE — FLOWSHEET NOTE
11/04/24 2000   Assessment   Charting Type Shift assessment   Psychosocial   Psychosocial (WDL) WDL   Neurological   Neuro (WDL) WDL   [place] Coma Scale   Eye Opening 4   Best Verbal Response 5   Best Motor Response 6   [place] Coma Scale Score 15   HEENT (Head, Ears, Eyes, Nose, & Throat)   HEENT (WDL) X   Right Eye Impaired vision   Left Eye Impaired vision   Teeth Dentures upper   Respiratory   Respiratory (WDL) X   Respiratory Interventions Incentive spirometry   Respiratory Pattern Regular   Respiratory Depth Normal   Respiratory Quality/Effort Unlabored   Chest Assessment Chest expansion symmetrical   L Breath Sounds Expiratory Wheezes   R Breath Sounds Expiratory Wheezes   Breath Sounds   Breath Sounds Bilateral Expiratory wheezing   Right Upper Lobe Expiratory wheezes   Right Middle Lobe Expiratory wheezes   Right Lower Lobe Expiratory wheezes   Left Upper Lobe Expiratory wheezes   Left Lower Lobe Expiratory wheezes   Cough/Sputum   Cough Congested   Sputum Amount Large   Sputum Color Green;White   Gastrointestinal   Abdominal (WDL) X   Abdomen Inspection Soft   RUQ Bowel Sounds Active   LUQ Bowel Sounds Active   RLQ Bowel Sounds Active   LLQ Bowel Sounds Active   GI Symptoms Diarrhea   Tenderness Tenderness  (across)   Genitourinary   Genitourinary (WDL) WDL   Peripheral Vascular   Peripheral Vascular (WDL) WDL   Skin Integumentary    Skin Integumentary (WDL) WDL   Musculoskeletal   Musculoskeletal (WDL) X   RL Extremity Weakness;Unsteady   LL Extremity Unsteady;Weakness

## 2024-11-05 NOTE — ACP (ADVANCE CARE PLANNING)
Advance Care Planning     General Advance Care Planning (ACP) Conversation    Date of Conversation: 11/5/2024  Conducted with: Patient with Decision Making Capacity  Other persons present: None    Healthcare Decision Maker:   Primary Decision Maker: Rafaela Benz - Brother/Sister - [phone]     Today we documented Decision Maker(s) consistent with Legal Next of Kin hierarchy.  Content/Action Overview:  Has NO ACP documents-Information provided  Reviewed DNR/DNI and patient elects Full Code (Attempt Resuscitation)  artificial nutrition, ventilation preferences, and resuscitation preferences      Length of Voluntary ACP Conversation in minutes:  <16 minutes (Non-Billable)    Gera Jordan

## 2024-11-05 NOTE — PROGRESS NOTES
Occupational Therapy  Facility/Department: [facility] SURG  Occupational Therapy Initial Assessment    Name: Gwendolyn Chan  : 1945  MRN: [id number]  Date of Service: 2024    Discharge Recommendations:     OT Equipment Recommendations  Equipment Needed: No       Patient Diagnosis(es): The primary encounter diagnosis was Loss of consciousness (HCC). Diagnoses of Elevated troponin, Acute cystitis without hematuria, Nausea vomiting and diarrhea, COPD with acute exacerbation (HCC), Acute renal insufficiency, Dehydration, Nausea and vomiting, unspecified vomiting type, Diarrhea, unspecified type, and Dizziness were also pertinent to this visit.  Past Medical History:  has a past medical history of Bleeds easily (HCC), COPD (chronic obstructive pulmonary disease) (HCC), DVT (deep venous thrombosis) (HCC), Headache(784.0), Hypertension, Hypothyroid, MI, old, Moderate episode of recurrent major depressive disorder (HCC), Pneumonia, Stomach ulcer, and Thyroid disease.  Past Surgical History:  has a past surgical history that includes Cholecystectomy; Liver surgery; Appendectomy; Upper gastrointestinal endoscopy; and Breast biopsy (Bilateral).    Assessment  Assessment: This 79 year old female was referred to OT services upon admission secondary to syncope and collapse. Pt is able to answer all questions appropriately. Pt is alert and oriented x4. Pt reports she hasn't had any rest and is independent and wants to be left alone. Pt presents on 2L 02 which is baseline need. Pt transferred to sitting at EOB with independence. Pt sat upright at EOB independently. Pt come to stand with independence. Pt is able to complete BADL's with independence. Pt declines need to toilet but reports she is able to complete without assistance. Pt AROM and MMT WFL. Pt appears at baseline functional status and no skilled OT services warranted at this time.  Prognosis: Good  Decision Making: Low Complexity  No Skilled OT: At baseline

## 2024-11-05 NOTE — CARE COORDINATION
Case Management Assessment  Initial Evaluation    Date/Time of Evaluation: 11/5/2024 12:35 PM  Assessment Completed by: Flores Hill RN     If patient is discharged prior to next notation, then this note serves as note for discharge by case management.    Patient Name: Gwendolyn Chan                   YOB: 1945  Diagnosis: Syncope and collapse [R55]  Dehydration [E86.0]  Dizziness [R42]  Loss of consciousness (HCC) [R40.20]  Acute renal insufficiency [N28.9]  Elevated troponin [R79.89]  COPD with acute exacerbation (HCC) [J44.1]  Acute cystitis without hematuria [N30.00]  Nausea vomiting and diarrhea [R11.2, R19.7]  Diarrhea, unspecified type [R19.7]  Nausea and vomiting, unspecified vomiting type [R11.2]                   Date / Time: 11/4/2024 11:01 AM    Patient Admission Status: Inpatient   Readmission Risk (Low < 19, Mod (19-27), High > 27): Readmission Risk Score: 11.1    Current PCP: Aleja Lieberman MD  PCP verified by CM? Yes    Chart Reviewed: Yes      History Provided by: Patient, Medical Record  Patient Orientation: Alert and Oriented    Patient Cognition: Alert    Hospitalization in the last 30 days (Readmission):  No    If yes, Readmission Assessment in CM Navigator will be completed.    Advance Directives:      Code Status: Full Code   Patient's Primary Decision Maker is: Named in Scanned ACP Document    Primary Decision Maker: Rafaela Benz - Brother/Sister - [phone]    Discharge Planning:    Patient lives with: Alone Type of Home: Apartment  Primary Care Giver: Self  Patient Support Systems include: Family Members, Friends/Neighbors, [employer]   Current Financial resources: Medicare  Current community resources: None  Current services prior to admission: Durable Medical Equipment, Oxygen Therapy ([employer])            Current DME: Oxygen Therapy (Comment), Cane, Walker (cane, RW)            Type of Home Care services:  None    ADLS  Prior functional level: Independent  in ADLs/IADLs  Current functional level: Independent in ADLs/IADLs    PT AM-PAC:   /24  OT AM-PAC:   /24    Family can provide assistance at DC: Yes  Would you like Case Management to discuss the discharge plan with any other family members/significant others, and if so, who? No  Plans to Return to Present Housing: Yes  Other Identified Issues/Barriers to RETURNING to current housing: none  Potential Assistance needed at discharge: N/A            Potential DME:    Patient expects to discharge to: Apartment  Plan for transportation at discharge:      Financial    Payor: MEDICARE / Plan: MEDICARE PART A AND B / Product Type: *No Product type* /     Does insurance require precert for SNF: No    Potential assistance Purchasing Medications: No  Meds-to-Beds request:        [facility] [id number] - [place], [place] - [address] [phone] - F [phone]  [address] [address]  Phone: [phone] Fax: [phone]    [facility] [address] [place] [address] [phone] - F [phone]  [address] [place] [address]  Phone: [phone] Fax: [phone]      Notes:    Factors facilitating achievement of predicted outcomes: Cooperative and Pleasant    Barriers to discharge: Medical complications    Additional Case Management Notes: pt lives alone and has a cane and rolling walker has oxygen ([employer]ogen)     The Plan for Transition of Care is related to the following treatment goals of Syncope and collapse [R55]  Dehydration [E86.0]  Dizziness [R42]  Loss of consciousness (HCC) [R40.20]  Acute renal insufficiency [N28.9]  Elevated troponin [R79.89]  COPD with acute exacerbation (HCC) [J44.1]  Acute cystitis without hematuria [N30.00]  Nausea vomiting and diarrhea [R11.2, R19.7]  Diarrhea, unspecified type [R19.7]  Nausea and vomiting, unspecified vomiting type [R11.2]

## 2024-11-05 NOTE — FLOWSHEET NOTE
11/05/24 0910   Assessment   Charting Type Shift assessment   Psychosocial   Psychosocial (WDL) WDL   Neurological   Neuro (WDL) WDL   Adriana Coma Scale   Eye Opening 4   Best Verbal Response 5   Best Motor Response 6   [place] Coma Scale Score 15   HEENT (Head, Ears, Eyes, Nose, & Throat)   HEENT (WDL) X   Right Eye Impaired vision   Left Eye Impaired vision   Teeth Dentures upper   Respiratory   Respiratory (WDL) X   Respiratory Pattern Regular   Respiratory Depth Normal   Respiratory Quality/Effort Unlabored   Chest Assessment Chest expansion symmetrical   L Breath Sounds Expiratory Wheezes   R Breath Sounds Expiratory Wheezes   Breath Sounds   Right Upper Lobe Expiratory wheezes;Clear   Right Middle Lobe Expiratory wheezes;Clear   Right Lower Lobe Expiratory wheezes;Clear   Left Upper Lobe Expiratory wheezes;Clear   Left Lower Lobe Expiratory wheezes;Clear   Cough/Sputum   Cough Congested   Sputum Amount Large   Sputum Color Green;White   Cardiac Monitor   Telemetry Box Number mx [id number]   Alarm Audible Yes   Alarms Set Yes   Electrodes Replaced Yes   Telemetry Monitor Alarm Parameters 50/120   Gastrointestinal   Abdominal (WDL) X   Abdomen Inspection Soft   RUQ Bowel Sounds Active   LUQ Bowel Sounds Active   RLQ Bowel Sounds Active   LLQ Bowel Sounds Active   GI Symptoms Diarrhea   Tenderness Tenderness  (across)   Genitourinary   Genitourinary (WDL) WDL   Peripheral Vascular   Peripheral Vascular (WDL) WDL   Skin Integumentary    Skin Integumentary (WDL) WDL   Musculoskeletal   Musculoskeletal (WDL) X   RL Extremity Weakness;Unsteady   LL Extremity Unsteady;Weakness

## 2024-11-05 NOTE — PROGRESS NOTES
PIV and telemetry removed.  Pt no longer has symptoms of allergic reaction.  Discharge home per BMT.  Pt verbalized understanding of discharge instructions.  Accompanied to exit.

## 2024-11-05 NOTE — PROGRESS NOTES
Pt eating lunch and called staff that her throat was closing and developing hives in her throat.  Sats 93% to 98% on room air.  Pt able to talk.  Sitting up on side of bed.  Mushrooms in dish pt was eating and pt states she is allergic to mushrooms.  Vomited small amt of food.  No swelling or hives noted on examination.  Benadryl, tylenol, zofran given.  Also noted thrush on tongue and nystatin given.  Ginger aware.  Sats currently 89%.  Placed back on 2 lpm O2.  Saturation improved to 98%.

## 2024-11-05 NOTE — CARE COORDINATION
Spoke with BMT they will arrive at approx 1430 to transport pt to her vehicle at pcp office. RN aware. No further discharge needs at this time.

## 2024-11-05 NOTE — PROGRESS NOTES
CLINICAL PHARMACY NOTE: MEDS TO BEDS    Total # of Prescriptions Filled: 2   The following medications were delivered to the patient:  Discharge Medication List as of 11/5/2024  1:55 PM        START taking these medications    Details   cefdinir (OMNICEF) 300 MG capsule Take 1 capsule by mouth 2 times daily for 7 days, Disp-14 capsule, R-0Normal      nystatin (MYCOSTATIN) 397774 UNIT/ML suspension Take 5 mLs by mouth 4 times daily for 5 days, Oral, 4 TIMES DAILY Starting Tue 11/5/2024, Until Sun 11/10/2024, For 5 days, Disp-100 mL, R-0, Normal               Additional Documentation:  Medications were delivered to patient's room and signed for by patient.

## 2024-11-05 NOTE — DISCHARGE SUMMARY
Short Stay Summary      Patient ID: Gwendolyn Chan      Patient's PCP: Aleja Lieberman MD    Admit Date: 11/4/2024     Discharge Date:   11/5/2024    Admitting Physician: Demi Owusu MD    Discharge Physician: JUSTINE Berman     Reason for this admission:   Syncope  Nausea, vomiting, diarrhea  UTI  SHAWANDA    Discharge Diagnoses:     Active Hospital Problems    Diagnosis Date Noted    SHAWANDA (acute kidney injury) (HCC) [N17.9] 11/05/2024    UTI (urinary tract infection) [N39.0] 11/05/2024    Elevated troponin [R79.89] 11/05/2024    Chronic respiratory failure [J96.10] 11/05/2024    Syncope and collapse [R55] 11/04/2024    COPD (chronic obstructive pulmonary disease) (HCC) [J44.9] 01/06/2022    Nausea vomiting and diarrhea [R11.2, R19.7] 05/02/2017    Hypothyroidism [E03.9] 05/20/2015       Procedures:  XR CHEST PORTABLE   Final Result      Minimal scarring in the lung bases.      No acute consolidation.      Normal cardiomediastinal silhouette.      Electronically signed by Cleveland Ledbetter      CT Head WO Contrast   Final Result      1. Atrophy and chronic ischemia without acute intracranial abnormalities.   If there is concern for acute infarct, MRI is considered more sensitive.      Electronically signed by Scooter Graf      CT ABDOMEN PELVIS WO CONTRAST Additional Contrast? None   Final Result      1.  No acute abdominal or pelvic findings.   2.  Unchanged fatty atrophy of the pancreatic head.      Electronically signed by Korey Hubbard      CT CERVICAL SPINE WO CONTRAST   Final Result      No acute fracture or traumatic subluxation.      Mild multilevel degenerative disc disease.         Electronically signed by Korey Hubbard            Consults:   IP CONSULT TO CASE MANAGEMENT  IP CONSULT TO SPIRITUAL SERVICES  PT OT    HISTORY OF PRESENT ILLNESS:   The patient is a 79 y.o. female with PMH of COPD, DVT, headaches, HTN, chronic respiratory failure on 2 LNC continuously, hypothyroidism, CAD status post MI, depression,  disease, unspecified COPD type (HCC)              Patient was seen and examined by Dr. Owusu and plan of care reviewed.  Treatment plan was formulated collaboratively.    Time spent: 14 minutes      Signed:  Electronically signed by JUSTINE Berman on 11/5/2024 at 11:09 AM       Thank you Aleja Lieberman MD for the opportunity to be involved in this patient's care. If you have any questions or concerns please feel free to contact me at [phone].

## 2024-11-05 NOTE — PROGRESS NOTES
Offered spiritual care to patient. Told pt to reach out if they needed anything further. Held prayer at bedside with patient.

## 2024-11-07 ENCOUNTER — CARE COORDINATION (OUTPATIENT)
Dept: PRIMARY CARE CLINIC | Age: 79
End: 2024-11-07

## 2024-11-07 NOTE — CARE COORDINATION
Care Transitions Initial Follow Up Call    Call within 2 business days of discharge: Yes     Patient: Gwendolyn Chan Patient : 1945 MRN: [id number]    Last Discharge Facility       Date Complaint Diagnosis Description Type Department Provider    24 Loss of Consciousness Loss of consciousness (HCC) ... ED to Hosp-Admission (Discharged) (ADMITTED) Demi Whittington MS, MD; Montana Mercado DO            RARS: Readmission Risk Score: 11.1       Spoke with: Attempting HFU call, unsuccessful.  Message left with contact information.     Discharge department/facility: [facility]    Non-face-to-face services provided:  Scheduled appointment with PCP-Luisana  Obtained and reviewed discharge summary and/or continuity of care documents    Follow Up  Future Appointments   Date Time Provider Department Center   2024  9:45 AM Aleja Lieberman MD [facility] DEP       Parveen Ring RN

## 2024-11-08 LAB
BACTERIA BLD CULT ORG #2: NORMAL
BACTERIA BLD CULT: NORMAL

## 2024-11-08 NOTE — CARE COORDINATION
Care Transitions Initial Follow Up Call    Call within 2 business days of discharge: Yes     Patient: Gwendolyn Chan Patient : 1945 MRN: [id number]    Last Discharge Facility       Date Complaint Diagnosis Description Type Department Provider    24 Loss of Consciousness Loss of consciousness (HCC) ... ED to Hosp-Admission (Discharged) (ADMITTED) Demi Whittington MS, MD; Montana Mercado DO            RARS: Readmission Risk Score: 11.1       Spoke with: Attempting HFU call, unsuccessful.  Message left with contact information.     Discharge department/facility: [facility]    Non-face-to-face services provided:  Scheduled appointment with PCP-Luisana  Obtained and reviewed discharge summary and/or continuity of care documents    Follow Up  Future Appointments   Date Time Provider Department Center   2024  9:45 AM Aleja Lieberman MD [facility] DEP       Parveen Ring RN

## 2024-11-11 NOTE — CARE COORDINATION
Care Transitions Initial Follow Up Call    Call within 2 business days of discharge: Yes    Patient Current Location:  Home:  [address] [place] [id number]    Care Transition Nurse contacted the patient by telephone to perform post hospital discharge assessment. Verified name and  with patient as identifiers. Provided introduction to self, and explanation of the Care Transition Nurse role.     Patient: Gwendolyn Chan Patient : 1945   MRN: [id number]  Reason for Admission: syncopal episode at Dr Office, treated recent for PNA, UTI  Discharge Date: 24 RARS: Readmission Risk Score: 11.1      Last Discharge Facility       Date Complaint Diagnosis Description Type Department Provider    24 Loss of Consciousness Loss of consciousness (HCC) ... ED to Hosp-Admission (Discharged) (ADMITTED) Demi Whittington MS, MD; Montana Mercado, DO            Was this an external facility discharge? No Discharge Facility: [facility]    Challenges to be reviewed by the provider   Additional needs identified to be addressed with provider: Yes  medications-patient wants to discuss steroids               Method of communication with provider: chart routing.    Gwendolyn tells me that \"she's not feeling well at all\".  She reports continued weakness and nausea.  She does have zofran available but states it doesn't help.  She is wearing her 2 liters oxygen and saturation is running 90 - 92 %.  She is eating and drinking but she admits \"probably not enough\".  Gwendolyn plans to speak to Dr Lieberman about a steroid shot or possibly more time in the hospital.  Dr Lieberman is not in clinic today but Gwendolyn wants to wait and see her in the morning for her HFU appointment.      Care Transition Nurse reviewed discharge instructions with patient who verbalized understanding. The patient was given an opportunity to ask questions and does not have any further questions or concerns at this time. Were discharge instructions available to patient? Yes.

## 2024-11-12 ENCOUNTER — OFFICE VISIT (OUTPATIENT)
Dept: FAMILY MEDICINE CLINIC | Age: 79
End: 2024-11-12

## 2024-11-12 ENCOUNTER — CARE COORDINATION (OUTPATIENT)
Age: 79
End: 2024-11-12

## 2024-11-12 VITALS
TEMPERATURE: 97.2 F | RESPIRATION RATE: 18 BRPM | HEIGHT: 63 IN | DIASTOLIC BLOOD PRESSURE: 90 MMHG | OXYGEN SATURATION: 92 % | WEIGHT: 173 LBS | HEART RATE: 78 BPM | SYSTOLIC BLOOD PRESSURE: 140 MMHG | BODY MASS INDEX: 30.65 KG/M2

## 2024-11-12 DIAGNOSIS — R73.9 ELEVATED BLOOD SUGAR: ICD-10-CM

## 2024-11-12 DIAGNOSIS — E53.8 B12 DEFICIENCY: ICD-10-CM

## 2024-11-12 DIAGNOSIS — E03.9 HYPOTHYROIDISM, UNSPECIFIED TYPE: ICD-10-CM

## 2024-11-12 DIAGNOSIS — J96.10 CHRONIC RESPIRATORY FAILURE, UNSPECIFIED WHETHER WITH HYPOXIA OR HYPERCAPNIA: ICD-10-CM

## 2024-11-12 DIAGNOSIS — K21.9 GASTROESOPHAGEAL REFLUX DISEASE, UNSPECIFIED WHETHER ESOPHAGITIS PRESENT: ICD-10-CM

## 2024-11-12 DIAGNOSIS — E78.2 MIXED HYPERLIPIDEMIA: ICD-10-CM

## 2024-11-12 DIAGNOSIS — E55.9 VITAMIN D DEFICIENCY: ICD-10-CM

## 2024-11-12 DIAGNOSIS — J44.1 ACUTE EXACERBATION OF CHRONIC OBSTRUCTIVE PULMONARY DISEASE (COPD) (HCC): Primary | ICD-10-CM

## 2024-11-12 DIAGNOSIS — I10 ESSENTIAL HYPERTENSION: ICD-10-CM

## 2024-11-12 RX ORDER — FUROSEMIDE 20 MG/1
20 TABLET ORAL DAILY
COMMUNITY

## 2024-11-12 RX ORDER — METHYLPREDNISOLONE SODIUM SUCCINATE 125 MG/2ML
125 INJECTION, POWDER, LYOPHILIZED, FOR SOLUTION INTRAMUSCULAR; INTRAVENOUS ONCE
Status: SHIPPED | OUTPATIENT
Start: 2024-11-12

## 2024-11-12 RX ORDER — AMLODIPINE BESYLATE 5 MG/1
5 TABLET ORAL DAILY
COMMUNITY

## 2024-11-12 RX ORDER — METHYLPREDNISOLONE SODIUM SUCCINATE 125 MG/2ML
125 INJECTION, POWDER, LYOPHILIZED, FOR SOLUTION INTRAMUSCULAR; INTRAVENOUS ONCE
Status: COMPLETED | OUTPATIENT
Start: 2024-11-12 | End: 2024-11-12

## 2024-11-12 RX ORDER — CEFTRIAXONE 1 G/1
1000 INJECTION, POWDER, FOR SOLUTION INTRAMUSCULAR; INTRAVENOUS ONCE
Status: COMPLETED | OUTPATIENT
Start: 2024-11-12 | End: 2024-11-12

## 2024-11-12 RX ORDER — OMEPRAZOLE 40 MG/1
40 CAPSULE, DELAYED RELEASE ORAL 2 TIMES DAILY
Qty: 60 CAPSULE | Refills: 5 | Status: SHIPPED | OUTPATIENT
Start: 2024-11-12

## 2024-11-12 RX ORDER — AMOXICILLIN 875 MG/1
875 TABLET, COATED ORAL 2 TIMES DAILY
Qty: 20 TABLET | Refills: 0 | Status: SHIPPED | OUTPATIENT
Start: 2024-11-12 | End: 2024-11-22

## 2024-11-12 RX ADMIN — CEFTRIAXONE 1000 MG: 1 INJECTION, POWDER, FOR SOLUTION INTRAMUSCULAR; INTRAVENOUS at 13:37

## 2024-11-12 RX ADMIN — METHYLPREDNISOLONE SODIUM SUCCINATE 125 MG: 125 INJECTION, POWDER, LYOPHILIZED, FOR SOLUTION INTRAMUSCULAR; INTRAVENOUS at 13:38

## 2024-11-12 ASSESSMENT — ENCOUNTER SYMPTOMS
COUGH: 1
ABDOMINAL PAIN: 1
CONSTIPATION: 0
NAUSEA: 0
SHORTNESS OF BREATH: 1
DIARRHEA: 0

## 2024-11-12 NOTE — CARE COORDINATION
Parveen Ring, RN  Manager Care Coordination  [phone]     Hypertension bag provided to Gwendolyn per PCP.

## 2024-11-12 NOTE — PATIENT INSTRUCTIONS
best results, work with your doctor. Together, you can test your lung function and compare the results to those of a nonsmoking person. The results can be given to you as your lung age. Finding out your lung age right after having the test done may help you to stop smoking.   Your doctor can also discuss with you all of your options and refer you to smoking-cessation support groups. You may wish to use nicotine replacement (gum, patches, inhaler) or one of the prescription medications that have been shown to increase quit rates and prolong abstinence from smoking. But whatever you and your doctor decide on these matters, it will still be you who decides when an how to quit. Here are some techniques:   Switch Brands   Switch to a brand you find distasteful.   Change to a brand that is low in tar and nicotine a couple of weeks before your target quit date. This will help change your smoking behavior. However, do not smoke more cigarettes, inhale them more often or more deeply, or place your fingertips over the holes in the filters. All of these actions will increase your nicotine intake, and the idea is to get your body used to functioning without nicotine.   Cut Down the Number of Cigarettes You Smoke   Smoke only half of each cigarette.   Each day, postpone the lighting of your first cigarette by one hour.   Decide you'll only smoke during odd or even hours of the day.   Decide beforehand how many cigarettes you'll smoke during the day. For each additional cigarette, give a dollar to your favorite agus.   Change your eating habits to help you cut down. For example, drink milk, which many people consider incompatible with smoking. End meals or snacks with something that won't lead to a cigarette.   Reach for a glass of juice instead of a cigarette for a \"pick-me-up.\"   Remember: Cutting down can help you quit, but it's not a substitute for quitting. If you're down to about seven cigarettes a day, it's time to set

## 2024-11-12 NOTE — PROGRESS NOTES
\"Have you been to the ER, urgent care clinic since your last visit?  Hospitalized since your last visit?\"    YES - When: approximately 4 days ago.  [facility]    “Have you seen or consulted any other health care providers outside our system since your last visit?”    YES - When: approximately 6 days ago.  Where and Why: dr egan in [place].         .

## 2024-11-12 NOTE — PROGRESS NOTES
Administrations This Visit       cefTRIAXone (ROCEPHIN) injection 1,000 mg       Admin Date  11/12/2024  13:37 Action  Given Dose  1,000 mg Route  IntraMUSCular Site  Dorsogluteal Right Documented By  Rachelle Herrera MA    NDC: 56998-112-65    :  CRITICAL CARE    Patient Supplied?: No              methylPREDNISolone sodium succ (SOLU-MEDROL) injection 125 mg       Admin Date  11/12/2024  13:38 Action  Given Dose  125 mg Route  IntraMUSCular Site  Dorsogluteal Left Documented By  Rachelle Herrera MA    NDC: 36827-866-35    : [employer].    Patient Supplied?: No                 Patient tolerated injection well. Patient advised to wait 20 minutes in the office following the injection. No signs/symptoms of reaction noted after 20 minutes.

## 2024-11-12 NOTE — PROGRESS NOTES
SUBJECTIVE:    Patient ID: Gwendolyn Chan is a 79 y.o. female.    Chief Complaint   Patient presents with    Urinary Tract Infection     Hospital f/u    Gastroesophageal Reflux     She is having to take two omeprazole     Hypertension     Dr. Mcmahan back on the valsartan until she sees him but she hasn't picked it up yet?       HPI: office visit  She is following up after her hospitalization.  She did have a urinary tract infection at that time.  She was feeling really bad.  She says her blood pressures have been up and down.  Dr. Mcmahan the cardiologist saw her since then and added some vault valsartan back.  Her blood pressure seem to be doing better.  She is still struggling with some congestion.  She is having some cough.  She is having some thick sputum.  She says she thinks she is needing some antibiotic.  She says she is a little more short of breath.  She has been using her nebulizer in the mornings.  That is been the only time she was using it.  She has not had any further chest pains.  Her reflux seems to be doing well if she takes the omeprazole twice a day.  She has not had any recent falls or injuries.  She is not having any medication problems otherwise    Review of Systems   Constitutional:  Positive for fatigue.   HENT:  Positive for congestion.    Respiratory:  Positive for cough and shortness of breath.    Cardiovascular:  Positive for leg swelling.   Gastrointestinal:  Positive for abdominal pain. Negative for constipation, diarrhea and nausea.   Genitourinary:  Positive for difficulty urinating.   Psychiatric/Behavioral:  Positive for sleep disturbance. The patient is nervous/anxious.    All other systems reviewed and are negative.       OBJECTIVE:  BP (!) 140/90   Pulse 78   Temp 97.2 °F (36.2 °C)   Resp 18   Ht 1.6 m (5' 3\")   Wt 78.5 kg (173 lb)   LMP 01/01/1986 (Approximate)   SpO2 92% Comment: ra  BMI 30.65 kg/m²    Wt Readings from Last 3 Encounters:   11/12/24 78.5 kg (173 lb)

## 2024-12-05 PROBLEM — R79.89 ELEVATED TROPONIN: Status: RESOLVED | Noted: 2024-11-05 | Resolved: 2024-12-05

## 2024-12-05 PROBLEM — N39.0 UTI (URINARY TRACT INFECTION): Status: RESOLVED | Noted: 2024-11-05 | Resolved: 2024-12-05

## 2024-12-07 DIAGNOSIS — E03.9 HYPOTHYROIDISM, UNSPECIFIED TYPE: ICD-10-CM

## 2024-12-09 DIAGNOSIS — M54.6 ACUTE MIDLINE THORACIC BACK PAIN: ICD-10-CM

## 2024-12-09 RX ORDER — HYDROCODONE BITARTRATE AND ACETAMINOPHEN 5; 325 MG/1; MG/1
1 TABLET ORAL EVERY 8 HOURS PRN
Qty: 40 TABLET | Refills: 0 | Status: SHIPPED | OUTPATIENT
Start: 2024-12-09 | End: 2025-01-08

## 2024-12-09 RX ORDER — LEVOTHYROXINE SODIUM 75 UG/1
TABLET ORAL
Qty: 90 TABLET | Refills: 3 | Status: SHIPPED | OUTPATIENT
Start: 2024-12-09

## 2024-12-09 NOTE — TELEPHONE ENCOUNTER
Refill request received from pharmacy      Next Office Visit Date:  Future Appointments   Date Time Provider Department Center   12/11/2024  3:45 PM [facility] Please review via PDMP        Last Office Visit:    11/12/2024   ---

## 2024-12-09 NOTE — TELEPHONE ENCOUNTER
Patient called, requested refill.       Next Office Visit Date:  Future Appointments   Date Time Provider Department Center   12/11/2024  3:45 PM Aleja Lie[facility]man [facility] please review via PDMP

## 2025-01-08 ENCOUNTER — OFFICE VISIT (OUTPATIENT)
Age: 80
End: 2025-01-08
Payer: MEDICARE

## 2025-01-08 VITALS
OXYGEN SATURATION: 95 % | DIASTOLIC BLOOD PRESSURE: 84 MMHG | HEART RATE: 81 BPM | TEMPERATURE: 97.1 F | WEIGHT: 174.8 LBS | SYSTOLIC BLOOD PRESSURE: 136 MMHG | HEIGHT: 63 IN | RESPIRATION RATE: 16 BRPM | BODY MASS INDEX: 30.97 KG/M2

## 2025-01-08 DIAGNOSIS — J96.10 CHRONIC RESPIRATORY FAILURE, UNSPECIFIED WHETHER WITH HYPOXIA OR HYPERCAPNIA: ICD-10-CM

## 2025-01-08 DIAGNOSIS — I80.201 PHLEBITIS AND THROMBOPHLEBITIS OF DEEP VEIN OF LOWER EXTREMITY, RIGHT (HCC): ICD-10-CM

## 2025-01-08 DIAGNOSIS — N63.11 MASS OF UPPER OUTER QUADRANT OF RIGHT BREAST: Primary | ICD-10-CM

## 2025-01-08 DIAGNOSIS — M54.6 ACUTE MIDLINE THORACIC BACK PAIN: ICD-10-CM

## 2025-01-08 DIAGNOSIS — F33.1 MODERATE EPISODE OF RECURRENT MAJOR DEPRESSIVE DISORDER (HCC): ICD-10-CM

## 2025-01-08 DIAGNOSIS — F41.0 PANIC ATTACK: ICD-10-CM

## 2025-01-08 DIAGNOSIS — M79.89 CALF SWELLING: ICD-10-CM

## 2025-01-08 PROBLEM — J42 CHRONIC BRONCHITIS (HCC): Status: RESOLVED | Noted: 2018-12-31 | Resolved: 2025-01-08

## 2025-01-08 PROBLEM — J44.9 COPD (CHRONIC OBSTRUCTIVE PULMONARY DISEASE) (HCC): Status: RESOLVED | Noted: 2022-01-06 | Resolved: 2025-01-08

## 2025-01-08 PROCEDURE — 1123F ACP DISCUSS/DSCN MKR DOCD: CPT | Performed by: FAMILY MEDICINE

## 2025-01-08 PROCEDURE — G8427 DOCREV CUR MEDS BY ELIG CLIN: HCPCS | Performed by: FAMILY MEDICINE

## 2025-01-08 PROCEDURE — 99214 OFFICE O/P EST MOD 30 MIN: CPT | Performed by: FAMILY MEDICINE

## 2025-01-08 PROCEDURE — 3079F DIAST BP 80-89 MM HG: CPT | Performed by: FAMILY MEDICINE

## 2025-01-08 PROCEDURE — 3075F SYST BP GE 130 - 139MM HG: CPT | Performed by: FAMILY MEDICINE

## 2025-01-08 PROCEDURE — 1036F TOBACCO NON-USER: CPT | Performed by: FAMILY MEDICINE

## 2025-01-08 PROCEDURE — 1160F RVW MEDS BY RX/DR IN RCRD: CPT | Performed by: FAMILY MEDICINE

## 2025-01-08 PROCEDURE — G8399 PT W/DXA RESULTS DOCUMENT: HCPCS | Performed by: FAMILY MEDICINE

## 2025-01-08 PROCEDURE — G8417 CALC BMI ABV UP PARAM F/U: HCPCS | Performed by: FAMILY MEDICINE

## 2025-01-08 PROCEDURE — 1159F MED LIST DOCD IN RCRD: CPT | Performed by: FAMILY MEDICINE

## 2025-01-08 PROCEDURE — 1090F PRES/ABSN URINE INCON ASSESS: CPT | Performed by: FAMILY MEDICINE

## 2025-01-08 RX ORDER — HYDROCODONE BITARTRATE AND ACETAMINOPHEN 5; 325 MG/1; MG/1
1 TABLET ORAL EVERY 8 HOURS PRN
Qty: 40 TABLET | Refills: 0 | Status: SHIPPED | OUTPATIENT
Start: 2025-01-08 | End: 2025-02-07

## 2025-01-08 RX ORDER — CLONAZEPAM 2 MG/1
TABLET ORAL
Qty: 30 TABLET | Refills: 2 | Status: SHIPPED | OUTPATIENT
Start: 2025-01-08 | End: 2025-04-28

## 2025-01-08 RX ORDER — AMOXICILLIN 875 MG/1
875 TABLET, COATED ORAL 2 TIMES DAILY
Qty: 20 TABLET | Refills: 0 | Status: SHIPPED | OUTPATIENT
Start: 2025-01-08 | End: 2025-01-18

## 2025-01-08 ASSESSMENT — PATIENT HEALTH QUESTIONNAIRE - PHQ9
2. FEELING DOWN, DEPRESSED OR HOPELESS: NOT AT ALL
5. POOR APPETITE OR OVEREATING: NEARLY EVERY DAY
SUM OF ALL RESPONSES TO PHQ QUESTIONS 1-9: 12
8. MOVING OR SPEAKING SO SLOWLY THAT OTHER PEOPLE COULD HAVE NOTICED. OR THE OPPOSITE, BEING SO FIGETY OR RESTLESS THAT YOU HAVE BEEN MOVING AROUND A LOT MORE THAN USUAL: NEARLY EVERY DAY
SUM OF ALL RESPONSES TO PHQ QUESTIONS 1-9: 12
SUM OF ALL RESPONSES TO PHQ QUESTIONS 1-9: 12
7. TROUBLE CONCENTRATING ON THINGS, SUCH AS READING THE NEWSPAPER OR WATCHING TELEVISION: NOT AT ALL
1. LITTLE INTEREST OR PLEASURE IN DOING THINGS: NOT AT ALL
SUM OF ALL RESPONSES TO PHQ9 QUESTIONS 1 & 2: 0
3. TROUBLE FALLING OR STAYING ASLEEP: NEARLY EVERY DAY
9. THOUGHTS THAT YOU WOULD BE BETTER OFF DEAD, OR OF HURTING YOURSELF: NOT AT ALL
SUM OF ALL RESPONSES TO PHQ QUESTIONS 1-9: 12
6. FEELING BAD ABOUT YOURSELF - OR THAT YOU ARE A FAILURE OR HAVE LET YOURSELF OR YOUR FAMILY DOWN: NOT AT ALL
10. IF YOU CHECKED OFF ANY PROBLEMS, HOW DIFFICULT HAVE THESE PROBLEMS MADE IT FOR YOU TO DO YOUR WORK, TAKE CARE OF THINGS AT HOME, OR GET ALONG WITH OTHER PEOPLE: SOMEWHAT DIFFICULT
4. FEELING TIRED OR HAVING LITTLE ENERGY: NEARLY EVERY DAY

## 2025-01-08 NOTE — PROGRESS NOTES
SUBJECTIVE:    Patient ID: Gwendolyn Chan is a 79 y.o. female.    Chief Complaint   Patient presents with    Breast Mass     Knot on the right breast, patient noticed it about 3 weeks.     Leg Pain     Right leg pain, patient thinks that she has a blood clot       HPI: office visit    She is in the office today complaining of a place on her right breast.  She says she is noticed it a couple weeks ago.  She is having some concerned about it.  She is not noticed it there before.  She is complaining of some swelling in her right leg.  She says she is worried she has a blood clot.  She does have some obvious significant varicose veins.  She does have some minimal tenderness over that near the heel below the knee.  Review of Systems   Constitutional:  Positive for fatigue.   HENT:  Positive for congestion.    Respiratory:  Positive for cough and shortness of breath.    Cardiovascular:  Positive for leg swelling.   Gastrointestinal:  Positive for abdominal pain. Negative for constipation, diarrhea and nausea.   Genitourinary:  Positive for difficulty urinating.   Psychiatric/Behavioral:  Positive for sleep disturbance. The patient is nervous/anxious.    All other systems reviewed and are negative.       OBJECTIVE:  /84   Pulse 81   Temp 97.1 °F (36.2 °C) (Infrared)   Resp 16   Ht 1.6 m (5' 3\")   Wt 79.3 kg (174 lb 12.8 oz)   LMP 01/01/1986 (Approximate)   SpO2 95% Comment: ra  BMI 30.96 kg/m²    Wt Readings from Last 3 Encounters:   01/08/25 79.3 kg (174 lb 12.8 oz)   11/12/24 78.5 kg (173 lb)   11/04/24 76 kg (167 lb 8 oz)     BP Readings from Last 3 Encounters:   01/08/25 136/84   11/12/24 (!) 140/90   11/05/24 (!) 141/69      Pulse Readings from Last 3 Encounters:   01/08/25 81   11/12/24 78   11/05/24 62     Body mass index is 30.96 kg/m².   Resp Readings from Last 3 Encounters:   01/08/25 16   11/12/24 18   11/05/24 18     Past medical, surgical, family and social history were reviewed and updated

## 2025-01-27 ENCOUNTER — OFFICE VISIT (OUTPATIENT)
Age: 80
End: 2025-01-27
Payer: MEDICARE

## 2025-01-27 VITALS
RESPIRATION RATE: 18 BRPM | SYSTOLIC BLOOD PRESSURE: 130 MMHG | HEIGHT: 63 IN | TEMPERATURE: 97.9 F | DIASTOLIC BLOOD PRESSURE: 70 MMHG | BODY MASS INDEX: 29.13 KG/M2 | HEART RATE: 71 BPM | OXYGEN SATURATION: 98 % | WEIGHT: 164.4 LBS

## 2025-01-27 DIAGNOSIS — R11.0 NAUSEA: ICD-10-CM

## 2025-01-27 DIAGNOSIS — F41.0 PANIC ATTACK: ICD-10-CM

## 2025-01-27 PROCEDURE — 3075F SYST BP GE 130 - 139MM HG: CPT

## 2025-01-27 PROCEDURE — G8427 DOCREV CUR MEDS BY ELIG CLIN: HCPCS

## 2025-01-27 PROCEDURE — 1160F RVW MEDS BY RX/DR IN RCRD: CPT

## 2025-01-27 PROCEDURE — G8417 CALC BMI ABV UP PARAM F/U: HCPCS

## 2025-01-27 PROCEDURE — 3078F DIAST BP <80 MM HG: CPT

## 2025-01-27 PROCEDURE — 1123F ACP DISCUSS/DSCN MKR DOCD: CPT

## 2025-01-27 PROCEDURE — 1090F PRES/ABSN URINE INCON ASSESS: CPT

## 2025-01-27 PROCEDURE — 99212 OFFICE O/P EST SF 10 MIN: CPT

## 2025-01-27 PROCEDURE — 1159F MED LIST DOCD IN RCRD: CPT

## 2025-01-27 PROCEDURE — G8399 PT W/DXA RESULTS DOCUMENT: HCPCS

## 2025-01-27 PROCEDURE — 1036F TOBACCO NON-USER: CPT

## 2025-01-27 RX ORDER — CIPROFLOXACIN 500 MG/1
TABLET, FILM COATED ORAL
COMMUNITY
Start: 2025-01-26

## 2025-01-27 RX ORDER — ONDANSETRON 4 MG/1
4 TABLET, FILM COATED ORAL
Qty: 30 TABLET | Refills: 2 | Status: SHIPPED | OUTPATIENT
Start: 2025-01-27

## 2025-01-27 RX ORDER — DICYCLOMINE HYDROCHLORIDE 10 MG/1
CAPSULE ORAL
COMMUNITY
Start: 2025-01-25

## 2025-01-27 RX ORDER — CLONAZEPAM 2 MG/1
TABLET ORAL
Qty: 30 TABLET | Refills: 2 | Status: CANCELLED | OUTPATIENT
Start: 2025-01-27 | End: 2025-05-17

## 2025-01-27 ASSESSMENT — ENCOUNTER SYMPTOMS
DIARRHEA: 1
NAUSEA: 1
EYES NEGATIVE: 1
ALLERGIC/IMMUNOLOGIC NEGATIVE: 1
RESPIRATORY NEGATIVE: 1

## 2025-01-27 NOTE — PROGRESS NOTES
Chief Complaint   Patient presents with    Other     Patient states she was diagnosed with the Flu a couple days ago at urgent care. She states that she had diarrhea and vomiting but it has slowed down. She states that she still does not feel well and usually need antibiotics and steroid shot. She states she also had to go to [facility] ER and was diagnoses with diverticulitis.     Needs work note stating she can go back to work Saturday.     Medication Refill     Would like refill of klonopin and zofran          Have you seen any other physician or provider since your last visit yes - urgent treatment, [facility] ER and diagnosed with diverticulitis and the flu     Have you had any other diagnostic tests since your last visit? yes - CT scan, labs, covid/flu test    Have you changed or stopped any medications since your last visit? yes - cipro and bentyl

## 2025-01-27 NOTE — PROGRESS NOTES
[facility] [address] [address]  Dept: [phone]  Loc: [phone]     SUBJECTIVE:  Gwendolyn Chan is a 79 y.o. female that presents with   Chief Complaint   Patient presents with    Other     Patient states she was diagnosed with the Flu a couple days ago at urgent care. She states that she had diarrhea and vomiting but it has slowed down. She states that she still does not feel well and usually need antibiotics and steroid shot. She states she also had to go to [facility] ER and was diagnoses with diverticulitis.     Needs work note stating she can go back to work Saturday.     Medication Refill     Would like refill of klonopin and zofran    .    HPI:    Gwendolyn tells me that on Thursday she was seen at an urgent care and diagnosed with flu.  She also tells me that on Saturday she went to [facility] to the ER with abdominal pain and diarrhea.  There she was diagnosed with diverticulitis.  She tells me she was given IV fluids.  She is currently taking Cipro and Bentyl as prescribed from the ER.  She tells me she is only had 1 episode of diarrhea today.  At today's visit she is requesting \"that shot Aleja always gives me \"as she thinks it will make her feel better.  We discussed in depth the difference in a viral illness versus a bacterial illness, and what antibiotics are used for.  I also talked to her about diverticulitis and the type of antibiotic she is taking, that it is specific for the gut.  After much discussion she did verbalize understanding.  She tells me she has not run a fever.  Overall she says she is feeling better today.    Medication Refill  Associated symptoms include arthralgias, fatigue, myalgias and nausea.       Review of Systems   Constitutional:  Positive for activity change, appetite change and fatigue.   HENT: Negative.     Eyes: Negative.    Respiratory: Negative.     Cardiovascular: Negative.

## 2025-01-28 RX ORDER — CLONAZEPAM 2 MG/1
TABLET ORAL
Qty: 30 TABLET | Refills: 2 | Status: SHIPPED | OUTPATIENT
Start: 2025-01-28 | End: 2025-05-17

## 2025-02-05 ENCOUNTER — OFFICE VISIT (OUTPATIENT)
Age: 80
End: 2025-02-05

## 2025-02-05 VITALS
WEIGHT: 170.4 LBS | TEMPERATURE: 98 F | SYSTOLIC BLOOD PRESSURE: 170 MMHG | BODY MASS INDEX: 30.19 KG/M2 | OXYGEN SATURATION: 96 % | DIASTOLIC BLOOD PRESSURE: 80 MMHG | RESPIRATION RATE: 16 BRPM | HEART RATE: 76 BPM | HEIGHT: 63 IN

## 2025-02-05 DIAGNOSIS — I10 ESSENTIAL HYPERTENSION: ICD-10-CM

## 2025-02-05 DIAGNOSIS — J18.9 PNEUMONIA OF LEFT UPPER LOBE DUE TO INFECTIOUS ORGANISM: Primary | ICD-10-CM

## 2025-02-05 RX ORDER — METHYLPREDNISOLONE SODIUM SUCCINATE 125 MG/2ML
125 INJECTION INTRAMUSCULAR; INTRAVENOUS ONCE
Status: COMPLETED | OUTPATIENT
Start: 2025-02-05 | End: 2025-02-05

## 2025-02-05 RX ORDER — AMLODIPINE BESYLATE 5 MG/1
5 TABLET ORAL DAILY
Qty: 30 TABLET | Refills: 5 | Status: SHIPPED | OUTPATIENT
Start: 2025-02-05

## 2025-02-05 RX ORDER — DOXYCYCLINE HYCLATE 100 MG
100 TABLET ORAL 2 TIMES DAILY
Qty: 14 TABLET | Refills: 0 | Status: SHIPPED | OUTPATIENT
Start: 2025-02-05 | End: 2025-02-12

## 2025-02-05 RX ORDER — METHYLPREDNISOLONE SODIUM SUCCINATE 125 MG/2ML
125 INJECTION INTRAMUSCULAR; INTRAVENOUS ONCE
Status: SHIPPED | OUTPATIENT
Start: 2025-02-05

## 2025-02-05 RX ORDER — BENZONATATE 200 MG/1
200 CAPSULE ORAL 3 TIMES DAILY PRN
Qty: 30 CAPSULE | Refills: 0 | Status: SHIPPED | OUTPATIENT
Start: 2025-02-05 | End: 2025-02-15

## 2025-02-05 RX ORDER — CEFTRIAXONE 1 G/1
1000 INJECTION, POWDER, FOR SOLUTION INTRAMUSCULAR; INTRAVENOUS ONCE
Status: COMPLETED | OUTPATIENT
Start: 2025-02-05 | End: 2025-02-05

## 2025-02-05 RX ADMIN — METHYLPREDNISOLONE SODIUM SUCCINATE 125 MG: 125 INJECTION INTRAMUSCULAR; INTRAVENOUS at 16:58

## 2025-02-05 RX ADMIN — CEFTRIAXONE 1000 MG: 1 INJECTION, POWDER, FOR SOLUTION INTRAMUSCULAR; INTRAVENOUS at 16:56

## 2025-02-05 NOTE — PROGRESS NOTES
SUBJECTIVE:    Patient ID: Gwendolyn Chan is a 79 y.o. female.    Chief Complaint   Patient presents with    Follow-Up from Hospital     Patient went to the ER at [facility] due to the Flu       HPI: office visit  She is in the office today in follow-up of being back to the ER at [facility] for the flu.  She says she still feels quite congested.  She still having a lot of cough.  She is still getting up quite a bit of phlegm.  She says she is a little more short of breath.  She says her blood pressures have not been bad at home.  She says she is not sure why were getting a high reading today.  She has not had any chest pain.  She is not having any palpitations  or significant swelling.  Review of Systems   Constitutional:  Positive for fatigue.   HENT:  Positive for congestion.    Respiratory:  Positive for cough and shortness of breath.    Gastrointestinal:  Positive for diarrhea. Negative for abdominal pain.   Genitourinary:  Positive for difficulty urinating.   Psychiatric/Behavioral:  Positive for sleep disturbance. The patient is nervous/anxious.    All other systems reviewed and are negative.       OBJECTIVE:  BP (!) 170/80   Pulse 76   Temp 98 °F (36.7 °C) (Infrared)   Resp 16   Ht 1.6 m (5' 3\")   Wt 77.3 kg (170 lb 6.4 oz)   LMP 01/01/1986 (Approximate)   SpO2 96% Comment: ra  BMI 30.19 kg/m²    Wt Readings from Last 3 Encounters:   02/05/25 77.3 kg (170 lb 6.4 oz)   01/27/25 74.6 kg (164 lb 6.4 oz)   01/08/25 79.3 kg (174 lb 12.8 oz)     BP Readings from Last 3 Encounters:   02/05/25 (!) 170/80   01/27/25 130/70   01/08/25 136/84      Pulse Readings from Last 3 Encounters:   02/05/25 76   01/27/25 71   01/08/25 81     Body mass index is 30.19 kg/m².   Resp Readings from Last 3 Encounters:   02/05/25 16   01/27/25 18   01/08/25 16     Past medical, surgical, family and social history were reviewed and updated with the patient.     Physical Exam  Vitals and nursing note

## 2025-02-05 NOTE — PROGRESS NOTES
\"Have you been to the ER, urgent care clinic since your last visit?  Hospitalized since your last visit?\"    Yes [facility] ER due to the Flu    “Have you seen or consulted any other health care providers outside our system since your last visit?”    NO    Administrations This Visit       cefTRIAXone (ROCEPHIN) injection 1,000 mg       Admin Date  02/05/2025  16:56 Action  Given Dose  1,000 mg Route  IntraMUSCular Site  Dorsogluteal Right Documented By  Rachelle Herrera MA    NDC: 95677-335-61    : [facility]    Patient Supplied?: No                 Patient tolerated injection well. Patient advised to wait 20 minutes in the office following the injection. No signs/symptoms of reaction noted after 20 minutes.

## 2025-02-07 ENCOUNTER — CARE COORDINATION (OUTPATIENT)
Age: 80
End: 2025-02-07

## 2025-02-07 NOTE — CARE COORDINATION
Parveen Ring, RN  Manager Care Coordination  [phone]     Hypertension bag provided to patient in office per PCP.

## 2025-03-06 ENCOUNTER — TELEPHONE (OUTPATIENT)
Age: 80
End: 2025-03-06

## 2025-03-06 NOTE — TELEPHONE ENCOUNTER
Patient called stating her blood pressure was running 180/100, she had just taken her blood pressure medication right after this.l Have instructed her to keep watch on it if it continues to go up or goes back down.Have offered to come to clinic however patient currently can not drive due to having a procedure done yesterday on eyes.

## 2025-03-19 ENCOUNTER — OFFICE VISIT (OUTPATIENT)
Age: 80
End: 2025-03-19
Payer: MEDICARE

## 2025-03-19 VITALS
HEART RATE: 69 BPM | BODY MASS INDEX: 30.19 KG/M2 | HEIGHT: 63 IN | OXYGEN SATURATION: 96 % | SYSTOLIC BLOOD PRESSURE: 136 MMHG | RESPIRATION RATE: 16 BRPM | TEMPERATURE: 99 F | DIASTOLIC BLOOD PRESSURE: 70 MMHG

## 2025-03-19 DIAGNOSIS — R35.0 URINARY FREQUENCY: Primary | ICD-10-CM

## 2025-03-19 LAB
INFLUENZA A ANTIGEN, POC: NEGATIVE
INFLUENZA B ANTIGEN, POC: NEGATIVE
LOT EXPIRE DATE: NORMAL
LOT KIT NUMBER: NORMAL
S PYO AG THROAT QL: NORMAL
SARS-COV-2 RNA, POC: NEGATIVE
VALID INTERNAL CONTROL: NORMAL
VENDOR AND KIT NAME POC: NORMAL

## 2025-03-19 PROCEDURE — 1090F PRES/ABSN URINE INCON ASSESS: CPT | Performed by: FAMILY MEDICINE

## 2025-03-19 PROCEDURE — 81002 URINALYSIS NONAUTO W/O SCOPE: CPT | Performed by: FAMILY MEDICINE

## 2025-03-19 PROCEDURE — 1123F ACP DISCUSS/DSCN MKR DOCD: CPT | Performed by: FAMILY MEDICINE

## 2025-03-19 PROCEDURE — 3075F SYST BP GE 130 - 139MM HG: CPT | Performed by: FAMILY MEDICINE

## 2025-03-19 PROCEDURE — 87880 STREP A ASSAY W/OPTIC: CPT | Performed by: FAMILY MEDICINE

## 2025-03-19 PROCEDURE — 3078F DIAST BP <80 MM HG: CPT | Performed by: FAMILY MEDICINE

## 2025-03-19 PROCEDURE — 1036F TOBACCO NON-USER: CPT | Performed by: FAMILY MEDICINE

## 2025-03-19 PROCEDURE — 1159F MED LIST DOCD IN RCRD: CPT | Performed by: FAMILY MEDICINE

## 2025-03-19 PROCEDURE — 1160F RVW MEDS BY RX/DR IN RCRD: CPT | Performed by: FAMILY MEDICINE

## 2025-03-19 PROCEDURE — G8399 PT W/DXA RESULTS DOCUMENT: HCPCS | Performed by: FAMILY MEDICINE

## 2025-03-19 PROCEDURE — 87428 SARSCOV & INF VIR A&B AG IA: CPT | Performed by: FAMILY MEDICINE

## 2025-03-19 PROCEDURE — G8427 DOCREV CUR MEDS BY ELIG CLIN: HCPCS | Performed by: FAMILY MEDICINE

## 2025-03-19 PROCEDURE — 99214 OFFICE O/P EST MOD 30 MIN: CPT | Performed by: FAMILY MEDICINE

## 2025-03-19 PROCEDURE — G8417 CALC BMI ABV UP PARAM F/U: HCPCS | Performed by: FAMILY MEDICINE

## 2025-03-19 RX ORDER — ONDANSETRON 4 MG/1
4 TABLET, FILM COATED ORAL DAILY PRN
Qty: 30 TABLET | Refills: 0 | Status: SHIPPED | OUTPATIENT
Start: 2025-03-19

## 2025-03-19 RX ORDER — AMLODIPINE BESYLATE 10 MG/1
10 TABLET ORAL DAILY
Qty: 30 TABLET | Refills: 3 | Status: SHIPPED | OUTPATIENT
Start: 2025-03-19

## 2025-03-19 RX ORDER — CLONIDINE HYDROCHLORIDE 0.1 MG/1
0.1 TABLET ORAL EVERY 6 HOURS PRN
Qty: 60 TABLET | Refills: 1 | Status: SHIPPED | OUTPATIENT
Start: 2025-03-19

## 2025-03-19 ASSESSMENT — ENCOUNTER SYMPTOMS
ABDOMINAL PAIN: 0
DIARRHEA: 1
SHORTNESS OF BREATH: 1
COUGH: 1

## 2025-03-19 NOTE — PROGRESS NOTES
\"Have you been to the ER, urgent care clinic since your last visit?  Hospitalized since your last visit?\"    Yes [facility]    “Have you seen or consulted any other health care providers outside our system since your last visit?”    NO           
tablet     Sig: Take 1 tablet by mouth daily as needed for Nausea or Vomiting     Dispense:  30 tablet     Refill:  0        Medications Discontinued During This Encounter   Medication Reason    amLODIPine (NORVASC) 5 MG tablet     ondansetron (ZOFRAN) 4 MG tablet        Controlled Substances Monitoring:      Please note: This chart was generated using Dragon dictation software. Although every effort was made to ensure the accuracy of this automated transcription, some errors in transcription may have occurred.

## 2025-03-20 ENCOUNTER — OFFICE VISIT (OUTPATIENT)
Age: 80
End: 2025-03-20
Payer: MEDICARE

## 2025-03-20 VITALS
BODY MASS INDEX: 30.19 KG/M2 | WEIGHT: 170.4 LBS | SYSTOLIC BLOOD PRESSURE: 138 MMHG | TEMPERATURE: 97.2 F | OXYGEN SATURATION: 97 % | HEIGHT: 63 IN | DIASTOLIC BLOOD PRESSURE: 70 MMHG | RESPIRATION RATE: 16 BRPM | HEART RATE: 72 BPM

## 2025-03-20 DIAGNOSIS — E86.0 DEHYDRATION: ICD-10-CM

## 2025-03-20 DIAGNOSIS — R11.2 NAUSEA VOMITING AND DIARRHEA: ICD-10-CM

## 2025-03-20 DIAGNOSIS — R19.7 NAUSEA VOMITING AND DIARRHEA: ICD-10-CM

## 2025-03-20 DIAGNOSIS — I10 ESSENTIAL HYPERTENSION: ICD-10-CM

## 2025-03-20 DIAGNOSIS — M54.50 ACUTE RIGHT-SIDED LOW BACK PAIN WITHOUT SCIATICA: Primary | ICD-10-CM

## 2025-03-20 LAB
BILIRUBIN, POC: NEGATIVE
BILIRUBIN, POC: NEGATIVE
BLOOD URINE, POC: NEGATIVE
BLOOD URINE, POC: NEGATIVE
CLARITY, POC: NORMAL
CLARITY, POC: NORMAL
COLOR, POC: NORMAL
COLOR, POC: YELLOW
GLUCOSE URINE, POC: NEGATIVE MG/DL
GLUCOSE URINE, POC: NEGATIVE MG/DL
KETONES, POC: NEGATIVE MG/DL
KETONES, POC: NEGATIVE MG/DL
LEUKOCYTE EST, POC: NEGATIVE
LEUKOCYTE EST, POC: NEGATIVE
NITRITE, POC: NEGATIVE
NITRITE, POC: NEGATIVE
PH, POC: 6
PH, POC: 6
PROTEIN, POC: NORMAL MG/DL
PROTEIN, POC: NORMAL MG/DL
SPECIFIC GRAVITY, POC: 1.03
SPECIFIC GRAVITY, POC: >=1.03
UROBILINOGEN, POC: 0.2 MG/DL
UROBILINOGEN, POC: 0.2 MG/DL

## 2025-03-20 PROCEDURE — 1036F TOBACCO NON-USER: CPT | Performed by: FAMILY MEDICINE

## 2025-03-20 PROCEDURE — 3078F DIAST BP <80 MM HG: CPT | Performed by: FAMILY MEDICINE

## 2025-03-20 PROCEDURE — 1123F ACP DISCUSS/DSCN MKR DOCD: CPT | Performed by: FAMILY MEDICINE

## 2025-03-20 PROCEDURE — G8427 DOCREV CUR MEDS BY ELIG CLIN: HCPCS | Performed by: FAMILY MEDICINE

## 2025-03-20 PROCEDURE — G8399 PT W/DXA RESULTS DOCUMENT: HCPCS | Performed by: FAMILY MEDICINE

## 2025-03-20 PROCEDURE — 3075F SYST BP GE 130 - 139MM HG: CPT | Performed by: FAMILY MEDICINE

## 2025-03-20 PROCEDURE — 1159F MED LIST DOCD IN RCRD: CPT | Performed by: FAMILY MEDICINE

## 2025-03-20 PROCEDURE — 99213 OFFICE O/P EST LOW 20 MIN: CPT | Performed by: FAMILY MEDICINE

## 2025-03-20 PROCEDURE — 1090F PRES/ABSN URINE INCON ASSESS: CPT | Performed by: FAMILY MEDICINE

## 2025-03-20 PROCEDURE — 81002 URINALYSIS NONAUTO W/O SCOPE: CPT | Performed by: FAMILY MEDICINE

## 2025-03-20 PROCEDURE — 1160F RVW MEDS BY RX/DR IN RCRD: CPT | Performed by: FAMILY MEDICINE

## 2025-03-20 PROCEDURE — G8417 CALC BMI ABV UP PARAM F/U: HCPCS | Performed by: FAMILY MEDICINE

## 2025-03-20 RX ORDER — 0.9 % SODIUM CHLORIDE 0.9 %
1000 INTRAVENOUS SOLUTION INTRAVENOUS ONCE
Status: SHIPPED | OUTPATIENT
Start: 2025-03-20

## 2025-03-20 RX ORDER — PROMETHAZINE HYDROCHLORIDE 12.5 MG/1
12.5 TABLET ORAL EVERY 8 HOURS PRN
Qty: 20 TABLET | Refills: 0 | Status: SHIPPED | OUTPATIENT
Start: 2025-03-20 | End: 2025-03-27

## 2025-03-20 ASSESSMENT — ENCOUNTER SYMPTOMS
SHORTNESS OF BREATH: 1
ABDOMINAL PAIN: 0
DIARRHEA: 1
COUGH: 1

## 2025-03-20 NOTE — PROGRESS NOTES
SUBJECTIVE:    Patient ID: Gwendolyn Chan is a 79 y.o. female.    Chief Complaint   Patient presents with    Dizziness    Nausea    Diarrhea    Other     Patient states that she still feels bad     Back Pain     Right sided back pain        HPI: office visit  History of Present Illness  The patient presents for evaluation of headache, vomiting, and diarrhea.    She reports persistent headaches, which she describes as unusual in nature. She has been managing these symptoms with Tylenol.    She has been experiencing severe vomiting and diarrhea, which have significantly impacted her daily activities. She has been unable to consume any food due to the severity of her symptoms, although she has managed to take her medications. She has been bedridden all day and required assistance from her neighbor to change her bed linens due to an episode of vomiting. She also reports a sensation of feeling \"weird.\" She suspects a recurrence of influenza, given her history of hospitalization due to the same, which was accompanied by similar symptoms of vomiting and diarrhea.    Additionally, she mentions a recent episode of hypertension, with a recorded blood pressure reading of 200, which was subsequently managed with medication.    Supplemental Information  She had a PET scan.    MEDICATIONS  Tylenol      Review of Systems   Constitutional:  Positive for fatigue.   HENT:  Positive for congestion.    Respiratory:  Positive for cough and shortness of breath.    Gastrointestinal:  Positive for diarrhea. Negative for abdominal pain.   Genitourinary:  Positive for difficulty urinating.   Psychiatric/Behavioral:  Positive for sleep disturbance. The patient is nervous/anxious.    All other systems reviewed and are negative.       OBJECTIVE:  /70   Pulse 72   Temp 97.2 °F (36.2 °C) (Infrared)   Resp 16   Ht 1.6 m (5' 3\")   Wt 77.3 kg (170 lb 6.4 oz)   LMP 01/01/1986 (Approximate)   SpO2 97% Comment: ra  BMI 30.19 kg/m²    Wt

## 2025-03-20 NOTE — PROGRESS NOTES
Chief Complaint   Patient presents with    Dizziness    Nausea    Diarrhea    Other     Patient states that she still feels bad     Back Pain     Right sided back pain        Have you seen any other physician or provider since your last visit no    Have you had any other diagnostic tests since your last visit? no    Have you changed or stopped any medications since your last visit? no

## 2025-03-31 ENCOUNTER — OFFICE VISIT (OUTPATIENT)
Age: 80
End: 2025-03-31
Payer: MEDICARE

## 2025-03-31 VITALS
HEIGHT: 63 IN | HEART RATE: 80 BPM | WEIGHT: 172.4 LBS | RESPIRATION RATE: 16 BRPM | BODY MASS INDEX: 30.55 KG/M2 | SYSTOLIC BLOOD PRESSURE: 128 MMHG | TEMPERATURE: 97.8 F | OXYGEN SATURATION: 94 % | DIASTOLIC BLOOD PRESSURE: 64 MMHG

## 2025-03-31 DIAGNOSIS — E03.9 ACQUIRED HYPOTHYROIDISM: ICD-10-CM

## 2025-03-31 DIAGNOSIS — Z13.220 SCREENING, LIPID: ICD-10-CM

## 2025-03-31 DIAGNOSIS — R53.82 CHRONIC FATIGUE: ICD-10-CM

## 2025-03-31 DIAGNOSIS — R73.9 ELEVATED BLOOD SUGAR: ICD-10-CM

## 2025-03-31 DIAGNOSIS — K22.2 ESOPHAGEAL STRICTURE: ICD-10-CM

## 2025-03-31 DIAGNOSIS — K21.9 GASTROESOPHAGEAL REFLUX DISEASE, UNSPECIFIED WHETHER ESOPHAGITIS PRESENT: ICD-10-CM

## 2025-03-31 DIAGNOSIS — Z87.19 HISTORY OF PANCREATITIS: ICD-10-CM

## 2025-03-31 DIAGNOSIS — E86.0 DEHYDRATION: Primary | ICD-10-CM

## 2025-03-31 DIAGNOSIS — E83.42 HYPOMAGNESEMIA: ICD-10-CM

## 2025-03-31 DIAGNOSIS — I10 ESSENTIAL HYPERTENSION: ICD-10-CM

## 2025-03-31 LAB
BILIRUBIN, POC: NEGATIVE
BLOOD URINE, POC: NEGATIVE
CLARITY, POC: CLEAR
COLOR, POC: YELLOW
GLUCOSE URINE, POC: NEGATIVE MG/DL
KETONES, POC: NEGATIVE MG/DL
LEUKOCYTE EST, POC: NEGATIVE
NITRITE, POC: NEGATIVE
PH, POC: 6
PROTEIN, POC: NORMAL MG/DL
SPECIFIC GRAVITY, POC: 1.03
UROBILINOGEN, POC: 0.2 MG/DL

## 2025-03-31 PROCEDURE — G8399 PT W/DXA RESULTS DOCUMENT: HCPCS | Performed by: FAMILY MEDICINE

## 2025-03-31 PROCEDURE — 1160F RVW MEDS BY RX/DR IN RCRD: CPT | Performed by: FAMILY MEDICINE

## 2025-03-31 PROCEDURE — G8427 DOCREV CUR MEDS BY ELIG CLIN: HCPCS | Performed by: FAMILY MEDICINE

## 2025-03-31 PROCEDURE — 3078F DIAST BP <80 MM HG: CPT | Performed by: FAMILY MEDICINE

## 2025-03-31 PROCEDURE — 3074F SYST BP LT 130 MM HG: CPT | Performed by: FAMILY MEDICINE

## 2025-03-31 PROCEDURE — 1090F PRES/ABSN URINE INCON ASSESS: CPT | Performed by: FAMILY MEDICINE

## 2025-03-31 PROCEDURE — 1123F ACP DISCUSS/DSCN MKR DOCD: CPT | Performed by: FAMILY MEDICINE

## 2025-03-31 PROCEDURE — 1159F MED LIST DOCD IN RCRD: CPT | Performed by: FAMILY MEDICINE

## 2025-03-31 PROCEDURE — 81002 URINALYSIS NONAUTO W/O SCOPE: CPT | Performed by: FAMILY MEDICINE

## 2025-03-31 PROCEDURE — 1036F TOBACCO NON-USER: CPT | Performed by: FAMILY MEDICINE

## 2025-03-31 PROCEDURE — G8417 CALC BMI ABV UP PARAM F/U: HCPCS | Performed by: FAMILY MEDICINE

## 2025-03-31 PROCEDURE — 99214 OFFICE O/P EST MOD 30 MIN: CPT | Performed by: FAMILY MEDICINE

## 2025-03-31 RX ORDER — OMEPRAZOLE 40 MG/1
40 CAPSULE, DELAYED RELEASE ORAL 2 TIMES DAILY
Qty: 60 CAPSULE | Refills: 5 | Status: SHIPPED | OUTPATIENT
Start: 2025-03-31

## 2025-03-31 RX ORDER — OMEPRAZOLE 40 MG/1
40 CAPSULE, DELAYED RELEASE ORAL
Qty: 90 CAPSULE | OUTPATIENT
Start: 2025-03-31

## 2025-03-31 RX ORDER — ONDANSETRON 4 MG/1
4 TABLET, FILM COATED ORAL DAILY PRN
Qty: 30 TABLET | Refills: 0 | Status: SHIPPED | OUTPATIENT
Start: 2025-03-31

## 2025-03-31 ASSESSMENT — ENCOUNTER SYMPTOMS
SHORTNESS OF BREATH: 1
DIARRHEA: 1
ABDOMINAL PAIN: 0
COUGH: 1

## 2025-03-31 NOTE — PROGRESS NOTES
\"Have you been to the ER, urgent care clinic since your last visit?  Hospitalized since your last visit?\"    NO    “Have you seen or consulted any other health care providers outside our system since your last visit?”    NO           
to be situational, likely exacerbated by the transition from winter to spring and her current lack of employment.  - Reports feeling very depressed and lacking energy, which is unusual for her.  - Discussed the importance of engaging in activities she enjoys to help alleviate depressive symptoms.  - No new medications for depression prescribed; focus on situational factors and upcoming cruise for improvement.    2. Vomiting.  - Vomiting episodes are likely related to her esophageal condition.  - Reports that Phenergan helps but causes significant sedation, leading to concerns about falls.  - Advised to exercise caution with Phenergan intake due to its sedative effects.  - Continues to use Zofran for nausea management.    3. Esophageal stricture.  - Reports difficulty swallowing and frequent choking, suggesting a possible esophageal stricture.  - History of esophageal dilation by Dr. Pittman, with symptoms worsening recently.  - Referral to Dr. Pittman for further evaluation and potential esophageal dilation will be made.  - Discussed the need for careful eating habits to avoid choking.    4. Blood pressure management.  - Blood pressure readings have been inconsistent, with occasional high readings followed by normal values.  - Today's blood pressure is 126/68; previously recorded instances of systolic BP >200.  - Follow-up blood work ordered today to monitor blood sugar levels and kidney function.  - Encouraged to continue monitoring blood pressure and report any significant changes.      Orders Placed This Encounter   Medications    ondansetron (ZOFRAN) 4 MG tablet     Sig: Take 1 tablet by mouth daily as needed for Nausea or Vomiting     Dispense:  30 tablet     Refill:  0    omeprazole (PRILOSEC) 40 MG delayed release capsule     Sig: Take 1 capsule by mouth 2 times daily     Dispense:  60 capsule     Refill:  5        Medications Discontinued During This Encounter   Medication Reason    dicyclomine (BENTYL) 10

## 2025-04-02 ENCOUNTER — OFFICE VISIT (OUTPATIENT)
Age: 80
End: 2025-04-02
Payer: MEDICARE

## 2025-04-02 VITALS
DIASTOLIC BLOOD PRESSURE: 66 MMHG | HEIGHT: 63 IN | HEART RATE: 82 BPM | OXYGEN SATURATION: 97 % | BODY MASS INDEX: 30.54 KG/M2 | SYSTOLIC BLOOD PRESSURE: 138 MMHG | RESPIRATION RATE: 20 BRPM

## 2025-04-02 DIAGNOSIS — L50.9 URTICARIA: ICD-10-CM

## 2025-04-02 DIAGNOSIS — R11.0 NAUSEA: ICD-10-CM

## 2025-04-02 DIAGNOSIS — T78.40XA ALLERGIC REACTION, INITIAL ENCOUNTER: Primary | ICD-10-CM

## 2025-04-02 DIAGNOSIS — L50.9 HIVES: ICD-10-CM

## 2025-04-02 PROCEDURE — 99213 OFFICE O/P EST LOW 20 MIN: CPT

## 2025-04-02 PROCEDURE — 3078F DIAST BP <80 MM HG: CPT

## 2025-04-02 PROCEDURE — 1123F ACP DISCUSS/DSCN MKR DOCD: CPT

## 2025-04-02 PROCEDURE — G8417 CALC BMI ABV UP PARAM F/U: HCPCS

## 2025-04-02 PROCEDURE — 96372 THER/PROPH/DIAG INJ SC/IM: CPT

## 2025-04-02 PROCEDURE — 1036F TOBACCO NON-USER: CPT

## 2025-04-02 PROCEDURE — G8399 PT W/DXA RESULTS DOCUMENT: HCPCS

## 2025-04-02 PROCEDURE — 3075F SYST BP GE 130 - 139MM HG: CPT

## 2025-04-02 PROCEDURE — 1090F PRES/ABSN URINE INCON ASSESS: CPT

## 2025-04-02 PROCEDURE — 1159F MED LIST DOCD IN RCRD: CPT

## 2025-04-02 PROCEDURE — G8427 DOCREV CUR MEDS BY ELIG CLIN: HCPCS

## 2025-04-02 RX ORDER — DIPHENHYDRAMINE HYDROCHLORIDE 50 MG/ML
25 INJECTION, SOLUTION INTRAMUSCULAR; INTRAVENOUS ONCE
Status: COMPLETED | OUTPATIENT
Start: 2025-04-02 | End: 2025-04-02

## 2025-04-02 RX ORDER — PREDNISONE 20 MG/1
20 TABLET ORAL 2 TIMES DAILY
Qty: 10 TABLET | Refills: 0 | Status: SHIPPED | OUTPATIENT
Start: 2025-04-02 | End: 2025-04-07

## 2025-04-02 RX ORDER — ONDANSETRON 4 MG/1
4 TABLET, FILM COATED ORAL ONCE
Status: SHIPPED | OUTPATIENT
Start: 2025-04-02

## 2025-04-02 RX ORDER — METHYLPREDNISOLONE SODIUM SUCCINATE 125 MG/2ML
125 INJECTION INTRAMUSCULAR; INTRAVENOUS ONCE
Status: COMPLETED | OUTPATIENT
Start: 2025-04-02 | End: 2025-04-02

## 2025-04-02 RX ADMIN — METHYLPREDNISOLONE SODIUM SUCCINATE 125 MG: 125 INJECTION INTRAMUSCULAR; INTRAVENOUS at 16:02

## 2025-04-02 RX ADMIN — DIPHENHYDRAMINE HYDROCHLORIDE 25 MG: 50 INJECTION, SOLUTION INTRAMUSCULAR; INTRAVENOUS at 16:02

## 2025-04-02 NOTE — PROGRESS NOTES
Chief Complaint   Patient presents with    Rash     Patient arrived to clinic with hives on left side of face, neck and chest and SOB. She states that she was laying down and did not do anything different.        Have you seen any other physician or provider since your last visit no    Have you had any other diagnostic tests since your last visit? no    Have you changed or stopped any medications since your last visit? no       Administrations This Visit       diphenhydrAMINE (BENADRYL) injection 25 mg       Admin Date  04/02/2025  16:02 Action  Given Dose  25 mg Route  IntraMUSCular Site  Dorsogluteal Left Documented By  Blaire Coates RN    NDC: 68234-347-72    : [employer]    Patient Supplied?: No              methylPREDNISolone sodium succ (SOLU-MEDROL) injection 125 mg       Admin Date  04/02/2025  16:02 Action  Given Dose  125 mg Route  IntraMUSCular Site  Dorsogluteal Right Documented By  Blaire Coates RN    NDC: 84896-322-18    : [employer]    Patient Supplied?: No

## 2025-04-03 DIAGNOSIS — K21.9 GASTROESOPHAGEAL REFLUX DISEASE, UNSPECIFIED WHETHER ESOPHAGITIS PRESENT: ICD-10-CM

## 2025-04-03 RX ORDER — OMEPRAZOLE 40 MG/1
40 CAPSULE, DELAYED RELEASE ORAL
Qty: 90 CAPSULE | Refills: 3 | OUTPATIENT
Start: 2025-04-03

## 2025-04-03 NOTE — TELEPHONE ENCOUNTER
Refill request received from pharmacy      Next Office Visit Date:  Future Appointments   Date Time Provider Department Center   4/29/2025  1:45 PM Aleja Lieberman MD [facility]   7/29/2025  1:30 PM Aleja Lieberman MD [facility] DE[facility]       JEREMY - Please review via PDMP        Last Office Visit:    4/2/2025

## 2025-04-03 NOTE — PROGRESS NOTES
Gwendolyn Chan (:  1945) is a 79 y.o. female,Established patient, here for evaluation of the following chief complaint(s):  Rash (Patient arrived to clinic with hives on left side of face, neck and chest and SOB. She states that she was laying down and did not do anything different. TATYANA Tripp in room to assess and 2 L of oxygen placed on patient. )         Assessment & Plan  Allergic reaction, initial encounter   Acute condition, new,  Solu-Medrol and Benadryl given in office.  Patient was monitored for 20 minutes.  Vitals remained stable.  She is to return to office or seek emergent care if signs and symptoms return.    Orders:    methylPREDNISolone sodium succ (SOLU-MEDROL) injection 125 mg    diphenhydrAMINE (BENADRYL) injection 25 mg    predniSONE (DELTASONE) 20 MG tablet; Take 1 tablet by mouth 2 times daily for 5 days    Nausea   Acute condition, new, Supportive care with appropriate antipyretics and fluids.    Orders:    ondansetron (ZOFRAN) tablet 4 mg    Hives   Acute condition, new, Solu-Medrol and Benadryl given in office.  Patient was monitored for 20 minutes.  Vitals remained stable.  She is to return to office or seek emergent care if signs and symptoms return.         Urticaria   Acute condition, new, Solu-Medrol and Benadryl given in office.  Patient was monitored for 20 minutes.  Vitals remained stable.  She is to return to office or seek emergent care if signs and symptoms return.    Orders:    predniSONE (DELTASONE) 20 MG tablet; Take 1 tablet by mouth 2 times daily for 5 days      No follow-ups on file.       Subjective   Gwendolyn arrived to office with redness and swelling on her face, neck, chest, and back.  She denies taking any new medications or using any new laundry detergents or lotions.  She tells me \"just out of nowhere I felt tingling and swelling on my face\".    Rash  This is a new problem. The current episode started today. The problem has been gradually worsening

## 2025-04-03 NOTE — ASSESSMENT & PLAN NOTE
Acute condition, new, Solu-Medrol and Benadryl given in office.  Patient was monitored for 20 minutes.  Vitals remained stable.  She is to return to office or seek emergent care if signs and symptoms return.    Orders:    methylPREDNISolone sodium succ (SOLU-MEDROL) injection 125 mg    diphenhydrAMINE (BENADRYL) injection 25 mg    predniSONE (DELTASONE) 20 MG tablet; Take 1 tablet by mouth 2 times daily for 5 days

## 2025-04-15 RX ORDER — CLONIDINE HYDROCHLORIDE 0.1 MG/1
0.1 TABLET ORAL EVERY 6 HOURS PRN
Qty: 60 TABLET | Refills: 1 | OUTPATIENT
Start: 2025-04-15

## 2025-04-29 ENCOUNTER — OFFICE VISIT (OUTPATIENT)
Age: 80
End: 2025-04-29
Payer: MEDICARE

## 2025-04-29 VITALS
SYSTOLIC BLOOD PRESSURE: 104 MMHG | HEIGHT: 63 IN | DIASTOLIC BLOOD PRESSURE: 72 MMHG | OXYGEN SATURATION: 94 % | HEART RATE: 63 BPM | BODY MASS INDEX: 31.18 KG/M2 | RESPIRATION RATE: 16 BRPM | TEMPERATURE: 98 F | WEIGHT: 176 LBS

## 2025-04-29 DIAGNOSIS — K22.2 ESOPHAGEAL STRICTURE: Primary | ICD-10-CM

## 2025-04-29 DIAGNOSIS — M79.605 BILATERAL LEG PAIN: ICD-10-CM

## 2025-04-29 DIAGNOSIS — I10 ESSENTIAL HYPERTENSION: ICD-10-CM

## 2025-04-29 DIAGNOSIS — F41.0 PANIC ATTACK: ICD-10-CM

## 2025-04-29 DIAGNOSIS — M79.604 BILATERAL LEG PAIN: ICD-10-CM

## 2025-04-29 PROCEDURE — 1160F RVW MEDS BY RX/DR IN RCRD: CPT | Performed by: FAMILY MEDICINE

## 2025-04-29 PROCEDURE — 3074F SYST BP LT 130 MM HG: CPT | Performed by: FAMILY MEDICINE

## 2025-04-29 PROCEDURE — G8399 PT W/DXA RESULTS DOCUMENT: HCPCS | Performed by: FAMILY MEDICINE

## 2025-04-29 PROCEDURE — G8417 CALC BMI ABV UP PARAM F/U: HCPCS | Performed by: FAMILY MEDICINE

## 2025-04-29 PROCEDURE — 3078F DIAST BP <80 MM HG: CPT | Performed by: FAMILY MEDICINE

## 2025-04-29 PROCEDURE — 99214 OFFICE O/P EST MOD 30 MIN: CPT | Performed by: FAMILY MEDICINE

## 2025-04-29 PROCEDURE — 1090F PRES/ABSN URINE INCON ASSESS: CPT | Performed by: FAMILY MEDICINE

## 2025-04-29 PROCEDURE — 1036F TOBACCO NON-USER: CPT | Performed by: FAMILY MEDICINE

## 2025-04-29 PROCEDURE — 96372 THER/PROPH/DIAG INJ SC/IM: CPT | Performed by: FAMILY MEDICINE

## 2025-04-29 PROCEDURE — G8427 DOCREV CUR MEDS BY ELIG CLIN: HCPCS | Performed by: FAMILY MEDICINE

## 2025-04-29 PROCEDURE — 1123F ACP DISCUSS/DSCN MKR DOCD: CPT | Performed by: FAMILY MEDICINE

## 2025-04-29 PROCEDURE — 1159F MED LIST DOCD IN RCRD: CPT | Performed by: FAMILY MEDICINE

## 2025-04-29 RX ORDER — METHYLPREDNISOLONE SODIUM SUCCINATE 125 MG/2ML
125 INJECTION INTRAMUSCULAR; INTRAVENOUS ONCE
Status: COMPLETED | OUTPATIENT
Start: 2025-04-29 | End: 2025-04-29

## 2025-04-29 RX ORDER — METHYLPREDNISOLONE SODIUM SUCCINATE 125 MG/2ML
125 INJECTION INTRAMUSCULAR; INTRAVENOUS ONCE
Status: SHIPPED | OUTPATIENT
Start: 2025-04-29

## 2025-04-29 RX ORDER — CEPHALEXIN 500 MG/1
500 CAPSULE ORAL 2 TIMES DAILY
Qty: 14 CAPSULE | Refills: 0 | Status: SHIPPED | OUTPATIENT
Start: 2025-04-29 | End: 2025-05-06

## 2025-04-29 RX ORDER — CLONAZEPAM 2 MG/1
TABLET ORAL
Qty: 30 TABLET | Refills: 2 | Status: SHIPPED | OUTPATIENT
Start: 2025-04-29 | End: 2025-08-16

## 2025-04-29 RX ADMIN — METHYLPREDNISOLONE SODIUM SUCCINATE 125 MG: 125 INJECTION INTRAMUSCULAR; INTRAVENOUS at 17:14

## 2025-04-29 ASSESSMENT — ENCOUNTER SYMPTOMS
COUGH: 1
DIARRHEA: 1
SHORTNESS OF BREATH: 1
ABDOMINAL PAIN: 0

## 2025-04-29 NOTE — PROGRESS NOTES
SUBJECTIVE:    Patient ID: Gwendolyn Chan is a 79 y.o. female.    Chief Complaint   Patient presents with    Leg Pain     Bilateral leg pain lately    Skin Problem     Patient has a bump on her the right side of her chest she would like it looked at        HPI: office visit  History of Present Illness  The patient presents for a follow-up visit accompanied by complaints of persistent fatigue and lack of energy. She reports an upcoming appointment with Dr. Perales in 06/2025. Episodes of choking, particularly when consuming dry bread, necessitate vomiting. Difficulty is also noted with the consumption of lettuce. Her diet is devoid of sweets, and she expresses a dislike for corn. However, she admits to a fondness for macaroni and cheese, which is consumed approximately once a month. An appointment with her back specialist is scheduled for Thursday.    A painful sensation is described extending from her legs downwards, accompanied by feelings of tiredness and achiness. Despite these symptoms, a high fluid intake is maintained, including Gatorade, water, and a daily soft drink.    An incident involving a large cyst that ruptured is recounted, releasing a white, foul-smelling substance. The cyst has since reduced in size but continues to cause discomfort radiating down her chest.    FAMILY HISTORY  Her sister is a real bad diabetic and takes 3 shots a day.      Review of Systems   Constitutional:  Positive for fatigue.   HENT:  Positive for congestion.    Respiratory:  Positive for cough and shortness of breath.    Gastrointestinal:  Positive for diarrhea. Negative for abdominal pain.   Genitourinary:  Positive for difficulty urinating.   Psychiatric/Behavioral:  Positive for sleep disturbance. The patient is nervous/anxious.    All other systems reviewed and are negative.       OBJECTIVE:  /72   Pulse 63   Temp 98 °F (36.7 °C) (Infrared)   Resp 16   Ht 1.6 m (5' 3\")   Wt 79.8 kg (176 lb)   LMP 01/01/1986

## 2025-04-29 NOTE — PROGRESS NOTES
Administrations This Visit       methylPREDNISolone sodium succ (SOLU-MEDROL) injection 125 mg       Admin Date  04/29/2025  17:14 Action  Given Dose  125 mg Route  IntraMUSCular Site  Dorsogluteal Right Documented By  Rachelle Herrera MA    NDC: 60042-852-55    : [employer]    Patient Supplied?: No                 Patient tolerated injection well. Patient advised to wait 20 minutes in the office following the injection. No signs/symptoms of reaction noted after 20 minutes.

## 2025-05-27 RX ORDER — AMOXICILLIN 875 MG/1
875 TABLET, COATED ORAL 2 TIMES DAILY
Qty: 20 TABLET | Refills: 0 | OUTPATIENT
Start: 2025-05-27 | End: 2025-06-06

## 2025-05-29 RX ORDER — ONDANSETRON 4 MG/1
4 TABLET, FILM COATED ORAL DAILY PRN
Qty: 30 TABLET | Refills: 5 | Status: SHIPPED | OUTPATIENT
Start: 2025-05-29

## 2025-05-29 NOTE — TELEPHONE ENCOUNTER
Refill request received from patient      Next Office Visit Date:  Future Appointments   Date Time Provider Department Center   6/24/2025  2:15 PM Aleja Lieberman MD [facility]   7/29/2025  1:30 PM Aleja Lieberman MD [facility] DEP       JEREMY - Please review via PDMP        Last Office Visit:    4/29/2025

## 2025-06-06 DIAGNOSIS — I10 ESSENTIAL HYPERTENSION: ICD-10-CM

## 2025-06-06 RX ORDER — AMLODIPINE BESYLATE 5 MG/1
TABLET ORAL
Qty: 90 TABLET | OUTPATIENT
Start: 2025-06-06

## 2025-07-07 ENCOUNTER — TELEPHONE (OUTPATIENT)
Age: 80
End: 2025-07-07

## 2025-07-07 ENCOUNTER — OFFICE VISIT (OUTPATIENT)
Age: 80
End: 2025-07-07
Payer: MEDICARE

## 2025-07-07 VITALS
RESPIRATION RATE: 20 BRPM | HEART RATE: 78 BPM | DIASTOLIC BLOOD PRESSURE: 88 MMHG | SYSTOLIC BLOOD PRESSURE: 152 MMHG | BODY MASS INDEX: 31.18 KG/M2 | HEIGHT: 63 IN | OXYGEN SATURATION: 97 %

## 2025-07-07 DIAGNOSIS — T78.40XA ALLERGIC REACTION, INITIAL ENCOUNTER: Primary | ICD-10-CM

## 2025-07-07 DIAGNOSIS — R11.2 NAUSEA VOMITING AND DIARRHEA: ICD-10-CM

## 2025-07-07 DIAGNOSIS — R19.7 NAUSEA VOMITING AND DIARRHEA: ICD-10-CM

## 2025-07-07 DIAGNOSIS — R11.0 NAUSEA: ICD-10-CM

## 2025-07-07 LAB
INFLUENZA A ANTIGEN, POC: NEGATIVE
INFLUENZA B ANTIGEN, POC: NEGATIVE
LOT EXPIRE DATE: NORMAL
LOT KIT NUMBER: NORMAL
SARS-COV-2 RNA, POC: NEGATIVE
VALID INTERNAL CONTROL: NORMAL
VENDOR AND KIT NAME POC: NORMAL

## 2025-07-07 PROCEDURE — 96372 THER/PROPH/DIAG INJ SC/IM: CPT | Performed by: NURSE PRACTITIONER

## 2025-07-07 PROCEDURE — 87428 SARSCOV & INF VIR A&B AG IA: CPT | Performed by: NURSE PRACTITIONER

## 2025-07-07 PROCEDURE — G8417 CALC BMI ABV UP PARAM F/U: HCPCS | Performed by: NURSE PRACTITIONER

## 2025-07-07 PROCEDURE — 3079F DIAST BP 80-89 MM HG: CPT | Performed by: NURSE PRACTITIONER

## 2025-07-07 PROCEDURE — 1090F PRES/ABSN URINE INCON ASSESS: CPT | Performed by: NURSE PRACTITIONER

## 2025-07-07 PROCEDURE — 1036F TOBACCO NON-USER: CPT | Performed by: NURSE PRACTITIONER

## 2025-07-07 PROCEDURE — 3077F SYST BP >= 140 MM HG: CPT | Performed by: NURSE PRACTITIONER

## 2025-07-07 PROCEDURE — G8427 DOCREV CUR MEDS BY ELIG CLIN: HCPCS | Performed by: NURSE PRACTITIONER

## 2025-07-07 PROCEDURE — 1160F RVW MEDS BY RX/DR IN RCRD: CPT | Performed by: NURSE PRACTITIONER

## 2025-07-07 PROCEDURE — 1123F ACP DISCUSS/DSCN MKR DOCD: CPT | Performed by: NURSE PRACTITIONER

## 2025-07-07 PROCEDURE — 99214 OFFICE O/P EST MOD 30 MIN: CPT | Performed by: NURSE PRACTITIONER

## 2025-07-07 PROCEDURE — 1159F MED LIST DOCD IN RCRD: CPT | Performed by: NURSE PRACTITIONER

## 2025-07-07 PROCEDURE — G8399 PT W/DXA RESULTS DOCUMENT: HCPCS | Performed by: NURSE PRACTITIONER

## 2025-07-07 RX ORDER — DIPHENHYDRAMINE HYDROCHLORIDE 50 MG/ML
50 INJECTION, SOLUTION INTRAMUSCULAR; INTRAVENOUS ONCE
Status: COMPLETED | OUTPATIENT
Start: 2025-07-07 | End: 2025-07-07

## 2025-07-07 RX ORDER — METHYLPREDNISOLONE SODIUM SUCCINATE 125 MG/2ML
125 INJECTION INTRAMUSCULAR; INTRAVENOUS ONCE
Status: COMPLETED | OUTPATIENT
Start: 2025-07-07 | End: 2025-07-07

## 2025-07-07 RX ORDER — METHYLPREDNISOLONE SODIUM SUCCINATE 125 MG/2ML
125 INJECTION INTRAMUSCULAR; INTRAVENOUS ONCE
Status: SHIPPED | OUTPATIENT
Start: 2025-07-07

## 2025-07-07 RX ORDER — ONDANSETRON 4 MG/1
4 TABLET, ORALLY DISINTEGRATING ORAL 3 TIMES DAILY PRN
Qty: 30 TABLET | Refills: 0 | Status: SHIPPED | OUTPATIENT
Start: 2025-07-07

## 2025-07-07 RX ADMIN — DIPHENHYDRAMINE HYDROCHLORIDE 50 MG: 50 INJECTION, SOLUTION INTRAMUSCULAR; INTRAVENOUS at 16:09

## 2025-07-07 RX ADMIN — METHYLPREDNISOLONE SODIUM SUCCINATE 125 MG: 125 INJECTION INTRAMUSCULAR; INTRAVENOUS at 16:11

## 2025-07-07 ASSESSMENT — ENCOUNTER SYMPTOMS
NAUSEA: 1
RESPIRATORY NEGATIVE: 1
VOMITING: 1
DIARRHEA: 1
ROS SKIN COMMENTS: ITCHY
EYES NEGATIVE: 1

## 2025-07-07 NOTE — PROGRESS NOTES
Patient presented to clinic stating that she is having trouble breathing and itching as well as nausea and diarrhea. Patient immediately taken to room and evaluated. Patient scratching chest and dry heaving. 02 97% on room air and dropped to 94%. 2 Liters 02 placed and SATs improving to 97-98%. TATYANA Smiley in room to assess patient. Patient denies chest pain. She states that she feels like she had an allergic reaction and is itching everywhere. She states that she has had nausea, vomiting and diarrhea. TATYANA Tripp also in room to evaluate. Order for solumedrol and benadryl given by TATYANA Smiley. Patient appearing to relax and is able to discuss symptoms during visit with provider without difficulty. 02 sats remaining at 98%. Medications administered by Luis SHERMAN LPN. Patient instructed to go straight home, verbalized understanding, and stable at time of leaving clinic.

## 2025-07-07 NOTE — TELEPHONE ENCOUNTER
Patient called stating she has made it back home after her visit today which she arrived with shortness of breathe and itching, nausea . She stated that she feels like she is more worse and is feeling weird from the shots given today. Patient told me she can't open her mouth right  and that her tongue feels like it is swollen now.I instructed patient to immediately call the EMS to go on to the ED , and instructed patient to not drive herself due to her symptoms. Patient verbalized understanding and agreed.

## 2025-07-07 NOTE — PROGRESS NOTES
SUBJECTIVE:  Gwendolyn Chan is a 80 y.o. female that presents with   Chief Complaint   Patient presents with    Nausea    Diarrhea    Shortness of Breath    Other     itching   .    HPI:    This is a 80-year-old white female who presents today for 230 appointment tells me that yesterday she started having problems with vomiting and diarrhea and some shortness of breath and that today she is itching all over and feels like she is short of breath she does use 2 L of O2 at home she came in without oxygen today and pulse ox was 95% on room air so I do not believe she is having a systemic reaction.  She tells me her diarrhea has been like water and that it started last night and that she is vomiting and asked for refill on her Zofran.        Review of Systems   Constitutional:  Positive for activity change.   HENT: Negative.     Eyes: Negative.    Respiratory: Negative.     Cardiovascular: Negative.    Gastrointestinal:  Positive for diarrhea, nausea and vomiting.   Endocrine: Negative.    Genitourinary: Negative.    Musculoskeletal:  Positive for arthralgias and myalgias.   Skin: Negative.         itchy   Neurological: Negative.    Hematological: Negative.    Psychiatric/Behavioral:  The patient is nervous/anxious.    All other systems reviewed and are negative.       OBJECTIVE:  BP (!) 152/88   Pulse 78   Resp 20   Ht 1.6 m (5' 3\")   LMP 01/01/1986 (Approximate)   SpO2 97% Comment: ra  BMI 31.18 kg/m²   Physical Exam  Vitals and nursing note reviewed.   Constitutional:       Appearance: Normal appearance. She is well-developed. She is obese.   HENT:      Head: Normocephalic and atraumatic.      Right Ear: Tympanic membrane, ear canal and external ear normal.      Left Ear: Tympanic membrane, ear canal and external ear normal.      Nose: Nose normal.      Mouth/Throat:      Mouth: Mucous membranes are moist.      Pharynx: Oropharynx is clear.   Eyes:      Extraocular Movements: Extraocular movements intact.

## 2025-07-07 NOTE — PROGRESS NOTES
Spoke with pt at 417pm and informed her that covid/flu test was negative. Pt informed me that she had called ambulance because \"I think I had a stroke a few minutes ago\" I informed pt that I would call her tomorrow and check on her.     Patient tolerated injection well. Patient advised to wait 20 minutes in the office following the injection. No signs/symptoms of reaction noted after 20 minutes.  Administrations This Visit       diphenhydrAMINE (BENADRYL) injection 50 mg       Admin Date  07/07/2025  16:09 Action  Given Dose  50 mg Route  IntraMUSCular Site  Dorsogluteal Right Documented By  Luis Miguel LPN    NDC: 7002-7427-06    : [employer]    Patient Supplied?: No              methylPREDNISolone sodium succ (SOLU-MEDROL) injection 125 mg       Admin Date  07/07/2025  16:11 Action  Given Dose  125 mg Route  IntraMUSCular Site  Dorsogluteal Left Documented By  Luis Miguel LPN    NDC: 27093-682-69    : [employer] [facility]    Patient Supplied?: No

## 2025-07-09 DIAGNOSIS — M54.6 ACUTE MIDLINE THORACIC BACK PAIN: ICD-10-CM

## 2025-07-09 NOTE — TELEPHONE ENCOUNTER
Patient called, requested refill.       Next Office Visit Date:  Future Appointments   Date Time Provider Department Center   7/15/2025  4:15 PM Aleja Lieberman MD [facility] DEP   7/29/2025  1:30 PM Aleja Lieberman MD [facility] DEP       JEREMY please review via PDMP

## 2025-07-13 RX ORDER — HYDROCODONE BITARTRATE AND ACETAMINOPHEN 5; 325 MG/1; MG/1
1 TABLET ORAL EVERY 8 HOURS PRN
Qty: 40 TABLET | Refills: 0 | Status: SHIPPED | OUTPATIENT
Start: 2025-07-13 | End: 2025-08-12

## 2025-07-15 ENCOUNTER — HOSPITAL ENCOUNTER (OUTPATIENT)
Facility: HOSPITAL | Age: 80
Discharge: HOME | End: 2025-07-15
Payer: MEDICARE

## 2025-07-15 ENCOUNTER — OFFICE VISIT (OUTPATIENT)
Age: 80
End: 2025-07-15
Payer: MEDICARE

## 2025-07-15 VITALS
DIASTOLIC BLOOD PRESSURE: 64 MMHG | WEIGHT: 179.2 LBS | RESPIRATION RATE: 16 BRPM | SYSTOLIC BLOOD PRESSURE: 136 MMHG | BODY MASS INDEX: 31.75 KG/M2 | OXYGEN SATURATION: 90 % | HEIGHT: 63 IN | TEMPERATURE: 97.2 F | HEART RATE: 79 BPM

## 2025-07-15 DIAGNOSIS — R11.2 NAUSEA VOMITING AND DIARRHEA: ICD-10-CM

## 2025-07-15 DIAGNOSIS — K85.80 OTHER ACUTE PANCREATITIS, UNSPECIFIED COMPLICATION STATUS: Primary | ICD-10-CM

## 2025-07-15 DIAGNOSIS — M54.6 ACUTE MIDLINE THORACIC BACK PAIN: ICD-10-CM

## 2025-07-15 DIAGNOSIS — R10.13 EPIGASTRIC PAIN: ICD-10-CM

## 2025-07-15 DIAGNOSIS — I10 ESSENTIAL HYPERTENSION: ICD-10-CM

## 2025-07-15 DIAGNOSIS — F41.0 PANIC ATTACK: ICD-10-CM

## 2025-07-15 DIAGNOSIS — K22.2 ESOPHAGEAL STRICTURE: ICD-10-CM

## 2025-07-15 DIAGNOSIS — R11.0 NAUSEA: ICD-10-CM

## 2025-07-15 DIAGNOSIS — R19.7 NAUSEA VOMITING AND DIARRHEA: ICD-10-CM

## 2025-07-15 PROCEDURE — G8427 DOCREV CUR MEDS BY ELIG CLIN: HCPCS | Performed by: FAMILY MEDICINE

## 2025-07-15 PROCEDURE — 3078F DIAST BP <80 MM HG: CPT | Performed by: FAMILY MEDICINE

## 2025-07-15 PROCEDURE — 1159F MED LIST DOCD IN RCRD: CPT | Performed by: FAMILY MEDICINE

## 2025-07-15 PROCEDURE — 99214 OFFICE O/P EST MOD 30 MIN: CPT | Performed by: FAMILY MEDICINE

## 2025-07-15 PROCEDURE — 1160F RVW MEDS BY RX/DR IN RCRD: CPT | Performed by: FAMILY MEDICINE

## 2025-07-15 PROCEDURE — 3075F SYST BP GE 130 - 139MM HG: CPT | Performed by: FAMILY MEDICINE

## 2025-07-15 PROCEDURE — 80061 LIPID PANEL: CPT

## 2025-07-15 PROCEDURE — 80053 COMPREHEN METABOLIC PANEL: CPT

## 2025-07-15 PROCEDURE — 36415 COLL VENOUS BLD VENIPUNCTURE: CPT

## 2025-07-15 PROCEDURE — 85027 COMPLETE CBC AUTOMATED: CPT

## 2025-07-15 PROCEDURE — 1090F PRES/ABSN URINE INCON ASSESS: CPT | Performed by: FAMILY MEDICINE

## 2025-07-15 PROCEDURE — 1123F ACP DISCUSS/DSCN MKR DOCD: CPT | Performed by: FAMILY MEDICINE

## 2025-07-15 PROCEDURE — G8399 PT W/DXA RESULTS DOCUMENT: HCPCS | Performed by: FAMILY MEDICINE

## 2025-07-15 PROCEDURE — 1036F TOBACCO NON-USER: CPT | Performed by: FAMILY MEDICINE

## 2025-07-15 PROCEDURE — 83690 ASSAY OF LIPASE: CPT

## 2025-07-15 PROCEDURE — G8417 CALC BMI ABV UP PARAM F/U: HCPCS | Performed by: FAMILY MEDICINE

## 2025-07-15 PROCEDURE — 96372 THER/PROPH/DIAG INJ SC/IM: CPT | Performed by: FAMILY MEDICINE

## 2025-07-15 RX ORDER — KETOROLAC TROMETHAMINE 30 MG/ML
60 INJECTION, SOLUTION INTRAMUSCULAR; INTRAVENOUS ONCE
Status: COMPLETED | OUTPATIENT
Start: 2025-07-15 | End: 2025-07-15

## 2025-07-15 RX ORDER — CLONAZEPAM 2 MG/1
TABLET ORAL
Qty: 30 TABLET | Refills: 2 | Status: SHIPPED | OUTPATIENT
Start: 2025-07-15 | End: 2025-11-01

## 2025-07-15 RX ORDER — HYDROCODONE BITARTRATE AND ACETAMINOPHEN 5; 325 MG/1; MG/1
1 TABLET ORAL EVERY 8 HOURS PRN
Qty: 40 TABLET | Refills: 0 | Status: SHIPPED | OUTPATIENT
Start: 2025-07-15 | End: 2025-08-14

## 2025-07-15 RX ORDER — ONDANSETRON 4 MG/1
4 TABLET, ORALLY DISINTEGRATING ORAL 3 TIMES DAILY PRN
Qty: 30 TABLET | Refills: 0 | Status: SHIPPED | OUTPATIENT
Start: 2025-07-15

## 2025-07-15 RX ADMIN — KETOROLAC TROMETHAMINE 60 MG: 30 INJECTION, SOLUTION INTRAMUSCULAR; INTRAVENOUS at 17:36

## 2025-07-15 ASSESSMENT — ENCOUNTER SYMPTOMS
SHORTNESS OF BREATH: 1
ABDOMINAL PAIN: 0
DIARRHEA: 1
COUGH: 1

## 2025-07-15 NOTE — PROGRESS NOTES
Have you been to the ER, urgent care clinic since your last visit?  Hospitalized since your last visit?   NO    Have you seen or consulted any other health care providers outside our system since your last visit?   NO             Administrations This Visit       ketorolac (TORADOL) injection 60 mg       Admin Date  07/15/2025  17:36 Action  Given Dose  60 mg Route  IntraMUSCular Site  Dorsogluteal Left Documented By  Rachelle Herrera MA    NDC: 75510-225-75    : [employer]    Patient Supplied?: No                 Patient tolerated injection well. Patient advised to wait 20 minutes in the office following the injection. No signs/symptoms of reaction noted after 20 minutes.    
normal.      Palpations: Abdomen is soft.   Musculoskeletal:      Right shoulder: Normal.      Left shoulder: Tenderness present. Decreased range of motion. Decreased strength.      Cervical back: Normal range of motion and neck supple.   Skin:     General: Skin is warm and dry.      Capillary Refill: Capillary refill takes less than 2 seconds.   Neurological:      General: No focal deficit present.      Mental Status: She is alert and oriented to person, place, and time.   Psychiatric:         Mood and Affect: Mood normal.         Behavior: Behavior normal.          No results found for requested labs within last 30 days.     Hemoglobin A1C (%)   Date Value   07/24/2024 6.6 (H)       Lab Results   Component Value Date/Time    WBC 7.7 11/05/2024 04:30 AM    NEUTROABS 10.7 11/04/2024 12:20 PM    HGB 11.1 11/05/2024 04:30 AM    HCT 34.7 11/05/2024 04:30 AM    MCV 93.8 11/05/2024 04:30 AM     11/05/2024 04:30 AM     Lab Results   Component Value Date    TSH 1.04 07/24/2024       ASSESSMENT/PLAN    Diagnosis Orders   1. Other acute pancreatitis, unspecified complication status  Lipase    LIPID PANEL    LIPID PANEL    Lipase    ketorolac (TORADOL) injection 60 mg      2. Epigastric pain        3. Esophageal stricture        4. Essential hypertension  Comprehensive Metabolic Panel    CBC    CBC    Comprehensive Metabolic Panel      5. Panic attack  clonazePAM (KLONOPIN) 2 MG tablet      6. Acute midline thoracic back pain  HYDROcodone-acetaminophen (NORCO) 5-325 MG per tablet      7. Nausea  ondansetron (ZOFRAN-ODT) 4 MG disintegrating tablet      8. Nausea vomiting and diarrhea  ondansetron (ZOFRAN-ODT) 4 MG disintegrating tablet        Assessment & Plan  Abdominal pain  The abdominal discomfort is likely due to a recurrence of pancreatitis, as evidenced by the tenderness in the mid-abdomen.  Diagnostic plan: Blood work will be conducted today to confirm the diagnosis of pancreatitis.  Treatment plan: A 24-hour

## 2025-07-16 LAB
ALBUMIN SERPL-MCNC: 4.1 G/DL (ref 3.4–4.8)
ALBUMIN/GLOB SERPL: 1.5 {RATIO} (ref 0.8–2)
ALP SERPL-CCNC: 101 U/L (ref 25–100)
ALT SERPL-CCNC: 13 U/L (ref 4–36)
ANION GAP SERPL CALCULATED.3IONS-SCNC: 8 MMOL/L (ref 3–16)
AST SERPL-CCNC: 19 U/L (ref 8–33)
BILIRUB SERPL-MCNC: 0.3 MG/DL (ref 0.3–1.2)
BUN SERPL-MCNC: 14 MG/DL (ref 6–20)
CALCIUM SERPL-MCNC: 9.1 MG/DL (ref 8.3–10.6)
CHLORIDE SERPL-SCNC: 102 MMOL/L (ref 98–107)
CHOLEST SERPL-MCNC: 207 MG/DL (ref 0–200)
CO2 SERPL-SCNC: 31 MMOL/L (ref 20–30)
CREAT SERPL-MCNC: 1 MG/DL (ref 0.6–1.2)
ERYTHROCYTE [DISTWIDTH] IN BLOOD BY AUTOMATED COUNT: 12.9 % (ref 11–16)
GFR SERPLBLD CREATININE-BSD FMLA CKD-EPI: 58 ML/MIN/{1.73_M2}
GLOBULIN SER CALC-MCNC: 2.7 G/DL
GLUCOSE SERPL-MCNC: 103 MG/DL (ref 74–106)
HCT VFR BLD AUTO: 41 % (ref 37–47)
HDLC SERPL-MCNC: 45 MG/DL (ref 40–60)
HGB BLD-MCNC: 12.8 G/DL (ref 11.5–16.5)
LDLC SERPL CALC-MCNC: 123 MG/DL
LIPASE SERPL-CCNC: 16 U/L (ref 5.6–51.3)
MCH RBC QN AUTO: 29.9 PG (ref 27–32)
MCHC RBC AUTO-ENTMCNC: 31.2 G/DL (ref 31–35)
MCV RBC AUTO: 95.8 FL (ref 80–100)
PLATELET # BLD AUTO: 224 K/UL (ref 150–400)
PMV BLD AUTO: 10.2 FL (ref 6–10)
POTASSIUM SERPL-SCNC: 4.3 MMOL/L (ref 3.4–5.1)
PROT SERPL-MCNC: 6.8 G/DL (ref 6.4–8.3)
RBC # BLD AUTO: 4.28 M/UL (ref 3.8–5.8)
SODIUM SERPL-SCNC: 141 MMOL/L (ref 136–145)
TRIGL SERPL-MCNC: 194 MG/DL (ref 0–249)
VLDLC SERPL CALC-MCNC: 39 MG/DL
WBC # BLD AUTO: 7.7 K/UL (ref 4–11)

## 2025-07-17 DIAGNOSIS — L50.9 HIVES: Primary | ICD-10-CM

## 2025-07-17 RX ORDER — CETIRIZINE HYDROCHLORIDE 10 MG/1
10 TABLET ORAL DAILY
Qty: 90 TABLET | Refills: 1 | Status: SHIPPED | OUTPATIENT
Start: 2025-07-17

## 2025-07-17 RX ORDER — FAMOTIDINE 20 MG/1
20 TABLET, FILM COATED ORAL 2 TIMES DAILY
Qty: 180 TABLET | Refills: 1 | Status: SHIPPED | OUTPATIENT
Start: 2025-07-17

## 2025-07-29 ENCOUNTER — TELEPHONE (OUTPATIENT)
Age: 80
End: 2025-07-29

## 2025-07-30 ENCOUNTER — TELEPHONE (OUTPATIENT)
Age: 80
End: 2025-07-30

## 2025-07-30 NOTE — TELEPHONE ENCOUNTER
Patient came in to clinic after being discharged from [facility] and was wondering if you could prescribe her pain patches?

## 2025-07-31 ENCOUNTER — OFFICE VISIT (OUTPATIENT)
Age: 80
End: 2025-07-31
Payer: MEDICARE

## 2025-07-31 VITALS
RESPIRATION RATE: 18 BRPM | HEIGHT: 63 IN | SYSTOLIC BLOOD PRESSURE: 138 MMHG | TEMPERATURE: 97.5 F | OXYGEN SATURATION: 95 % | HEART RATE: 68 BPM | WEIGHT: 172.2 LBS | BODY MASS INDEX: 30.51 KG/M2 | DIASTOLIC BLOOD PRESSURE: 70 MMHG

## 2025-07-31 DIAGNOSIS — M54.6 ACUTE MIDLINE THORACIC BACK PAIN: Primary | ICD-10-CM

## 2025-07-31 DIAGNOSIS — R19.7 DIARRHEA, UNSPECIFIED TYPE: ICD-10-CM

## 2025-07-31 DIAGNOSIS — R06.02 SHORTNESS OF BREATH: ICD-10-CM

## 2025-07-31 DIAGNOSIS — Z09 HOSPITAL DISCHARGE FOLLOW-UP: ICD-10-CM

## 2025-07-31 PROCEDURE — 1159F MED LIST DOCD IN RCRD: CPT

## 2025-07-31 PROCEDURE — 1111F DSCHRG MED/CURRENT MED MERGE: CPT

## 2025-07-31 PROCEDURE — 1090F PRES/ABSN URINE INCON ASSESS: CPT

## 2025-07-31 PROCEDURE — 1036F TOBACCO NON-USER: CPT

## 2025-07-31 PROCEDURE — G8417 CALC BMI ABV UP PARAM F/U: HCPCS

## 2025-07-31 PROCEDURE — 1160F RVW MEDS BY RX/DR IN RCRD: CPT

## 2025-07-31 PROCEDURE — 99214 OFFICE O/P EST MOD 30 MIN: CPT

## 2025-07-31 PROCEDURE — 1123F ACP DISCUSS/DSCN MKR DOCD: CPT

## 2025-07-31 PROCEDURE — 96372 THER/PROPH/DIAG INJ SC/IM: CPT

## 2025-07-31 PROCEDURE — 3078F DIAST BP <80 MM HG: CPT

## 2025-07-31 PROCEDURE — 3075F SYST BP GE 130 - 139MM HG: CPT

## 2025-07-31 PROCEDURE — G8399 PT W/DXA RESULTS DOCUMENT: HCPCS

## 2025-07-31 PROCEDURE — G8427 DOCREV CUR MEDS BY ELIG CLIN: HCPCS

## 2025-07-31 RX ORDER — DIPHENOXYLATE HYDROCHLORIDE AND ATROPINE SULFATE 2.5; .025 MG/1; MG/1
TABLET ORAL
Qty: 30 TABLET | Refills: 1 | Status: SHIPPED | OUTPATIENT
Start: 2025-07-31 | End: 2025-08-10

## 2025-07-31 RX ORDER — LIDOCAINE 4 G/G
1 PATCH TOPICAL DAILY
Qty: 30 EACH | Refills: 0 | Status: SHIPPED | OUTPATIENT
Start: 2025-07-31 | End: 2025-08-01

## 2025-07-31 RX ORDER — FLUTICASONE FUROATE, UMECLIDINIUM BROMIDE AND VILANTEROL TRIFENATATE 200; 62.5; 25 UG/1; UG/1; UG/1
POWDER RESPIRATORY (INHALATION)
COMMUNITY
Start: 2025-07-30 | End: 2025-07-31 | Stop reason: SDUPTHER

## 2025-07-31 RX ORDER — KETOROLAC TROMETHAMINE 30 MG/ML
60 INJECTION, SOLUTION INTRAMUSCULAR; INTRAVENOUS ONCE
Status: COMPLETED | OUTPATIENT
Start: 2025-07-31 | End: 2025-07-31

## 2025-07-31 RX ORDER — FLUTICASONE FUROATE, UMECLIDINIUM BROMIDE AND VILANTEROL TRIFENATATE 200; 62.5; 25 UG/1; UG/1; UG/1
1 POWDER RESPIRATORY (INHALATION) DAILY
Qty: 1 EACH | Refills: 1 | Status: SHIPPED | OUTPATIENT
Start: 2025-07-31

## 2025-07-31 RX ADMIN — KETOROLAC TROMETHAMINE 60 MG: 30 INJECTION, SOLUTION INTRAMUSCULAR; INTRAVENOUS at 11:42

## 2025-07-31 NOTE — PROGRESS NOTES
Post-Discharge Transitional Care Management Progress Note      Gwendolyn Chan   YOB: 1945    Date of Office Visit:  7/31/2025  Date of Hospital Admission: 7/29/2025  Date of Hospital Discharge: 7/30/2025    Care management risk score Rising risk (score 2-5) and Complex Care (Scores >=6): No Risk Score On File     Non face to face  following discharge, date last encounter closed (first attempt may have been earlier): *No documented post hospital discharge outreach found in the last 14 days *No documented post hospital discharge outreach found in the last 14 days    Call initiated 2 business days of discharge: *No response recorded in the last 14 days    ASSESSMENT/PLAN:   Acute midline thoracic back pain  -     lidocaine 4 % external patch; Place 1 patch onto the skin daily, TransDERmal, DAILY Starting Thu 7/31/2025, Until Sat 8/30/2025, For 30 days, Disp-30 each, R-0, Normal  -     ketorolac (TORADOL) injection 60 mg; 60 mg, IntraMUSCular, ONCE, 1 dose, On u 7/31/25 at 1200Do not administer for more than 5 days.  Shortness of breath  -     TRELEGY ELLIPTA 200-62.5-25 MCG/ACT AEPB inhaler; Inhale 1 puff into the lungs daily, Disp-1 each, R-1, DAWNormal  Diarrhea, unspecified type  -     diphenoxylate-atropine (LOMOTIL) 2.5-0.025 MG per tablet; Take one tablet q4 hours as needed for diarrhea, Disp-30 tablet, R-1Normal  Hospital discharge follow-up  -     MT DISCHARGE MEDS RECONCILED W/ CURRENT OUTPATIENT MED LIST      Medical Decision Making: straightforward  Return if symptoms worsen or fail to improve.         Subjective:   HPI:  Follow up of Hospital problems/diagnosis(es): Thoracic back pain, pleuritic pain    Inpatient course: Discharge summary reviewed- see chart.    Interval history/Current status: Gwendolyn tells me that she woke up on the morning of 7/29 with excruciating back pain and shortness of breath and presented to [facility]'s ER.  CT and Doppler ruled out a blood clot.  She

## 2025-07-31 NOTE — PROGRESS NOTES
Chief Complaint   Patient presents with    Follow-Up from Hospital     Went to ER at [facility] yesterday for back pain.       Have you seen any other physician or provider since your last visit yes - [facility]     Have you had any other diagnostic tests since your last visit? yes - labs, CT chest, US of leg     Have you changed or stopped any medications since your last visit? no     Patient tolerated injection well. Patient advised to wait 20 minutes in the office following the injection. No signs/symptoms of reaction noted after 20 minutes.  Administrations This Visit       ketorolac (TORADOL) injection 60 mg       Admin Date  07/31/2025  11:42 Action  Given Dose  60 mg Route  IntraMUSCular Site  Dorsogluteal Right Documented By  Blaire Shahid RN    NDC: 35174-962-25    : [employer]    Patient Supplied?: No

## 2025-07-31 NOTE — TELEPHONE ENCOUNTER
Patient called requesting pain medication and patches be sent in for her. I advised her that an appointment would be the quickest way to get these. I scheduled her for today at 11 am.

## 2025-08-01 DIAGNOSIS — M54.6 ACUTE MIDLINE THORACIC BACK PAIN: ICD-10-CM

## 2025-08-01 RX ORDER — HYDROCODONE BITARTRATE AND ACETAMINOPHEN 5; 325 MG/1; MG/1
1 TABLET ORAL EVERY 8 HOURS PRN
Qty: 40 TABLET | Refills: 0 | Status: SHIPPED | OUTPATIENT
Start: 2025-08-01 | End: 2025-08-31

## 2025-08-01 RX ORDER — LIDOCAINE 50 MG/G
1 PATCH TOPICAL DAILY
Qty: 30 PATCH | Refills: 2 | Status: SHIPPED | OUTPATIENT
Start: 2025-08-01 | End: 2025-10-30

## 2025-08-06 ENCOUNTER — OFFICE VISIT (OUTPATIENT)
Age: 80
End: 2025-08-06
Payer: MEDICARE

## 2025-08-06 VITALS
WEIGHT: 171.6 LBS | SYSTOLIC BLOOD PRESSURE: 152 MMHG | TEMPERATURE: 97.5 F | HEART RATE: 77 BPM | RESPIRATION RATE: 16 BRPM | DIASTOLIC BLOOD PRESSURE: 80 MMHG | HEIGHT: 63 IN | OXYGEN SATURATION: 96 % | BODY MASS INDEX: 30.41 KG/M2

## 2025-08-06 DIAGNOSIS — R19.7 DIARRHEA, UNSPECIFIED TYPE: ICD-10-CM

## 2025-08-06 DIAGNOSIS — M54.6 ACUTE MIDLINE THORACIC BACK PAIN: ICD-10-CM

## 2025-08-06 PROCEDURE — 1090F PRES/ABSN URINE INCON ASSESS: CPT | Performed by: FAMILY MEDICINE

## 2025-08-06 PROCEDURE — 1036F TOBACCO NON-USER: CPT | Performed by: FAMILY MEDICINE

## 2025-08-06 PROCEDURE — 3077F SYST BP >= 140 MM HG: CPT | Performed by: FAMILY MEDICINE

## 2025-08-06 PROCEDURE — G8427 DOCREV CUR MEDS BY ELIG CLIN: HCPCS | Performed by: FAMILY MEDICINE

## 2025-08-06 PROCEDURE — 1160F RVW MEDS BY RX/DR IN RCRD: CPT | Performed by: FAMILY MEDICINE

## 2025-08-06 PROCEDURE — 1123F ACP DISCUSS/DSCN MKR DOCD: CPT | Performed by: FAMILY MEDICINE

## 2025-08-06 PROCEDURE — G8417 CALC BMI ABV UP PARAM F/U: HCPCS | Performed by: FAMILY MEDICINE

## 2025-08-06 PROCEDURE — 99214 OFFICE O/P EST MOD 30 MIN: CPT | Performed by: FAMILY MEDICINE

## 2025-08-06 PROCEDURE — 3079F DIAST BP 80-89 MM HG: CPT | Performed by: FAMILY MEDICINE

## 2025-08-06 PROCEDURE — 1159F MED LIST DOCD IN RCRD: CPT | Performed by: FAMILY MEDICINE

## 2025-08-06 PROCEDURE — G8399 PT W/DXA RESULTS DOCUMENT: HCPCS | Performed by: FAMILY MEDICINE

## 2025-08-06 RX ORDER — DIPHENOXYLATE HYDROCHLORIDE AND ATROPINE SULFATE 2.5; .025 MG/1; MG/1
TABLET ORAL
Qty: 30 TABLET | Refills: 1 | Status: SHIPPED | OUTPATIENT
Start: 2025-08-06 | End: 2025-08-16

## 2025-08-06 RX ORDER — HYDROCODONE BITARTRATE AND ACETAMINOPHEN 5; 325 MG/1; MG/1
1 TABLET ORAL EVERY 8 HOURS PRN
Qty: 40 TABLET | Refills: 0 | Status: SHIPPED | OUTPATIENT
Start: 2025-08-06 | End: 2025-09-05

## 2025-08-06 ASSESSMENT — ENCOUNTER SYMPTOMS
SHORTNESS OF BREATH: 1
ABDOMINAL PAIN: 0
DIARRHEA: 1

## 2025-08-26 ENCOUNTER — OFFICE VISIT (OUTPATIENT)
Age: 80
End: 2025-08-26
Payer: MEDICARE

## 2025-08-26 VITALS
HEART RATE: 94 BPM | RESPIRATION RATE: 20 BRPM | OXYGEN SATURATION: 98 % | TEMPERATURE: 99.1 F | SYSTOLIC BLOOD PRESSURE: 138 MMHG | BODY MASS INDEX: 30.4 KG/M2 | HEIGHT: 63 IN | DIASTOLIC BLOOD PRESSURE: 76 MMHG

## 2025-08-26 DIAGNOSIS — M54.6 ACUTE MIDLINE THORACIC BACK PAIN: Primary | ICD-10-CM

## 2025-08-26 PROCEDURE — 3075F SYST BP GE 130 - 139MM HG: CPT

## 2025-08-26 PROCEDURE — G8427 DOCREV CUR MEDS BY ELIG CLIN: HCPCS

## 2025-08-26 PROCEDURE — 1123F ACP DISCUSS/DSCN MKR DOCD: CPT

## 2025-08-26 PROCEDURE — 3078F DIAST BP <80 MM HG: CPT

## 2025-08-26 PROCEDURE — G8399 PT W/DXA RESULTS DOCUMENT: HCPCS

## 2025-08-26 PROCEDURE — 1090F PRES/ABSN URINE INCON ASSESS: CPT

## 2025-08-26 PROCEDURE — 1159F MED LIST DOCD IN RCRD: CPT

## 2025-08-26 PROCEDURE — 1036F TOBACCO NON-USER: CPT

## 2025-08-26 PROCEDURE — 99213 OFFICE O/P EST LOW 20 MIN: CPT

## 2025-08-26 PROCEDURE — G8417 CALC BMI ABV UP PARAM F/U: HCPCS

## 2025-08-26 PROCEDURE — 1160F RVW MEDS BY RX/DR IN RCRD: CPT

## 2025-08-26 PROCEDURE — 96372 THER/PROPH/DIAG INJ SC/IM: CPT

## 2025-08-26 RX ORDER — METHYLPREDNISOLONE SODIUM SUCCINATE 125 MG/2ML
125 INJECTION INTRAMUSCULAR; INTRAVENOUS ONCE
Status: COMPLETED | OUTPATIENT
Start: 2025-08-26 | End: 2025-08-26

## 2025-08-26 RX ORDER — KETOROLAC TROMETHAMINE 30 MG/ML
60 INJECTION, SOLUTION INTRAMUSCULAR; INTRAVENOUS ONCE
Status: COMPLETED | OUTPATIENT
Start: 2025-08-26 | End: 2025-08-26

## 2025-08-26 RX ORDER — METHYLPREDNISOLONE SODIUM SUCCINATE 125 MG/2ML
125 INJECTION INTRAMUSCULAR; INTRAVENOUS ONCE
Status: SHIPPED | OUTPATIENT
Start: 2025-08-26

## 2025-08-26 RX ADMIN — METHYLPREDNISOLONE SODIUM SUCCINATE 125 MG: 125 INJECTION INTRAMUSCULAR; INTRAVENOUS at 16:51

## 2025-08-26 RX ADMIN — KETOROLAC TROMETHAMINE 60 MG: 30 INJECTION, SOLUTION INTRAMUSCULAR; INTRAVENOUS at 16:50

## 2025-08-27 ENCOUNTER — OFFICE VISIT (OUTPATIENT)
Age: 80
End: 2025-08-27
Payer: MEDICARE

## 2025-08-27 VITALS
TEMPERATURE: 99 F | RESPIRATION RATE: 16 BRPM | OXYGEN SATURATION: 96 % | DIASTOLIC BLOOD PRESSURE: 88 MMHG | BODY MASS INDEX: 31.01 KG/M2 | HEART RATE: 85 BPM | HEIGHT: 63 IN | WEIGHT: 175 LBS | SYSTOLIC BLOOD PRESSURE: 158 MMHG

## 2025-08-27 DIAGNOSIS — R34 DECREASED URINE OUTPUT: ICD-10-CM

## 2025-08-27 DIAGNOSIS — J44.1 ACUTE EXACERBATION OF CHRONIC OBSTRUCTIVE PULMONARY DISEASE (COPD) (HCC): Primary | ICD-10-CM

## 2025-08-27 DIAGNOSIS — R68.89 CONGESTION OF THROAT: ICD-10-CM

## 2025-08-27 PROCEDURE — 1160F RVW MEDS BY RX/DR IN RCRD: CPT | Performed by: FAMILY MEDICINE

## 2025-08-27 PROCEDURE — G8417 CALC BMI ABV UP PARAM F/U: HCPCS | Performed by: FAMILY MEDICINE

## 2025-08-27 PROCEDURE — G8399 PT W/DXA RESULTS DOCUMENT: HCPCS | Performed by: FAMILY MEDICINE

## 2025-08-27 PROCEDURE — 96372 THER/PROPH/DIAG INJ SC/IM: CPT | Performed by: FAMILY MEDICINE

## 2025-08-27 PROCEDURE — 1123F ACP DISCUSS/DSCN MKR DOCD: CPT | Performed by: FAMILY MEDICINE

## 2025-08-27 PROCEDURE — 87880 STREP A ASSAY W/OPTIC: CPT | Performed by: FAMILY MEDICINE

## 2025-08-27 PROCEDURE — G8427 DOCREV CUR MEDS BY ELIG CLIN: HCPCS | Performed by: FAMILY MEDICINE

## 2025-08-27 PROCEDURE — 3023F SPIROM DOC REV: CPT | Performed by: FAMILY MEDICINE

## 2025-08-27 PROCEDURE — 3077F SYST BP >= 140 MM HG: CPT | Performed by: FAMILY MEDICINE

## 2025-08-27 PROCEDURE — 1159F MED LIST DOCD IN RCRD: CPT | Performed by: FAMILY MEDICINE

## 2025-08-27 PROCEDURE — 3079F DIAST BP 80-89 MM HG: CPT | Performed by: FAMILY MEDICINE

## 2025-08-27 PROCEDURE — 1036F TOBACCO NON-USER: CPT | Performed by: FAMILY MEDICINE

## 2025-08-27 PROCEDURE — 1090F PRES/ABSN URINE INCON ASSESS: CPT | Performed by: FAMILY MEDICINE

## 2025-08-27 PROCEDURE — 87428 SARSCOV & INF VIR A&B AG IA: CPT | Performed by: FAMILY MEDICINE

## 2025-08-27 PROCEDURE — 99213 OFFICE O/P EST LOW 20 MIN: CPT | Performed by: FAMILY MEDICINE

## 2025-08-27 RX ORDER — TAMSULOSIN HYDROCHLORIDE 0.4 MG/1
0.4 CAPSULE ORAL DAILY
Qty: 30 CAPSULE | Refills: 0 | Status: SHIPPED | OUTPATIENT
Start: 2025-08-27

## 2025-08-27 RX ORDER — METHYLPREDNISOLONE SODIUM SUCCINATE 125 MG/2ML
125 INJECTION INTRAMUSCULAR; INTRAVENOUS ONCE
Status: COMPLETED | OUTPATIENT
Start: 2025-08-27 | End: 2025-08-27

## 2025-08-27 RX ORDER — CEFTRIAXONE 1 G/1
1000 INJECTION, POWDER, FOR SOLUTION INTRAMUSCULAR; INTRAVENOUS ONCE
Status: COMPLETED | OUTPATIENT
Start: 2025-08-27 | End: 2025-08-27

## 2025-08-27 RX ORDER — DOXYCYCLINE HYCLATE 100 MG
100 TABLET ORAL 2 TIMES DAILY
Qty: 20 TABLET | Refills: 0 | Status: SHIPPED | OUTPATIENT
Start: 2025-08-27 | End: 2025-09-06

## 2025-08-27 RX ADMIN — CEFTRIAXONE 1000 MG: 1 INJECTION, POWDER, FOR SOLUTION INTRAMUSCULAR; INTRAVENOUS at 16:50

## 2025-08-27 RX ADMIN — METHYLPREDNISOLONE SODIUM SUCCINATE 125 MG: 125 INJECTION INTRAMUSCULAR; INTRAVENOUS at 16:50

## 2025-08-27 ASSESSMENT — ENCOUNTER SYMPTOMS: BACK PAIN: 1

## 2025-08-28 RX ORDER — BENZONATATE 200 MG/1
200 CAPSULE ORAL 3 TIMES DAILY PRN
Qty: 30 CAPSULE | Refills: 0 | Status: SHIPPED | OUTPATIENT
Start: 2025-08-28 | End: 2025-09-07